# Patient Record
Sex: FEMALE | Race: WHITE | NOT HISPANIC OR LATINO | Employment: OTHER | URBAN - NONMETROPOLITAN AREA
[De-identification: names, ages, dates, MRNs, and addresses within clinical notes are randomized per-mention and may not be internally consistent; named-entity substitution may affect disease eponyms.]

---

## 2017-01-04 ENCOUNTER — OFFICE VISIT (OUTPATIENT)
Dept: FAMILY MEDICINE CLINIC | Facility: CLINIC | Age: 82
End: 2017-01-04

## 2017-01-04 VITALS
DIASTOLIC BLOOD PRESSURE: 77 MMHG | HEART RATE: 56 BPM | BODY MASS INDEX: 30.26 KG/M2 | SYSTOLIC BLOOD PRESSURE: 132 MMHG | WEIGHT: 181.6 LBS | OXYGEN SATURATION: 89 % | HEIGHT: 65 IN

## 2017-01-04 DIAGNOSIS — M79.7 FIBROMYALGIA: ICD-10-CM

## 2017-01-04 DIAGNOSIS — E78.5 HYPERLIPIDEMIA, UNSPECIFIED HYPERLIPIDEMIA TYPE: ICD-10-CM

## 2017-01-04 DIAGNOSIS — R21 RASH: ICD-10-CM

## 2017-01-04 DIAGNOSIS — J30.1 ALLERGIC RHINITIS DUE TO POLLEN, UNSPECIFIED RHINITIS SEASONALITY: ICD-10-CM

## 2017-01-04 DIAGNOSIS — I10 ESSENTIAL HYPERTENSION: Primary | ICD-10-CM

## 2017-01-04 DIAGNOSIS — M25.50 MULTIPLE JOINT PAIN: ICD-10-CM

## 2017-01-04 DIAGNOSIS — F41.9 ANXIETY: ICD-10-CM

## 2017-01-04 DIAGNOSIS — M19.012 OSTEOARTHRITIS OF LEFT SHOULDER, UNSPECIFIED OSTEOARTHRITIS TYPE: ICD-10-CM

## 2017-01-04 DIAGNOSIS — K21.9 GASTROESOPHAGEAL REFLUX DISEASE, ESOPHAGITIS PRESENCE NOT SPECIFIED: ICD-10-CM

## 2017-01-04 PROCEDURE — 99213 OFFICE O/P EST LOW 20 MIN: CPT | Performed by: NURSE PRACTITIONER

## 2017-01-04 RX ORDER — TRAMADOL HYDROCHLORIDE 50 MG/1
50 TABLET ORAL 2 TIMES DAILY
Qty: 60 TABLET | Refills: 0 | Status: SHIPPED | OUTPATIENT
Start: 2017-01-04 | End: 2017-02-22 | Stop reason: SDUPTHER

## 2017-01-04 NOTE — PROGRESS NOTES
Subjective   Tonia Ritter is a 81 y.o. female  here to follow up on fibromyalgia and OA pain.    Fibromyalgia   This is a chronic problem. The current episode started more than 1 year ago. The problem occurs daily. The problem has been unchanged. Associated symptoms include arthralgias and myalgias. Nothing aggravates the symptoms. She has tried acetaminophen, NSAIDs, oral narcotics and relaxation for the symptoms. The treatment provided moderate relief.   Pt has been taking Tramadol for her pain and is tolerating it well.  Tramadol enables pt to continue with her ADLs and live her life without difficulty.  She denies any side effects from the medication.  Pt is requesting refill on her med today.     Itching.  Patient states that she has an area on her back that itches intermittently.  She states that she cannot actually see the area that itches but she had surgery at the site a couple of years ago.  She denies any pain at the site.  No drainage from the site.  She has used lotion on area but does not seem to help.       Braulio # [id number]  Reviewed and is consistent.  Patient comfort assessment guide reviewed and updated today.    As part of the patient's treatment plan they are being prescribed a controlled substance/ substances with potential for abuse.  This patient has been made aware of the appropriate use of such medications, including potential risk of somnolence, limited ability to drive and/or work safely, and potential for overdose.  It has also been made clear these medications are for use by the patient only, without concomitant use of alcohol or other substances unless prescribed/advised by medical provider.    Patient has completed prescribing agreement detailing terms of continued prescribing of controlled substances including monitoring BRAULIO reports, urine drug screens, and pill counts.  The patient is aware BRAULIO reports are reviewed on a regular basis and scanned into the chart.    History  and physical exam exhibit continued safe and appropriate use of controlled substances.    Family History   Problem Relation Age of Onset   • Thyroid disease Mother    • Mental illness Father      nervous breakdown       Social History     Social History   • Marital status:      Spouse name: N/A   • Number of children: N/A   • Years of education: N/A     Occupational History   • Not on file.     Social History Main Topics   • Smoking status: Never Smoker   • Smokeless tobacco: Never Used   • Alcohol use No   • Drug use: No   • Sexual activity: Not on file     Other Topics Concern   • Not on file     Social History Narrative       Past Medical History   Diagnosis Date   • Acute bronchitis    • Allergic rhinitis    • Anxiety    • Atrial fibrillation    • Back pain    • Burping    • Chronic pain    • Depression    • Encounter for screening fecal occult blood testing 03/10/2016     NEGATIVE   • Fibromyalgia    • Flatulence    • GERD (gastroesophageal reflux disease)    • Hematuria    • Hyperlipidemia    • Hypertension    • Osteoarthritis    • PUD (peptic ulcer disease)    • Right shoulder pain        Review of Systems   Constitutional: Negative.    HENT: Negative.    Respiratory: Negative.    Cardiovascular: Negative.    Gastrointestinal: Negative.    Musculoskeletal: Positive for arthralgias and myalgias.   Skin:        itching   Neurological: Negative.        Objective   Physical Exam   Constitutional: She is oriented to person, place, and time. She appears well-developed and well-nourished.   Neck: Normal range of motion. Neck supple.   Cardiovascular: Normal rate, regular rhythm and normal heart sounds.    Pulmonary/Chest: Effort normal and breath sounds normal.   Neurological: She is alert and oriented to person, place, and time.   Skin: Skin is warm and dry.   Erythematous quarter size area noted to upper right back area x2     Psychiatric: She has a normal mood and affect. Her behavior is normal. Judgment  "and thought content normal.   Nursing note and vitals reviewed.      Procedures    Blood pressure 132/77, pulse 56, height 65\" (165.1 cm), weight 181 lb 9.6 oz (82.4 kg), SpO2 (!) 89 %, not currently breastfeeding.      Assessment/Plan   Tonia was seen today for follow-up, hypertension and anxiety.    Diagnoses and all orders for this visit:    Essential hypertension  -     CBC & Differential  -     Comprehensive Metabolic Panel  -     Lipid Panel  -     TSH  -     Magnesium    Allergic rhinitis due to pollen, unspecified rhinitis seasonality  -     CBC & Differential  -     Comprehensive Metabolic Panel  -     Lipid Panel  -     TSH  -     Magnesium    Gastroesophageal reflux disease, esophagitis presence not specified  -     CBC & Differential  -     Comprehensive Metabolic Panel  -     Lipid Panel  -     TSH  -     Magnesium    Hyperlipidemia, unspecified hyperlipidemia type  -     CBC & Differential  -     Comprehensive Metabolic Panel  -     Lipid Panel  -     TSH  -     Magnesium    Anxiety  -     CBC & Differential  -     Comprehensive Metabolic Panel  -     Lipid Panel  -     TSH  -     Magnesium    Fibromyalgia  -     traMADol (ULTRAM) 50 MG tablet; Take 1 tablet by mouth 2 (Two) Times a Day.    Osteoarthritis of left shoulder, unspecified osteoarthritis type    Multiple joint pain  -     traMADol (ULTRAM) 50 MG tablet; Take 1 tablet by mouth 2 (Two) Times a Day.  -     CBC & Differential  -     Comprehensive Metabolic Panel  -     Lipid Panel  -     TSH  -     Magnesium    Rash  -     triamcinolone (KENALOG) 0.1 % ointment; Apply  topically 2 (Two) Times a Day.      Labs prior to next visit   Refill Tramadol   Continue current meds and plan of care   RTC 3 months; sooner if needed            "

## 2017-01-04 NOTE — MR AVS SNAPSHOT
Tonia Ritter   1/4/2017 2:20 PM   Office Visit    Dept Phone:  [phone]   Encounter #:  [id number]    Provider:  TEODORA Red   Department:  [facility]                Your Full Care Plan              Today's Medication Changes          These changes are accurate as of: 1/4/17  3:16 PM.  If you have any questions, ask your nurse or doctor.               New Medication(s)Ordered:     triamcinolone 0.1 % ointment   Commonly known as:  KENALOG   Apply  topically 2 (Two) Times a Day.   Started by:  TEODORA Red            Where to Get Your Medications      These medications were sent to [facility] [address] [place] [address] [phone] [facility] [phone] [address] [address]     Phone:  [phone]     triamcinolone 0.1 % ointment         You can get these medications from any pharmacy     Bring a paper prescription for each of these medications     traMADol 50 MG tablet                  Your Updated Medication List          This list is accurate as of: 1/4/17  3:16 PM.  Always use your most recent med list.                acetaminophen 500 MG tablet   Commonly known as:  TYLENOL       diazePAM 2 MG tablet   Commonly known as:  VALIUM   Take 1 tablet by mouth 2 (Two) Times a Day As Needed for anxiety.       furosemide 20 MG tablet   Commonly known as:  LASIX   Take 1 tablet by mouth 2 (Two) Times a Day.       lisinopril 5 MG tablet   Commonly known as:  PRINIVIL,ZESTRIL       miconazole 2 % cream   Commonly known as:  MICATIN   Apply  topically 2 (Two) Times a Day.       mometasone 50 MCG/ACT nasal spray   Commonly known as:  NASONEX   2 sprays into each nostril daily.       PARoxetine 10 MG tablet   Commonly known as:  PAXIL   Take 1 tablet by mouth Every Night. 1/2 tab qhs       potassium chloride 10 MEQ CR tablet   Commonly known as:  K-DUR,KLOR-CON   Take 1 tablet by mouth daily.       PROBIOTIC ACIDOPHILUS BEADS PO       raNITIdine 150 MG tablet   Commonly known as:  ZANTAC   Take 1 tablet by mouth 2 (Two) Times a Day.       rivaroxaban 20 MG tablet   Commonly known as:  XARELTO   Take 1 tablet by mouth Daily.       rosuvastatin 10 MG tablet   Commonly known as:  CRESTOR   Take 1 tablet by mouth daily.       simethicone 80 MG chewable tablet   Commonly known as:  MYLICON       sotalol 120 MG tablet   Commonly known as:  BETAPACE   Take 1 tablet by mouth 2 (Two) Times a Day.       traMADol 50 MG tablet   Commonly known as:  ULTRAM   Take 1 tablet by mouth 2 (Two) Times a Day.       triamcinolone 0.1 % ointment   Commonly known as:  KENALOG   Apply  topically 2 (Two) Times a Day.               We Performed the Following     CBC & Differential     Comprehensive Metabolic Panel     Lipid Panel     Magnesium     TSH       You Were Diagnosed With        Codes Comments    Essential hypertension    -  Primary ICD-10-CM: I10  ICD-9-CM: 401.9     Allergic rhinitis due to pollen, unspecified rhinitis seasonality     ICD-10-CM: J30.1  ICD-9-CM: 477.0     Gastroesophageal reflux disease, esophagitis presence not specified     ICD-10-CM: K21.9  ICD-9-CM: 530.81     Hyperlipidemia, unspecified hyperlipidemia type     ICD-10-CM: E78.5  ICD-9-CM: 272.4     Anxiety     ICD-10-CM: F41.9  ICD-9-CM: 300.00     Fibromyalgia     ICD-10-CM: M79.7  ICD-9-CM: 729.1     Osteoarthritis of left shoulder, unspecified osteoarthritis type     ICD-10-CM: M19.012  ICD-9-CM: 715.91     Multiple joint pain     ICD-10-CM: M25.50  ICD-9-CM: 719.49     Rash     ICD-10-CM: R21  ICD-9-CM: 782.1       Instructions     None    Patient Instructions History      Upcoming Appointments     Visit Type Date Time Department    FOLLOW UP 1/4/2017  2:20 PM [facility]    FOLLOW UP 4/4/2017  2:00 PM [facility]      MyChart Signup     [facility] [employer] allows you to send messages to your doctor, view your test results, renew your  "prescriptions, schedule appointments, and more. To sign up, go to [employer] and click on the Sign Up Now link in the New User? box. Enter your [employer] Activation Code exactly as it appears below along with the last four digits of your Social Security Number and your Date of Birth () to complete the sign-up process. If you do not sign up before the expiration date, you must request a new code.    [employer] Activation Code: [email or web address]  Expires: 2017  3:16 PM    If you have questions, you can email [email or web address] or call [phone] to talk to our [employer] staff. Remember, [employer] is NOT to be used for urgent needs. For medical emergencies, dial 911.               Other Info from Your Visit           Your Appointments     2017  2:00 PM EDT   Follow Up with TEODORA Red   [facility] (--)    [address]  [address]  [facility] [address] [phone]           Arrive 15 minutes prior to appointment.              Allergies     Reglan [Metoclopramide]      Sulfa Antibiotics        Reason for Visit     Follow-up     Hypertension     Anxiety           Vital Signs     Blood Pressure Pulse Height Weight Oxygen Saturation Breastfeeding?    132/77 (BP Location: Right arm, Patient Position: Sitting, Cuff Size: Adult) 56 65\" (165.1 cm) 181 lb 9.6 oz (82.4 kg) 89% No    Body Mass Index Smoking Status                30.22 kg/m2 Never Smoker          Problems and Diagnoses Noted     Nasal inflammation due to allergen    Anxiety problem    Fibromyalgia    Acid reflux disease    High cholesterol or triglycerides    High blood pressure    Osteoarthritis (arthritis due to wear and tear of joints)    Multiple joint pain        Rash and nonspecific skin eruption            "

## 2017-01-09 LAB
ALBUMIN SERPL-MCNC: 4.5 G/DL (ref 3.4–4.8)
ALBUMIN/GLOB SERPL: 1.6 G/DL (ref 1.5–2.5)
ALP SERPL-CCNC: 86 U/L (ref 46–116)
ALT SERPL W P-5'-P-CCNC: 8 U/L (ref 10–36)
ANION GAP SERPL CALCULATED.3IONS-SCNC: 6.8 MMOL/L (ref 3.6–11.2)
AST SERPL-CCNC: 24 U/L (ref 10–30)
BASOPHILS # BLD AUTO: 0.02 10*3/MM3 (ref 0–0.3)
BASOPHILS NFR BLD AUTO: 0.4 % (ref 0–2)
BILIRUB SERPL-MCNC: 0.4 MG/DL (ref 0.2–1.8)
BUN BLD-MCNC: 11 MG/DL (ref 7–21)
BUN/CREAT SERPL: 10.2 (ref 7–25)
CALCIUM SPEC-SCNC: 9.9 MG/DL (ref 7.7–10)
CHLORIDE SERPL-SCNC: 106 MMOL/L (ref 99–112)
CHOLEST SERPL-MCNC: 199 MG/DL (ref 0–200)
CO2 SERPL-SCNC: 32.2 MMOL/L (ref 24.3–31.9)
CREAT BLD-MCNC: 1.08 MG/DL (ref 0.43–1.29)
DEPRECATED RDW RBC AUTO: 46.7 FL (ref 37–54)
EOSINOPHIL # BLD AUTO: 0.13 10*3/MM3 (ref 0–0.7)
EOSINOPHIL NFR BLD AUTO: 2.5 % (ref 0–7)
ERYTHROCYTE [DISTWIDTH] IN BLOOD BY AUTOMATED COUNT: 13.4 % (ref 11.5–14.5)
GFR SERPL CREATININE-BSD FRML MDRD: 49 ML/MIN/1.73
GLOBULIN UR ELPH-MCNC: 2.9 GM/DL
GLUCOSE BLD-MCNC: 99 MG/DL (ref 70–110)
HCT VFR BLD AUTO: 41.2 % (ref 37–47)
HDLC SERPL-MCNC: 67 MG/DL (ref 60–100)
HGB BLD-MCNC: 13.1 G/DL (ref 12–16)
IMM GRANULOCYTES # BLD: 0 10*3/MM3 (ref 0–0.03)
IMM GRANULOCYTES NFR BLD: 0 % (ref 0–0.5)
LDLC SERPL CALC-MCNC: 105 MG/DL (ref 0–100)
LDLC/HDLC SERPL: 1.57 {RATIO}
LYMPHOCYTES # BLD AUTO: 1.99 10*3/MM3 (ref 1–3)
LYMPHOCYTES NFR BLD AUTO: 39 % (ref 16–46)
MAGNESIUM SERPL-MCNC: 2.5 MG/DL (ref 1.7–2.6)
MCH RBC QN AUTO: 30.2 PG (ref 27–33)
MCHC RBC AUTO-ENTMCNC: 31.8 G/DL (ref 33–37)
MCV RBC AUTO: 94.9 FL (ref 80–94)
MONOCYTES # BLD AUTO: 0.73 10*3/MM3 (ref 0.1–0.9)
MONOCYTES NFR BLD AUTO: 14.3 % (ref 0–12)
NEUTROPHILS # BLD AUTO: 2.23 10*3/MM3 (ref 1.4–6.5)
NEUTROPHILS NFR BLD AUTO: 43.8 % (ref 40–75)
OSMOLALITY SERPL CALC.SUM OF ELEC: 288.1 MOSM/KG (ref 273–305)
PLATELET # BLD AUTO: 185 10*3/MM3 (ref 130–400)
PMV BLD AUTO: 11 FL (ref 6–10)
POTASSIUM BLD-SCNC: 4.3 MMOL/L (ref 3.5–5.3)
PROT SERPL-MCNC: 7.4 G/DL (ref 6–8)
RBC # BLD AUTO: 4.34 10*6/MM3 (ref 4.2–5.4)
SODIUM BLD-SCNC: 145 MMOL/L (ref 135–153)
TRIGL SERPL-MCNC: 135 MG/DL (ref 0–150)
TSH SERPL DL<=0.05 MIU/L-ACNC: 1.35 MIU/ML (ref 0.55–4.78)
VLDLC SERPL-MCNC: 27 MG/DL
WBC NRBC COR # BLD: 5.1 10*3/MM3 (ref 4.5–12.5)

## 2017-01-09 PROCEDURE — 85025 COMPLETE CBC W/AUTO DIFF WBC: CPT | Performed by: NURSE PRACTITIONER

## 2017-01-09 PROCEDURE — 84443 ASSAY THYROID STIM HORMONE: CPT | Performed by: NURSE PRACTITIONER

## 2017-01-09 PROCEDURE — 80053 COMPREHEN METABOLIC PANEL: CPT | Performed by: NURSE PRACTITIONER

## 2017-01-09 PROCEDURE — 36415 COLL VENOUS BLD VENIPUNCTURE: CPT | Performed by: NURSE PRACTITIONER

## 2017-01-09 PROCEDURE — 83735 ASSAY OF MAGNESIUM: CPT | Performed by: NURSE PRACTITIONER

## 2017-01-09 PROCEDURE — 80061 LIPID PANEL: CPT | Performed by: NURSE PRACTITIONER

## 2017-01-10 ENCOUNTER — TELEPHONE (OUTPATIENT)
Dept: FAMILY MEDICINE CLINIC | Facility: CLINIC | Age: 82
End: 2017-01-10

## 2017-01-10 NOTE — TELEPHONE ENCOUNTER
----- Message from TEODORA Red sent at 1/10/2017  2:46 PM EST -----  Labs look essentially normal.  Please let pt know.      No answer & no way to leave a message will attempt later.    Patient returned call & verbalized understanding.

## 2017-01-12 ENCOUNTER — TELEPHONE (OUTPATIENT)
Dept: FAMILY MEDICINE CLINIC | Facility: CLINIC | Age: 82
End: 2017-01-12

## 2017-01-12 ENCOUNTER — HOSPITAL ENCOUNTER (OUTPATIENT)
Dept: CT IMAGING | Facility: HOSPITAL | Age: 82
Discharge: HOME | End: 2017-01-12
Attending: OTOLARYNGOLOGY | Admitting: OTOLARYNGOLOGY

## 2017-01-12 DIAGNOSIS — J30.1 ALLERGIC RHINITIS DUE TO POLLEN, UNSPECIFIED RHINITIS SEASONALITY: ICD-10-CM

## 2017-01-12 DIAGNOSIS — F41.9 ANXIETY: ICD-10-CM

## 2017-01-12 PROCEDURE — 70486 CT MAXILLOFACIAL W/O DYE: CPT

## 2017-01-12 PROCEDURE — 70486 CT MAXILLOFACIAL W/O DYE: CPT | Performed by: RADIOLOGY

## 2017-01-12 RX ORDER — LISINOPRIL 5 MG/1
5 TABLET ORAL DAILY
Qty: 30 TABLET | Refills: 5 | Status: SHIPPED | OUTPATIENT
Start: 2017-01-12 | End: 2017-10-10 | Stop reason: SDUPTHER

## 2017-01-12 RX ORDER — DIAZEPAM 2 MG/1
2 TABLET ORAL 2 TIMES DAILY PRN
Qty: 60 TABLET | Refills: 0 | OUTPATIENT
Start: 2017-01-12 | End: 2017-02-22 | Stop reason: SDUPTHER

## 2017-01-12 NOTE — TELEPHONE ENCOUNTER
Patient called requesting a refill on Diazepam,mo is on your desk.    Rx called in per order & patient notified.

## 2017-02-03 RX ORDER — POLYETHYLENE GLYCOL 3350 17 G/17G
17 POWDER, FOR SOLUTION ORAL DAILY
Qty: 527 G | Refills: 3 | Status: SHIPPED | OUTPATIENT
Start: 2017-02-03 | End: 2017-02-06 | Stop reason: SDUPTHER

## 2017-02-06 RX ORDER — POLYETHYLENE GLYCOL 3350 17 G/17G
17 POWDER, FOR SOLUTION ORAL DAILY
Qty: 527 G | Refills: 3 | Status: SHIPPED | OUTPATIENT
Start: 2017-02-06 | End: 2017-12-19

## 2017-02-06 RX ORDER — ROSUVASTATIN CALCIUM 10 MG/1
10 TABLET, COATED ORAL DAILY
Qty: 30 TABLET | Refills: 5 | Status: SHIPPED | OUTPATIENT
Start: 2017-02-06 | End: 2018-01-04 | Stop reason: SDUPTHER

## 2017-02-06 NOTE — TELEPHONE ENCOUNTER
Patient called for refills on Miralax & crestor,refilled per protocol but she is requesting an ointment for a fever blister like sore that is on the inside & outside of her nose?

## 2017-02-07 NOTE — TELEPHONE ENCOUNTER
Bactroban ointment apply to affected areas BID #[id number] no refills    Rx sent to pharmacy & left a message for her to return call

## 2017-02-22 ENCOUNTER — OFFICE VISIT (OUTPATIENT)
Dept: FAMILY MEDICINE CLINIC | Facility: CLINIC | Age: 82
End: 2017-02-22

## 2017-02-22 VITALS
HEIGHT: 65 IN | HEART RATE: 57 BPM | WEIGHT: 180.6 LBS | OXYGEN SATURATION: 99 % | BODY MASS INDEX: 30.09 KG/M2 | DIASTOLIC BLOOD PRESSURE: 74 MMHG | SYSTOLIC BLOOD PRESSURE: 135 MMHG

## 2017-02-22 DIAGNOSIS — M25.50 MULTIPLE JOINT PAIN: ICD-10-CM

## 2017-02-22 DIAGNOSIS — M79.7 FIBROMYALGIA: ICD-10-CM

## 2017-02-22 DIAGNOSIS — F41.9 ANXIETY: ICD-10-CM

## 2017-02-22 DIAGNOSIS — K59.00 CONSTIPATION, UNSPECIFIED CONSTIPATION TYPE: Primary | ICD-10-CM

## 2017-02-22 PROCEDURE — 99213 OFFICE O/P EST LOW 20 MIN: CPT | Performed by: NURSE PRACTITIONER

## 2017-02-22 RX ORDER — DOCUSATE SODIUM 100 MG/1
100 CAPSULE, LIQUID FILLED ORAL DAILY
Qty: 30 CAPSULE | Refills: 5 | Status: SHIPPED | OUTPATIENT
Start: 2017-02-22 | End: 2017-12-27

## 2017-02-22 RX ORDER — DIAZEPAM 2 MG/1
2 TABLET ORAL 2 TIMES DAILY PRN
Qty: 60 TABLET | Refills: 0 | Status: SHIPPED | OUTPATIENT
Start: 2017-02-22 | End: 2017-04-21 | Stop reason: SDUPTHER

## 2017-02-22 RX ORDER — OMEGA-3/DHA/EPA/FISH OIL 1000 MG
CAPSULE ORAL
Qty: 30 CAPSULE | Refills: 5 | Status: SHIPPED | OUTPATIENT
Start: 2017-02-22 | End: 2017-12-27

## 2017-02-22 RX ORDER — TRAMADOL HYDROCHLORIDE 50 MG/1
50 TABLET ORAL 2 TIMES DAILY
Qty: 60 TABLET | Refills: 0 | Status: SHIPPED | OUTPATIENT
Start: 2017-02-22 | End: 2017-04-21 | Stop reason: SDUPTHER

## 2017-02-22 NOTE — PROGRESS NOTES
Subjective   Tonia Ritter is a 81 y.o. female.     Anxiety   Presents for follow-up visit. Symptoms include decreased concentration, irritability and nervous/anxious behavior. Symptoms occur occasionally. The severity of symptoms is moderate. The quality of sleep is good. Nighttime awakenings: occasional.     Compliance with medications is %.   Pt has taken diazepam for years and tolerates it well.  Her symptoms are well controlled with the medication and without side effects.  Pt is able to continue her ADLs with use of medication.  She denies any suicidal thoughts or ideations.       Fibromyalgia   This is a chronic problem. The current episode started more than 1 year ago. The problem occurs daily. The problem has been unchanged. Associated symptoms include arthralgias and myalgias. Nothing aggravates the symptoms. She has tried acetaminophen, NSAIDs, oral narcotics and relaxation for the symptoms. The treatment provided moderate relief.   Pt has been taking Tramadol for her pain and is tolerating it well. Tramadol enables pt to continue with her ADLs and live her life without difficulty. She denies any side effects from the medication. Pt is requesting refill on her med today.     Pt has a hx of constipation also.  She has been using Miralax and taking it every day.  She states for the past several days she has had excessive gas and abdominal cramping.  She is no longer having any formed stool.  She is also having some rectal bleeding with bowel movements.  She has a hx of hemorrhoids and has discussed this issue with Dr Bearden, general surgeon, who has advised that she have surgery to correct the hemorrhoid and he is currently prescribing her suppositories for her hemorrhoids.  After discussion today I have advised her to stop using Miralax for now.  She may be taking too much of this medication.  I have advised her that she can use a stool softener to help with soft bowel movements.  Pt is agreeable  today.   She is to also follow up with Dr. Bearden regarding her surgery for hemorrhoids.  Pt states understanding.     Family History   Problem Relation Age of Onset   • Thyroid disease Mother    • Mental illness Father      nervous breakdown       Social History     Social History   • Marital status:      Spouse name: N/A   • Number of children: N/A   • Years of education: N/A     Occupational History   • Not on file.     Social History Main Topics   • Smoking status: Never Smoker   • Smokeless tobacco: Never Used   • Alcohol use No   • Drug use: No   • Sexual activity: Not on file     Other Topics Concern   • Not on file     Social History Narrative       Past Medical History   Diagnosis Date   • Acute bronchitis    • Allergic rhinitis    • Anxiety    • Atrial fibrillation    • Back pain    • Burping    • Chronic pain    • Depression    • Encounter for screening fecal occult blood testing 03/10/2016     NEGATIVE   • Fibromyalgia    • Flatulence    • GERD (gastroesophageal reflux disease)    • Hematuria    • Hyperlipidemia    • Hypertension    • Osteoarthritis    • PUD (peptic ulcer disease)    • Right shoulder pain        Review of Systems   Constitutional: Positive for irritability.   HENT: Negative.    Respiratory: Negative.    Cardiovascular: Negative.    Gastrointestinal: Positive for anal bleeding and constipation.   Neurological: Negative.    Psychiatric/Behavioral: Positive for decreased concentration. The patient is nervous/anxious.        Objective   Physical Exam   Constitutional: She is oriented to person, place, and time. She appears well-developed and well-nourished.   Neck: Normal range of motion. Neck supple.   Cardiovascular: Normal rate, regular rhythm and normal heart sounds.    Pulmonary/Chest: Effort normal and breath sounds normal.   Neurological: She is alert and oriented to person, place, and time.   Skin: Skin is warm and dry.   Psychiatric: She has a normal mood and affect. Her  "behavior is normal. Judgment and thought content normal.   Nursing note and vitals reviewed.      Procedures  Blood pressure 135/74, pulse 57, height 65\" (165.1 cm), weight 180 lb 9.6 oz (81.9 kg), SpO2 99 %, not currently breastfeeding.      Assessment/Plan   Tonia was seen today for hemorrhoids and constipation.    Diagnoses and all orders for this visit:    Constipation, unspecified constipation type  -     docusate sodium (COLACE) 100 MG capsule; Take 1 capsule by mouth Daily.  -     Probiotic Product (PROBIOTIC ACIDOPHILUS) capsule; Once daily as directed    Anxiety  -     diazePAM (VALIUM) 2 MG tablet; Take 1 tablet by mouth 2 (Two) Times a Day As Needed for anxiety.    Multiple joint pain  -     traMADol (ULTRAM) 50 MG tablet; Take 1 tablet by mouth 2 (Two) Times a Day.    Fibromyalgia  -     traMADol (ULTRAM) 50 MG tablet; Take 1 tablet by mouth 2 (Two) Times a Day.      Refill on meds today.  Stop Miralax; start colace   Continue other meds as prescribed.   RTC 2-3 months for follow up       "

## 2017-03-06 RX ORDER — SOTALOL HYDROCHLORIDE 120 MG/1
120 TABLET ORAL 2 TIMES DAILY
Qty: 60 TABLET | Refills: 3 | Status: SHIPPED | OUTPATIENT
Start: 2017-03-06 | End: 2017-06-07 | Stop reason: SDUPTHER

## 2017-03-15 RX ORDER — FUROSEMIDE 20 MG/1
20 TABLET ORAL 2 TIMES DAILY
Qty: 60 TABLET | Refills: 3 | Status: SHIPPED | OUTPATIENT
Start: 2017-03-15 | End: 2017-07-03 | Stop reason: SDUPTHER

## 2017-03-15 RX ORDER — RANITIDINE 150 MG/1
150 TABLET ORAL 2 TIMES DAILY
Qty: 60 TABLET | Refills: 3 | Status: SHIPPED | OUTPATIENT
Start: 2017-03-15 | End: 2017-12-27

## 2017-03-29 RX ORDER — POTASSIUM CHLORIDE 750 MG/1
10 TABLET, EXTENDED RELEASE ORAL DAILY
Qty: 30 TABLET | Refills: 5 | Status: SHIPPED | OUTPATIENT
Start: 2017-03-29 | End: 2017-11-14 | Stop reason: SDUPTHER

## 2017-04-21 ENCOUNTER — OFFICE VISIT (OUTPATIENT)
Dept: FAMILY MEDICINE CLINIC | Facility: CLINIC | Age: 82
End: 2017-04-21

## 2017-04-21 VITALS
OXYGEN SATURATION: 94 % | HEART RATE: 61 BPM | BODY MASS INDEX: 30.46 KG/M2 | HEIGHT: 65 IN | WEIGHT: 182.8 LBS | DIASTOLIC BLOOD PRESSURE: 66 MMHG | SYSTOLIC BLOOD PRESSURE: 124 MMHG

## 2017-04-21 DIAGNOSIS — R53.83 OTHER FATIGUE: ICD-10-CM

## 2017-04-21 DIAGNOSIS — M79.7 FIBROMYALGIA: ICD-10-CM

## 2017-04-21 DIAGNOSIS — Z12.31 SCREENING MAMMOGRAM, ENCOUNTER FOR: Primary | ICD-10-CM

## 2017-04-21 DIAGNOSIS — E55.9 VITAMIN D DEFICIENCY: ICD-10-CM

## 2017-04-21 DIAGNOSIS — M25.50 MULTIPLE JOINT PAIN: ICD-10-CM

## 2017-04-21 DIAGNOSIS — F41.9 ANXIETY: ICD-10-CM

## 2017-04-21 LAB
25(OH)D3 SERPL-MCNC: 28 NG/ML
ALBUMIN SERPL-MCNC: 4.4 G/DL (ref 3.4–4.8)
ALBUMIN/GLOB SERPL: 1.6 G/DL (ref 1.5–2.5)
ALP SERPL-CCNC: 81 U/L (ref 35–104)
ALT SERPL W P-5'-P-CCNC: 12 U/L (ref 10–36)
ANION GAP SERPL CALCULATED.3IONS-SCNC: 2.9 MMOL/L (ref 3.6–11.2)
AST SERPL-CCNC: 24 U/L (ref 10–30)
BASOPHILS # BLD AUTO: 0.01 10*3/MM3 (ref 0–0.3)
BASOPHILS NFR BLD AUTO: 0.2 % (ref 0–2)
BILIRUB SERPL-MCNC: 0.3 MG/DL (ref 0.2–1.8)
BUN BLD-MCNC: 14 MG/DL (ref 7–21)
BUN/CREAT SERPL: 14.7 (ref 7–25)
CALCIUM SPEC-SCNC: 9.4 MG/DL (ref 7.7–10)
CHLORIDE SERPL-SCNC: 104 MMOL/L (ref 99–112)
CO2 SERPL-SCNC: 33.1 MMOL/L (ref 24.3–31.9)
CREAT BLD-MCNC: 0.95 MG/DL (ref 0.43–1.29)
DEPRECATED RDW RBC AUTO: 47.6 FL (ref 37–54)
EOSINOPHIL # BLD AUTO: 0.11 10*3/MM3 (ref 0–0.7)
EOSINOPHIL NFR BLD AUTO: 1.7 % (ref 0–7)
ERYTHROCYTE [DISTWIDTH] IN BLOOD BY AUTOMATED COUNT: 13.8 % (ref 11.5–14.5)
GFR SERPL CREATININE-BSD FRML MDRD: 57 ML/MIN/1.73
GLOBULIN UR ELPH-MCNC: 2.8 GM/DL
GLUCOSE BLD-MCNC: 86 MG/DL (ref 70–110)
HCT VFR BLD AUTO: 39 % (ref 37–47)
HGB BLD-MCNC: 12.5 G/DL (ref 12–16)
IMM GRANULOCYTES # BLD: 0.02 10*3/MM3 (ref 0–0.03)
IMM GRANULOCYTES NFR BLD: 0.3 % (ref 0–0.5)
LYMPHOCYTES # BLD AUTO: 2.24 10*3/MM3 (ref 1–3)
LYMPHOCYTES NFR BLD AUTO: 34.8 % (ref 16–46)
MAGNESIUM SERPL-MCNC: 2.1 MG/DL (ref 1.7–2.6)
MCH RBC QN AUTO: 30.1 PG (ref 27–33)
MCHC RBC AUTO-ENTMCNC: 32.1 G/DL (ref 33–37)
MCV RBC AUTO: 94 FL (ref 80–94)
MONOCYTES # BLD AUTO: 1 10*3/MM3 (ref 0.1–0.9)
MONOCYTES NFR BLD AUTO: 15.5 % (ref 0–12)
NEUTROPHILS # BLD AUTO: 3.06 10*3/MM3 (ref 1.4–6.5)
NEUTROPHILS NFR BLD AUTO: 47.5 % (ref 40–75)
OSMOLALITY SERPL CALC.SUM OF ELEC: 279.2 MOSM/KG (ref 273–305)
PLATELET # BLD AUTO: 177 10*3/MM3 (ref 130–400)
PMV BLD AUTO: 11.4 FL (ref 6–10)
POTASSIUM BLD-SCNC: 4.2 MMOL/L (ref 3.5–5.3)
PROT SERPL-MCNC: 7.2 G/DL (ref 6–8)
RBC # BLD AUTO: 4.15 10*6/MM3 (ref 4.2–5.4)
SODIUM BLD-SCNC: 140 MMOL/L (ref 135–153)
VIT B12 BLD-MCNC: 421 PG/ML (ref 211–911)
WBC NRBC COR # BLD: 6.44 10*3/MM3 (ref 4.5–12.5)

## 2017-04-21 PROCEDURE — 83735 ASSAY OF MAGNESIUM: CPT | Performed by: NURSE PRACTITIONER

## 2017-04-21 PROCEDURE — 85025 COMPLETE CBC W/AUTO DIFF WBC: CPT | Performed by: NURSE PRACTITIONER

## 2017-04-21 PROCEDURE — 36415 COLL VENOUS BLD VENIPUNCTURE: CPT | Performed by: NURSE PRACTITIONER

## 2017-04-21 PROCEDURE — 82607 VITAMIN B-12: CPT | Performed by: NURSE PRACTITIONER

## 2017-04-21 PROCEDURE — 80053 COMPREHEN METABOLIC PANEL: CPT | Performed by: NURSE PRACTITIONER

## 2017-04-21 PROCEDURE — 99214 OFFICE O/P EST MOD 30 MIN: CPT | Performed by: NURSE PRACTITIONER

## 2017-04-21 PROCEDURE — 82306 VITAMIN D 25 HYDROXY: CPT | Performed by: NURSE PRACTITIONER

## 2017-04-21 PROCEDURE — 96372 THER/PROPH/DIAG INJ SC/IM: CPT | Performed by: NURSE PRACTITIONER

## 2017-04-21 RX ORDER — TRAMADOL HYDROCHLORIDE 50 MG/1
50 TABLET ORAL 2 TIMES DAILY
Qty: 60 TABLET | Refills: 0 | Status: SHIPPED | OUTPATIENT
Start: 2017-04-21 | End: 2017-06-07 | Stop reason: SDUPTHER

## 2017-04-21 RX ORDER — CYANOCOBALAMIN 1000 UG/ML
1000 INJECTION, SOLUTION INTRAMUSCULAR; SUBCUTANEOUS ONCE
Status: COMPLETED | OUTPATIENT
Start: 2017-04-21 | End: 2017-04-21

## 2017-04-21 RX ORDER — DIAZEPAM 2 MG/1
2 TABLET ORAL 2 TIMES DAILY PRN
Qty: 60 TABLET | Refills: 0 | Status: SHIPPED | OUTPATIENT
Start: 2017-04-21 | End: 2017-06-07 | Stop reason: SDUPTHER

## 2017-04-21 RX ADMIN — CYANOCOBALAMIN 1000 MCG: 1000 INJECTION, SOLUTION INTRAMUSCULAR; SUBCUTANEOUS at 16:13

## 2017-04-21 NOTE — PROGRESS NOTES
Subjective   Tonia Ritter is a 81 y.o. female.     Chief Complaint: Follow-up; Anxiety; and Fibromyalgia    History of Present Illness   Pt here for follow up on anxiety and fibromyalgia.    Anxiety   Presents for follow-up visit. Symptoms include decreased concentration, irritability and nervous/anxious behavior. Symptoms occur occasionally. The severity of symptoms is moderate. The quality of sleep is good. Nighttime awakenings: occasional.      Compliance with medications is %.   Pt has taken diazepam for years and tolerates it well. Her symptoms are well controlled with the medication and without side effects. Pt is able to continue her ADLs with use of medication. She denies any suicidal thoughts or ideations. Pt states that she does occasionally take only 1/2 tablet and does well with that.        Fibromyalgia   This is a chronic problem. The current episode started more than 1 year ago. The problem occurs daily. The problem has been unchanged. Associated symptoms include arthralgias and myalgias. Nothing aggravates the symptoms. She has tried acetaminophen, NSAIDs, oral narcotics and relaxation for the symptoms. The treatment provided moderate relief.   Pt has been taking Tramadol for her pain and is tolerating it well. Tramadol enables pt to continue with her ADLs and live her life without difficulty. She denies any side effects from the medication. Pt is requesting refill on her med today.     Fatigue  Pt states that she has been having a lot of fatigue and getting tired very easily.  These symptoms have been going on for several months and seem to be getting worse.  She continues to follow up with cardiologist as scheduled.  She denies any chest pain or SOA, dizziness or syncope.  Just feels tired.     Micheal #[id number]  Reviewed and is consistent.  Patient comfort assessment guide reviewed and updated today.    As part of the patient's treatment plan they are being prescribed a controlled  substance/ substances with potential for abuse.  This patient has been made aware of the appropriate use of such medications, including potential risk of somnolence, limited ability to drive and/or work safely, and potential for overdose.  It has also been made clear these medications are for use by the patient only, without concomitant use of alcohol or other substances unless prescribed/advised by medical provider.    Patient has completed prescribing agreement detailing terms of continued prescribing of controlled substances including monitoring BRAULIO reports, urine drug screens, and pill counts.  The patient is aware BRAULIO reports are reviewed on a regular basis and scanned into the chart.    History and physical exam exhibit continued safe and appropriate use of controlled substances.    Family History   Problem Relation Age of Onset   • Thyroid disease Mother    • Mental illness Father      nervous breakdown       Social History     Social History   • Marital status:      Spouse name: N/A   • Number of children: N/A   • Years of education: N/A     Occupational History   • Not on file.     Social History Main Topics   • Smoking status: Never Smoker   • Smokeless tobacco: Never Used   • Alcohol use No   • Drug use: No   • Sexual activity: Defer     Other Topics Concern   • Not on file     Social History Narrative       Past Medical History:   Diagnosis Date   • Acute bronchitis    • Allergic rhinitis    • Anxiety    • Atrial fibrillation    • Back pain    • Burping    • Chronic pain    • Depression    • Encounter for screening fecal occult blood testing 03/10/2016    NEGATIVE   • Fibromyalgia    • Flatulence    • GERD (gastroesophageal reflux disease)    • Hematuria    • Hyperlipidemia    • Hypertension    • Osteoarthritis    • PUD (peptic ulcer disease)    • Right shoulder pain        Review of Systems   Constitutional: Positive for fatigue.   HENT: Negative.    Respiratory: Negative.   "  Cardiovascular: Negative.    Gastrointestinal: Negative.    Genitourinary: Negative.    Musculoskeletal: Positive for myalgias.   Neurological: Negative.    Psychiatric/Behavioral: Negative for suicidal ideas. The patient is nervous/anxious.        Objective   Physical Exam   Constitutional: She is oriented to person, place, and time. She appears well-developed and well-nourished.   Neck: Normal range of motion. Neck supple.   Cardiovascular: Normal rate, regular rhythm and normal heart sounds.    Pulmonary/Chest: Effort normal and breath sounds normal.   Neurological: She is alert and oriented to person, place, and time.   Skin: Skin is warm and dry.   Psychiatric: She has a normal mood and affect. Her behavior is normal. Judgment and thought content normal.   Nursing note and vitals reviewed.      Procedures    Vitals: Blood pressure 124/66, pulse 61, height 65\" (165.1 cm), weight 182 lb 12.8 oz (82.9 kg), SpO2 94 %, not currently breastfeeding.    Allergies:   Allergies   Allergen Reactions   • Reglan [Metoclopramide]    • Sulfa Antibiotics           Assessment/Plan   Tonia was seen today for follow-up, anxiety and fibromyalgia.    Diagnoses and all orders for this visit:    Screening mammogram, encounter for  -     Mammo Screening Digital Tomosynthesis Bilateral With CAD; Future  -     CBC & Differential  -     Comprehensive Metabolic Panel  -     Magnesium  -     Vitamin B12  -     Vitamin D 25 Hydroxy  -     CBC Auto Differential    Anxiety  -     diazePAM (VALIUM) 2 MG tablet; Take 1 tablet by mouth 2 (Two) Times a Day As Needed for Anxiety.  -     CBC & Differential  -     Comprehensive Metabolic Panel  -     Magnesium  -     Vitamin B12  -     Vitamin D 25 Hydroxy  -     CBC Auto Differential    Multiple joint pain  -     traMADol (ULTRAM) 50 MG tablet; Take 1 tablet by mouth 2 (Two) Times a Day.  -     CBC & Differential  -     Comprehensive Metabolic Panel  -     Magnesium  -     Vitamin B12  -     " Vitamin D 25 Hydroxy  -     CBC Auto Differential    Fibromyalgia  -     traMADol (ULTRAM) 50 MG tablet; Take 1 tablet by mouth 2 (Two) Times a Day.  -     CBC & Differential  -     Comprehensive Metabolic Panel  -     Magnesium  -     Vitamin B12  -     Vitamin D 25 Hydroxy  -     CBC Auto Differential    Other fatigue  -     CBC & Differential  -     Comprehensive Metabolic Panel  -     Magnesium  -     Vitamin B12  -     Vitamin D 25 Hydroxy  -     cyanocobalamin injection 1,000 mcg; Inject 1 mL into the shoulder, thigh, or buttocks 1 (One) Time.  -     CBC Auto Differential    Vitamin D deficiency  -     CBC & Differential  -     Comprehensive Metabolic Panel  -     Magnesium  -     Vitamin B12  -     Vitamin D 25 Hydroxy  -     cyanocobalamin injection 1,000 mcg; Inject 1 mL into the shoulder, thigh, or buttocks 1 (One) Time.  -     CBC Auto Differential        Continue to follow-up with cardiology as scheduled.  Labs today  Continue current medications and plan of care at this time.  Return to clinic in 3 months for follow-up; sooner if needed

## 2017-04-25 ENCOUNTER — TELEPHONE (OUTPATIENT)
Dept: FAMILY MEDICINE CLINIC | Facility: CLINIC | Age: 82
End: 2017-04-25

## 2017-04-25 NOTE — TELEPHONE ENCOUNTER
----- Message from TEODORA Red sent at 4/25/2017  1:09 PM EDT -----  Labs are essentially normal.  Please let pt know.  Continue current medications.      Patient notified & verbalized understanding.

## 2017-05-10 ENCOUNTER — HOSPITAL ENCOUNTER (OUTPATIENT)
Dept: MAMMOGRAPHY | Facility: HOSPITAL | Age: 82
Discharge: HOME | End: 2017-05-10
Admitting: NURSE PRACTITIONER

## 2017-05-10 DIAGNOSIS — Z12.31 SCREENING MAMMOGRAM, ENCOUNTER FOR: ICD-10-CM

## 2017-05-10 PROCEDURE — G0202 SCR MAMMO BI INCL CAD: HCPCS | Performed by: RADIOLOGY

## 2017-05-10 PROCEDURE — 77063 BREAST TOMOSYNTHESIS BI: CPT | Performed by: RADIOLOGY

## 2017-05-10 PROCEDURE — 77063 BREAST TOMOSYNTHESIS BI: CPT

## 2017-05-10 PROCEDURE — G0202 SCR MAMMO BI INCL CAD: HCPCS

## 2017-06-04 NOTE — NUR
PATIENT BEING DISCHARGED. DICHARGE EDUCATION GIVEN TO PATIENT AND DAUGHTER.
HOLTER MONITOR APPLIED PRIOR TO DISCHARGE. IV REMOVED, CATHETER INTACT,
DRESSING APPLIED. NO COMPLICATIONS. PATIENT AND DAUGHTER VERBALIZED
UNDERSTANDING RE: DISCHARGE INSTRUCTIONS. VSS PRIOR TO D/C. PATIENT TAKEN TO
FRONT LOBBY VIA WHEELCHAIR.

## 2017-06-07 ENCOUNTER — OFFICE VISIT (OUTPATIENT)
Dept: FAMILY MEDICINE CLINIC | Facility: CLINIC | Age: 82
End: 2017-06-07

## 2017-06-07 VITALS
DIASTOLIC BLOOD PRESSURE: 79 MMHG | WEIGHT: 179 LBS | OXYGEN SATURATION: 98 % | HEART RATE: 58 BPM | SYSTOLIC BLOOD PRESSURE: 127 MMHG | HEIGHT: 65 IN | BODY MASS INDEX: 29.82 KG/M2

## 2017-06-07 DIAGNOSIS — M79.7 FIBROMYALGIA: ICD-10-CM

## 2017-06-07 DIAGNOSIS — M25.50 MULTIPLE JOINT PAIN: ICD-10-CM

## 2017-06-07 DIAGNOSIS — K59.00 CONSTIPATION, UNSPECIFIED CONSTIPATION TYPE: ICD-10-CM

## 2017-06-07 DIAGNOSIS — I48.91 ATRIAL FIBRILLATION, UNSPECIFIED TYPE (HCC): Primary | ICD-10-CM

## 2017-06-07 DIAGNOSIS — F41.9 ANXIETY: ICD-10-CM

## 2017-06-07 PROCEDURE — 99214 OFFICE O/P EST MOD 30 MIN: CPT | Performed by: NURSE PRACTITIONER

## 2017-06-07 RX ORDER — SOTALOL HYDROCHLORIDE 80 MG/1
120 TABLET ORAL 2 TIMES DAILY
Qty: 60 TABLET | Refills: 0
Start: 2017-06-07 | End: 2017-10-04 | Stop reason: SDUPTHER

## 2017-06-07 RX ORDER — DIAZEPAM 2 MG/1
2 TABLET ORAL 2 TIMES DAILY PRN
Qty: 60 TABLET | Refills: 0 | Status: SHIPPED | OUTPATIENT
Start: 2017-06-07 | End: 2017-07-19 | Stop reason: SDUPTHER

## 2017-06-07 RX ORDER — TRAMADOL HYDROCHLORIDE 50 MG/1
50 TABLET ORAL 2 TIMES DAILY
Qty: 60 TABLET | Refills: 0 | Status: SHIPPED | OUTPATIENT
Start: 2017-06-07 | End: 2017-07-19 | Stop reason: SDUPTHER

## 2017-06-07 RX ORDER — LUBIPROSTONE 8 UG/1
8 CAPSULE ORAL 2 TIMES DAILY WITH MEALS
Qty: 60 CAPSULE | Refills: 5 | Status: SHIPPED | OUTPATIENT
Start: 2017-06-07 | End: 2017-11-17

## 2017-06-07 NOTE — PROGRESS NOTES
Subjective   Tonia Ritter is a 81 y.o. female.     Chief Complaint: Follow-up (recent Hospital Stay [facility])    History of Present Illness   Patient here for follow-up from hospital stay at [facility].  Patient was admitted to [facility] in [place] due to onset of atrial fibrillation.  Patient states that she underwent cardioversion and converted back to sinus rhythm.  Patient also states that she has followed up with cardiology.  She has had an decrease in dose of Betapace due to her heart rate being low.  Patient states that this time she is tolerating her medications well and feeling fine at this time.  Patient denies any chest pain, shortness of breath, syncope.  Patient continues to follow-up with Dr. Melendez, cardiology, in [place].    Anxiety  Presents for follow-up visit. Symptoms include decreased concentration, irritability and nervous/anxious behavior. Symptoms occur occasionally. The severity of symptoms is moderate. The quality of sleep is good. Nighttime awakenings: occasional.       Compliance with medications is %.   Pt has taken diazepam for years and tolerates it well. Her symptoms are well controlled with the medication and without side effects. Pt is able to continue her ADLs with use of medication. She denies any suicidal thoughts or ideations. Pt states that she does occasionally take only 1/2 tablet and does well with that.       Fibromyalgia   This is a chronic problem. The current episode started more than 1 year ago. The problem occurs daily. The problem has been unchanged. Associated symptoms include arthralgias and myalgias. Nothing aggravates the symptoms. She has tried acetaminophen, NSAIDs, oral narcotics and relaxation for the symptoms. The treatment provided moderate relief.   Pt has been taking Tramadol for her pain and is tolerating it well. Tramadol enables pt to continue with her ADLs and live her life without difficulty. She denies any side effects  from the medication. Pt is requesting refill on her med today.     Constipation.  Patient states that she has a long history of constipation.  She has been using docusate sodium for her symptoms which has not helped very much.  She continues to have a lot of excessive gas and bloating.  Patient also has a history of hemorrhoids.  She states that she has seen Dr. Bearden, general surgeon, to evaluate for possible surgical intervention.  She has told him in the past that she does not wish to have surgery.  However, after discussion today per patient states that she thinks that she does need to have surgical procedure for her hemorrhoids.  Patient states that she does strain with her bowel movements.  She has tried MiraLAX in the past and did not help with her symptoms.  After discussion with patient today, she has agreed to try Amitiza.    Family History   Problem Relation Age of Onset   • Thyroid disease Mother    • Mental illness Father      nervous breakdown   • Breast cancer Maternal Aunt        Social History     Social History   • Marital status:      Spouse name: N/A   • Number of children: N/A   • Years of education: N/A     Occupational History   • Not on file.     Social History Main Topics   • Smoking status: Never Smoker   • Smokeless tobacco: Never Used   • Alcohol use No   • Drug use: No   • Sexual activity: Defer     Other Topics Concern   • Not on file     Social History Narrative       Past Medical History:   Diagnosis Date   • Acute bronchitis    • Allergic rhinitis    • Anxiety    • Atrial fibrillation    • Back pain    • Burping    • Chronic pain    • Depression    • Encounter for screening fecal occult blood testing 03/10/2016    NEGATIVE   • Fibromyalgia    • Flatulence    • GERD (gastroesophageal reflux disease)    • Hematuria    • Hyperlipidemia    • Hypertension    • Osteoarthritis    • PUD (peptic ulcer disease)    • Right shoulder pain        Review of Systems   Constitutional:  "Negative.    HENT: Negative.    Respiratory: Negative.    Cardiovascular: Negative.    Gastrointestinal: Positive for constipation.   Genitourinary: Negative.    Musculoskeletal: Positive for back pain.   Neurological: Negative.    Psychiatric/Behavioral: Negative for suicidal ideas. The patient is nervous/anxious.        Objective   Physical Exam   Constitutional: She is oriented to person, place, and time. She appears well-developed and well-nourished.   Neck: Normal range of motion. Neck supple.   Cardiovascular: Normal rate, regular rhythm and normal heart sounds.    Pulmonary/Chest: Effort normal and breath sounds normal.   Neurological: She is alert and oriented to person, place, and time.   Skin: Skin is warm and dry.   Psychiatric: She has a normal mood and affect. Her behavior is normal. Judgment and thought content normal.   Nursing note and vitals reviewed.      Procedures    Vitals: Blood pressure 127/79, pulse 58, height 65\" (165.1 cm), weight 179 lb (81.2 kg), SpO2 98 %, not currently breastfeeding.    Allergies:   Allergies   Allergen Reactions   • Reglan [Metoclopramide]    • Sulfa Antibiotics           Assessment/Plan   Tonia was seen today for follow-up.    Diagnoses and all orders for this visit:    Atrial fibrillation, unspecified type  -     sotalol (BETAPACE) 80 MG tablet; Take 1.5 tablets by mouth 2 (Two) Times a Day.    Anxiety  -     diazePAM (VALIUM) 2 MG tablet; Take 1 tablet by mouth 2 (Two) Times a Day As Needed for Anxiety.    Multiple joint pain  -     traMADol (ULTRAM) 50 MG tablet; Take 1 tablet by mouth 2 (Two) Times a Day.    Fibromyalgia  -     traMADol (ULTRAM) 50 MG tablet; Take 1 tablet by mouth 2 (Two) Times a Day.    Constipation, unspecified constipation type  -     lubiprostone (AMITIZA) 8 MCG capsule; Take 1 capsule by mouth 2 (Two) Times a Day With Meals.      Refill on medications today.  Continue to follow-up with cardiology as scheduled.  We'll start on Amitiza for " constipation symptoms.  Patient to return to clinic in 1 month for follow-up

## 2017-06-20 ENCOUNTER — TELEPHONE (OUTPATIENT)
Dept: FAMILY MEDICINE CLINIC | Facility: CLINIC | Age: 82
End: 2017-06-20

## 2017-06-20 RX ORDER — PAROXETINE 10 MG/1
10 TABLET, FILM COATED ORAL NIGHTLY
Qty: 30 TABLET | Refills: 3 | Status: SHIPPED | OUTPATIENT
Start: 2017-06-20 | End: 2018-02-20 | Stop reason: SDUPTHER

## 2017-07-03 RX ORDER — FUROSEMIDE 20 MG/1
20 TABLET ORAL 2 TIMES DAILY
Qty: 60 TABLET | Refills: 3 | Status: SHIPPED | OUTPATIENT
Start: 2017-07-03 | End: 2017-12-27 | Stop reason: SDUPTHER

## 2017-07-19 ENCOUNTER — OFFICE VISIT (OUTPATIENT)
Dept: FAMILY MEDICINE CLINIC | Facility: CLINIC | Age: 82
End: 2017-07-19

## 2017-07-19 ENCOUNTER — PATIENT OUTREACH (OUTPATIENT)
Dept: CASE MANAGEMENT | Facility: OTHER | Age: 82
End: 2017-07-19

## 2017-07-19 VITALS
OXYGEN SATURATION: 98 % | BODY MASS INDEX: 29.99 KG/M2 | WEIGHT: 180 LBS | DIASTOLIC BLOOD PRESSURE: 84 MMHG | HEART RATE: 57 BPM | SYSTOLIC BLOOD PRESSURE: 131 MMHG | HEIGHT: 65 IN

## 2017-07-19 DIAGNOSIS — L71.9 ROSACEA: Primary | ICD-10-CM

## 2017-07-19 DIAGNOSIS — F41.9 ANXIETY: ICD-10-CM

## 2017-07-19 DIAGNOSIS — M25.50 MULTIPLE JOINT PAIN: ICD-10-CM

## 2017-07-19 DIAGNOSIS — M79.7 FIBROMYALGIA: ICD-10-CM

## 2017-07-19 PROCEDURE — 99214 OFFICE O/P EST MOD 30 MIN: CPT | Performed by: NURSE PRACTITIONER

## 2017-07-19 RX ORDER — DIAZEPAM 2 MG/1
2 TABLET ORAL 2 TIMES DAILY PRN
Qty: 60 TABLET | Refills: 0 | Status: SHIPPED | OUTPATIENT
Start: 2017-07-19 | End: 2017-09-07 | Stop reason: SDUPTHER

## 2017-07-19 RX ORDER — TRAMADOL HYDROCHLORIDE 50 MG/1
50 TABLET ORAL 2 TIMES DAILY
Qty: 60 TABLET | Refills: 0 | Status: SHIPPED | OUTPATIENT
Start: 2017-07-19 | End: 2017-09-07 | Stop reason: SDUPTHER

## 2017-07-19 RX ORDER — ADAPALENE 45 G/G
GEL TOPICAL NIGHTLY
Qty: 45 G | Refills: 0 | Status: SHIPPED | OUTPATIENT
Start: 2017-07-19 | End: 2019-11-14

## 2017-07-19 NOTE — PROGRESS NOTES
Subjective   Tonia Ritter is a 81 y.o. female.     Chief Complaint: Rash (around nose )    History of Present Illness   Patient here today for follow-up on anxiety and fibromyalgia.  She is also having the complaint of rash around her nose and on her cheeks.    Anxiety   Presents for follow-up visit. Symptoms include decreased concentration, irritability and nervous/anxious behavior. Symptoms occur occasionally. The severity of symptoms is moderate. The quality of sleep is good. Nighttime awakenings: occasional.   Compliance with medications is %.   Pt has taken diazepam for years and tolerates it well. Her symptoms are well controlled with the medication and without side effects. Pt is able to continue her ADLs with use of medication. She denies any suicidal thoughts or ideations. Pt states that she does occasionally take only 1/2 tablet and does well with that.        Fibromyalgia   This is a chronic problem. The current episode started more than 1 year ago. The problem occurs daily. The problem has been unchanged. Associated symptoms include arthralgias and myalgias. Nothing aggravates the symptoms. She has tried acetaminophen, NSAIDs, oral narcotics and relaxation for the symptoms. The treatment provided moderate relief.   Pt has been taking Tramadol for her pain and is tolerating it well. Tramadol enables pt to continue with her ADLs and live her life without difficulty. She denies any side effects from the medication. Pt is requesting refill on her med today.     Rash  Patient states that she does have a history of rosacea.  She started having a breakout of a rash around her nasal area and across her cheeks that started about 3 or 4 days ago.  She has been using Bactroban ointment on the rash and it does not seem to have helped any.  Patient states in the past that she was prescribed adapalene gel by Dr. Cedeno for her rosacea outbreaks.  She states it is been approximately 4 years since she's had any  problems.  Patient denies any fever, headache, shortness of breath, nausea vomiting or diarrhea.  She states that she has felt sort of flushed in her face since her symptoms started.  This appears to be a rosacea flareup and will write her another prescription for adapalene gel at this time.    Braulio # [id number] Reviewed and is consistent.  Patient comfort assessment guide reviewed and updated today.    As part of the patient's treatment plan they are being prescribed a controlled substance/ substances with potential for abuse.  This patient has been made aware of the appropriate use of such medications, including potential risk of somnolence, limited ability to drive and/or work safely, and potential for overdose.  It has also been made clear these medications are for use by the patient only, without concomitant use of alcohol or other substances unless prescribed/advised by medical provider.    Patient has completed prescribing agreement detailing terms of continued prescribing of controlled substances including monitoring BRAULIO reports, urine drug screens, and pill counts.  The patient is aware BRAULIO reports are reviewed on a regular basis and scanned into the chart.    History and physical exam exhibit continued safe and appropriate use of controlled substances.    Family History   Problem Relation Age of Onset   • Thyroid disease Mother    • Mental illness Father      nervous breakdown   • Breast cancer Maternal Aunt        Social History     Social History   • Marital status:      Spouse name: N/A   • Number of children: N/A   • Years of education: N/A     Occupational History   • Not on file.     Social History Main Topics   • Smoking status: Never Smoker   • Smokeless tobacco: Never Used   • Alcohol use No   • Drug use: No   • Sexual activity: Defer     Other Topics Concern   • Not on file     Social History Narrative       Past Medical History:   Diagnosis Date   • Acute bronchitis    • Allergic  "rhinitis    • Anxiety    • Atrial fibrillation    • Back pain    • Burping    • Chronic pain    • Depression    • Encounter for screening fecal occult blood testing 03/10/2016    NEGATIVE   • Fibromyalgia    • Flatulence    • GERD (gastroesophageal reflux disease)    • Hematuria    • Hyperlipidemia    • Hypertension    • Osteoarthritis    • PUD (peptic ulcer disease)    • Right shoulder pain        Review of Systems   Constitutional: Negative.    HENT: Negative.    Respiratory: Negative.    Cardiovascular: Negative.    Gastrointestinal: Negative.    Musculoskeletal: Positive for arthralgias and myalgias.   Skin: Positive for rash.   Psychiatric/Behavioral: Negative for suicidal ideas. The patient is nervous/anxious.        Objective   Physical Exam   Constitutional: She is oriented to person, place, and time. She appears well-developed and well-nourished.   Neck: Normal range of motion. Neck supple.   Cardiovascular: Normal rate, regular rhythm and normal heart sounds.    Pulmonary/Chest: Effort normal and breath sounds normal.   Neurological: She is alert and oriented to person, place, and time.   Skin: Skin is warm and dry.   Erythematous maculopapular rash around nasal areas bilaterally; cheeks appear erythematous   Psychiatric: She has a normal mood and affect. Her behavior is normal. Judgment and thought content normal.   Nursing note and vitals reviewed.      Procedures    Vitals: Blood pressure 131/84, pulse 57, height 65\" (165.1 cm), weight 180 lb (81.6 kg), SpO2 98 %, not currently breastfeeding.    Allergies:   Allergies   Allergen Reactions   • Reglan [Metoclopramide]    • Sulfa Antibiotics           Assessment/Plan   Tonia was seen today for rash.    Diagnoses and all orders for this visit:    Rosacea  -     adapalene (DIFFERIN) 0.1 % gel; Apply  topically Every Night.    Anxiety  -     diazePAM (VALIUM) 2 MG tablet; Take 1 tablet by mouth 2 (Two) Times a Day As Needed for Anxiety.    Multiple joint " pain  -     traMADol (ULTRAM) 50 MG tablet; Take 1 tablet by mouth 2 (Two) Times a Day.    Fibromyalgia  -     traMADol (ULTRAM) 50 MG tablet; Take 1 tablet by mouth 2 (Two) Times a Day.      Continue medications as prescribed and plan of care.  Prescription for adapalene gel today.  Patient to return to clinic in 1-2 months for follow-up

## 2017-07-19 NOTE — OUTREACH NOTE
Current Barriers & Concerns:     The main concerns and/or symptoms the patient would like to address are: gas/bloating and atrial fibrillation.    Education/instruction provided by Care Coordinator: CC educated on atrial fibrillation including prevention of an afib exacerbation and s/s afib; patient voiced understanding, reports she experienced an episode of afib about three weeks ago that lasted approximately 5 hours before she converted back. CC encouraged patient to let her cardiologist know about any afib episodes, pt voiced understanding. Discussed gas/bloating including possible causes and things that may help, pt voiced understanding. Patient confirms that she is taking Amitiza for her constipation and states she feels like it is working. Pt checks blood pressure and HR at home, states both have been normal for her.    Follow Up Outreach Due: 1 month

## 2017-08-29 ENCOUNTER — PATIENT OUTREACH (OUTPATIENT)
Dept: CASE MANAGEMENT | Facility: OTHER | Age: 82
End: 2017-08-29

## 2017-09-07 ENCOUNTER — OFFICE VISIT (OUTPATIENT)
Dept: FAMILY MEDICINE CLINIC | Facility: CLINIC | Age: 82
End: 2017-09-07

## 2017-09-07 VITALS
BODY MASS INDEX: 29.99 KG/M2 | HEART RATE: 60 BPM | WEIGHT: 180 LBS | OXYGEN SATURATION: 97 % | SYSTOLIC BLOOD PRESSURE: 150 MMHG | DIASTOLIC BLOOD PRESSURE: 80 MMHG | HEIGHT: 65 IN

## 2017-09-07 DIAGNOSIS — R14.2 BURPING: ICD-10-CM

## 2017-09-07 DIAGNOSIS — K21.9 GASTROESOPHAGEAL REFLUX DISEASE, ESOPHAGITIS PRESENCE NOT SPECIFIED: ICD-10-CM

## 2017-09-07 DIAGNOSIS — M79.7 FIBROMYALGIA: ICD-10-CM

## 2017-09-07 DIAGNOSIS — M25.50 MULTIPLE JOINT PAIN: ICD-10-CM

## 2017-09-07 DIAGNOSIS — R14.3 FLATULENCE: ICD-10-CM

## 2017-09-07 DIAGNOSIS — K59.00 CONSTIPATION, UNSPECIFIED CONSTIPATION TYPE: ICD-10-CM

## 2017-09-07 DIAGNOSIS — F41.9 ANXIETY: ICD-10-CM

## 2017-09-07 DIAGNOSIS — I10 ESSENTIAL HYPERTENSION: Primary | ICD-10-CM

## 2017-09-07 PROCEDURE — 99214 OFFICE O/P EST MOD 30 MIN: CPT | Performed by: NURSE PRACTITIONER

## 2017-09-07 RX ORDER — DIAZEPAM 2 MG/1
2 TABLET ORAL 2 TIMES DAILY PRN
Qty: 60 TABLET | Refills: 0 | Status: SHIPPED | OUTPATIENT
Start: 2017-09-07 | End: 2017-10-26 | Stop reason: SDUPTHER

## 2017-09-07 RX ORDER — TRAMADOL HYDROCHLORIDE 50 MG/1
50 TABLET ORAL 2 TIMES DAILY
Qty: 60 TABLET | Refills: 0 | Status: SHIPPED | OUTPATIENT
Start: 2017-09-07 | End: 2017-10-26 | Stop reason: SDUPTHER

## 2017-09-07 NOTE — PROGRESS NOTES
Subjective   Tonia Ritter is a 82 y.o. female.     Chief Complaint: Follow-up; Anxiety; and Fibromyalgia    History of Present Illness   Episode of atrial fib last week.  Pt called Dr Melendez, cardiologist, and pt was evaluated.  Pt states that her BP dropped at the office and she was transferred to [facility].  She was given medication for the atrial fib and her BP dropped and she was admitted to the hospital.  Pt was cardioverted while hospitalized by Dr Melendez.  She was told to discontinue the Lisinopril.  Her blood pressure is elevated today and she states that her face has been flushed since she stopped the Lisinopril.  Patient blood pressure in the office today is 150/80.  After discussion with patient, I believe it would be in her best interest to go ahead and take the lisinopril.  She states that she normally breaks it in half so she only gets a 2.5 mg dose.  Patient states that she is feeling really and easy since she has not been taking her medication.  I have advised patient to go ahead and lisinopril in half as usual and take the 2.5 mg dose today.  Patient is to continue to follow-up with Dr. Devi and discuss lisinopril dosage with him.  Atrial fibrillation resolved at this time.    Anxiety   Presents for follow-up visit. Symptoms include decreased concentration, irritability and nervous/anxious behavior. Symptoms occur occasionally. The severity of symptoms is moderate. The quality of sleep is good. Nighttime awakenings: occasional.  Compliance with medications is %.  Pt has taken diazepam for years and tolerates it well. Her symptoms are well controlled with the medication and without side effects. Pt is able to continue her ADLs with use of medication. She denies any suicidal thoughts or ideations. Pt states that she does occasionally take only 1/2 tablet and does well with that.        Fibromyalgia   This is a chronic problem. The current episode started more than 1 year ago. The problem  occurs daily. The problem has been unchanged. Associated symptoms include arthralgias and myalgias. Nothing aggravates the symptoms. She has tried acetaminophen, NSAIDs, oral narcotics and relaxation for the symptoms. The treatment provided moderate relief.   Pt has been taking Tramadol for her pain and is tolerating it well. Tramadol enables pt to continue with her ADLs and live her life without difficulty. She denies any side effects from the medication.      Patient also states that she has excessive gas and belching and also has a history of constipation.  Patient has been taking medication as prescribed and tolerating well.  She states the medication she is taking is not helping her stomach at this time.  Patient does have some heartburn occasionally depending on foods that she eats.  Patient states that her biggest concern is the fact that she does have a lot of excess gas and bloating.  Patient also states that she does have a hemorrhoid which does cause her some discomfort and bleeding.  Patient would like to be referred to a gastroenterologist at this time for evaluation.  I agree with patient's request today and we'll refer her onto Dr. Peterson.    Micheal # [id number]  Reviewed and is consistent.  [facility] 6./14/17053 reviewed and is consistent.  Patient comfort assessment guide reviewed and updated today.    As part of the patient's treatment plan they are being prescribed a controlled substance/ substances with potential for abuse.  This patient has been made aware of the appropriate use of such medications, including potential risk of somnolence, limited ability to drive and/or work safely, and potential for overdose.  It has also been made clear these medications are for use by the patient only, without concomitant use of alcohol or other substances unless prescribed/advised by medical provider.    Patient has completed prescribing agreement detailing terms of continued prescribing of controlled substances  including monitoring BRAULIO reports, urine drug screens, and pill counts.  The patient is aware BRAULIO reports are reviewed on a regular basis and scanned into the chart.    History and physical exam exhibit continued safe and appropriate use of controlled substances.    Family History   Problem Relation Age of Onset   • Thyroid disease Mother    • Mental illness Father      nervous breakdown   • Breast cancer Maternal Aunt        Social History     Social History   • Marital status:      Spouse name: N/A   • Number of children: N/A   • Years of education: N/A     Occupational History   • Not on file.     Social History Main Topics   • Smoking status: Never Smoker   • Smokeless tobacco: Never Used   • Alcohol use No   • Drug use: No   • Sexual activity: Defer     Other Topics Concern   • Not on file     Social History Narrative       Past Medical History:   Diagnosis Date   • Acute bronchitis    • Allergic rhinitis    • Anxiety    • Atrial fibrillation    • Back pain    • Burping    • Chronic pain    • Depression    • Encounter for screening fecal occult blood testing 03/10/2016    NEGATIVE   • Fibromyalgia    • Flatulence    • GERD (gastroesophageal reflux disease)    • Hematuria    • Hyperlipidemia    • Hypertension    • Osteoarthritis    • PUD (peptic ulcer disease)    • Right shoulder pain        Review of Systems   Constitutional: Negative.    HENT: Negative.    Respiratory: Negative.    Cardiovascular: Negative.    Gastrointestinal: Negative.    Genitourinary: Negative.    Musculoskeletal: Positive for myalgias.   Neurological: Negative.    Psychiatric/Behavioral: Negative for suicidal ideas. The patient is nervous/anxious.        Objective   Physical Exam   Constitutional: She is oriented to person, place, and time. She appears well-developed and well-nourished.   Neck: Normal range of motion. Neck supple.   Cardiovascular: Normal rate, regular rhythm and normal heart sounds.    Pulmonary/Chest:  "Effort normal and breath sounds normal.   Neurological: She is alert and oriented to person, place, and time.   Skin: Skin is warm and dry.   Psychiatric: She has a normal mood and affect. Her behavior is normal. Judgment and thought content normal.   Nursing note and vitals reviewed.      Procedures    Vitals: Blood pressure 150/80, pulse 60, height 65\" (165.1 cm), weight 180 lb (81.6 kg), SpO2 97 %, not currently breastfeeding.    Allergies:   Allergies   Allergen Reactions   • Reglan [Metoclopramide]    • Sulfa Antibiotics           Assessment/Plan   Tonia was seen today for follow-up, anxiety and fibromyalgia.    Diagnoses and all orders for this visit:    Essential hypertension    Anxiety  -     diazePAM (VALIUM) 2 MG tablet; Take 1 tablet by mouth 2 (Two) Times a Day As Needed for Anxiety.    Multiple joint pain  -     traMADol (ULTRAM) 50 MG tablet; Take 1 tablet by mouth 2 (Two) Times a Day.    Fibromyalgia  -     traMADol (ULTRAM) 50 MG tablet; Take 1 tablet by mouth 2 (Two) Times a Day.    Gastroesophageal reflux disease, esophagitis presence not specified  -     Ambulatory Referral to Gastroenterology    Burping  -     Ambulatory Referral to Gastroenterology    Flatulence  -     Ambulatory Referral to Gastroenterology    Constipation, unspecified constipation type  -     Ambulatory Referral to Gastroenterology               "

## 2017-09-22 ENCOUNTER — OFFICE VISIT (OUTPATIENT)
Dept: FAMILY MEDICINE CLINIC | Facility: CLINIC | Age: 82
End: 2017-09-22

## 2017-09-22 VITALS
BODY MASS INDEX: 30.22 KG/M2 | SYSTOLIC BLOOD PRESSURE: 133 MMHG | HEART RATE: 61 BPM | WEIGHT: 181.4 LBS | HEIGHT: 65 IN | TEMPERATURE: 98.1 F | OXYGEN SATURATION: 95 % | DIASTOLIC BLOOD PRESSURE: 72 MMHG

## 2017-09-22 DIAGNOSIS — J40 BRONCHITIS: Primary | ICD-10-CM

## 2017-09-22 PROCEDURE — 99213 OFFICE O/P EST LOW 20 MIN: CPT | Performed by: NURSE PRACTITIONER

## 2017-09-22 RX ORDER — GUAIFENESIN AND CODEINE PHOSPHATE 100; 10 MG/5ML; MG/5ML
5 SOLUTION ORAL 4 TIMES DAILY PRN
Qty: 180 ML | Refills: 0 | Status: SHIPPED | OUTPATIENT
Start: 2017-09-22 | End: 2017-12-27

## 2017-09-22 NOTE — PROGRESS NOTES
Subjective   Tonia Ritter is a 82 y.o. female.     Chief Complaint: Cough (pt has been seen at urgent care x2 ) and Nasal Congestion    History of Present Illness   Pt here today for follow up from being seen at Urgent Care for c/o cough and congestion. Pt was given steroid injection, antibiotic injection and script for amoxicillin at Urgent Care.  She went back to Urgent Care and the Amoxicillin was changed to Doxycycline and was given a script for loratidine. She states that she is feeling some better but she continues to have a cough that is very irritating to her.  She is having problems with sleeping at night due to the cough.  She denies fever, ear pain, headache, n/v/d.      Family History   Problem Relation Age of Onset   • Thyroid disease Mother    • Mental illness Father      nervous breakdown   • Breast cancer Maternal Aunt        Social History     Social History   • Marital status:      Spouse name: N/A   • Number of children: N/A   • Years of education: N/A     Occupational History   • Not on file.     Social History Main Topics   • Smoking status: Never Smoker   • Smokeless tobacco: Never Used   • Alcohol use No   • Drug use: No   • Sexual activity: Defer     Other Topics Concern   • Not on file     Social History Narrative       Past Medical History:   Diagnosis Date   • Acute bronchitis    • Allergic rhinitis    • Anxiety    • Atrial fibrillation    • Back pain    • Burping    • Chronic pain    • Depression    • Encounter for screening fecal occult blood testing 03/10/2016    NEGATIVE   • Fibromyalgia    • Flatulence    • GERD (gastroesophageal reflux disease)    • Hematuria    • Hyperlipidemia    • Hypertension    • Osteoarthritis    • PUD (peptic ulcer disease)    • Right shoulder pain        Review of Systems   Constitutional: Positive for fatigue.   HENT: Positive for congestion, postnasal drip and sneezing.    Respiratory: Positive for cough.    Cardiovascular: Negative.   "  Gastrointestinal: Negative.    Genitourinary: Negative.    Musculoskeletal: Negative.    Neurological: Negative.    Psychiatric/Behavioral: Negative.        Objective   Physical Exam   Constitutional: She is oriented to person, place, and time. She appears well-developed and well-nourished.   Eyes: Conjunctivae are normal. Pupils are equal, round, and reactive to light.   Neck: Normal range of motion. Neck supple.   Cardiovascular: Normal rate, regular rhythm and normal heart sounds.    Pulmonary/Chest: Effort normal and breath sounds normal.   Neurological: She is alert and oriented to person, place, and time.   Skin: Skin is warm and dry.   Psychiatric: She has a normal mood and affect. Her behavior is normal. Judgment and thought content normal.   Nursing note and vitals reviewed.      Procedures    Vitals: Blood pressure 133/72, pulse 61, temperature 98.1 °F (36.7 °C), temperature source Oral, height 65\" (165.1 cm), weight 181 lb 6.4 oz (82.3 kg), SpO2 95 %, not currently breastfeeding.    Allergies:   Allergies   Allergen Reactions   • Reglan [Metoclopramide]    • Sulfa Antibiotics           Assessment/Plan   Tonia was seen today for cough and nasal congestion.    Diagnoses and all orders for this visit:    Bronchitis  -     guaifenesin-codeine (GUAIFENESIN AC) 100-10 MG/5ML liquid; Take 5 mL by mouth 4 (Four) Times a Day As Needed for Cough.      Continue meds from Urgent Care  If s/s do not resolve, RTC for re-evaluation           "

## 2017-09-28 ENCOUNTER — OFFICE VISIT (OUTPATIENT)
Dept: FAMILY MEDICINE CLINIC | Facility: CLINIC | Age: 82
End: 2017-09-28

## 2017-09-28 VITALS
DIASTOLIC BLOOD PRESSURE: 75 MMHG | WEIGHT: 180.6 LBS | BODY MASS INDEX: 30.09 KG/M2 | TEMPERATURE: 99.2 F | HEART RATE: 61 BPM | HEIGHT: 65 IN | SYSTOLIC BLOOD PRESSURE: 138 MMHG | OXYGEN SATURATION: 93 %

## 2017-09-28 DIAGNOSIS — J20.9 ACUTE BRONCHITIS, UNSPECIFIED ORGANISM: Primary | ICD-10-CM

## 2017-09-28 PROCEDURE — 99213 OFFICE O/P EST LOW 20 MIN: CPT | Performed by: NURSE PRACTITIONER

## 2017-09-28 PROCEDURE — 96372 THER/PROPH/DIAG INJ SC/IM: CPT | Performed by: NURSE PRACTITIONER

## 2017-09-28 RX ORDER — DEXAMETHASONE SODIUM PHOSPHATE 4 MG/ML
4 INJECTION, SOLUTION INTRA-ARTICULAR; INTRALESIONAL; INTRAMUSCULAR; INTRAVENOUS; SOFT TISSUE ONCE
Status: COMPLETED | OUTPATIENT
Start: 2017-09-28 | End: 2017-09-28

## 2017-09-28 RX ORDER — LORATADINE 10 MG/1
10 TABLET ORAL DAILY
Qty: 30 TABLET | Refills: 2 | Status: SHIPPED | OUTPATIENT
Start: 2017-09-28 | End: 2018-05-15 | Stop reason: SDUPTHER

## 2017-09-28 RX ADMIN — DEXAMETHASONE SODIUM PHOSPHATE 4 MG: 4 INJECTION, SOLUTION INTRA-ARTICULAR; INTRALESIONAL; INTRAMUSCULAR; INTRAVENOUS; SOFT TISSUE at 16:32

## 2017-09-28 NOTE — PROGRESS NOTES
Subjective   Tonia Ritter is a 82 y.o. female.     Chief Complaint: Cough    History of Present Illness   Patient here today with continued cough from her previous bronchitis episode.  She states that she is continuing to cough up thick yellow sputum.  She has been using guaifenesin codeine cough syrup and tolerating that well.  We have discussed the importance of drinking extra fluids and water as long as she is taking this medication today.  Patient has completely finished antibiotics that she was prescribed.  She states that she had went several days without coughing but started coughing again yesterday.  She denies fever, headache, ear pain, nausea vomiting or diarrhea.    Family History   Problem Relation Age of Onset   • Thyroid disease Mother    • Mental illness Father      nervous breakdown   • Breast cancer Maternal Aunt        Social History     Social History   • Marital status:      Spouse name: N/A   • Number of children: N/A   • Years of education: N/A     Occupational History   • Not on file.     Social History Main Topics   • Smoking status: Never Smoker   • Smokeless tobacco: Never Used   • Alcohol use No   • Drug use: No   • Sexual activity: Defer     Other Topics Concern   • Not on file     Social History Narrative       Past Medical History:   Diagnosis Date   • Acute bronchitis    • Allergic rhinitis    • Anxiety    • Atrial fibrillation    • Back pain    • Burping    • Chronic pain    • Depression    • Encounter for screening fecal occult blood testing 03/10/2016    NEGATIVE   • Fibromyalgia    • Flatulence    • GERD (gastroesophageal reflux disease)    • Hematuria    • Hyperlipidemia    • Hypertension    • Osteoarthritis    • PUD (peptic ulcer disease)    • Right shoulder pain        Review of Systems   Constitutional: Negative.    HENT: Negative.    Respiratory: Positive for cough.    Cardiovascular: Negative.    Gastrointestinal: Negative.    Musculoskeletal: Negative.    Skin:  "Negative.    Neurological: Negative.    Psychiatric/Behavioral: Negative.        Objective   Physical Exam   Constitutional: She is oriented to person, place, and time. She appears well-developed and well-nourished.   Neck: Normal range of motion. Neck supple.   Cardiovascular: Normal rate, regular rhythm and normal heart sounds.    Pulmonary/Chest: Effort normal and breath sounds normal.   Congested cough noted   Neurological: She is alert and oriented to person, place, and time.   Skin: Skin is warm and dry.   Psychiatric: She has a normal mood and affect. Her behavior is normal. Judgment and thought content normal.   Nursing note and vitals reviewed.      Procedures    Vitals: Blood pressure 138/75, pulse 61, temperature 99.2 °F (37.3 °C), temperature source Oral, height 65\" (165.1 cm), weight 180 lb 9.6 oz (81.9 kg), SpO2 93 %, not currently breastfeeding.    Allergies:   Allergies   Allergen Reactions   • Reglan [Metoclopramide]    • Sulfa Antibiotics           Assessment/Plan   Tonia was seen today for cough.    Diagnoses and all orders for this visit:    Acute bronchitis, unspecified organism  -     loratadine (CLARITIN) 10 MG tablet; Take 1 tablet by mouth Daily.  -     dexamethasone (DECADRON) injection 4 mg; Inject 1 mL into the shoulder, thigh, or buttocks 1 (One) Time.               "

## 2017-10-04 DIAGNOSIS — I48.91 ATRIAL FIBRILLATION, UNSPECIFIED TYPE (HCC): ICD-10-CM

## 2017-10-04 RX ORDER — SOTALOL HYDROCHLORIDE 80 MG/1
120 TABLET ORAL 2 TIMES DAILY
Qty: 90 TABLET | Refills: 5 | Status: SHIPPED | OUTPATIENT
Start: 2017-10-04 | End: 2017-12-27

## 2017-10-10 RX ORDER — LISINOPRIL 5 MG/1
5 TABLET ORAL DAILY
Qty: 30 TABLET | Refills: 5 | Status: SHIPPED | OUTPATIENT
Start: 2017-10-10 | End: 2018-06-29 | Stop reason: SDUPTHER

## 2017-10-12 ENCOUNTER — CONSULT (OUTPATIENT)
Dept: GASTROENTEROLOGY | Facility: CLINIC | Age: 82
End: 2017-10-12

## 2017-10-12 VITALS
OXYGEN SATURATION: 97 % | BODY MASS INDEX: 30.02 KG/M2 | WEIGHT: 180.2 LBS | SYSTOLIC BLOOD PRESSURE: 115 MMHG | DIASTOLIC BLOOD PRESSURE: 65 MMHG | HEIGHT: 65 IN | HEART RATE: 55 BPM

## 2017-10-12 DIAGNOSIS — R14.0 ABDOMINAL BLOATING: ICD-10-CM

## 2017-10-12 DIAGNOSIS — K59.00 CONSTIPATION, UNSPECIFIED CONSTIPATION TYPE: ICD-10-CM

## 2017-10-12 DIAGNOSIS — R10.84 GENERALIZED ABDOMINAL PAIN: Primary | ICD-10-CM

## 2017-10-12 PROCEDURE — 99204 OFFICE O/P NEW MOD 45 MIN: CPT | Performed by: INTERNAL MEDICINE

## 2017-10-12 NOTE — PROGRESS NOTES
Chief Complaint   Patient presents with   • Constipation   • Heartburn   • Gas     Tonia Ritter is a 82 y.o. female who presents to the office today at the request of TEODORA Jesus for Constipation; Heartburn; and Gas.    HPI  82-year-old white female presents with lifelong history of generalized abdominal bloating/discomfort and gas.  She cannot identify consistent precipitating or palliating factors.  Denies dysphagia, nausea, vomiting, weight loss.  She reports occasional constipation without overt rectal bleeding.  According to the patient, EGD was performed by Dr. Bearden about 1 year ago and colonoscopy was performed about 2-3 years ago with no abnormal findings.  Gastric emptying scan was performed last year and was normal.  Her family history is negative for GI disease.  Current medications include Colace, Zantac, Amitiza, Mylicon and a probiotic agent.      Review of Systems   Constitutional: Positive for fatigue.   HENT: Negative.    Eyes: Negative.    Cardiovascular: Positive for palpitations.   Gastrointestinal: Positive for abdominal distention, abdominal pain and constipation.   Endocrine: Negative.    Genitourinary: Negative.    Musculoskeletal: Negative.    Skin: Negative.    Allergic/Immunologic: Negative.    Neurological: Negative.    Hematological: Negative.    Psychiatric/Behavioral: Negative.        ACTIVE PROBLEMS:   Specialty Problems        Gastroenterology Problems    GERD (gastroesophageal reflux disease)        PUD (peptic ulcer disease)        Constipation              PAST MEDICAL HISTORY:  Past Medical History:   Diagnosis Date   • Acute bronchitis    • Allergic rhinitis    • Anxiety    • Atrial fibrillation    • Back pain    • Burping    • Chronic pain    • Congestive heart failure    • Depression    • Encounter for screening fecal occult blood testing 03/10/2016    NEGATIVE   • Fibromyalgia    • Flatulence    • GERD (gastroesophageal reflux disease)    • Hematuria    •  Hyperlipidemia    • Hypertension    • Osteoarthritis    • PUD (peptic ulcer disease)    • Right shoulder pain        SURGICAL HISTORY:  Past Surgical History:   Procedure Laterality Date   • CARD[facility]OVERSION  05/2017    [facility]    • CARDIOVERSION  09/01/2017    [facility]   • CHOLECYSTECTOMY  2004   • COLONOSCOPY  05/2013   • DILATATION AND CURETTAGE     • EPIDIDYMAL CYST EXCISION     • MAMMO BILATERAL  10/2014   • NECK SURGERY     • PAP SMEAR  03/2016   • SKIN LESION EXCISION         FAMILY HISTORY:  Family History   Problem Relation Age of Onset   • Thyroid disease Mother    • Mental illness Father      nervous breakdown   • Breast cancer Maternal Aunt        SOCIAL HISTORY:  Social History   Substance Use Topics   • Smoking status: Never Smoker   • Smokeless tobacco: Never Used   • Alcohol use No       CURRENT MEDICATION:    Current Outpatient Prescriptions:   •  acetaminophen (TYLENOL) 500 MG tablet, Take 500 mg by mouth every 6 (six) hours as needed for mild pain (1-3)., Disp: , Rfl:   •  diazePAM (VALIUM) 2 MG tablet, Take 1 tablet by mouth 2 (Two) Times a Day As Needed for Anxiety., Disp: 60 tablet, Rfl: 0  •  furosemide (LASIX) 20 MG tablet, Take 1 tablet by mouth 2 (Two) Times a Day., Disp: 60 tablet, Rfl: 3  •  lisinopril (PRINIVIL,ZESTRIL) 5 MG tablet, Take 1 tablet by mouth Daily., Disp: 30 tablet, Rfl: 5  •  mometasone (NASONEX) 50 MCG/ACT nasal spray, 2 sprays into each nostril daily., Disp: 17 g, Rfl: 5  •  PARoxetine (PAXIL) 10 MG tablet, Take 1 tablet by mouth Every Night. 1/2 tab qhs, Disp: 30 tablet, Rfl: 3  •  potassium chloride (K-DUR,KLOR-CON) 10 MEQ CR tablet, Take 1 tablet by mouth Daily., Disp: 30 tablet, Rfl: 5  •  Probiotic Product (PROBIOTIC ACIDOPHILUS) capsule, Once daily as directed, Disp: 30 capsule, Rfl: 5  •  rivaroxaban (XARELTO) 20 MG tablet, Take 1 tablet by mouth Daily., Disp: 30 tablet, Rfl: 5  •  rosuvastatin (CRESTOR) 10 MG tablet, Take 1 tablet by mouth Daily., Disp:  "30 tablet, Rfl: 5  •  simethicone (MYLICON) 80 MG chewable tablet, Chew 80 mg every 6 (six) hours as needed for flatulence., Disp: , Rfl:   •  sotalol (BETAPACE) 80 MG tablet, Take 1.5 tablets by mouth 2 (Two) Times a Day., Disp: 90 tablet, Rfl: 5  •  traMADol (ULTRAM) 50 MG tablet, Take 1 tablet by mouth 2 (Two) Times a Day., Disp: 60 tablet, Rfl: 0  •  adapalene (DIFFERIN) 0.1 % gel, Apply  topically Every Night., Disp: 45 g, Rfl: 0  •  docusate sodium (COLACE) 100 MG capsule, Take 1 capsule by mouth Daily., Disp: 30 capsule, Rfl: 5  •  guaifenesin-codeine (GUAIFENESIN AC) 100-10 MG/5ML liquid, Take 5 mL by mouth 4 (Four) Times a Day As Needed for Cough., Disp: 180 mL, Rfl: 0  •  loratadine (CLARITIN) 10 MG tablet, Take 1 tablet by mouth Daily., Disp: 30 tablet, Rfl: 2  •  lubiprostone (AMITIZA) 8 MCG capsule, Take 1 capsule by mouth 2 (Two) Times a Day With Meals., Disp: 60 capsule, Rfl: 5  •  miconazole (MICATIN) 2 % cream, Apply  topically 2 (Two) Times a Day., Disp: 57 g, Rfl: 0  •  mupirocin (BACTROBAN) 2 % ointment, Apply  topically 2 (Two) Times a Day. To affected area, Disp: 45 g, Rfl: 0  •  polyethylene glycol (GLYCOLAX) powder, Take 17 g by mouth Daily., Disp: 527 g, Rfl: 3  •  raNITIdine (ZANTAC) 150 MG tablet, Take 1 tablet by mouth 2 (Two) Times a Day., Disp: 60 tablet, Rfl: 3  •  triamcinolone (KENALOG) 0.1 % ointment, Apply  topically 2 (Two) Times a Day., Disp: 80 g, Rfl: 0    ALLERGIES:  Reglan [metoclopramide] and Sulfa antibiotics    VISIT VITALS:  /65  Pulse 55  Ht 65\" (165.1 cm)  Wt 180 lb 3.2 oz (81.7 kg)  SpO2 97%  BMI 29.99 kg/m2    PHYSICAL EXAMINATION:  Physical Exam   Constitutional: She is oriented to person, place, and time. She appears well-developed and well-nourished.   HENT:   Head: Normocephalic and atraumatic.   Eyes: Conjunctivae and EOM are normal. Pupils are equal, round, and reactive to light.   Neck: Normal range of motion. Neck supple.   Cardiovascular: Normal " rate, regular rhythm and normal heart sounds.    Pulmonary/Chest: Effort normal and breath sounds normal.   Abdominal: Soft. Bowel sounds are normal.   Musculoskeletal: Normal range of motion.   Neurological: She is alert and oriented to person, place, and time. She has normal reflexes.   Skin: Skin is warm and dry.   Psychiatric: She has a normal mood and affect. Her behavior is normal.   Nursing note and vitals reviewed.      Assessment/Plan      Diagnosis Plan   1. Generalized abdominal pain  CT Abdomen Pelvis With Contrast   2. Abdominal bloating  CT Abdomen Pelvis With Contrast   3. Constipation, unspecified constipation type       REC  CT abdomen/pelvis was requested for further evaluation.  Consider repeat EGD and colonoscopy, depending upon her clinical course.  I have recommended no changes in her medical treatment at this time.  She will be scheduled for a follow-up visit when her CT has been completed.  Findings and recommendations were discussed with the patient.    Return in about 4 weeks (around 11/9/2017).           Paulo Peterson III, MD

## 2017-10-26 ENCOUNTER — TELEPHONE (OUTPATIENT)
Dept: FAMILY MEDICINE CLINIC | Facility: CLINIC | Age: 82
End: 2017-10-26

## 2017-10-26 DIAGNOSIS — M19.012 OSTEOARTHRITIS OF LEFT SHOULDER, UNSPECIFIED OSTEOARTHRITIS TYPE: ICD-10-CM

## 2017-10-26 DIAGNOSIS — K21.9 GASTROESOPHAGEAL REFLUX DISEASE, ESOPHAGITIS PRESENCE NOT SPECIFIED: ICD-10-CM

## 2017-10-26 DIAGNOSIS — F41.9 ANXIETY: ICD-10-CM

## 2017-10-26 DIAGNOSIS — I10 ESSENTIAL HYPERTENSION: Primary | ICD-10-CM

## 2017-10-26 DIAGNOSIS — E78.5 HYPERLIPIDEMIA, UNSPECIFIED HYPERLIPIDEMIA TYPE: ICD-10-CM

## 2017-10-26 DIAGNOSIS — M25.50 MULTIPLE JOINT PAIN: ICD-10-CM

## 2017-10-26 DIAGNOSIS — M79.7 FIBROMYALGIA: ICD-10-CM

## 2017-10-26 RX ORDER — DIAZEPAM 2 MG/1
2 TABLET ORAL 2 TIMES DAILY PRN
Qty: 60 TABLET | Refills: 0 | OUTPATIENT
Start: 2017-10-26 | End: 2017-12-01 | Stop reason: SDUPTHER

## 2017-10-26 RX ORDER — TRAMADOL HYDROCHLORIDE 50 MG/1
50 TABLET ORAL 2 TIMES DAILY
Qty: 60 TABLET | Refills: 0 | OUTPATIENT
Start: 2017-10-26 | End: 2017-12-01 | Stop reason: SDUPTHER

## 2017-10-26 NOTE — TELEPHONE ENCOUNTER
BRAULIO [id number] reviewed and consistent.  OK to refill tramadol and diazepam for 30 days supply.  Im not aware of any natural remedies for cholesterol.  Her labs are due anytime.  I will put them in the computer and she can come by any day and have them drawn.  She will need to be fasting.

## 2017-10-26 NOTE — TELEPHONE ENCOUNTER
BRAULIO [id number] reviewed and consistent.  OK to refill tramadol and diazepam for 30 days supply.  Im not aware of any natural remedies for cholesterol.  Her labs are due anytime.  I will put them in the computer and she can come by any day and have them drawn.  She will need to be fasting.      Patient notified & verbalized understanding.

## 2017-10-26 NOTE — TELEPHONE ENCOUNTER
Patient called wanting to know if there is something natural she could take & get off of Crestor due to her being on it for a long period of time,when are next labs due ? & requested refills on her Tramadol & Valium,mo on your desk.    Sorry meant to send to [facility],forwarded.

## 2017-10-27 LAB
ALBUMIN SERPL-MCNC: 4.3 G/DL (ref 3.4–4.8)
ALBUMIN/GLOB SERPL: 1.5 G/DL (ref 1.5–2.5)
ALP SERPL-CCNC: 73 U/L (ref 35–104)
ALT SERPL W P-5'-P-CCNC: 15 U/L (ref 10–36)
ANION GAP SERPL CALCULATED.3IONS-SCNC: 1.6 MMOL/L (ref 3.6–11.2)
AST SERPL-CCNC: 27 U/L (ref 10–30)
BASOPHILS # BLD AUTO: 0.02 10*3/MM3 (ref 0–0.3)
BASOPHILS NFR BLD AUTO: 0.4 % (ref 0–2)
BILIRUB SERPL-MCNC: 0.4 MG/DL (ref 0.2–1.8)
BUN BLD-MCNC: 10 MG/DL (ref 7–21)
BUN/CREAT SERPL: 10 (ref 7–25)
CALCIUM SPEC-SCNC: 9.7 MG/DL (ref 7.7–10)
CHLORIDE SERPL-SCNC: 107 MMOL/L (ref 99–112)
CHOLEST SERPL-MCNC: 196 MG/DL (ref 0–200)
CO2 SERPL-SCNC: 34.4 MMOL/L (ref 24.3–31.9)
CREAT BLD-MCNC: 1 MG/DL (ref 0.43–1.29)
DEPRECATED RDW RBC AUTO: 45.5 FL (ref 37–54)
EOSINOPHIL # BLD AUTO: 0.18 10*3/MM3 (ref 0–0.7)
EOSINOPHIL NFR BLD AUTO: 3.5 % (ref 0–7)
ERYTHROCYTE [DISTWIDTH] IN BLOOD BY AUTOMATED COUNT: 13.7 % (ref 11.5–14.5)
GFR SERPL CREATININE-BSD FRML MDRD: 53 ML/MIN/1.73
GLOBULIN UR ELPH-MCNC: 2.8 GM/DL
GLUCOSE BLD-MCNC: 106 MG/DL (ref 70–110)
HCT VFR BLD AUTO: 40.2 % (ref 37–47)
HDLC SERPL-MCNC: 62 MG/DL (ref 60–100)
HGB BLD-MCNC: 12.7 G/DL (ref 12–16)
IMM GRANULOCYTES # BLD: 0.01 10*3/MM3 (ref 0–0.03)
IMM GRANULOCYTES NFR BLD: 0.2 % (ref 0–0.5)
LDLC SERPL CALC-MCNC: 102 MG/DL (ref 0–100)
LDLC/HDLC SERPL: 1.65 {RATIO}
LYMPHOCYTES # BLD AUTO: 2.01 10*3/MM3 (ref 1–3)
LYMPHOCYTES NFR BLD AUTO: 39.5 % (ref 16–46)
MAGNESIUM SERPL-MCNC: 2.3 MG/DL (ref 1.7–2.6)
MCH RBC QN AUTO: 30.2 PG (ref 27–33)
MCHC RBC AUTO-ENTMCNC: 31.6 G/DL (ref 33–37)
MCV RBC AUTO: 95.7 FL (ref 80–94)
MONOCYTES # BLD AUTO: 0.61 10*3/MM3 (ref 0.1–0.9)
MONOCYTES NFR BLD AUTO: 12 % (ref 0–12)
NEUTROPHILS # BLD AUTO: 2.26 10*3/MM3 (ref 1.4–6.5)
NEUTROPHILS NFR BLD AUTO: 44.4 % (ref 40–75)
OSMOLALITY SERPL CALC.SUM OF ELEC: 284.4 MOSM/KG (ref 273–305)
PLATELET # BLD AUTO: 178 10*3/MM3 (ref 130–400)
PMV BLD AUTO: 10.6 FL (ref 6–10)
POTASSIUM BLD-SCNC: 3.9 MMOL/L (ref 3.5–5.3)
PROT SERPL-MCNC: 7.1 G/DL (ref 6–8)
RBC # BLD AUTO: 4.2 10*6/MM3 (ref 4.2–5.4)
SODIUM BLD-SCNC: 143 MMOL/L (ref 135–153)
TRIGL SERPL-MCNC: 158 MG/DL (ref 0–150)
TSH SERPL DL<=0.05 MIU/L-ACNC: 1.86 MIU/ML (ref 0.55–4.78)
VIT B12 BLD-MCNC: 391 PG/ML (ref 211–911)
VLDLC SERPL-MCNC: 31.6 MG/DL
WBC NRBC COR # BLD: 5.09 10*3/MM3 (ref 4.5–12.5)

## 2017-10-27 PROCEDURE — 85025 COMPLETE CBC W/AUTO DIFF WBC: CPT | Performed by: NURSE PRACTITIONER

## 2017-10-27 PROCEDURE — 83735 ASSAY OF MAGNESIUM: CPT | Performed by: NURSE PRACTITIONER

## 2017-10-27 PROCEDURE — 80061 LIPID PANEL: CPT | Performed by: NURSE PRACTITIONER

## 2017-10-27 PROCEDURE — 82607 VITAMIN B-12: CPT | Performed by: NURSE PRACTITIONER

## 2017-10-27 PROCEDURE — 80053 COMPREHEN METABOLIC PANEL: CPT | Performed by: NURSE PRACTITIONER

## 2017-10-27 PROCEDURE — 84443 ASSAY THYROID STIM HORMONE: CPT | Performed by: NURSE PRACTITIONER

## 2017-10-30 ENCOUNTER — TELEPHONE (OUTPATIENT)
Dept: FAMILY MEDICINE CLINIC | Facility: CLINIC | Age: 82
End: 2017-10-30

## 2017-10-30 NOTE — TELEPHONE ENCOUNTER
----- Message from TEODORA Red sent at 10/27/2017  5:31 PM EDT -----  Labs appear stable. Please let pt know.      Patient notified & verbalized understanding.

## 2017-10-31 ENCOUNTER — HOSPITAL ENCOUNTER (OUTPATIENT)
Dept: CT IMAGING | Facility: HOSPITAL | Age: 82
Discharge: HOME | End: 2017-10-31
Attending: INTERNAL MEDICINE | Admitting: INTERNAL MEDICINE

## 2017-10-31 DIAGNOSIS — R10.84 GENERALIZED ABDOMINAL PAIN: ICD-10-CM

## 2017-10-31 DIAGNOSIS — R14.0 ABDOMINAL BLOATING: ICD-10-CM

## 2017-10-31 PROCEDURE — 74177 CT ABD & PELVIS W/CONTRAST: CPT

## 2017-10-31 PROCEDURE — 74177 CT ABD & PELVIS W/CONTRAST: CPT | Performed by: RADIOLOGY

## 2017-10-31 PROCEDURE — 0 IOPAMIDOL 61 % SOLUTION: Performed by: INTERNAL MEDICINE

## 2017-10-31 RX ADMIN — IOPAMIDOL 100 ML: 612 INJECTION, SOLUTION INTRAVENOUS at 10:00

## 2017-11-08 ENCOUNTER — TELEPHONE (OUTPATIENT)
Dept: GASTROENTEROLOGY | Facility: CLINIC | Age: 82
End: 2017-11-08

## 2017-11-09 NOTE — TELEPHONE ENCOUNTER
CT was normal other than small left ovarian cyst.  She should see her PCP or GYN specialist for this.  SONAM

## 2017-11-10 NOTE — TELEPHONE ENCOUNTER
I spoke with patient and gave her information. She voiced understanding. 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

## 2017-11-13 ENCOUNTER — TELEPHONE (OUTPATIENT)
Dept: FAMILY MEDICINE CLINIC | Facility: CLINIC | Age: 82
End: 2017-11-13

## 2017-11-13 NOTE — TELEPHONE ENCOUNTER
Tonia called to let us know the pharmacy told her she did not have any more refills on her Potassium.  Can we send in a refill to [facility] for her?

## 2017-11-14 RX ORDER — POTASSIUM CHLORIDE 750 MG/1
10 TABLET, EXTENDED RELEASE ORAL DAILY
Qty: 30 TABLET | Refills: 5 | Status: SHIPPED | OUTPATIENT
Start: 2017-11-14 | End: 2017-12-19

## 2017-11-16 ENCOUNTER — OFFICE VISIT (OUTPATIENT)
Dept: GASTROENTEROLOGY | Facility: CLINIC | Age: 82
End: 2017-11-16

## 2017-11-16 VITALS
DIASTOLIC BLOOD PRESSURE: 66 MMHG | HEART RATE: 56 BPM | WEIGHT: 180 LBS | SYSTOLIC BLOOD PRESSURE: 127 MMHG | OXYGEN SATURATION: 97 % | BODY MASS INDEX: 29.99 KG/M2 | HEIGHT: 65 IN

## 2017-11-16 DIAGNOSIS — R14.3 FLATULENCE: ICD-10-CM

## 2017-11-16 DIAGNOSIS — K59.00 CONSTIPATION, UNSPECIFIED CONSTIPATION TYPE: Primary | ICD-10-CM

## 2017-11-16 DIAGNOSIS — R10.10 PAIN OF UPPER ABDOMEN: ICD-10-CM

## 2017-11-16 PROCEDURE — 99214 OFFICE O/P EST MOD 30 MIN: CPT | Performed by: PHYSICIAN ASSISTANT

## 2017-11-16 NOTE — PROGRESS NOTES
Chief Complaint   Patient presents with   • Constipation       Tonia Ritter is a 82 y.o. female who presents to the office today for follow up appointment for Constipation  .    HPI    The patient was seen for a follow up visit.  She had a CT scan that was ordered last visit but was negative for GI issues.  The patient reports that she takes Amitiza 8 mcg and over the counter stool softeners but is still experiencing gas with a mucousy stubstance.  Patient is having upper abdominal pain as well.  She denies nausea, vomiting,  hematochezia, and melena.  Patient takes Tramadol approximately 3-4 times per week.        Review of Systems   Constitutional: Positive for fatigue.   HENT: Negative.    Eyes: Negative.    Cardiovascular: Positive for palpitations.   Gastrointestinal: Positive for abdominal distention, abdominal pain and constipation. Negative for anal bleeding, blood in stool, diarrhea, nausea, rectal pain and vomiting.   Endocrine: Negative.    Genitourinary: Negative.    Musculoskeletal: Negative.    Skin: Negative.    Allergic/Immunologic: Negative.    Neurological: Negative.    Hematological: Negative.    Psychiatric/Behavioral: Negative.        ACTIVE PROBLEMS:   Specialty Problems        Gastroenterology Problems    GERD (gastroesophageal reflux disease)        PUD (peptic ulcer disease)        Constipation              PAST MEDICAL HISTORY:  Past Medical History:   Diagnosis Date   • Acute bronchitis    • Allergic rhinitis    • Anxiety    • Atrial fibrillation    • Back pain    • Burping    • Chronic pain    • Congestive heart failure    • Depression    • Encounter for screening fecal occult blood testing 03/10/2016    NEGATIVE   • Fibromyalgia    • Flatulence    • GERD (gastroesophageal reflux disease)    • Hematuria    • Hyperlipidemia    • Hypertension    • Osteoarthritis    • PUD (peptic ulcer disease)    • Right shoulder pain        SURGICAL HISTORY:  Past Surgical History:   Procedure  Laterality Date   • CARDIOVERSION  05/2017    [facility]    • CARDIOVERSION  09/01/2017    [facility]   • CHOLECYSTECTOMY  2004   • COLONOSCOPY  05/2013   • DILATATION AND CURETTAGE     • EPIDIDYMAL CYST EXCISION     • MAMMO BILATERAL  10/2014   • NECK SURGERY     • PAP SMEAR  03/2016   • SKIN LESION EXCISION         FAMILY HISTORY:  Family History   Problem Relation Age of Onset   • Thyroid disease Mother    • Mental illness Father      nervous breakdown   • Breast cancer Maternal Aunt        SOCIAL HISTORY:  Social History   Substance Use Topics   • Smoking status: Never Smoker   • Smokeless tobacco: Never Used   • Alcohol use No       CURRENT MEDICATION:    Current Outpatient Prescriptions:   •  acetaminophen (TYLENOL) 500 MG tablet, Take 500 mg by mouth every 6 (six) hours as needed for mild pain (1-3)., Disp: , Rfl:   •  adapalene (DIFFERIN) 0.1 % gel, Apply  topically Every Night., Disp: 45 g, Rfl: 0  •  diazePAM (VALIUM) 2 MG tablet, Take 1 tablet by mouth 2 (Two) Times a Day As Needed for Anxiety., Disp: 60 tablet, Rfl: 0  •  docusate sodium (COLACE) 100 MG capsule, Take 1 capsule by mouth Daily., Disp: 30 capsule, Rfl: 5  •  furosemide (LASIX) 20 MG tablet, Take 1 tablet by mouth 2 (Two) Times a Day., Disp: 60 tablet, Rfl: 3  •  guaifenesin-codeine (GUAIFENESIN AC) 100-10 MG/5ML liquid, Take 5 mL by mouth 4 (Four) Times a Day As Needed for Cough., Disp: 180 mL, Rfl: 0  •  lisinopril (PRINIVIL,ZESTRIL) 5 MG tablet, Take 1 tablet by mouth Daily., Disp: 30 tablet, Rfl: 5  •  loratadine (CLARITIN) 10 MG tablet, Take 1 tablet by mouth Daily., Disp: 30 tablet, Rfl: 2  •  lubiprostone (AMITIZA) 8 MCG capsule, Take 1 capsule by mouth 2 (Two) Times a Day With Meals., Disp: 60 capsule, Rfl: 5  •  miconazole (MICATIN) 2 % cream, Apply  topically 2 (Two) Times a Day., Disp: 57 g, Rfl: 0  •  mometasone (NASONEX) 50 MCG/ACT nasal spray, 2 sprays into each nostril daily., Disp: 17 g, Rfl: 5  •  mupirocin  "(BACTROBAN) 2 % ointment, Apply  topically 2 (Two) Times a Day. To affected area, Disp: 45 g, Rfl: 0  •  PARoxetine (PAXIL) 10 MG tablet, Take 1 tablet by mouth Every Night. 1/2 tab qhs, Disp: 30 tablet, Rfl: 3  •  polyethylene glycol (GLYCOLAX) powder, Take 17 g by mouth Daily., Disp: 527 g, Rfl: 3  •  potassium chloride (K-DUR,KLOR-CON) 10 MEQ CR tablet, Take 1 tablet by mouth Daily., Disp: 30 tablet, Rfl: 5  •  Probiotic Product (PROBIOTIC ACIDOPHILUS) capsule, Once daily as directed, Disp: 30 capsule, Rfl: 5  •  raNITIdine (ZANTAC) 150 MG tablet, Take 1 tablet by mouth 2 (Two) Times a Day., Disp: 60 tablet, Rfl: 3  •  rivaroxaban (XARELTO) 20 MG tablet, Take 1 tablet by mouth Daily., Disp: 30 tablet, Rfl: 5  •  rosuvastatin (CRESTOR) 10 MG tablet, Take 1 tablet by mouth Daily., Disp: 30 tablet, Rfl: 5  •  simethicone (MYLICON) 80 MG chewable tablet, Chew 80 mg every 6 (six) hours as needed for flatulence., Disp: , Rfl:   •  sotalol (BETAPACE) 80 MG tablet, Take 1.5 tablets by mouth 2 (Two) Times a Day., Disp: 90 tablet, Rfl: 5  •  traMADol (ULTRAM) 50 MG tablet, Take 1 tablet by mouth 2 (Two) Times a Day., Disp: 60 tablet, Rfl: 0  •  triamcinolone (KENALOG) 0.1 % ointment, Apply  topically 2 (Two) Times a Day., Disp: 80 g, Rfl: 0    ALLERGIES:  Reglan [metoclopramide] and Sulfa antibiotics    VISIT VITALS:  /66  Pulse 56  Ht 65\" (165.1 cm)  Wt 180 lb (81.6 kg)  SpO2 97%  BMI 29.95 kg/m2    Physical Exam   Constitutional: She is oriented to person, place, and time. She appears well-developed and well-nourished. No distress.   HENT:   Head: Normocephalic and atraumatic.   Right Ear: External ear normal.   Left Ear: External ear normal.   Nose: Nose normal.   Mouth/Throat: Oropharynx is clear and moist. No oropharyngeal exudate.   Eyes: Conjunctivae and EOM are normal. Pupils are equal, round, and reactive to light. Right eye exhibits no discharge. Left eye exhibits no discharge. No scleral icterus. "   Neck: Normal range of motion. Neck supple. No JVD present. No tracheal deviation present. No thyromegaly present.   Cardiovascular: Normal rate, regular rhythm, normal heart sounds and intact distal pulses.  Exam reveals no gallop and no friction rub.    No murmur heard.  Pulmonary/Chest: Effort normal and breath sounds normal. No stridor. No respiratory distress. She has no wheezes. She has no rales. She exhibits no tenderness.   Abdominal: Soft. She exhibits no distension and no mass. There is no tenderness. There is no rebound and no guarding. No hernia.   Decreased bowel sounds   Musculoskeletal: She exhibits no edema, tenderness or deformity.   Lymphadenopathy:     She has no cervical adenopathy.   Neurological: She is alert and oriented to person, place, and time. She has normal reflexes. She displays normal reflexes. No cranial nerve deficit. She exhibits normal muscle tone. Coordination normal.   Skin: Skin is warm and dry. No rash noted. She is not diaphoretic. No erythema. No pallor.   Psychiatric: She has a normal mood and affect. Her behavior is normal. Judgment and thought content normal.       Assessment/Plan      Diagnosis Plan   1. Constipation, unspecified constipation type     2. Flatulence     3. Pain of upper abdomen       The patient's Amitiza 8 mcg will be increased to 24 mcg BID due to her uncontrolled constipation and flatulence.  She voiced understanding and agreement of recommendations.   Return in about 4 weeks (around 12/14/2017) for Recheck.         CASSIE Singh

## 2017-11-17 ENCOUNTER — TELEPHONE (OUTPATIENT)
Dept: GASTROENTEROLOGY | Facility: CLINIC | Age: 82
End: 2017-11-17

## 2017-11-17 DIAGNOSIS — K59.04 CHRONIC IDIOPATHIC CONSTIPATION: Primary | ICD-10-CM

## 2017-11-17 RX ORDER — LUBIPROSTONE 24 UG/1
24 CAPSULE ORAL 2 TIMES DAILY WITH MEALS
Qty: 60 CAPSULE | Refills: 5 | Status: SHIPPED | OUTPATIENT
Start: 2017-11-17 | End: 2019-03-21

## 2017-11-29 ENCOUNTER — TRANSITIONAL CARE MANAGEMENT TELEPHONE ENCOUNTER (OUTPATIENT)
Dept: FAMILY MEDICINE CLINIC | Facility: CLINIC | Age: 82
End: 2017-11-29

## 2017-11-29 NOTE — OUTREACH NOTE
Called pt and completed TCM. Pt answered questions and verbalized understanding. Pt confirmed appt for 12/1/2017 @ 1040 with [facility]. Called [facility] and records are being faxed.

## 2017-12-01 ENCOUNTER — OFFICE VISIT (OUTPATIENT)
Dept: FAMILY MEDICINE CLINIC | Facility: CLINIC | Age: 82
End: 2017-12-01

## 2017-12-01 VITALS
HEIGHT: 65 IN | BODY MASS INDEX: 29.66 KG/M2 | OXYGEN SATURATION: 98 % | HEART RATE: 83 BPM | WEIGHT: 178 LBS | DIASTOLIC BLOOD PRESSURE: 80 MMHG | SYSTOLIC BLOOD PRESSURE: 134 MMHG

## 2017-12-01 DIAGNOSIS — I48.0 PAROXYSMAL ATRIAL FIBRILLATION (HCC): Primary | ICD-10-CM

## 2017-12-01 DIAGNOSIS — R14.3 EXCESSIVE GAS: ICD-10-CM

## 2017-12-01 DIAGNOSIS — M25.50 MULTIPLE JOINT PAIN: ICD-10-CM

## 2017-12-01 DIAGNOSIS — M79.7 FIBROMYALGIA: ICD-10-CM

## 2017-12-01 DIAGNOSIS — F41.9 ANXIETY: ICD-10-CM

## 2017-12-01 PROBLEM — Z98.890 S/P ABLATION OF ATRIAL FIBRILLATION: Status: ACTIVE | Noted: 2017-12-01

## 2017-12-01 PROBLEM — Z86.79 S/P ABLATION OF ATRIAL FIBRILLATION: Status: ACTIVE | Noted: 2017-12-01

## 2017-12-01 PROBLEM — Z95.0 PACEMAKER: Status: ACTIVE | Noted: 2017-12-01

## 2017-12-01 PROCEDURE — 99496 TRANSJ CARE MGMT HIGH F2F 7D: CPT | Performed by: NURSE PRACTITIONER

## 2017-12-01 RX ORDER — TRAMADOL HYDROCHLORIDE 50 MG/1
50 TABLET ORAL 2 TIMES DAILY
Qty: 60 TABLET | Refills: 0 | Status: SHIPPED | OUTPATIENT
Start: 2017-12-01 | End: 2018-01-09 | Stop reason: SDUPTHER

## 2017-12-01 RX ORDER — DIAZEPAM 2 MG/1
2 TABLET ORAL 2 TIMES DAILY PRN
Qty: 60 TABLET | Refills: 0 | Status: SHIPPED | OUTPATIENT
Start: 2017-12-01 | End: 2018-01-09 | Stop reason: SDUPTHER

## 2017-12-01 RX ORDER — SIMETHICONE 80 MG
80 TABLET,CHEWABLE ORAL
Qty: 120 TABLET | Refills: 5 | Status: SHIPPED | OUTPATIENT
Start: 2017-12-01 | End: 2019-11-14

## 2017-12-01 NOTE — PROGRESS NOTES
Subjective   Tonia Ritter is a 82 y.o. female.     Chief Complaint: Transitional Care Management (Recent Stay at [facility])    History of Present Illness   Patient here today for transitional care visit.  Patient was admitted to [facility] in [place] on November 24 and was discharged on November 28, 2017.  Patient was admitted with atrial fib.  Patient does have a history of atrial feel and was currently taking sotalol and xarelto.  Patient follows with cardiologist, Dr. Melendez.  Patient states that she began having atrial fib 5 days prior to going to the emergency room.  Patient was admitted after being evaluated in the emergency room on Friday afternoon.  The following Monday morning she was evaluated by her cardiologist and underwent a cardiac ablation and also permanent pacemaker.  Patient states that she has done well since she was discharged 3 days ago.  She has had no further atrial fib to her knowledge.  She denies any issues with her permanent pacemaker placement.  She is currently wearing her arm sling as was advised from the cardiologist.  She does have a pressure dressing to her left upper chest area that is clean and dry.  She denies any pain or discomfort at her pacemaker site.  Patient appears to be doing well since her procedures were done.  Patient states that she feels well and is very happy at this time.  She does have a follow-up appointment scheduled with cardiologist in 3 days.  I have advised patient to keep this appointment as scheduled.    Anxiety   Presents for follow-up visit. Symptoms include decreased concentration, irritability and nervous/anxious behavior. Symptoms occur occasionally. The severity of symptoms is moderate. The quality of sleep is good. Nighttime awakenings: occasional.  Compliance with medications is %.  Pt has taken diazepam for years and tolerates it well. Her symptoms are well controlled with the medication and without side effects. Pt is able to  continue her ADLs with use of medication. She denies any suicidal thoughts or ideations. Pt states that she does occasionally take only 1/2 tablet and does well with that.        Fibromyalgia   This is a chronic problem. The current episode started more than 1 year ago. The problem occurs daily. The problem has been unchanged. Associated symptoms include arthralgias and myalgias. Nothing aggravates the symptoms. She has tried acetaminophen, NSAIDs, oral narcotics and relaxation for the symptoms. The treatment provided moderate relief.   Pt has been taking Tramadol for her pain and is tolerating it well. Tramadol enables pt to continue with her ADLs and live her life without difficulty. She denies any side effects from the medication.     Excessive gas  Patient states that she does continue to have excessive gas especially after meals.  She states when she was in the hospital she was given simethicone to help with her gas.  She states that the medication worked well.  She has requested a prescription for that medication today.    Braulio # [id number] Reviewed and is consistent.  S 9/17/2017 reviewed and is consistent.  Patient comfort assessment guide reviewed and updated today.    As part of the patient's treatment plan they are being prescribed a controlled substance/ substances with potential for abuse.  This patient has been made aware of the appropriate use of such medications, including potential risk of somnolence, limited ability to drive and/or work safely, and potential for overdose.  It has also been made clear these medications are for use by the patient only, without concomitant use of alcohol or other substances unless prescribed/advised by medical provider.    Patient has completed prescribing agreement detailing terms of continued prescribing of controlled substances including monitoring BRAULIO reports, urine drug screens, and pill counts.  The patient is aware BRAULIO reports are reviewed on a regular  basis and scanned into the chart.    History and physical exam exhibit continued safe and appropriate use of controlled substances.    Family History   Problem Relation Age of Onset   • Thyroid disease Mother    • Mental illness Father      nervous breakdown   • Breast cancer Maternal Aunt        Social History     Social History   • Marital status:      Spouse name: N/A   • Number of children: N/A   • Years of education: N/A     Occupational History   • Not on file.     Social History Main Topics   • Smoking status: Never Smoker   • Smokeless tobacco: Never Used   • Alcohol use No   • Drug use: No   • Sexual activity: Defer     Other Topics Concern   • Not on file     Social History Narrative       Past Medical History:   Diagnosis Date   • Acute bronchitis    • Allergic rhinitis    • Anxiety    • Atrial fibrillation    • Back pain    • Burping    • Chronic pain    • Congestive heart failure    • Depression    • Encounter for screening fecal occult blood testing 03/10/2016    NEGATIVE   • Fibromyalgia    • Flatulence    • GERD (gastroesophageal reflux disease)    • Hematuria    • Hyperlipidemia    • Hypertension    • Osteoarthritis    • Pacemaker    • PUD (peptic ulcer disease)    • Right shoulder pain        Review of Systems   Constitutional: Negative.    HENT: Negative.    Respiratory: Negative.    Cardiovascular: Negative.    Gastrointestinal: Negative.    Musculoskeletal: Negative.    Skin: Negative.    Neurological: Negative.    Psychiatric/Behavioral: Negative.        Objective   Physical Exam   Constitutional: She is oriented to person, place, and time. She appears well-developed and well-nourished.   Neck: Normal range of motion. Neck supple.   Cardiovascular: Normal rate, regular rhythm and normal heart sounds.    Pulmonary/Chest: Effort normal and breath sounds normal.   Neurological: She is alert and oriented to person, place, and time.   Skin: Skin is warm and dry.   Pressure dressing intact to  "left upper chest wall; dressing is clean and dry; no drainage noted; left arm in sling as advised from cardiologist   Psychiatric: She has a normal mood and affect. Her behavior is normal. Judgment and thought content normal.   Nursing note and vitals reviewed.      Procedures    Vitals: Blood pressure 134/80, pulse 83, height 65\" (165.1 cm), weight 178 lb (80.7 kg), SpO2 98 %, not currently breastfeeding.    Allergies:   Allergies   Allergen Reactions   • Reglan [Metoclopramide]    • Sulfa Antibiotics           Assessment/Plan   Tonia was seen today for transitional care management.    Diagnoses and all orders for this visit:    Paroxysmal atrial fibrillation    Anxiety  -     diazePAM (VALIUM) 2 MG tablet; Take 1 tablet by mouth 2 (Two) Times a Day As Needed for Anxiety.    Multiple joint pain  -     traMADol (ULTRAM) 50 MG tablet; Take 1 tablet by mouth 2 (Two) Times a Day.    Fibromyalgia  -     traMADol (ULTRAM) 50 MG tablet; Take 1 tablet by mouth 2 (Two) Times a Day.    Excessive gas  -     simethicone (GAS-X) 80 MG chewable tablet; Chew 1 tablet 4 (Four) Times a Day After Meals & at Bedtime.               "

## 2017-12-13 RX ORDER — MOMETASONE FUROATE 50 UG/1
2 SPRAY, METERED NASAL DAILY
Qty: 17 G | Refills: 5 | Status: SHIPPED | OUTPATIENT
Start: 2017-12-13 | End: 2018-05-15 | Stop reason: SDUPTHER

## 2017-12-19 ENCOUNTER — OFFICE VISIT (OUTPATIENT)
Dept: GASTROENTEROLOGY | Facility: CLINIC | Age: 82
End: 2017-12-19

## 2017-12-19 VITALS
SYSTOLIC BLOOD PRESSURE: 110 MMHG | WEIGHT: 176.6 LBS | OXYGEN SATURATION: 96 % | DIASTOLIC BLOOD PRESSURE: 70 MMHG | HEIGHT: 65 IN | BODY MASS INDEX: 29.42 KG/M2 | HEART RATE: 86 BPM

## 2017-12-19 DIAGNOSIS — R14.0 FLATULENCE/GAS PAIN/BELCHING: ICD-10-CM

## 2017-12-19 DIAGNOSIS — K59.04 CHRONIC IDIOPATHIC CONSTIPATION: Primary | ICD-10-CM

## 2017-12-19 PROCEDURE — 99214 OFFICE O/P EST MOD 30 MIN: CPT | Performed by: PHYSICIAN ASSISTANT

## 2017-12-19 RX ORDER — SOTALOL HYDROCHLORIDE 120 MG/1
120 TABLET ORAL 2 TIMES DAILY
COMMUNITY
End: 2017-12-27 | Stop reason: SDUPTHER

## 2017-12-19 NOTE — PROGRESS NOTES
Chief Complaint   Patient presents with   • Constipation   • Abdominal Pain       Tonia Ritter is a 82 y.o. female who presents to the office today for follow up appointment regarding Constipation and Abdominal Pain.    HPI  She is taking Amitiza 24 mcg BID. Bowel movements are occurring daily most of the time but seems to be in small amounts. She has occasional skip days between stools. She complains of continued abdominal gas pains, flatulence and belching. Her PCP gave her simethicone chews but they have not seemed to help any. When she lies down at night, the abdominal gas pressure builds up and she has more severe gas pains and even large amount of belching. She does have a hemorrhoid which causes her discomfort at times. It seems to have improved recently.     Gastric emptying test in the past was normal.     According to the patient, EGD was performed by Dr. Bearden approx 1 year ago and colonoscopy was performed about 2-3 years ago with no abnormal findings.    Review of Systems   Constitutional: Positive for fatigue.   HENT: Negative.    Eyes: Negative.    Cardiovascular: Positive for palpitations.   Gastrointestinal: Positive for abdominal distention, abdominal pain and constipation. Negative for anal bleeding, blood in stool, diarrhea, nausea, rectal pain and vomiting.   Endocrine: Negative.    Genitourinary: Negative.    Musculoskeletal: Negative.    Skin: Negative.    Allergic/Immunologic: Negative.    Neurological: Negative.    Hematological: Negative.    Psychiatric/Behavioral: Negative.      Specialty Problems        Gastroenterology Problems    GERD (gastroesophageal reflux disease)        PUD (peptic ulcer disease)        Constipation            Past Medical History:   Diagnosis Date   • Acute bronchitis    • Allergic rhinitis    • Anxiety    • Atrial fibrillation    • Back pain    • Burping    • Chronic pain    • Congestive heart failure    • Depression    • Encounter for screening fecal  occult blood testing 03/10/2016    NEGATIVE   • Fibromyalgia    • Flatulence    • GERD (gastroesophageal reflux disease)    • Hematuria    • Hyperlipidemia    • Hypertension    • Osteoarthritis    • Pacemaker    • PUD (peptic ulcer disease)    • Right shoulder pain        Past Surgical History:   Procedure Laterality Date   • C[facility]ERSION  05/2017    [facility]    • CARDIOVERSION  09/01/2017    [facility]   • CHOLECYSTECTOMY  2004   • COLONOSCOPY  05/2013   • DILATATION AND CURETTAGE     • EPIDIDYMAL CYST EXCISION     • MAMMO BILATERAL  10/2014   • NECK SURGERY     • PACEMAKER IMPLANTATION     • PAP SMEAR  03/2016   • SKIN LESION EXCISION         Family History   Problem Relation Age of Onset   • Thyroid disease Mother    • Mental illness Father      nervous breakdown   • Breast cancer Maternal Aunt        Social History   Substance Use Topics   • Smoking status: Never Smoker   • Smokeless tobacco: Never Used   • Alcohol use No       CURRENT MEDICATION:  •  acetaminophen (TYLENOL) 500 MG tablet, Take 500 mg by mouth every 6 (six) hours as needed for mild pain (1-3)., Disp: , Rfl:   •  adapalene (DIFFERIN) 0.1 % gel, Apply  topically Every Night., Disp: 45 g, Rfl: 0  •  diazePAM (VALIUM) 2 MG tablet, Take 1 tablet by mouth 2 (Two) Times a Day As Needed for Anxiety., Disp: 60 tablet, Rfl: 0  •  docusate sodium (COLACE) 100 MG capsule, Take 1 capsule by mouth Daily., Disp: 30 capsule, Rfl: 5  •  furosemide (LASIX) 20 MG tablet, Take 1 tablet by mouth 2 (Two) Times a Day., Disp: 60 tablet, Rfl: 3  •  guaifenesin-codeine (GUAIFENESIN AC) 100-10 MG/5ML liquid, Take 5 mL by mouth 4 (Four) Times a Day As Needed for Cough., Disp: 180 mL, Rfl: 0  •  lisinopril (PRINIVIL,ZESTRIL) 5 MG tablet, Take 1 tablet by mouth Daily., Disp: 30 tablet, Rfl: 5  •  loratadine (CLARITIN) 10 MG tablet, Take 1 tablet by mouth Daily., Disp: 30 tablet, Rfl: 2  •  lubiprostone (AMITIZA) 24 MCG capsule, Take 1 capsule by mouth 2 (Two) Times  "a Day With Meals., Disp: 60 capsule, Rfl: 5  •  miconazole (MICATIN) 2 % cream, Apply  topically 2 (Two) Times a Day., Disp: 57 g, Rfl: 0  •  mometasone (NASONEX) 50 MCG/ACT nasal spray, 2 sprays into each nostril Daily., Disp: 17 g, Rfl: 5  •  mupirocin (BACTROBAN) 2 % ointment, Apply  topically 2 (Two) Times a Day. To affected area, Disp: 45 g, Rfl: 0  •  PARoxetine (PAXIL) 10 MG tablet, Take 1 tablet by mouth Every Night. 1/2 tab qhs, Disp: 30 tablet, Rfl: 3  •  polyethylene glycol (GLYCOLAX) powder, Take 17 g by mouth Daily., Disp: 527 g, Rfl: 3  •  potassium chloride (K-DUR,KLOR-CON) 10 MEQ CR tablet, Take 1 tablet by mouth Daily., Disp: 30 tablet, Rfl: 5  •  Probiotic Product (PROBIOTIC ACIDOPHILUS) capsule, Once daily as directed, Disp: 30 capsule, Rfl: 5  •  raNITIdine (ZANTAC) 150 MG tablet, Take 1 tablet by mouth 2 (Two) Times a Day., Disp: 60 tablet, Rfl: 3  •  rivaroxaban (XARELTO) 20 MG tablet, Take 1 tablet by mouth Daily., Disp: 30 tablet, Rfl: 5  •  rosuvastatin (CRESTOR) 10 MG tablet, Take 1 tablet by mouth Daily., Disp: 30 tablet, Rfl: 5  •  simethicone (GAS-X) 80 MG chewable tablet, Chew 1 tablet 4 (Four) Times a Day After Meals & at Bedtime., Disp: 120 tablet, Rfl: 5  •  simethicone (MYLICON) 80 MG chewable tablet, Chew 80 mg every 6 (six) hours as needed for flatulence., Disp: , Rfl:   •  sotalol (BETAPACE) 80 MG tablet, Take 1.5 tablets by mouth 2 (Two) Times a Day., Disp: 90 tablet, Rfl: 5  •  traMADol (ULTRAM) 50 MG tablet, Take 1 tablet by mouth 2 (Two) Times a Day., Disp: 60 tablet, Rfl: 0  •  triamcinolone (KENALOG) 0.1 % ointment, Apply  topically 2 (Two) Times a Day., Disp: 80 g, Rfl: 0    ALLERGIES:  Reglan [metoclopramide] and Sulfa antibiotics    VISIT VITALS:  /70  Pulse 86  Ht 165.1 cm (65\")  Wt 80.1 kg (176 lb 9.6 oz)  SpO2 96%  BMI 29.39 kg/m2    Physical Exam   Constitutional: She is oriented to person, place, and time. She appears well-developed and well-nourished. No " distress.   HENT:   Head: Normocephalic and atraumatic.   Right Ear: External ear normal.   Left Ear: External ear normal.   Nose: Nose normal.   Mouth/Throat: Oropharynx is clear and moist.   Eyes: Conjunctivae and EOM are normal. Right eye exhibits no discharge. Left eye exhibits no discharge. No scleral icterus.   Neck: Normal range of motion. Neck supple.   Cardiovascular: Normal rate, regular rhythm and normal heart sounds.  Exam reveals no gallop and no friction rub.    No murmur heard.  Pulmonary/Chest: Effort normal and breath sounds normal. No respiratory distress. She has no wheezes. She has no rales. She exhibits no tenderness.   Abdominal: Soft. Normal appearance and bowel sounds are normal. She exhibits no distension, no ascites and no mass. There is tenderness (generalized). There is no rigidity and no guarding. No hernia.   Musculoskeletal: Normal range of motion. She exhibits no edema or deformity.   Neurological: She is alert and oriented to person, place, and time. She exhibits normal muscle tone. Coordination normal.   Skin: Skin is warm and dry. No rash noted. No erythema. No pallor.   Psychiatric: She has a normal mood and affect. Her behavior is normal. Judgment and thought content normal.   Nursing note and vitals reviewed.      Assessment/Plan     1. Chronic idiopathic constipation    2. Flatulence/gas pain/belching      She will complete magnesium citrate clean out then resume Amitiza 24 mcg BID due to constipation. It is likely that her other complaints of increased flatulence, abdominal gas, etc are all consistent with constipation. Increase dietary fiber and daily water intake.          Return in about 6 weeks (around 1/30/2018) for recheck constipation.      Electronically signed 12/22/2017 2:17 PM  Vivian Saul PA-C, [facility]

## 2017-12-27 ENCOUNTER — OFFICE VISIT (OUTPATIENT)
Dept: FAMILY MEDICINE CLINIC | Facility: CLINIC | Age: 82
End: 2017-12-27

## 2017-12-27 VITALS
HEIGHT: 65 IN | WEIGHT: 176 LBS | OXYGEN SATURATION: 97 % | BODY MASS INDEX: 29.32 KG/M2 | SYSTOLIC BLOOD PRESSURE: 112 MMHG | DIASTOLIC BLOOD PRESSURE: 71 MMHG | HEART RATE: 91 BPM

## 2017-12-27 DIAGNOSIS — M81.0 AGE-RELATED OSTEOPOROSIS WITHOUT CURRENT PATHOLOGICAL FRACTURE: Primary | ICD-10-CM

## 2017-12-27 PROCEDURE — G0439 PPPS, SUBSEQ VISIT: HCPCS | Performed by: NURSE PRACTITIONER

## 2017-12-27 RX ORDER — SOTALOL HYDROCHLORIDE 120 MG/1
120 TABLET ORAL 2 TIMES DAILY
Qty: 60 TABLET | Refills: 5 | Status: SHIPPED | OUTPATIENT
Start: 2017-12-27 | End: 2018-06-06 | Stop reason: SDUPTHER

## 2017-12-27 RX ORDER — FUROSEMIDE 20 MG/1
20 TABLET ORAL DAILY
Qty: 60 TABLET | Refills: 3
Start: 2017-12-27 | End: 2018-02-23 | Stop reason: SDUPTHER

## 2017-12-27 NOTE — PROGRESS NOTES
QUICK REFERENCE INFORMATION:  The ABCs of the Annual Wellness Visit    Subsequent Medicare Wellness Visit    HEALTH RISK ASSESSMENT    1935    Recent Hospitalizations:  Recently treated at the following:  Other: [facility], [facility].        Current Medical Providers:  Patient Care Team:  TEODORA Guy as PCP - General  TEODORA Red as PCP - Family Medicine  TEODORA Red as PCP - Claims Attributed  Dr. Melendez, Cardiologist      Smoking Status:  History   Smoking Status   • Never Smoker   Smokeless Tobacco   • Never Used       Alcohol Consumption:  History   Alcohol Use No       Depression Screen:   PHQ-2/PHQ-9 Depression Screening 12/27/2017   Little interest or pleasure in doing things 0   Feeling down, depressed, or hopeless 0   Total Score 0       Health Habits and Functional and Cognitive Screening:  Functional & Cognitive Status 12/27/2017   Do you have difficulty preparing food and eating? No   Do you have difficulty bathing yourself, getting dressed or grooming yourself? No   Do you have difficulty using the toilet? No   Do you have difficulty moving around from place to place? No   Do you have trouble with steps or getting out of a bed or a chair? No   In the past year have you fallen or experienced a near fall? No   Current Diet Well Balanced Diet   Dental Exam Not up to date   Eye Exam Not up to date   Exercise (times per week) 0 times per week   Current Exercise Activities Include Housecleaning   Do you need help using the phone?  No   Are you deaf or do you have serious difficulty hearing?  No   Do you need help with transportation? No   Do you need help shopping? No   Do you need help preparing meals?  No   Do you need help with housework?  No   Do you need help with laundry? No   Do you need help taking your medications? No   Do you need help managing money? No   Have you felt unusual stress, anger or loneliness in the last month? No   Who do you live with? Alone   If you  need help, do you have trouble finding someone available to you? No   Have you been bothered in the last four weeks by sexual problems? (No Data)   Do you have difficulty concentrating, remembering or making decisions? No           Does the patient have evidence of cognitive impairment? No    Aspirin use counseling: Does not need ASA (and currently is not on it)      Recent Lab Results:  CMP:  Lab Results   Component Value Date    GLU 92 08/11/2016    BUN 10 10/27/2017    CREATININE 1.00 10/27/2017    EGFRIFNONA 53 (L) 10/27/2017    EGFRIFAFRI 63 08/11/2016    BCR 10.0 10/27/2017     10/27/2017    K 3.9 10/27/2017    CO2 34.4 (H) 10/27/2017    CALCIUM 9.7 10/27/2017    PROTENTOTREF 7.1 08/11/2016    ALBUMIN 4.30 10/27/2017    LABGLOBREF 2.8 08/11/2016    LABIL2 1.5 10/27/2017    BILITOT 0.4 10/27/2017    ALKPHOS 73 10/27/2017    AST 27 10/27/2017    ALT 15 10/27/2017     Lipid Panel:  Lab Results   Component Value Date    CHOL 196 10/27/2017    TRIG 158 (H) 10/27/2017    HDL 62 10/27/2017    VLDL 31.6 10/27/2017    LDLCALC 102 (H) 10/27/2017    LDLHDL 1.65 10/27/2017     HbA1c:       Visual Acuity:  No exam data present    Age-appropriate Screening Schedule:  Refer to the list below for future screening recommendations based on patient's age, sex and/or medical conditions. Orders for these recommended tests are listed in the plan section. The patient has been provided with a written plan.    Health Maintenance   Topic Date Due   • ZOSTER VACCINE  05/19/2016   • PNEUMOCOCCAL VACCINES (65+ LOW/MEDIUM RISK) (2 of 2 - PPSV23) 08/04/2016   • INFLUENZA VACCINE  08/01/2017   • DXA SCAN  12/27/2017   • LIPID PANEL  10/27/2018   • MAMMOGRAM  05/10/2019   • TDAP/TD VACCINES (2 - Td) 10/04/2026        Subjective   History of Present Illness    Tonia Ritter is a 82 y.o. female who presents for an Subsequent Wellness Visit.    The following portions of the patient's history were reviewed and updated as appropriate:  allergies, current medications, past family history, past medical history, past social history, past surgical history and problem list.    Outpatient Medications Prior to Visit   Medication Sig Dispense Refill   • acetaminophen (TYLENOL) 500 MG tablet Take 500 mg by mouth every 6 (six) hours as needed for mild pain (1-3).     • adapalene (DIFFERIN) 0.1 % gel Apply  topically Every Night. 45 g 0   • diazePAM (VALIUM) 2 MG tablet Take 1 tablet by mouth 2 (Two) Times a Day As Needed for Anxiety. 60 tablet 0   • lisinopril (PRINIVIL,ZESTRIL) 5 MG tablet Take 1 tablet by mouth Daily. 30 tablet 5   • loratadine (CLARITIN) 10 MG tablet Take 1 tablet by mouth Daily. 30 tablet 2   • lubiprostone (AMITIZA) 24 MCG capsule Take 1 capsule by mouth 2 (Two) Times a Day With Meals. 60 capsule 5   • mometasone (NASONEX) 50 MCG/ACT nasal spray 2 sprays into each nostril Daily. 17 g 5   • PARoxetine (PAXIL) 10 MG tablet Take 1 tablet by mouth Every Night. 1/2 tab qhs 30 tablet 3   • rivaroxaban (XARELTO) 20 MG tablet Take 1 tablet by mouth Daily. 30 tablet 5   • rosuvastatin (CRESTOR) 10 MG tablet Take 1 tablet by mouth Daily. 30 tablet 5   • simethicone (GAS-X) 80 MG chewable tablet Chew 1 tablet 4 (Four) Times a Day After Meals & at Bedtime. 120 tablet 5   • traMADol (ULTRAM) 50 MG tablet Take 1 tablet by mouth 2 (Two) Times a Day. 60 tablet 0   • triamcinolone (KENALOG) 0.1 % ointment Apply  topically 2 (Two) Times a Day. 80 g 0   • docusate sodium (COLACE) 100 MG capsule Take 1 capsule by mouth Daily. 30 capsule 5   • furosemide (LASIX) 20 MG tablet Take 1 tablet by mouth 2 (Two) Times a Day. 60 tablet 3   • guaifenesin-codeine (GUAIFENESIN AC) 100-10 MG/5ML liquid Take 5 mL by mouth 4 (Four) Times a Day As Needed for Cough. 180 mL 0   • magnesium citrate solution Take 296 mL by mouth Take As Directed. Take 1 bottle now than 2nd bottle approx 6 hours after for clean out. 592 mL 0   • miconazole (MICATIN) 2 % cream Apply  topically  "2 (Two) Times a Day. 57 g 0   • mupirocin (BACTROBAN) 2 % ointment Apply  topically 2 (Two) Times a Day. To affected area 45 g 0   • Probiotic Product (PROBIOTIC ACIDOPHILUS) capsule Once daily as directed 30 capsule 5   • raNITIdine (ZANTAC) 150 MG tablet Take 1 tablet by mouth 2 (Two) Times a Day. 60 tablet 3   • simethicone (MYLICON) 80 MG chewable tablet Chew 80 mg every 6 (six) hours as needed for flatulence.     • sotalol (BETAPACE) 120 MG tablet Take 120 mg by mouth 2 (Two) Times a Day.     • sotalol (BETAPACE) 80 MG tablet Take 1.5 tablets by mouth 2 (Two) Times a Day. 90 tablet 5     No facility-administered medications prior to visit.        Patient Active Problem List   Diagnosis   • Right shoulder pain   • PUD (peptic ulcer disease)   • Osteoarthritis   • Hypertension   • Hyperlipidemia   • Acute bronchitis   • Allergic rhinitis   • Anxiety   • Atrial fibrillation   • Back pain   • Burping   • Chronic pain   • Depression   • Encounter for screening fecal occult blood testing   • Flatulence   • GERD (gastroesophageal reflux disease)   • Hematuria   • Fibromyalgia   • Constipation   • Pain of upper abdomen   • Pacemaker   • S/P ablation of atrial fibrillation       Advance Care Planning:  has NO advance directive - not interested in additional information    Identification of Risk Factors:  Risk factors include: cardiovascular risk, inactivity, depression and polypharmacy.    Review of Systems    Compared to one year ago, the patient feels her physical health is the same.  Compared to one year ago, the patient feels her mental health is the same.    Objective     Physical Exam    Vitals:    12/27/17 1031   BP: 112/71   BP Location: Right arm   Patient Position: Sitting   Cuff Size: Adult   Pulse: 91   SpO2: 97%   Weight: 79.8 kg (176 lb)   Height: 165.1 cm (65\")       Body mass index is 29.29 kg/(m^2).  Discussed the patient's BMI with her. BMI is above normal parameters. Follow-up plan includes:  exercise " counseling and nutrition counseling.    Assessment/Plan   Patient Self-Management and Personalized Health Advice  The patient has been provided with information about: diet and prevention of cardiac or vascular disease and preventive services including:   · Bone densitometry screening, Counseling for cardiovascular disease risk reduction, Fall Risk assessment done, Glaucoma screening recommended, Influenza vaccine, Nutrition counseling provided, Zostavax vaccine (Herpes Zoster).    Visit Diagnoses:    ICD-10-CM ICD-9-CM   1. Age-related osteoporosis without current pathological fracture M81.0 733.01       Orders Placed This Encounter   Procedures   • DEXA Bone Density Axial     Standing Status:   Future     Standing Expiration Date:   12/27/2018     Order Specific Question:   Reason for Exam:     Answer:   osteoporosis       Outpatient Encounter Prescriptions as of 12/27/2017   Medication Sig Dispense Refill   • acetaminophen (TYLENOL) 500 MG tablet Take 500 mg by mouth every 6 (six) hours as needed for mild pain (1-3).     • adapalene (DIFFERIN) 0.1 % gel Apply  topically Every Night. 45 g 0   • diazePAM (VALIUM) 2 MG tablet Take 1 tablet by mouth 2 (Two) Times a Day As Needed for Anxiety. 60 tablet 0   • furosemide (LASIX) 20 MG tablet Take 1 tablet by mouth Daily. 60 tablet 3   • lisinopril (PRINIVIL,ZESTRIL) 5 MG tablet Take 1 tablet by mouth Daily. 30 tablet 5   • loratadine (CLARITIN) 10 MG tablet Take 1 tablet by mouth Daily. 30 tablet 2   • lubiprostone (AMITIZA) 24 MCG capsule Take 1 capsule by mouth 2 (Two) Times a Day With Meals. 60 capsule 5   • mometasone (NASONEX) 50 MCG/ACT nasal spray 2 sprays into each nostril Daily. 17 g 5   • PARoxetine (PAXIL) 10 MG tablet Take 1 tablet by mouth Every Night. 1/2 tab qhs 30 tablet 3   • rivaroxaban (XARELTO) 20 MG tablet Take 1 tablet by mouth Daily. 30 tablet 5   • rosuvastatin (CRESTOR) 10 MG tablet Take 1 tablet by mouth Daily. 30 tablet 5   • simethicone  (GAS-X) 80 MG chewable tablet Chew 1 tablet 4 (Four) Times a Day After Meals & at Bedtime. 120 tablet 5   • sotalol (BETAPACE) 120 MG tablet Take 1 tablet by mouth 2 (Two) Times a Day. 60 tablet 5   • traMADol (ULTRAM) 50 MG tablet Take 1 tablet by mouth 2 (Two) Times a Day. 60 tablet 0   • triamcinolone (KENALOG) 0.1 % ointment Apply  topically 2 (Two) Times a Day. 80 g 0   • [DISCONTINUED] docusate sodium (COLACE) 100 MG capsule Take 1 capsule by mouth Daily. 30 capsule 5   • [DISCONTINUED] furosemide (LASIX) 20 MG tablet Take 1 tablet by mouth 2 (Two) Times a Day. 60 tablet 3   • [DISCONTINUED] guaifenesin-codeine (GUAIFENESIN AC) 100-10 MG/5ML liquid Take 5 mL by mouth 4 (Four) Times a Day As Needed for Cough. 180 mL 0   • [DISCONTINUED] magnesium citrate solution Take 296 mL by mouth Take As Directed. Take 1 bottle now than 2nd bottle approx 6 hours after for clean out. 592 mL 0   • [DISCONTINUED] miconazole (MICATIN) 2 % cream Apply  topically 2 (Two) Times a Day. 57 g 0   • [DISCONTINUED] mupirocin (BACTROBAN) 2 % ointment Apply  topically 2 (Two) Times a Day. To affected area 45 g 0   • [DISCONTINUED] Probiotic Product (PROBIOTIC ACIDOPHILUS) capsule Once daily as directed 30 capsule 5   • [DISCONTINUED] raNITIdine (ZANTAC) 150 MG tablet Take 1 tablet by mouth 2 (Two) Times a Day. 60 tablet 3   • [DISCONTINUED] simethicone (MYLICON) 80 MG chewable tablet Chew 80 mg every 6 (six) hours as needed for flatulence.     • [DISCONTINUED] sotalol (BETAPACE) 120 MG tablet Take 120 mg by mouth 2 (Two) Times a Day.     • [DISCONTINUED] sotalol (BETAPACE) 80 MG tablet Take 1.5 tablets by mouth 2 (Two) Times a Day. 90 tablet 5     No facility-administered encounter medications on file as of 12/27/2017.        Reviewed use of high risk medication in the elderly: yes  Reviewed for potential of harmful drug interactions in the elderly: yes    Follow Up:  Return in about 3 months (around 3/27/2018).     An After Visit  Summary and PPPS with all of these plans were given to the patient.

## 2018-01-04 ENCOUNTER — TELEPHONE (OUTPATIENT)
Dept: FAMILY MEDICINE CLINIC | Facility: CLINIC | Age: 83
End: 2018-01-04

## 2018-01-04 ENCOUNTER — HOSPITAL ENCOUNTER (OUTPATIENT)
Dept: BONE DENSITY | Facility: HOSPITAL | Age: 83
Discharge: HOME | End: 2018-01-04
Admitting: NURSE PRACTITIONER

## 2018-01-04 DIAGNOSIS — M81.0 AGE-RELATED OSTEOPOROSIS WITHOUT CURRENT PATHOLOGICAL FRACTURE: ICD-10-CM

## 2018-01-04 PROCEDURE — 77080 DXA BONE DENSITY AXIAL: CPT | Performed by: RADIOLOGY

## 2018-01-04 PROCEDURE — 77080 DXA BONE DENSITY AXIAL: CPT

## 2018-01-04 RX ORDER — ROSUVASTATIN CALCIUM 10 MG/1
10 TABLET, COATED ORAL DAILY
Qty: 30 TABLET | Refills: 5 | Status: SHIPPED | OUTPATIENT
Start: 2018-01-04 | End: 2018-01-05 | Stop reason: SDUPTHER

## 2018-01-05 ENCOUNTER — TELEPHONE (OUTPATIENT)
Dept: FAMILY MEDICINE CLINIC | Facility: CLINIC | Age: 83
End: 2018-01-05

## 2018-01-05 RX ORDER — ROSUVASTATIN CALCIUM 10 MG/1
10 TABLET, COATED ORAL DAILY
Qty: 30 TABLET | Refills: 5 | Status: SHIPPED | OUTPATIENT
Start: 2018-01-05 | End: 2018-08-01 | Stop reason: SDUPTHER

## 2018-01-05 NOTE — TELEPHONE ENCOUNTER
----- Message from TEODORA Red sent at 1/5/2018  8:40 AM EST -----  Her DEXA scan shows that she is osteopenic.  She needs to be taking Caltrate D.  Please let her know.  She can get otc.      Patient notified & verbalized understanding.

## 2018-01-09 DIAGNOSIS — F41.9 ANXIETY: ICD-10-CM

## 2018-01-09 DIAGNOSIS — R53.83 FATIGUE, UNSPECIFIED TYPE: ICD-10-CM

## 2018-01-09 DIAGNOSIS — I15.9 SECONDARY HYPERTENSION: Primary | ICD-10-CM

## 2018-01-09 DIAGNOSIS — M25.50 MULTIPLE JOINT PAIN: ICD-10-CM

## 2018-01-09 DIAGNOSIS — E55.9 VITAMIN D DEFICIENCY: ICD-10-CM

## 2018-01-09 DIAGNOSIS — M79.7 FIBROMYALGIA: ICD-10-CM

## 2018-01-09 DIAGNOSIS — E78.00 HIGH CHOLESTEROL: ICD-10-CM

## 2018-01-09 DIAGNOSIS — E53.8 B12 DEFICIENCY: ICD-10-CM

## 2018-01-09 LAB
25(OH)D3 SERPL-MCNC: 28 NG/ML
ALBUMIN SERPL-MCNC: 4.2 G/DL (ref 3.4–4.8)
ALBUMIN/GLOB SERPL: 1.4 G/DL (ref 1.5–2.5)
ALP SERPL-CCNC: 82 U/L (ref 35–104)
ALT SERPL W P-5'-P-CCNC: 12 U/L (ref 10–36)
ANION GAP SERPL CALCULATED.3IONS-SCNC: 3.9 MMOL/L (ref 3.6–11.2)
AST SERPL-CCNC: 23 U/L (ref 10–30)
BASOPHILS # BLD AUTO: 0.01 10*3/MM3 (ref 0–0.3)
BASOPHILS NFR BLD AUTO: 0.2 % (ref 0–2)
BILIRUB SERPL-MCNC: 0.5 MG/DL (ref 0.2–1.8)
BUN BLD-MCNC: 9 MG/DL (ref 7–21)
BUN/CREAT SERPL: 8.9 (ref 7–25)
CALCIUM SPEC-SCNC: 9.5 MG/DL (ref 7.7–10)
CHLORIDE SERPL-SCNC: 106 MMOL/L (ref 99–112)
CHOLEST SERPL-MCNC: 180 MG/DL (ref 0–200)
CO2 SERPL-SCNC: 33.1 MMOL/L (ref 24.3–31.9)
CREAT BLD-MCNC: 1.01 MG/DL (ref 0.43–1.29)
DEPRECATED RDW RBC AUTO: 44.3 FL (ref 37–54)
EOSINOPHIL # BLD AUTO: 0.11 10*3/MM3 (ref 0–0.7)
EOSINOPHIL NFR BLD AUTO: 2.4 % (ref 0–7)
ERYTHROCYTE [DISTWIDTH] IN BLOOD BY AUTOMATED COUNT: 13.3 % (ref 11.5–14.5)
GFR SERPL CREATININE-BSD FRML MDRD: 53 ML/MIN/1.73
GLOBULIN UR ELPH-MCNC: 2.9 GM/DL
GLUCOSE BLD-MCNC: 88 MG/DL (ref 70–110)
HCT VFR BLD AUTO: 39.7 % (ref 37–47)
HDLC SERPL-MCNC: 68 MG/DL (ref 60–100)
HGB BLD-MCNC: 12.8 G/DL (ref 12–16)
IMM GRANULOCYTES # BLD: 0.01 10*3/MM3 (ref 0–0.03)
IMM GRANULOCYTES NFR BLD: 0.2 % (ref 0–0.5)
LDLC SERPL CALC-MCNC: 86 MG/DL (ref 0–100)
LDLC/HDLC SERPL: 1.27 {RATIO}
LYMPHOCYTES # BLD AUTO: 1.7 10*3/MM3 (ref 1–3)
LYMPHOCYTES NFR BLD AUTO: 37.1 % (ref 16–46)
MAGNESIUM SERPL-MCNC: 2.2 MG/DL (ref 1.7–2.6)
MCH RBC QN AUTO: 30.8 PG (ref 27–33)
MCHC RBC AUTO-ENTMCNC: 32.2 G/DL (ref 33–37)
MCV RBC AUTO: 95.7 FL (ref 80–94)
MONOCYTES # BLD AUTO: 0.47 10*3/MM3 (ref 0.1–0.9)
MONOCYTES NFR BLD AUTO: 10.3 % (ref 0–12)
NEUTROPHILS # BLD AUTO: 2.28 10*3/MM3 (ref 1.4–6.5)
NEUTROPHILS NFR BLD AUTO: 49.8 % (ref 40–75)
OSMOLALITY SERPL CALC.SUM OF ELEC: 283.1 MOSM/KG (ref 273–305)
PLATELET # BLD AUTO: 176 10*3/MM3 (ref 130–400)
PMV BLD AUTO: 10.5 FL (ref 6–10)
POTASSIUM BLD-SCNC: 4.3 MMOL/L (ref 3.5–5.3)
PROT SERPL-MCNC: 7.1 G/DL (ref 6–8)
RBC # BLD AUTO: 4.15 10*6/MM3 (ref 4.2–5.4)
SODIUM BLD-SCNC: 143 MMOL/L (ref 135–153)
TRIGL SERPL-MCNC: 128 MG/DL (ref 0–150)
TSH SERPL DL<=0.05 MIU/L-ACNC: 1.06 MIU/ML (ref 0.55–4.78)
VIT B12 BLD-MCNC: 437 PG/ML (ref 211–911)
VLDLC SERPL-MCNC: 25.6 MG/DL
WBC NRBC COR # BLD: 4.58 10*3/MM3 (ref 4.5–12.5)

## 2018-01-09 PROCEDURE — 83735 ASSAY OF MAGNESIUM: CPT | Performed by: NURSE PRACTITIONER

## 2018-01-09 PROCEDURE — 84443 ASSAY THYROID STIM HORMONE: CPT | Performed by: NURSE PRACTITIONER

## 2018-01-09 PROCEDURE — 85025 COMPLETE CBC W/AUTO DIFF WBC: CPT | Performed by: NURSE PRACTITIONER

## 2018-01-09 PROCEDURE — 82306 VITAMIN D 25 HYDROXY: CPT | Performed by: NURSE PRACTITIONER

## 2018-01-09 PROCEDURE — 82607 VITAMIN B-12: CPT | Performed by: NURSE PRACTITIONER

## 2018-01-09 PROCEDURE — 80053 COMPREHEN METABOLIC PANEL: CPT | Performed by: NURSE PRACTITIONER

## 2018-01-09 PROCEDURE — 80061 LIPID PANEL: CPT | Performed by: NURSE PRACTITIONER

## 2018-01-09 RX ORDER — DIAZEPAM 2 MG/1
2 TABLET ORAL 2 TIMES DAILY PRN
Qty: 60 TABLET | Refills: 0 | Status: SHIPPED | OUTPATIENT
Start: 2018-01-09 | End: 2018-02-20 | Stop reason: SDUPTHER

## 2018-01-09 RX ORDER — TRAMADOL HYDROCHLORIDE 50 MG/1
50 TABLET ORAL 2 TIMES DAILY PRN
Qty: 60 TABLET | Refills: 0 | Status: SHIPPED | OUTPATIENT
Start: 2018-01-09 | End: 2018-02-20 | Stop reason: SDUPTHER

## 2018-01-10 ENCOUNTER — TELEPHONE (OUTPATIENT)
Dept: FAMILY MEDICINE CLINIC | Facility: CLINIC | Age: 83
End: 2018-01-10

## 2018-01-10 NOTE — TELEPHONE ENCOUNTER
----- Message from TEODORA Red sent at 1/10/2018  1:10 PM EST -----  Labs appear to be stable at this time.  Please let her know      Attempted to contact patient,no answer ,will attempt later.    No answer.      Patient returned call & verbalized understanding.

## 2018-01-22 ENCOUNTER — TELEPHONE (OUTPATIENT)
Dept: FAMILY MEDICINE CLINIC | Facility: CLINIC | Age: 83
End: 2018-01-22

## 2018-01-22 NOTE — TELEPHONE ENCOUNTER
Bone density is stable at this time, same as dexa in 2014 PER KANWAL LEE.    STABLE LETTER SENT VIA MAIL.

## 2018-01-25 ENCOUNTER — OFFICE VISIT (OUTPATIENT)
Dept: FAMILY MEDICINE CLINIC | Facility: CLINIC | Age: 83
End: 2018-01-25

## 2018-01-25 VITALS
BODY MASS INDEX: 29.32 KG/M2 | SYSTOLIC BLOOD PRESSURE: 119 MMHG | HEART RATE: 78 BPM | HEIGHT: 65 IN | DIASTOLIC BLOOD PRESSURE: 72 MMHG | TEMPERATURE: 97.9 F | WEIGHT: 176 LBS | OXYGEN SATURATION: 97 %

## 2018-01-25 DIAGNOSIS — K64.4 EXTERNAL HEMORRHOID: ICD-10-CM

## 2018-01-25 DIAGNOSIS — J01.00 ACUTE NON-RECURRENT MAXILLARY SINUSITIS: Primary | ICD-10-CM

## 2018-01-25 PROCEDURE — 99213 OFFICE O/P EST LOW 20 MIN: CPT | Performed by: NURSE PRACTITIONER

## 2018-01-25 RX ORDER — AMOXICILLIN 875 MG/1
875 TABLET, COATED ORAL 2 TIMES DAILY
Qty: 20 TABLET | Refills: 0 | Status: SHIPPED | OUTPATIENT
Start: 2018-01-25 | End: 2018-03-27

## 2018-01-25 NOTE — PROGRESS NOTES
Subjective   Tonia Ritter is a 82 y.o. female.     Chief Complaint: Sinus Problem; Nose Bleed; and Earache    History of Present Illness   Patient here today with complaints of right sided sinus pressure and earache along with right sided nosebleeds.  Patient states that she had cataract surgery almost 2 years ago and following that she had to have another procedure due to a blocked tear duct.  Since that time she has had issues with increased congestion on the right side of her face.  She states that over the past 3-4 weeks she has had a lot of pressure underneath her eye and in her ear.  She states that yesterday she had an active nosebleed from the right nostril.  The bleeding lasted for several minutes and was bright red.  Patient has had a mild headache and earache for the past 3-4 weeks intermittently.  Patient states that she has had a lot of nasal drainage from her right nasal area that has included copious amounts of yellow to white discharge.  Patient has denied fever, visual disturbances, sore throat, nausea vomiting or diarrhea.    Patient also has a history of external hemorrhoid.  She has been evaluated by general surgery and suggested that she have a hemorrhoidectomy.  Patient does not wish to do that at this time.  She has been prescribed medication for her hemorrhoids in the past and is requesting a refill on the medication today.  I have agreed to go ahead and write her another prescription for the hemorrhoid medication but I have also advised her that she needs to follow-up with general surgery for possibility of hemorrhoidectomy.  Patient stated understanding.    Family History   Problem Relation Age of Onset   • Thyroid disease Mother    • Mental illness Father      nervous breakdown   • Breast cancer Maternal Aunt        Social History     Social History   • Marital status:      Spouse name: N/A   • Number of children: N/A   • Years of education: N/A     Occupational History   •  Not on file.     Social History Main Topics   • Smoking status: Never Smoker   • Smokeless tobacco: Never Used   • Alcohol use No   • Drug use: No   • Sexual activity: Defer     Other Topics Concern   • Not on file     Social History Narrative       Past Medical History:   Diagnosis Date   • Acute bronchitis    • Allergic rhinitis    • Anxiety    • Atrial fibrillation    • Back pain    • Burping    • Chronic pain    • Congestive heart failure    • Depression    • Encounter for screening fecal occult blood testing 03/10/2016    NEGATIVE   • Fibromyalgia    • Flatulence    • GERD (gastroesophageal reflux disease)    • Hematuria    • Hyperlipidemia    • Hypertension    • Osteoarthritis    • Pacemaker    • PUD (peptic ulcer disease)    • Right shoulder pain        Review of Systems   Constitutional: Negative.    HENT: Positive for congestion, ear pain, facial swelling, postnasal drip, rhinorrhea and sinus pressure.    Eyes: Negative for discharge and visual disturbance.   Respiratory: Negative.    Cardiovascular: Negative.    Gastrointestinal:        External hemorrhoid   Musculoskeletal: Negative.    Skin: Negative.    Neurological: Negative.    Psychiatric/Behavioral: Negative.        Objective   Physical Exam   Constitutional: She is oriented to person, place, and time. She appears well-developed and well-nourished.   HENT:   Right Ear: External ear normal.   Left Ear: External ear normal.   Mouth/Throat: Oropharynx is clear and moist.   Right sided maxillary sinus tenderness;  Nasal erythematous right side; blood noted in right nasal   Eyes: Conjunctivae are normal. Pupils are equal, round, and reactive to light.   Neck: Normal range of motion. Neck supple.   Cardiovascular: Normal rate, regular rhythm and normal heart sounds.    Pulmonary/Chest: Effort normal and breath sounds normal.   Neurological: She is alert and oriented to person, place, and time.   Skin: Skin is warm and dry.   Psychiatric: She has a normal  "mood and affect. Her behavior is normal. Judgment and thought content normal.   Nursing note and vitals reviewed.      Procedures    Vitals: Blood pressure 119/72, pulse 78, temperature 97.9 °F (36.6 °C), temperature source Oral, height 165.1 cm (65\"), weight 79.8 kg (176 lb), SpO2 97 %, not currently breastfeeding.    Allergies:   Allergies   Allergen Reactions   • Reglan [Metoclopramide]    • Sulfa Antibiotics           Assessment/Plan   Tonia was seen today for sinus problem, nose bleed and earache.    Diagnoses and all orders for this visit:    Acute non-recurrent maxillary sinusitis  -     amoxicillin (AMOXIL) 875 MG tablet; Take 1 tablet by mouth 2 (Two) Times a Day.    External hemorrhoid  -     hydrocortisone 2.5 % cream; Apply  topically 3 (Three) Times a Day.               "

## 2018-02-01 ENCOUNTER — OFFICE VISIT (OUTPATIENT)
Dept: GASTROENTEROLOGY | Facility: CLINIC | Age: 83
End: 2018-02-01

## 2018-02-01 VITALS
WEIGHT: 174.2 LBS | HEART RATE: 72 BPM | SYSTOLIC BLOOD PRESSURE: 116 MMHG | HEIGHT: 65 IN | OXYGEN SATURATION: 99 % | DIASTOLIC BLOOD PRESSURE: 70 MMHG | BODY MASS INDEX: 29.02 KG/M2

## 2018-02-01 DIAGNOSIS — R14.0 FLATULENCE/GAS PAIN/BELCHING: ICD-10-CM

## 2018-02-01 DIAGNOSIS — K59.04 CHRONIC IDIOPATHIC CONSTIPATION: Primary | ICD-10-CM

## 2018-02-01 DIAGNOSIS — R19.8 BORBORYGMI: ICD-10-CM

## 2018-02-01 PROCEDURE — 99213 OFFICE O/P EST LOW 20 MIN: CPT | Performed by: PHYSICIAN ASSISTANT

## 2018-02-01 NOTE — PROGRESS NOTES
Chief Complaint   Patient presents with   • Constipation       Tonia Ritter is a 82 y.o. female who presents to the office today for follow up appointment regarding Constipation.    HPI  Today, she reports that constipation is improved. She takes Amitiza 8 mcg in the morning and 24 mcg at night. She decided to do this because she had 8 mcg Amitiza tablets already at home and wanted to use them. She wonders if she should have hemorrhoid surgery due to uncomfortable external hemorrhoid. She is afraid of the recovery time. She has been trying to increase dietary fiber and drink more water. BMs are occurring every day but still in small amounts. There has been some improvement in her bowel habits per her report. Magnesium citrate bowel cleanse was not completed and she states that she forgot to do this. Still having flatulence and abdominal gas pains. Abdominal noises are constant and loud.     According to the patient, EGD was performed by Dr. Bearden approx 1 year ago and colonoscopy was performed about 2-3 years ago with no abnormal findings.     Review of Systems   Constitutional: Positive for fatigue.   HENT: Negative.    Eyes: Negative.    Cardiovascular: Positive for palpitations.   Gastrointestinal: Positive for abdominal distention, abdominal pain and constipation. Negative for anal bleeding, blood in stool, diarrhea, nausea, rectal pain and vomiting.   Endocrine: Negative.    Genitourinary: Negative.    Musculoskeletal: Negative.    Skin: Negative.    Allergic/Immunologic: Negative.    Neurological: Negative.    Hematological: Negative.    Psychiatric/Behavioral: Negative.        Specialty Problems        Gastroenterology Problems    GERD (gastroesophageal reflux disease)        PUD (peptic ulcer disease)        Constipation              Past Medical History:   Diagnosis Date   • Acute bronchitis    • Allergic rhinitis    • Anxiety    • Atrial fibrillation    • Back pain    • Burping    • Chronic pain    •  Congestive heart failure    • Depression    • Encounter for screening fecal occult blood testing 03/10/2016    NEGATIVE   • Fibromyalgia    • Flatulence    • GERD (gastroesophageal reflux disease)    • Hematuria    • Hyperlipidemia    • Hypertension    • Osteoarthritis    • Pacemaker    • PUD (peptic ulcer disease)    • Right shoulder pain        Past Surgical History:   Procedure Laterality Date   • CARDIOVERSION  05/2017    [facility]    • CARDIOVERSION  09/01/2017    [facility]   • CHOLECYSTECTOMY  2004   • COLONOSCOPY  05/2013   • DILATATION AND CURETTAGE     • EPIDIDYMAL CYST EXCISION     • MAMMO BILATERAL  10/2014   • NECK SURGERY     • PACEMAKER IMPLANTATION     • PAP SMEAR  03/2016   • SKIN LESION EXCISION         Family History   Problem Relation Age of Onset   • Thyroid disease Mother    • Mental illness Father      nervous breakdown   • Breast cancer Maternal Aunt        Social History   Substance Use Topics   • Smoking status: Never Smoker   • Smokeless tobacco: Never Used   • Alcohol use No       CURRENT MEDICATION:  •  acetaminophen (TYLENOL) 500 MG tablet, Take 500 mg by mouth every 6 (six) hours as needed for mild pain (1-3)., Disp: , Rfl:   •  adapalene (DIFFERIN) 0.1 % gel, Apply  topically Every Night., Disp: 45 g, Rfl: 0  •  amoxicillin (AMOXIL) 875 MG tablet, Take 1 tablet by mouth 2 (Two) Times a Day., Disp: 20 tablet, Rfl: 0  •  diazePAM (VALIUM) 2 MG tablet, Take 1 tablet by mouth 2 (Two) Times a Day As Needed for Anxiety., Disp: 60 tablet, Rfl: 0  •  furosemide (LASIX) 20 MG tablet, Take 1 tablet by mouth Daily., Disp: 60 tablet, Rfl: 3  •  hydrocortisone 2.5 % cream, Apply  topically 3 (Three) Times a Day., Disp: 28 g, Rfl: 2  •  lisinopril (PRINIVIL,ZESTRIL) 5 MG tablet, Take 1 tablet by mouth Daily., Disp: 30 tablet, Rfl: 5  •  loratadine (CLARITIN) 10 MG tablet, Take 1 tablet by mouth Daily., Disp: 30 tablet, Rfl: 2  •  lubiprostone (AMITIZA) 24 MCG capsule, Take 1 capsule by  "mouth 2 (Two) Times a Day With Meals., Disp: 60 capsule, Rfl: 5  •  mometasone (NASONEX) 50 MCG/ACT nasal spray, 2 sprays into each nostril Daily., Disp: 17 g, Rfl: 5  •  PARoxetine (PAXIL) 10 MG tablet, Take 1 tablet by mouth Every Night. 1/2 tab qhs, Disp: 30 tablet, Rfl: 3  •  rivaroxaban (XARELTO) 20 MG tablet, Take 1 tablet by mouth Daily., Disp: 30 tablet, Rfl: 5  •  rosuvastatin (CRESTOR) 10 MG tablet, Take 1 tablet by mouth Daily., Disp: 30 tablet, Rfl: 5  •  simethicone (GAS-X) 80 MG chewable tablet, Chew 1 tablet 4 (Four) Times a Day After Meals & at Bedtime., Disp: 120 tablet, Rfl: 5  •  sotalol (BETAPACE) 120 MG tablet, Take 1 tablet by mouth 2 (Two) Times a Day., Disp: 60 tablet, Rfl: 5  •  traMADol (ULTRAM) 50 MG tablet, Take 1 tablet by mouth 2 (Two) Times a Day As Needed for Moderate Pain ., Disp: 60 tablet, Rfl: 0  •  triamcinolone (KENALOG) 0.1 % ointment, Apply  topically 2 (Two) Times a Day., Disp: 80 g, Rfl: 0    ALLERGIES:  Reglan [metoclopramide] and Sulfa antibiotics    VISIT VITALS:  /70  Pulse 72  Ht 165.1 cm (65\")  Wt 79 kg (174 lb 3.2 oz)  SpO2 99%  BMI 28.99 kg/m2    Physical Exam   Constitutional: She is oriented to person, place, and time. She appears well-developed and well-nourished. No distress.   HENT:   Head: Normocephalic and atraumatic.   Right Ear: External ear normal.   Left Ear: External ear normal.   Nose: Nose normal.   Mouth/Throat: Oropharynx is clear and moist.   Eyes: Conjunctivae and EOM are normal. Right eye exhibits no discharge. Left eye exhibits no discharge. No scleral icterus.   Neck: Normal range of motion. Neck supple.   Cardiovascular: Normal rate, regular rhythm and normal heart sounds.  Exam reveals no gallop and no friction rub.    No murmur heard.  Pacemaker scar   Pulmonary/Chest: Effort normal and breath sounds normal. No respiratory distress. She has no wheezes. She has no rales. She exhibits no tenderness.   Abdominal: Soft. Normal " appearance and bowel sounds are normal. She exhibits no distension, no ascites and no mass. There is no tenderness. There is no rigidity and no guarding. No hernia.   Musculoskeletal: Normal range of motion. She exhibits no edema or deformity.   Neurological: She is alert and oriented to person, place, and time. She exhibits normal muscle tone. Coordination normal.   Skin: Skin is warm and dry. No rash noted. No erythema. No pallor.   Psychiatric: She has a normal mood and affect. Her behavior is normal. Judgment and thought content normal.   Nursing note and vitals reviewed.      Assessment/Plan     1. Chronic idiopathic constipation    2. Flatulence/gas pain/belching    3. Borborygmi      I have instructed her to complete magnesium citrate bowel cleanse. 1 bottle first then see how she is feeling to determine if second one is needed. She has had severe constipation on previous imaging studies. I recommend that she start consistently taking Amitiza 24 mcg BID with meals for management of constipation.       Return in about 6 weeks (around 3/15/2018) for recheck constipation.      Electronically signed 2/7/2018 10:37 AM  Vivian Saul PA-C, [facility]

## 2018-02-20 ENCOUNTER — TELEPHONE (OUTPATIENT)
Dept: FAMILY MEDICINE CLINIC | Facility: CLINIC | Age: 83
End: 2018-02-20

## 2018-02-20 DIAGNOSIS — F41.9 ANXIETY: ICD-10-CM

## 2018-02-20 DIAGNOSIS — M25.50 MULTIPLE JOINT PAIN: ICD-10-CM

## 2018-02-20 DIAGNOSIS — M79.7 FIBROMYALGIA: ICD-10-CM

## 2018-02-20 RX ORDER — TRAMADOL HYDROCHLORIDE 50 MG/1
50 TABLET ORAL 2 TIMES DAILY PRN
Qty: 60 TABLET | Refills: 0 | OUTPATIENT
Start: 2018-02-20 | End: 2018-03-27 | Stop reason: SDUPTHER

## 2018-02-20 RX ORDER — PAROXETINE 10 MG/1
10 TABLET, FILM COATED ORAL NIGHTLY
Qty: 30 TABLET | Refills: 0 | OUTPATIENT
Start: 2018-02-20 | End: 2018-04-23 | Stop reason: SDUPTHER

## 2018-02-20 RX ORDER — DIAZEPAM 2 MG/1
2 TABLET ORAL 2 TIMES DAILY PRN
Qty: 60 TABLET | Refills: 0 | OUTPATIENT
Start: 2018-02-20 | End: 2018-03-27 | Stop reason: SDUPTHER

## 2018-02-20 NOTE — TELEPHONE ENCOUNTER
BRAULIO [id number] reviewed and consistent.  OK to refill 30 days supply      Called to pharmacy as requested & patient notified.

## 2018-02-23 RX ORDER — FUROSEMIDE 20 MG/1
20 TABLET ORAL DAILY
Qty: 60 TABLET | Refills: 3 | Status: SHIPPED | OUTPATIENT
Start: 2018-02-23 | End: 2018-10-18 | Stop reason: SDUPTHER

## 2018-03-27 ENCOUNTER — OFFICE VISIT (OUTPATIENT)
Dept: FAMILY MEDICINE CLINIC | Facility: CLINIC | Age: 83
End: 2018-03-27

## 2018-03-27 VITALS
SYSTOLIC BLOOD PRESSURE: 105 MMHG | DIASTOLIC BLOOD PRESSURE: 63 MMHG | BODY MASS INDEX: 28.99 KG/M2 | WEIGHT: 174 LBS | HEIGHT: 65 IN | OXYGEN SATURATION: 98 % | HEART RATE: 80 BPM

## 2018-03-27 DIAGNOSIS — M25.50 MULTIPLE JOINT PAIN: ICD-10-CM

## 2018-03-27 DIAGNOSIS — F41.9 ANXIETY: ICD-10-CM

## 2018-03-27 DIAGNOSIS — M79.7 FIBROMYALGIA: ICD-10-CM

## 2018-03-27 DIAGNOSIS — K59.04 CHRONIC IDIOPATHIC CONSTIPATION: ICD-10-CM

## 2018-03-27 PROCEDURE — 99214 OFFICE O/P EST MOD 30 MIN: CPT | Performed by: NURSE PRACTITIONER

## 2018-03-27 RX ORDER — DIAZEPAM 2 MG/1
2 TABLET ORAL 2 TIMES DAILY PRN
Qty: 60 TABLET | Refills: 0 | Status: SHIPPED | OUTPATIENT
Start: 2018-03-27 | End: 2018-05-30 | Stop reason: SDUPTHER

## 2018-03-27 RX ORDER — TRAMADOL HYDROCHLORIDE 50 MG/1
50 TABLET ORAL 2 TIMES DAILY PRN
Qty: 60 TABLET | Refills: 0 | Status: SHIPPED | OUTPATIENT
Start: 2018-03-27 | End: 2018-05-30 | Stop reason: SDUPTHER

## 2018-03-27 NOTE — PROGRESS NOTES
Subjective   Tonia Ritter is a 82 y.o. female.     Chief Complaint: Follow-up; Anxiety; and Back Pain    Anxiety   Presents for follow-up visit. Symptoms include depressed mood, excessive worry, nervous/anxious behavior and restlessness. Patient reports no chest pain, insomnia, shortness of breath or suicidal ideas. Symptoms occur most days. The severity of symptoms is moderate. The quality of sleep is fair. Nighttime awakenings: occasional.     Compliance with medications is %.   Back Pain   This is a chronic problem. The current episode started more than 1 year ago. The problem occurs daily. The problem has been waxing and waning since onset. The pain is present in the lumbar spine. The quality of the pain is described as aching. The pain does not radiate. The pain is moderate. The pain is worse during the day. The symptoms are aggravated by bending and standing. Stiffness is present all day. Pertinent negatives include no bladder incontinence, bowel incontinence, chest pain or dysuria. Risk factors include menopause and sedentary lifestyle. Treatments tried: tramadol. The treatment provided significant relief.   Fibromyalgia   This is a chronic problem. The current episode started more than 1 year ago. The problem occurs daily. The problem has been unchanged. Associated symptoms include arthralgias and myalgias. Nothing aggravates the symptoms. She has tried acetaminophen, NSAIDs, oral narcotics and relaxation for the symptoms. The treatment provided moderate relief.   Pt has been taking Tramadol for her pain and is tolerating it well. Tramadol enables pt to continue with her ADLs and live her life without difficulty. She denies any side effects from the medication.       [facility] # [id number]  Reviewed and is consistent.  UDS reviewed and is consistent.  Patient comfort assessment guide reviewed and updated today.    As part of the patient's treatment plan they are being prescribed a controlled substance/  substances with potential for abuse.  This patient has been made aware of the appropriate use of such medications, including potential risk of somnolence, limited ability to drive and/or work safely, and potential for overdose.  It has also been made clear these medications are for use by the patient only, without concomitant use of alcohol or other substances unless prescribed/advised by medical provider.    Patient has completed prescribing agreement detailing terms of continued prescribing of controlled substances including monitoring BRAULIO reports, urine drug screens, and pill counts.  The patient is aware BRAULIO reports are reviewed on a regular basis and scanned into the chart.    History and physical exam exhibit continued safe and appropriate use of controlled substances.    Family History   Problem Relation Age of Onset   • Thyroid disease Mother    • Mental illness Father      nervous breakdown   • Breast cancer Maternal Aunt        Social History     Social History   • Marital status:      Spouse name: N/A   • Number of children: N/A   • Years of education: N/A     Occupational History   • Not on file.     Social History Main Topics   • Smoking status: Never Smoker   • Smokeless tobacco: Never Used   • Alcohol use No   • Drug use: No   • Sexual activity: Defer     Other Topics Concern   • Not on file     Social History Narrative   • No narrative on file       Past Medical History:   Diagnosis Date   • Acute bronchitis    • Allergic rhinitis    • Anxiety    • Atrial fibrillation    • Back pain    • Burping    • Chronic pain    • Congestive heart failure    • Depression    • Encounter for screening fecal occult blood testing 03/10/2016    NEGATIVE   • Fibromyalgia    • Flatulence    • GERD (gastroesophageal reflux disease)    • Hematuria    • Hyperlipidemia    • Hypertension    • Osteoarthritis    • Pacemaker    • PUD (peptic ulcer disease)    • Right shoulder pain        Review of Systems  "  Constitutional: Negative.    HENT: Negative.    Respiratory: Negative.  Negative for shortness of breath.    Cardiovascular: Negative.  Negative for chest pain.   Gastrointestinal: Negative.  Negative for bowel incontinence.   Genitourinary: Negative for bladder incontinence and dysuria.   Musculoskeletal: Positive for back pain.   Skin: Negative.    Neurological: Negative.    Psychiatric/Behavioral: Negative for suicidal ideas. The patient is nervous/anxious. The patient does not have insomnia.        Objective   Physical Exam   Constitutional: She is oriented to person, place, and time. She appears well-developed and well-nourished.   Neck: Normal range of motion. Neck supple.   Cardiovascular: Normal rate, regular rhythm and normal heart sounds.    Pulmonary/Chest: Effort normal and breath sounds normal.   Neurological: She is alert and oriented to person, place, and time.   Skin: Skin is warm and dry.   Psychiatric: She has a normal mood and affect. Her behavior is normal. Judgment and thought content normal.   Nursing note and vitals reviewed.      Procedures    Vitals: Blood pressure 105/63, pulse 80, height 165.1 cm (65\"), weight 78.9 kg (174 lb), SpO2 98 %, not currently breastfeeding.    Allergies:   Allergies   Allergen Reactions   • Reglan [Metoclopramide]    • Sulfa Antibiotics           Assessment/Plan   Tonia was seen today for follow-up, anxiety and back pain.    Diagnoses and all orders for this visit:    Anxiety  -     diazePAM (VALIUM) 2 MG tablet; Take 1 tablet by mouth 2 (Two) Times a Day As Needed for Anxiety.    Multiple joint pain  -     traMADol (ULTRAM) 50 MG tablet; Take 1 tablet by mouth 2 (Two) Times a Day As Needed for Moderate Pain .    Fibromyalgia  -     traMADol (ULTRAM) 50 MG tablet; Take 1 tablet by mouth 2 (Two) Times a Day As Needed for Moderate Pain .               "

## 2018-04-23 RX ORDER — PAROXETINE 10 MG/1
10 TABLET, FILM COATED ORAL NIGHTLY
Qty: 30 TABLET | Refills: 5 | Status: SHIPPED | OUTPATIENT
Start: 2018-04-23 | End: 2018-12-04 | Stop reason: SDUPTHER

## 2018-05-15 ENCOUNTER — OFFICE VISIT (OUTPATIENT)
Dept: FAMILY MEDICINE CLINIC | Facility: CLINIC | Age: 83
End: 2018-05-15

## 2018-05-15 VITALS
HEIGHT: 65 IN | SYSTOLIC BLOOD PRESSURE: 105 MMHG | TEMPERATURE: 99 F | OXYGEN SATURATION: 97 % | BODY MASS INDEX: 28.66 KG/M2 | HEART RATE: 77 BPM | WEIGHT: 172 LBS | DIASTOLIC BLOOD PRESSURE: 63 MMHG

## 2018-05-15 DIAGNOSIS — Z91.09 ENVIRONMENTAL ALLERGIES: ICD-10-CM

## 2018-05-15 DIAGNOSIS — M54.2 MUSCLE PAIN, CERVICAL: Primary | ICD-10-CM

## 2018-05-15 DIAGNOSIS — J06.9 ACUTE URI: ICD-10-CM

## 2018-05-15 PROCEDURE — 96372 THER/PROPH/DIAG INJ SC/IM: CPT | Performed by: NURSE PRACTITIONER

## 2018-05-15 PROCEDURE — 99213 OFFICE O/P EST LOW 20 MIN: CPT | Performed by: NURSE PRACTITIONER

## 2018-05-15 RX ORDER — LORATADINE 10 MG/1
10 TABLET ORAL DAILY
Qty: 30 TABLET | Refills: 2 | Status: SHIPPED | OUTPATIENT
Start: 2018-05-15 | End: 2018-12-04 | Stop reason: SDUPTHER

## 2018-05-15 RX ORDER — KETOROLAC TROMETHAMINE 30 MG/ML
30 INJECTION, SOLUTION INTRAMUSCULAR; INTRAVENOUS ONCE
Status: COMPLETED | OUTPATIENT
Start: 2018-05-15 | End: 2018-05-15

## 2018-05-15 RX ORDER — MOMETASONE FUROATE 50 UG/1
2 SPRAY, METERED NASAL DAILY
Qty: 17 G | Refills: 5 | Status: SHIPPED | OUTPATIENT
Start: 2018-05-15 | End: 2019-03-21

## 2018-05-15 RX ADMIN — KETOROLAC TROMETHAMINE 30 MG: 30 INJECTION, SOLUTION INTRAMUSCULAR; INTRAVENOUS at 16:33

## 2018-05-15 NOTE — PROGRESS NOTES
Subjective   Tonia Ritter is a 82 y.o. female.     Chief Complaint: Generalized Body Aches ([facility] )    Muscle Pain   This is a new problem. The current episode started in the past 7 days. The problem occurs constantly. The problem is unchanged. Associated with: pt states that she went to the beauty parlor and had her hair styled for a wedding; she has had pain in her neck and shoulders since that time; gotten worse yesterday. The pain is present in the neck, right shoulder and left shoulder. The pain is mild. The symptoms are aggravated by any movement. Pertinent negatives include no eye pain, fever or nausea. Past treatments include nothing.   URI    This is a new problem. The current episode started yesterday. The problem has been unchanged. There has been no fever. Associated symptoms include neck pain and rhinorrhea. Pertinent negatives include no nausea. Treatments tried[facility]  The treatment provided no relief.        Family History   Problem Relation Age of Onset   • Thyroid disease Mother    • Mental illness Father      nervous breakdown   • Breast cancer Maternal Aunt        Social History     Social History   • Marital status:      Spouse name: N/A   • Number of children: N/A   • Years of education: N/A     Occupational History   • Not on file.     Social History Main Topics   • Smoking status: Never Smoker   • Smokeless tobacco: Never Used   • Alcohol use No   • Drug use: No   • Sexual activity: Defer     Other Topics Concern   • Not on file     Social History Narrative   • No narrative on file       Past Medical History:   Diagnosis Date   • Acute bronchitis    • Allergic rhinitis    • Anxiety    • Atrial fibrillation    • Back pain    • Burping    • Chronic pain    • Congestive heart failure    • Depression    • Encounter for screening fecal occult blood testing 03/10/2016  "   NEGATIVE   • Fibromyalgia    • Flatulence    • GERD (gastroesophageal reflux disease)    • Hematuria    • Hyperlipidemia    • Hypertension    • Osteoarthritis    • Pacemaker    • PUD (peptic ulcer disease)    • Right shoulder pain        Review of Systems   Constitutional: Negative.  Negative for fever.   HENT: Positive for rhinorrhea.    Eyes: Negative for pain.   Respiratory: Negative.    Cardiovascular: Negative.    Gastrointestinal: Negative.  Negative for nausea.   Musculoskeletal: Positive for neck pain.   Skin: Negative.    Neurological: Negative.    Psychiatric/Behavioral: Negative.        Objective   Physical Exam   Constitutional: She is oriented to person, place, and time. She appears well-developed and well-nourished.   Neck: Normal range of motion. Neck supple.   Cardiovascular: Normal rate, regular rhythm and normal heart sounds.    Pulmonary/Chest: Effort normal and breath sounds normal.   Neurological: She is alert and oriented to person, place, and time.   Skin: Skin is warm and dry.   Psychiatric: She has a normal mood and affect. Her behavior is normal. Judgment and thought content normal.   Nursing note and vitals reviewed.      Procedures    Vitals: Blood pressure 105/63, pulse 77, temperature 99 °F (37.2 °C), temperature source Oral, height 165.1 cm (65\"), weight 78 kg (172 lb), SpO2 97 %, not currently breastfeeding.    Allergies:   Allergies   Allergen Reactions   • Reglan [Metoclopramide]    • Sulfa Antibiotics         During this visit the following were done:  Labs Reviewed []    Labs Ordered []    Radiology Reports Reviewed []    Radiology Ordered []    PCP Records Reviewed []    Referring Provider Records Reviewed []    ER Records Reviewed []    Hospital Records Reviewed []    History Obtained From Family []    Radiology Images Reviewed []    Other Reviewed []    Records Requested []      Assessment/Plan   Tonia was seen today for generalized body aches.    Diagnoses and all orders " for this visit:    Muscle pain, cervical  -     ketorolac (TORADOL) injection 30 mg; Inject 30 mg into the shoulder, thigh, or buttocks 1 (One) Time.    Acute URI    Environmental allergies  -     loratadine (CLARITIN) 10 MG tablet; Take 1 tablet by mouth Daily.    Other orders  -     mometasone (NASONEX) 50 MCG/ACT nasal spray; 2 sprays into each nostril Daily.

## 2018-05-30 ENCOUNTER — TELEPHONE (OUTPATIENT)
Dept: FAMILY MEDICINE CLINIC | Facility: CLINIC | Age: 83
End: 2018-05-30

## 2018-05-30 DIAGNOSIS — M79.7 FIBROMYALGIA: ICD-10-CM

## 2018-05-30 DIAGNOSIS — F41.9 ANXIETY: ICD-10-CM

## 2018-05-30 DIAGNOSIS — M25.50 MULTIPLE JOINT PAIN: ICD-10-CM

## 2018-05-30 RX ORDER — DIAZEPAM 2 MG/1
2 TABLET ORAL 2 TIMES DAILY PRN
Qty: 60 TABLET | Refills: 0 | Status: SHIPPED | OUTPATIENT
Start: 2018-05-30 | End: 2018-06-29 | Stop reason: SDUPTHER

## 2018-05-30 RX ORDER — TRAMADOL HYDROCHLORIDE 50 MG/1
50 TABLET ORAL 2 TIMES DAILY PRN
Qty: 60 TABLET | Refills: 0 | Status: SHIPPED | OUTPATIENT
Start: 2018-05-30 | End: 2018-06-29 | Stop reason: SDUPTHER

## 2018-05-30 NOTE — TELEPHONE ENCOUNTER
BRAULIO [id number] reviewed and consistent.  Sent to pharmacy      Attempted to notify patient,no answer,will attempt later.    Patient here & notified.

## 2018-06-06 RX ORDER — SOTALOL HYDROCHLORIDE 120 MG/1
120 TABLET ORAL 2 TIMES DAILY
Qty: 60 TABLET | Refills: 3 | Status: SHIPPED | OUTPATIENT
Start: 2018-06-06 | End: 2018-10-02 | Stop reason: SDUPTHER

## 2018-06-25 RX ORDER — RIVAROXABAN 20 MG/1
TABLET, FILM COATED ORAL
Qty: 30 TABLET | Refills: 5 | Status: SHIPPED | OUTPATIENT
Start: 2018-06-25 | End: 2019-01-14 | Stop reason: SDUPTHER

## 2018-06-29 ENCOUNTER — OFFICE VISIT (OUTPATIENT)
Dept: FAMILY MEDICINE CLINIC | Facility: CLINIC | Age: 83
End: 2018-06-29

## 2018-06-29 VITALS
HEART RATE: 77 BPM | BODY MASS INDEX: 28.82 KG/M2 | SYSTOLIC BLOOD PRESSURE: 108 MMHG | WEIGHT: 173 LBS | DIASTOLIC BLOOD PRESSURE: 67 MMHG | HEIGHT: 65 IN | OXYGEN SATURATION: 98 %

## 2018-06-29 DIAGNOSIS — M79.7 FIBROMYALGIA: ICD-10-CM

## 2018-06-29 DIAGNOSIS — Z12.31 SCREENING MAMMOGRAM, ENCOUNTER FOR: ICD-10-CM

## 2018-06-29 DIAGNOSIS — M19.012 PRIMARY OSTEOARTHRITIS OF BOTH SHOULDERS: ICD-10-CM

## 2018-06-29 DIAGNOSIS — M25.50 MULTIPLE JOINT PAIN: ICD-10-CM

## 2018-06-29 DIAGNOSIS — M19.011 PRIMARY OSTEOARTHRITIS OF BOTH SHOULDERS: ICD-10-CM

## 2018-06-29 DIAGNOSIS — G89.29 CHRONIC MIDLINE LOW BACK PAIN WITHOUT SCIATICA: ICD-10-CM

## 2018-06-29 DIAGNOSIS — F41.9 ANXIETY: Primary | ICD-10-CM

## 2018-06-29 DIAGNOSIS — M54.50 CHRONIC MIDLINE LOW BACK PAIN WITHOUT SCIATICA: ICD-10-CM

## 2018-06-29 PROCEDURE — 99214 OFFICE O/P EST MOD 30 MIN: CPT | Performed by: NURSE PRACTITIONER

## 2018-06-29 RX ORDER — LISINOPRIL 5 MG/1
5 TABLET ORAL DAILY
Qty: 30 TABLET | Refills: 5 | Status: SHIPPED | OUTPATIENT
Start: 2018-06-29 | End: 2018-12-04 | Stop reason: SDUPTHER

## 2018-06-29 RX ORDER — DIAZEPAM 2 MG/1
2 TABLET ORAL 2 TIMES DAILY PRN
Qty: 60 TABLET | Refills: 0 | Status: SHIPPED | OUTPATIENT
Start: 2018-06-29 | End: 2018-08-07 | Stop reason: SDUPTHER

## 2018-06-29 RX ORDER — TRAMADOL HYDROCHLORIDE 50 MG/1
50 TABLET ORAL 2 TIMES DAILY PRN
Qty: 60 TABLET | Refills: 0 | Status: SHIPPED | OUTPATIENT
Start: 2018-06-29 | End: 2018-08-07 | Stop reason: SDUPTHER

## 2018-06-29 NOTE — PROGRESS NOTES
Subjective   Tonia Ritter is a 82 y.o. female.     Chief Complaint: Follow-up; Hypertension; Back Pain; and Anxiety    History of Present Illness   Anxiety   Presents for follow-up visit. Symptoms include depressed mood, excessive worry, nervous/anxious behavior and restlessness. Patient reports no chest pain, insomnia, shortness of breath or suicidal ideas. Symptoms occur most days. The severity of symptoms is moderate. The quality of sleep is fair. Nighttime awakenings: occasional.   Compliance with medications is %. Symptoms controlled well.  No side effects from medication.  Pt able to continue ADLs with use of medication.   Back Pain   This is a chronic problem. The current episode started more than 1 year ago. The problem occurs daily. The problem has been waxing and waning since onset. The pain is present in the lumbar spine. The quality of the pain is described as aching. The pain does not radiate. The pain is moderate. The pain is worse during the day. The symptoms are aggravated by bending and standing. Stiffness is present all day. Pertinent negatives include no bladder incontinence, bowel incontinence, chest pain or dysuria. Risk factors include menopause and sedentary lifestyle. Treatments tried: tramadol. The treatment provided significant relief. Pt able to continue ADLs with medication use.  Fibromyalgia   This is a chronic problem. The current episode started more than 1 year ago. The problem occurs daily. The problem has been unchanged. Associated symptoms include arthralgias and myalgias. Nothing aggravates the symptoms. She has tried acetaminophen, NSAIDs, oral narcotics and relaxation for the symptoms. The treatment provided moderate relief.   Pt has been taking Tramadol for her pain and is tolerating it well. Tramadol enables pt to continue with her ADLs and live her life without difficulty. She denies any side effects from the medication.     [facility] # [id number]  Reviewed and is  consistent.  UDS 3/28/2018 reviewed and is consistent.  Patient comfort assessment guide reviewed and updated today.    As part of the patient's treatment plan they are being prescribed a controlled substance/ substances with potential for abuse.  This patient has been made aware of the appropriate use of such medications, including potential risk of somnolence, limited ability to drive and/or work safely, and potential for overdose.  It has also been made clear these medications are for use by the patient only, without concomitant use of alcohol or other substances unless prescribed/advised by medical provider.    Patient has completed prescribing agreement detailing terms of continued prescribing of controlled substances including monitoring BRAULIO reports, urine drug screens, and pill counts.  The patient is aware BRAULIO reports are reviewed on a regular basis and scanned into the chart.    History and physical exam exhibit continued safe and appropriate use of controlled substances.    Family History   Problem Relation Age of Onset   • Thyroid disease Mother    • Mental illness Father         nervous breakdown   • Breast cancer Maternal Aunt        Social History     Social History   • Marital status:      Spouse name: N/A   • Number of children: N/A   • Years of education: N/A     Occupational History   • Not on file.     Social History Main Topics   • Smoking status: Never Smoker   • Smokeless tobacco: Never Used   • Alcohol use No   • Drug use: No   • Sexual activity: Defer     Other Topics Concern   • Not on file     Social History Narrative   • No narrative on file       Past Medical History:   Diagnosis Date   • Acute bronchitis    • Allergic rhinitis    • Anxiety    • Atrial fibrillation    • Back pain    • Burping    • Chronic pain    • Congestive heart failure    • Depression    • Encounter for screening fecal occult blood testing 03/10/2016    NEGATIVE   • Fibromyalgia    • Flatulence    • GERD  "(gastroesophageal reflux disease)    • Hematuria    • Hyperlipidemia    • Hypertension    • Osteoarthritis    • Pacemaker    • PUD (peptic ulcer disease)    • Right shoulder pain        Review of Systems   Constitutional: Negative.    HENT: Negative.    Respiratory: Negative.    Cardiovascular: Negative.    Gastrointestinal: Negative.    Musculoskeletal: Positive for back pain.   Skin: Negative.    Neurological: Negative.    Psychiatric/Behavioral: Negative for suicidal ideas. The patient is nervous/anxious.        Objective   Physical Exam   Constitutional: She is oriented to person, place, and time. She appears well-developed and well-nourished.   Neck: Normal range of motion. Neck supple.   Cardiovascular: Normal rate, regular rhythm and normal heart sounds.    Pulmonary/Chest: Effort normal and breath sounds normal.   Musculoskeletal: Normal range of motion.   Neurological: She is alert and oriented to person, place, and time.   Skin: Skin is warm and dry.   Psychiatric: She has a normal mood and affect. Her behavior is normal. Judgment and thought content normal.   Nursing note and vitals reviewed.      Procedures    Vitals: Blood pressure 108/67, pulse 77, height 165.1 cm (65\"), weight 78.5 kg (173 lb), SpO2 98 %, not currently breastfeeding.    Allergies:   Allergies   Allergen Reactions   • Reglan [Metoclopramide]    • Sulfa Antibiotics         During this visit the following were done:  Labs Reviewed []    Labs Ordered []    Radiology Reports Reviewed []    Radiology Ordered []    PCP Records Reviewed []    Referring Provider Records Reviewed []    ER Records Reviewed []    Hospital Records Reviewed []    History Obtained From Family []    Radiology Images Reviewed []    Other Reviewed []    Records Requested []      Assessment/Plan   Tonia was seen today for follow-up, hypertension, back pain and anxiety.    Diagnoses and all orders for this visit:    Anxiety  -     diazePAM (VALIUM) 2 MG tablet; Take 1 " tablet by mouth 2 (Two) Times a Day As Needed for Anxiety.    Fibromyalgia  -     traMADol (ULTRAM) 50 MG tablet; Take 1 tablet by mouth 2 (Two) Times a Day As Needed for Moderate Pain .    Primary osteoarthritis of both shoulders    Chronic midline low back pain without sciatica    Screening mammogram, encounter for  -     Mammo Screening Digital Tomosynthesis Bilateral With CAD    Multiple joint pain  -     traMADol (ULTRAM) 50 MG tablet; Take 1 tablet by mouth 2 (Two) Times a Day As Needed for Moderate Pain .

## 2018-07-26 RX ORDER — POTASSIUM CHLORIDE 750 MG/1
10 TABLET, FILM COATED, EXTENDED RELEASE ORAL DAILY
Qty: 30 TABLET | Refills: 5 | Status: SHIPPED | OUTPATIENT
Start: 2018-07-26 | End: 2018-12-04 | Stop reason: SDUPTHER

## 2018-07-26 NOTE — TELEPHONE ENCOUNTER
After Visit Summary   4/19/2017    Gallito Farley    MRN: [id number]           Patient Information     Date Of Birth          1/1/1932        Visit Information        Provider Department      4/19/2017 1:00 PM Mal Davis DPM; [employer] [facility]        Today's Diagnoses     Ulcer of right lower leg, with fat layer exposed (H)    -  1    Venous stasis          Care Instructions    Thanks for coming today.  Or[facility] Medicine Clinic  [address] [place] [id number]    To schedule future appointments in Ortho Clinic, you may call [phone].    To schedule ordered imaging by your Provider: Call [facility] at [phone]    [employer] available online at:   [email or web address]    Please call if any further questions or concerns [phone] and ask for the Orthopedic Department. Clinic hours 8 am to 5 pm.    Return to clinic if symptoms worsen.          Follow-ups after your visit        Your next 10 appointments already scheduled     May 03, 2017  2:00 PM CDT   Return Visit with Mal Davis DPM   [facility] ([facility])    [address] [place] [id number] [phone]            May 11, 2017 11:00 AM CDT   Return Visit with Maria G Friedman PA-C   [facility] ([facility])    [address] [place] [id number] [phone]            Oct 17, 2017  2:00 PM CDT   (Arrive by 1:45 PM)   Pacemaker Check with Uc Cv Device 1   [facility] ([facility])    [address] [place] [id number] [phone]              Who to contact     If you have questions or need follow up information about today's clinic visit or your schedule please contact [facility] directly at [phone].  Normal or non-critical lab and imaging results will be communicated to you by  Patient called for refills on her K Dur,refilled per orders.   MyChart, letter or phone within 4 business days after the clinic has received the results. If you do not hear from us within 7 days, please contact the clinic through [employer] or phone. If you have a critical or abnormal lab result, we will notify you by phone as soon as possible.  Submit refill requests through [employer] or call your pharmacy and they will forward the refill request to us. Please allow 3 business days for your refill to be completed.          Additional Information About Your Visit        [employer] Information     [employer] is an electronic gateway that provides easy, online access to your medical records. With [employer], you can request a clinic appointment, read your test results, renew a prescription or communicate with your care team.     To sign up for [employer] visit the website at [email or web address]   You will be asked to enter the access code listed below, as well as some personal information. Please follow the directions to create your username and password.     Your access code is: [email or web address]  Expires: 2017  6:31 AM     Your access code will  in 90 days. If you need help or a new code, please contact your [facility] or call [phone] for assistance.        Care EveryWhere ID     This is your Care EveryWhere ID. This could be used by other organizations to access your [facility] medical records  [id number]        Your Vitals Were     Pulse Pulse Oximetry                96 97%           Blood Pressure from Last 3 Encounters:   17 113/74   17 104/65   17 101/63    Weight from Last 3 Encounters:   17 81.6 kg (180 lb)   17 78.9 kg (174 lb)   17 79.6 kg (175 lb 7.8 oz)              Today, you had the following     No orders found for display       Primary Care Provider Office Phone # Fax #    Hakeem Awad [phone]       [facility] [address]  [facility]  "[id number]        Thank you!     Thank you for choosing [facility]  for your care. Our goal is always to provide you with excellent care. Hearing back from our patients is one way we can continue to improve our services. Please take a few minutes to complete the written survey that you may receive in the mail after your visit with us. Thank you!             Your Updated Medication List - Protect others around you: Learn how to safely use, store and throw away your medicines at www.disposemymeds.org.          This list is accurate as of: 4/19/17 11:59 PM.  Always use your most recent med list.                   Brand Name Dispense Instructions for use    acetaminophen 325 MG tablet    TYLENOL    100 tablet    Take 2 tablets (650 mg) by mouth every 4 hours as needed for mild pain       albuterol (2.5 MG/3ML) 0.083% neb solution     360 mL    Take 1 vial (2.5 mg) by nebulization every 6 hours as needed       amLODIPine 10 MG tablet    NORVASC    30 tablet    Take 1 tablet (10 mg) by mouth daily       diclofenac sodium 3 % Gel     100 g    Apply 4 gm four times a day as needed to right trapezius muscle.       enalapril 10 MG tablet    VASOTEC    90 tablet    Take 1 tablet (10 mg) by mouth daily       finasteride 5 MG tablet    PROSCAR    30 tablet    Take 1 tablet (5 mg) by mouth At Bedtime       fluticasone 250 MCG/BLIST Aepb Inhaler    FLOVENT DISKUS    1 Inhaler    Inhale 1 puff into the lungs every 12 hours       hydrochlorothiazide 12.5 MG capsule    MICROZIDE    60 capsule    Take 2 capsules (25 mg) by mouth daily       mupirocin 2 % ointment    BACTROBAN    30 g    Apply twice a day to right leg wound and cover with occlusive dressing.       * order for DME     5 each    Equipment being ordered: Dispense 4\" x 4\" sterile gauze. Dispense any brand name.       * order for DME     5 each    Equipment being ordered: Dispense Coban wrap.       ranitidine 150 MG tablet    ZANTAC    60 tablet    Take 1 " tablet (150 mg) by mouth 2 times daily       tamsulosin 0.4 MG capsule    FLOMAX    60 capsule    Take 2 capsules (0.8 mg) by mouth At Bedtime       tiotropium 18 MCG capsule    SPIRIVA    30 capsule    Inhale 1 capsule (18 mcg) into the lungs daily       trolamine salicylate 10 % cream    ASPERCREME    85 g    Apply topically as needed for moderate pain       * Notice:  This list has 2 medication(s) that are the same as other medications prescribed for you. Read the directions carefully, and ask your doctor or other care provider to review them with you.

## 2018-08-01 RX ORDER — ROSUVASTATIN CALCIUM 10 MG/1
10 TABLET, COATED ORAL DAILY
Qty: 90 TABLET | Refills: 1 | Status: SHIPPED | OUTPATIENT
Start: 2018-08-01 | End: 2018-11-06 | Stop reason: SDUPTHER

## 2018-08-02 ENCOUNTER — TELEPHONE (OUTPATIENT)
Dept: FAMILY MEDICINE CLINIC | Facility: CLINIC | Age: 83
End: 2018-08-02

## 2018-08-02 ENCOUNTER — HOSPITAL ENCOUNTER (OUTPATIENT)
Dept: MAMMOGRAPHY | Facility: HOSPITAL | Age: 83
Discharge: HOME | End: 2018-08-02
Admitting: NURSE PRACTITIONER

## 2018-08-02 PROCEDURE — 77063 BREAST TOMOSYNTHESIS BI: CPT

## 2018-08-02 PROCEDURE — 77067 SCR MAMMO BI INCL CAD: CPT

## 2018-08-02 PROCEDURE — 77067 SCR MAMMO BI INCL CAD: CPT | Performed by: RADIOLOGY

## 2018-08-02 PROCEDURE — 77063 BREAST TOMOSYNTHESIS BI: CPT | Performed by: RADIOLOGY

## 2018-08-03 ENCOUNTER — TELEPHONE (OUTPATIENT)
Dept: FAMILY MEDICINE CLINIC | Facility: CLINIC | Age: 83
End: 2018-08-03

## 2018-08-06 ENCOUNTER — TELEPHONE (OUTPATIENT)
Dept: FAMILY MEDICINE CLINIC | Facility: CLINIC | Age: 83
End: 2018-08-06

## 2018-08-07 DIAGNOSIS — M79.7 FIBROMYALGIA: ICD-10-CM

## 2018-08-07 DIAGNOSIS — F41.9 ANXIETY: ICD-10-CM

## 2018-08-07 DIAGNOSIS — M25.50 MULTIPLE JOINT PAIN: ICD-10-CM

## 2018-08-07 RX ORDER — TRAMADOL HYDROCHLORIDE 50 MG/1
50 TABLET ORAL 2 TIMES DAILY PRN
Qty: 60 TABLET | Refills: 0 | Status: SHIPPED | OUTPATIENT
Start: 2018-08-07 | End: 2018-09-18 | Stop reason: SDUPTHER

## 2018-08-07 RX ORDER — DIAZEPAM 2 MG/1
2 TABLET ORAL 2 TIMES DAILY PRN
Qty: 60 TABLET | Refills: 0 | Status: SHIPPED | OUTPATIENT
Start: 2018-08-07 | End: 2018-09-18 | Stop reason: SDUPTHER

## 2018-08-20 RX ORDER — ROSUVASTATIN CALCIUM 10 MG/1
TABLET, COATED ORAL
Qty: 30 TABLET | Refills: 5 | Status: SHIPPED | OUTPATIENT
Start: 2018-08-20 | End: 2018-11-06 | Stop reason: SDUPTHER

## 2018-09-18 ENCOUNTER — TELEPHONE (OUTPATIENT)
Dept: FAMILY MEDICINE CLINIC | Facility: CLINIC | Age: 83
End: 2018-09-18

## 2018-09-18 DIAGNOSIS — F41.9 ANXIETY: ICD-10-CM

## 2018-09-18 DIAGNOSIS — M25.50 MULTIPLE JOINT PAIN: ICD-10-CM

## 2018-09-18 DIAGNOSIS — M79.7 FIBROMYALGIA: ICD-10-CM

## 2018-09-18 RX ORDER — TRAMADOL HYDROCHLORIDE 50 MG/1
50 TABLET ORAL 2 TIMES DAILY PRN
Qty: 60 TABLET | Refills: 0 | Status: SHIPPED | OUTPATIENT
Start: 2018-09-18 | End: 2018-10-24 | Stop reason: SDUPTHER

## 2018-09-18 RX ORDER — DIAZEPAM 2 MG/1
2 TABLET ORAL 2 TIMES DAILY PRN
Qty: 60 TABLET | Refills: 0 | Status: SHIPPED | OUTPATIENT
Start: 2018-09-18 | End: 2018-10-24 | Stop reason: SDUPTHER

## 2018-09-18 NOTE — TELEPHONE ENCOUNTER
RX sent to pharmacy  [facility] # [id number] reviewed    Attempted to notify patient,no answer,will try again later.    Patient notified.

## 2018-10-02 ENCOUNTER — OFFICE VISIT (OUTPATIENT)
Dept: FAMILY MEDICINE CLINIC | Facility: CLINIC | Age: 83
End: 2018-10-02

## 2018-10-02 VITALS
WEIGHT: 172 LBS | BODY MASS INDEX: 28.66 KG/M2 | HEART RATE: 80 BPM | HEIGHT: 65 IN | DIASTOLIC BLOOD PRESSURE: 70 MMHG | OXYGEN SATURATION: 97 % | SYSTOLIC BLOOD PRESSURE: 108 MMHG

## 2018-10-02 DIAGNOSIS — E55.9 VITAMIN D DEFICIENCY: ICD-10-CM

## 2018-10-02 DIAGNOSIS — M19.012 PRIMARY OSTEOARTHRITIS OF BOTH SHOULDERS: ICD-10-CM

## 2018-10-02 DIAGNOSIS — R10.12 LEFT UPPER QUADRANT PAIN: ICD-10-CM

## 2018-10-02 DIAGNOSIS — M19.011 PRIMARY OSTEOARTHRITIS OF BOTH SHOULDERS: ICD-10-CM

## 2018-10-02 DIAGNOSIS — E78.5 HYPERLIPIDEMIA, UNSPECIFIED HYPERLIPIDEMIA TYPE: ICD-10-CM

## 2018-10-02 DIAGNOSIS — I10 ESSENTIAL HYPERTENSION: Primary | ICD-10-CM

## 2018-10-02 DIAGNOSIS — F41.9 ANXIETY: ICD-10-CM

## 2018-10-02 LAB
25(OH)D3 SERPL-MCNC: 21 NG/ML
ALBUMIN SERPL-MCNC: 4.4 G/DL (ref 3.4–4.8)
ALBUMIN/GLOB SERPL: 1.6 G/DL (ref 1.5–2.5)
ALP SERPL-CCNC: 74 U/L (ref 35–104)
ALT SERPL W P-5'-P-CCNC: 16 U/L (ref 10–36)
ANION GAP SERPL CALCULATED.3IONS-SCNC: 6 MMOL/L (ref 3.6–11.2)
AST SERPL-CCNC: 21 U/L (ref 10–30)
BASOPHILS # BLD AUTO: 0.02 10*3/MM3 (ref 0–0.3)
BASOPHILS NFR BLD AUTO: 0.3 % (ref 0–2)
BILIRUB SERPL-MCNC: 0.4 MG/DL (ref 0.2–1.8)
BUN BLD-MCNC: 17 MG/DL (ref 7–21)
BUN/CREAT SERPL: 14.4 (ref 7–25)
CALCIUM SPEC-SCNC: 9.3 MG/DL (ref 7.7–10)
CHLORIDE SERPL-SCNC: 104 MMOL/L (ref 99–112)
CO2 SERPL-SCNC: 30 MMOL/L (ref 24.3–31.9)
CREAT BLD-MCNC: 1.18 MG/DL (ref 0.43–1.29)
DEPRECATED RDW RBC AUTO: 46.2 FL (ref 37–54)
EOSINOPHIL # BLD AUTO: 0.1 10*3/MM3 (ref 0–0.7)
EOSINOPHIL NFR BLD AUTO: 1.7 % (ref 0–7)
ERYTHROCYTE [DISTWIDTH] IN BLOOD BY AUTOMATED COUNT: 13.4 % (ref 11.5–14.5)
GFR SERPL CREATININE-BSD FRML MDRD: 44 ML/MIN/1.73
GLOBULIN UR ELPH-MCNC: 2.7 GM/DL
GLUCOSE BLD-MCNC: 85 MG/DL (ref 70–110)
HCT VFR BLD AUTO: 38.8 % (ref 37–47)
HGB BLD-MCNC: 12.3 G/DL (ref 12–16)
IMM GRANULOCYTES # BLD: 0.01 10*3/MM3 (ref 0–0.03)
IMM GRANULOCYTES NFR BLD: 0.2 % (ref 0–0.5)
LYMPHOCYTES # BLD AUTO: 1.95 10*3/MM3 (ref 1–3)
LYMPHOCYTES NFR BLD AUTO: 32.2 % (ref 16–46)
MAGNESIUM SERPL-MCNC: 2.2 MG/DL (ref 1.7–2.6)
MCH RBC QN AUTO: 29.9 PG (ref 27–33)
MCHC RBC AUTO-ENTMCNC: 31.7 G/DL (ref 33–37)
MCV RBC AUTO: 94.4 FL (ref 80–94)
MONOCYTES # BLD AUTO: 0.78 10*3/MM3 (ref 0.1–0.9)
MONOCYTES NFR BLD AUTO: 12.9 % (ref 0–12)
NEUTROPHILS # BLD AUTO: 3.2 10*3/MM3 (ref 1.4–6.5)
NEUTROPHILS NFR BLD AUTO: 52.7 % (ref 40–75)
OSMOLALITY SERPL CALC.SUM OF ELEC: 280.2 MOSM/KG (ref 273–305)
PLATELET # BLD AUTO: 170 10*3/MM3 (ref 130–400)
PMV BLD AUTO: 10.9 FL (ref 6–10)
POTASSIUM BLD-SCNC: 4.3 MMOL/L (ref 3.5–5.3)
PROT SERPL-MCNC: 7.1 G/DL (ref 6–8)
RBC # BLD AUTO: 4.11 10*6/MM3 (ref 4.2–5.4)
SODIUM BLD-SCNC: 140 MMOL/L (ref 135–153)
TSH SERPL DL<=0.05 MIU/L-ACNC: 1.04 MIU/ML (ref 0.55–4.78)
VIT B12 BLD-MCNC: 295 PG/ML (ref 211–911)
WBC NRBC COR # BLD: 6.06 10*3/MM3 (ref 4.5–12.5)

## 2018-10-02 PROCEDURE — 83735 ASSAY OF MAGNESIUM: CPT | Performed by: NURSE PRACTITIONER

## 2018-10-02 PROCEDURE — 99214 OFFICE O/P EST MOD 30 MIN: CPT | Performed by: NURSE PRACTITIONER

## 2018-10-02 PROCEDURE — 36415 COLL VENOUS BLD VENIPUNCTURE: CPT | Performed by: NURSE PRACTITIONER

## 2018-10-02 PROCEDURE — 84443 ASSAY THYROID STIM HORMONE: CPT | Performed by: NURSE PRACTITIONER

## 2018-10-02 PROCEDURE — 80053 COMPREHEN METABOLIC PANEL: CPT | Performed by: NURSE PRACTITIONER

## 2018-10-02 PROCEDURE — 82306 VITAMIN D 25 HYDROXY: CPT | Performed by: NURSE PRACTITIONER

## 2018-10-02 PROCEDURE — 82607 VITAMIN B-12: CPT | Performed by: NURSE PRACTITIONER

## 2018-10-02 PROCEDURE — 85025 COMPLETE CBC W/AUTO DIFF WBC: CPT | Performed by: NURSE PRACTITIONER

## 2018-10-02 RX ORDER — SOTALOL HYDROCHLORIDE 120 MG/1
TABLET ORAL
Qty: 60 TABLET | Refills: 3 | Status: SHIPPED | OUTPATIENT
Start: 2018-10-02 | End: 2019-01-23 | Stop reason: SDUPTHER

## 2018-10-02 NOTE — PROGRESS NOTES
Subjective   Tonia Ritter is a 83 y.o. female.     Chief Complaint: Follow-up and Anxiety    Hypertension   This is a chronic problem. The current episode started more than 1 year ago. The problem is controlled. Pertinent negatives include no chest pain, peripheral edema or shortness of breath. Risk factors for coronary artery disease include dyslipidemia and sedentary lifestyle. Current antihypertension treatment includes diuretics and ACE inhibitors. The current treatment provides significant improvement. There are no compliance problems.    Hyperlipidemia   This is a chronic problem. The current episode started more than 1 year ago. Recent lipid tests were reviewed and are variable. Factors aggravating her hyperlipidemia include fatty foods. Pertinent negatives include no chest pain or shortness of breath. Current antihyperlipidemic treatment includes statins. The current treatment provides significant improvement of lipids. There are no compliance problems.  Risk factors for coronary artery disease include dyslipidemia, hypertension and a sedentary lifestyle.   Abdominal Pain   This is a chronic problem. The current episode started more than 1 year ago. The problem occurs daily. The problem has been waxing and waning. The pain is located in the LUQ. The pain is moderate. The quality of the pain is aching and cramping. The abdominal pain does not radiate. Associated symptoms include belching, constipation and nausea. The pain is aggravated by eating. The pain is relieved by nothing. She has tried proton pump inhibitors for the symptoms. Prior workup: discussed with patient need for EGD/colonoscopy for evaluation and pt is agreeable.      Anxiety   Presents for follow-up visit. Symptoms include depressed mood, excessive worry, nervous/anxious behavior and restlessness. Patient reports no chest pain, insomnia, shortness of breath or suicidal ideas. Symptoms occur most days. The severity of symptoms is  moderate. The quality of sleep is fair. Nighttime awakenings: occasional.   Compliance with medications is %. Symptoms controlled well.  No side effects from medication.  Pt able to continue ADLs with use of medication.   Back Pain   This is a chronic problem. The current episode started more than 1 year ago. The problem occurs daily. The problem has been waxing and waning since onset. The pain is present in the lumbar spine. The quality of the pain is described as aching. The pain does not radiate. The pain is moderate. The pain is worse during the day. The symptoms are aggravated by bending and standing. Stiffness is present all day. Pertinent negatives include no bladder incontinence, bowel incontinence, chest pain or dysuria. Risk factors include menopause and sedentary lifestyle. Treatments tried: tramadol. The treatment provided significant relief. Pt able to continue ADLs with medication use.  Fibromyalgia   This is a chronic problem. The current episode started more than 1 year ago. The problem occurs daily. The problem has been unchanged. Associated symptoms include arthralgias and myalgias. Nothing aggravates the symptoms. She has tried acetaminophen, NSAIDs, oral narcotics and relaxation for the symptoms. The treatment provided moderate relief.   Pt has been taking Tramadol for her pain and is tolerating it well. Tramadol enables pt to continue with her ADLs and live her life without difficulty. She denies any side effects from the medication.     [facility] # [id number]  Reviewed and is consistent.  [facility] 3/28/2018 reviewed and is consistent.  Patient comfort assessment guide reviewed and updated today.    As part of the patient's treatment plan they are being prescribed a controlled substance/ substances with potential for abuse.  This patient has been made aware of the appropriate use of such medications, including potential risk of somnolence, limited ability to drive and/or work safely, and potential  for overdose.  It has also been made clear these medications are for use by the patient only, without concomitant use of alcohol or other substances unless prescribed/advised by medical provider.    Patient has completed prescribing agreement detailing terms of continued prescribing of controlled substances including monitoring BRAULIO reports, urine drug screens, and pill counts.  The patient is aware BRAULIO reports are reviewed on a regular basis and scanned into the chart.    History and physical exam exhibit continued safe and appropriate use of controlled substances.    Family History   Problem Relation Age of Onset   • Thyroid disease Mother    • Mental illness Father         nervous breakdown   • Breast cancer Maternal Aunt        Social History     Social History   • Marital status:      Spouse name: N/A   • Number of children: N/A   • Years of education: N/A     Occupational History   • Not on file.     Social History Main Topics   • Smoking status: Never Smoker   • Smokeless tobacco: Never Used   • Alcohol use No   • Drug use: No   • Sexual activity: Defer     Other Topics Concern   • Not on file     Social History Narrative   • No narrative on file       Past Medical History:   Diagnosis Date   • Acute bronchitis    • Allergic rhinitis    • Anxiety    • Atrial fibrillation (CMS/HCC)    • Back pain    • Burping    • Chronic pain    • Congestive heart failure (CMS/HCC)    • Depression    • Encounter for screening fecal occult blood testing 03/10/2016    NEGATIVE   • Fibromyalgia    • Flatulence    • GERD (gastroesophageal reflux disease)    • Hematuria    • Hyperlipidemia    • Hypertension    • Osteoarthritis    • Pacemaker    • PUD (peptic ulcer disease)    • Right shoulder pain        Review of Systems   Constitutional: Negative.    HENT: Negative.    Respiratory: Negative.  Negative for shortness of breath.    Cardiovascular: Negative.  Negative for chest pain.   Gastrointestinal: Positive for  "abdominal pain, constipation and nausea.   Musculoskeletal: Negative.    Skin: Negative.    Neurological: Negative.    Psychiatric/Behavioral: Negative.        Objective   Physical Exam   Constitutional: She is oriented to person, place, and time. She appears well-developed and well-nourished.   Neck: Normal range of motion. Neck supple.   Cardiovascular: Normal rate, regular rhythm and normal heart sounds.    Pulmonary/Chest: Effort normal and breath sounds normal.   Abdominal: Soft. Bowel sounds are normal. She exhibits no distension. There is no tenderness. There is no guarding.   Neurological: She is alert and oriented to person, place, and time.   Skin: Skin is warm and dry.   Psychiatric: She has a normal mood and affect. Her behavior is normal. Judgment and thought content normal.   Nursing note and vitals reviewed.      Procedures    Vitals: Blood pressure 108/70, pulse 80, height 165.1 cm (65\"), weight 78 kg (172 lb), SpO2 97 %, not currently breastfeeding.    Allergies:   Allergies   Allergen Reactions   • Reglan [Metoclopramide]    • Sulfa Antibiotics         During this visit the following were done:  Labs Reviewed []    Labs Ordered []    Radiology Reports Reviewed []    Radiology Ordered []    PCP Records Reviewed []    Referring Provider Records Reviewed []    ER Records Reviewed []    Hospital Records Reviewed []    History Obtained From Family []    Radiology Images Reviewed []    Other Reviewed []    Records Requested []      Assessment/Plan   Tonia was seen today for follow-up and anxiety.    Diagnoses and all orders for this visit:    Essential hypertension  -     CBC & Differential  -     Comprehensive Metabolic Panel  -     Magnesium  -     TSH  -     Vitamin B12  -     Vitamin D 25 Hydroxy  -     CBC Auto Differential    Hyperlipidemia, unspecified hyperlipidemia type  -     CBC & Differential  -     Comprehensive Metabolic Panel  -     Magnesium  -     TSH  -     Vitamin B12  -     Vitamin " D 25 Hydroxy  -     CBC Auto Differential    Primary osteoarthritis of both shoulders  -     CBC & Differential  -     Comprehensive Metabolic Panel  -     Magnesium  -     TSH  -     Vitamin B12  -     Vitamin D 25 Hydroxy  -     CBC Auto Differential    Anxiety  -     CBC & Differential  -     Comprehensive Metabolic Panel  -     Magnesium  -     TSH  -     Vitamin B12  -     Vitamin D 25 Hydroxy  -     CBC Auto Differential    Left upper quadrant pain  -     Ambulatory Referral to General Surgery  -     CBC & Differential  -     Comprehensive Metabolic Panel  -     Magnesium  -     TSH  -     Vitamin B12  -     Vitamin D 25 Hydroxy  -     CBC Auto Differential    Vitamin D deficiency  -     CBC & Differential  -     Comprehensive Metabolic Panel  -     Magnesium  -     TSH  -     Vitamin B12  -     Vitamin D 25 Hydroxy  -     CBC Auto Differential

## 2018-10-03 ENCOUNTER — CLINICAL SUPPORT (OUTPATIENT)
Dept: FAMILY MEDICINE CLINIC | Facility: CLINIC | Age: 83
End: 2018-10-03

## 2018-10-03 ENCOUNTER — TELEPHONE (OUTPATIENT)
Dept: FAMILY MEDICINE CLINIC | Facility: CLINIC | Age: 83
End: 2018-10-03

## 2018-10-03 DIAGNOSIS — E53.8 B12 DEFICIENCY: Primary | ICD-10-CM

## 2018-10-03 PROCEDURE — 96372 THER/PROPH/DIAG INJ SC/IM: CPT | Performed by: NURSE PRACTITIONER

## 2018-10-03 RX ORDER — CYANOCOBALAMIN 1000 UG/ML
1000 INJECTION, SOLUTION INTRAMUSCULAR; SUBCUTANEOUS
Status: DISCONTINUED | OUTPATIENT
Start: 2018-10-03 | End: 2018-11-06

## 2018-10-03 RX ORDER — ERGOCALCIFEROL 1.25 MG/1
50000 CAPSULE ORAL WEEKLY
Qty: 4 CAPSULE | Refills: 2 | Status: SHIPPED | OUTPATIENT
Start: 2018-10-03 | End: 2019-11-14

## 2018-10-03 RX ADMIN — CYANOCOBALAMIN 1000 MCG: 1000 INJECTION, SOLUTION INTRAMUSCULAR; SUBCUTANEOUS at 16:13

## 2018-10-03 NOTE — TELEPHONE ENCOUNTER
----- Message from TEODORA Red sent at 10/3/2018  8:39 AM EDT -----  Her Vit D is low at 21.  I will send her in weekly dosing of Vit D for 12 weeks.  Her B12 is also a little low.  She has c/o fatigue.  If she would like to have weekly B12 injections x8 weeks, it may help with her energy.  Please let her know.      Patient notified & verbalized understanding.

## 2018-10-03 NOTE — PROGRESS NOTES
Her Vit D is low at 21.  I will send her in weekly dosing of Vit D for 12 weeks.  Her B12 is also a little low.  She has c/o fatigue.  If she would like to have weekly B12 injections x8 weeks, it may help with her energy.  Please let her know.

## 2018-10-10 ENCOUNTER — CLINICAL SUPPORT (OUTPATIENT)
Dept: FAMILY MEDICINE CLINIC | Facility: CLINIC | Age: 83
End: 2018-10-10

## 2018-10-10 DIAGNOSIS — E53.8 B12 DEFICIENCY: Primary | ICD-10-CM

## 2018-10-10 PROCEDURE — 96372 THER/PROPH/DIAG INJ SC/IM: CPT | Performed by: NURSE PRACTITIONER

## 2018-10-10 RX ORDER — CYANOCOBALAMIN 1000 UG/ML
1000 INJECTION, SOLUTION INTRAMUSCULAR; SUBCUTANEOUS ONCE
Status: COMPLETED | OUTPATIENT
Start: 2018-10-10 | End: 2018-10-10

## 2018-10-10 RX ADMIN — CYANOCOBALAMIN 1000 MCG: 1000 INJECTION, SOLUTION INTRAMUSCULAR; SUBCUTANEOUS at 16:10

## 2018-10-17 ENCOUNTER — CLINICAL SUPPORT (OUTPATIENT)
Dept: FAMILY MEDICINE CLINIC | Facility: CLINIC | Age: 83
End: 2018-10-17

## 2018-10-17 DIAGNOSIS — E53.8 B12 DEFICIENCY: Primary | ICD-10-CM

## 2018-10-17 PROCEDURE — 96372 THER/PROPH/DIAG INJ SC/IM: CPT | Performed by: NURSE PRACTITIONER

## 2018-10-17 RX ORDER — CYANOCOBALAMIN 1000 UG/ML
1000 INJECTION, SOLUTION INTRAMUSCULAR; SUBCUTANEOUS ONCE
Status: COMPLETED | OUTPATIENT
Start: 2018-10-17 | End: 2018-10-17

## 2018-10-17 RX ADMIN — CYANOCOBALAMIN 1000 MCG: 1000 INJECTION, SOLUTION INTRAMUSCULAR; SUBCUTANEOUS at 15:24

## 2018-10-18 RX ORDER — FUROSEMIDE 20 MG/1
20 TABLET ORAL DAILY
Qty: 30 TABLET | Refills: 3 | Status: SHIPPED | OUTPATIENT
Start: 2018-10-18 | End: 2018-12-04 | Stop reason: SDUPTHER

## 2018-10-24 ENCOUNTER — TELEPHONE (OUTPATIENT)
Dept: FAMILY MEDICINE CLINIC | Facility: CLINIC | Age: 83
End: 2018-10-24

## 2018-10-24 DIAGNOSIS — M25.50 MULTIPLE JOINT PAIN: ICD-10-CM

## 2018-10-24 DIAGNOSIS — F41.9 ANXIETY: ICD-10-CM

## 2018-10-24 DIAGNOSIS — M79.7 FIBROMYALGIA: ICD-10-CM

## 2018-10-24 RX ORDER — DIAZEPAM 2 MG/1
2 TABLET ORAL 2 TIMES DAILY PRN
Qty: 60 TABLET | Refills: 0 | Status: SHIPPED | OUTPATIENT
Start: 2018-10-24 | End: 2018-12-04 | Stop reason: SDUPTHER

## 2018-10-24 RX ORDER — TRAMADOL HYDROCHLORIDE 50 MG/1
50 TABLET ORAL 2 TIMES DAILY PRN
Qty: 60 TABLET | Refills: 0 | Status: SHIPPED | OUTPATIENT
Start: 2018-10-24 | End: 2018-12-04 | Stop reason: SDUPTHER

## 2018-10-24 NOTE — TELEPHONE ENCOUNTER
BRAULIO [id number] reviewed and consistent.  Sent to pharmacy      Patient notified & verbalized understanding.

## 2018-11-01 ENCOUNTER — CLINICAL SUPPORT (OUTPATIENT)
Dept: FAMILY MEDICINE CLINIC | Facility: CLINIC | Age: 83
End: 2018-11-01

## 2018-11-01 DIAGNOSIS — E53.8 B12 DEFICIENCY: Primary | ICD-10-CM

## 2018-11-01 PROCEDURE — 96372 THER/PROPH/DIAG INJ SC/IM: CPT | Performed by: FAMILY MEDICINE

## 2018-11-01 RX ORDER — CYANOCOBALAMIN 1000 UG/ML
1000 INJECTION, SOLUTION INTRAMUSCULAR; SUBCUTANEOUS ONCE
Status: COMPLETED | OUTPATIENT
Start: 2018-11-01 | End: 2018-11-01

## 2018-11-01 RX ADMIN — CYANOCOBALAMIN 1000 MCG: 1000 INJECTION, SOLUTION INTRAMUSCULAR; SUBCUTANEOUS at 13:56

## 2018-11-06 ENCOUNTER — OFFICE VISIT (OUTPATIENT)
Dept: FAMILY MEDICINE CLINIC | Facility: CLINIC | Age: 83
End: 2018-11-06

## 2018-11-06 VITALS
DIASTOLIC BLOOD PRESSURE: 85 MMHG | HEART RATE: 73 BPM | OXYGEN SATURATION: 97 % | TEMPERATURE: 98.6 F | SYSTOLIC BLOOD PRESSURE: 131 MMHG | BODY MASS INDEX: 29.06 KG/M2 | HEIGHT: 65 IN | WEIGHT: 174.4 LBS

## 2018-11-06 DIAGNOSIS — B37.0 ORAL CANDIDA: Primary | ICD-10-CM

## 2018-11-06 DIAGNOSIS — E53.8 B12 DEFICIENCY: ICD-10-CM

## 2018-11-06 PROCEDURE — 96372 THER/PROPH/DIAG INJ SC/IM: CPT | Performed by: NURSE PRACTITIONER

## 2018-11-06 PROCEDURE — 99213 OFFICE O/P EST LOW 20 MIN: CPT | Performed by: NURSE PRACTITIONER

## 2018-11-06 RX ORDER — DIPHENHYDRAMINE, LIDOCAINE, NYSTATIN
5 KIT ORAL 4 TIMES DAILY
Qty: 60 ML | Refills: 0 | Status: SHIPPED | OUTPATIENT
Start: 2018-11-06 | End: 2018-12-04

## 2018-11-06 RX ORDER — FLUTICASONE PROPIONATE 50 MCG
2 SPRAY, SUSPENSION (ML) NASAL DAILY
COMMUNITY
End: 2019-06-04 | Stop reason: SDUPTHER

## 2018-11-06 RX ORDER — ROSUVASTATIN CALCIUM 10 MG/1
TABLET, COATED ORAL
Qty: 90 TABLET | Refills: 1 | Status: SHIPPED | OUTPATIENT
Start: 2018-11-06 | End: 2018-12-04 | Stop reason: SDUPTHER

## 2018-11-06 RX ORDER — CYANOCOBALAMIN 1000 UG/ML
1000 INJECTION, SOLUTION INTRAMUSCULAR; SUBCUTANEOUS ONCE
Status: COMPLETED | OUTPATIENT
Start: 2018-11-06 | End: 2018-11-06

## 2018-11-06 RX ADMIN — CYANOCOBALAMIN 1000 MCG: 1000 INJECTION, SOLUTION INTRAMUSCULAR; SUBCUTANEOUS at 16:47

## 2018-11-06 NOTE — PROGRESS NOTES
Subjective   Tonia Ritter is a 83 y.o. female.     Chief Complaint: discomfort in mouth (blister on the tip of the tongue ) and Nasal Congestion (sores in nose as well as drainage )    Oral Pain    This is a new problem. The current episode started in the past 7 days. The problem occurs constantly. The problem has been unchanged. Pertinent negatives include no difficulty swallowing, facial pain, fever or oral bleeding. She has tried nothing for the symptoms.        Family History   Problem Relation Age of Onset   • Thyroid disease Mother    • Mental illness Father         nervous breakdown   • Breast cancer Maternal Aunt        Social History     Social History   • Marital status:      Spouse name: N/A   • Number of children: N/A   • Years of education: N/A     Occupational History   • Not on file.     Social History Main Topics   • Smoking status: Never Smoker   • Smokeless tobacco: Never Used   • Alcohol use No   • Drug use: No   • Sexual activity: Defer     Other Topics Concern   • Not on file     Social History Narrative   • No narrative on file       Past Medical History:   Diagnosis Date   • Acute bronchitis    • Allergic rhinitis    • Anxiety    • Atrial fibrillation (CMS/HCC)    • Back pain    • Burping    • Chronic pain    • Congestive heart failure (CMS/HCC)    • Depression    • Encounter for screening fecal occult blood testing 03/10/2016    NEGATIVE   • Fibromyalgia    • Flatulence    • GERD (gastroesophageal reflux disease)    • Hematuria    • Hyperlipidemia    • Hypertension    • Osteoarthritis    • Pacemaker    • PUD (peptic ulcer disease)    • Right shoulder pain        Review of Systems   Constitutional: Negative.  Negative for fever.   HENT: Positive for rhinorrhea.    Respiratory: Negative.    Cardiovascular: Negative.    Gastrointestinal: Negative.    Musculoskeletal: Negative.    Skin: Negative.    Neurological: Negative.    Psychiatric/Behavioral: Negative.        Objective  "  Physical Exam   Constitutional: She is oriented to person, place, and time. She appears well-developed and well-nourished.   HENT:   Right Ear: External ear normal.   Left Ear: External ear normal.   Tongue erythematous    Neck: Normal range of motion. Neck supple.   Cardiovascular: Normal rate, regular rhythm and normal heart sounds.    Pulmonary/Chest: Effort normal and breath sounds normal.   Neurological: She is alert and oriented to person, place, and time.   Skin: Skin is warm and dry.   Psychiatric: She has a normal mood and affect. Her behavior is normal. Judgment and thought content normal.   Nursing note and vitals reviewed.      Procedures    Vitals: Blood pressure 131/85, pulse 73, temperature 98.6 °F (37 °C), temperature source Oral, height 165.1 cm (65\"), weight 79.1 kg (174 lb 6.4 oz), SpO2 97 %, not currently breastfeeding.    Allergies:   Allergies   Allergen Reactions   • Reglan [Metoclopramide]    • Sulfa Antibiotics         During this visit the following were done:  Labs Reviewed []    Labs Ordered []    Radiology Reports Reviewed [x]    Radiology Ordered []    PCP Records Reviewed []    Referring Provider Records Reviewed []    ER Records Reviewed []    Hospital Records Reviewed []    History Obtained From Family []    Radiology Images Reviewed []    Other Reviewed []    Records Requested []      Assessment/Plan   Tonia was seen today for discomfort in mouth and nasal congestion.    Diagnoses and all orders for this visit:    Oral candida  -     nystatin susp + lidocaine viscous (MAGIC MOUTHWASH) oral suspension; Swish and spit 5 mL 4 (Four) Times a Day.               "

## 2018-11-14 ENCOUNTER — CLINICAL SUPPORT (OUTPATIENT)
Dept: FAMILY MEDICINE CLINIC | Facility: CLINIC | Age: 83
End: 2018-11-14

## 2018-11-14 DIAGNOSIS — E53.8 B12 DEFICIENCY: Primary | ICD-10-CM

## 2018-11-14 PROCEDURE — 96372 THER/PROPH/DIAG INJ SC/IM: CPT | Performed by: NURSE PRACTITIONER

## 2018-11-14 RX ORDER — CYANOCOBALAMIN 1000 UG/ML
1000 INJECTION, SOLUTION INTRAMUSCULAR; SUBCUTANEOUS ONCE
Status: COMPLETED | OUTPATIENT
Start: 2018-11-14 | End: 2018-11-14

## 2018-11-14 RX ADMIN — CYANOCOBALAMIN 1000 MCG: 1000 INJECTION, SOLUTION INTRAMUSCULAR; SUBCUTANEOUS at 14:55

## 2018-11-21 ENCOUNTER — CLINICAL SUPPORT (OUTPATIENT)
Dept: FAMILY MEDICINE CLINIC | Facility: CLINIC | Age: 83
End: 2018-11-21

## 2018-11-21 DIAGNOSIS — E53.8 B12 DEFICIENCY: Primary | ICD-10-CM

## 2018-11-21 PROCEDURE — 96372 THER/PROPH/DIAG INJ SC/IM: CPT | Performed by: NURSE PRACTITIONER

## 2018-11-21 RX ORDER — CYANOCOBALAMIN 1000 UG/ML
1000 INJECTION, SOLUTION INTRAMUSCULAR; SUBCUTANEOUS
Status: DISCONTINUED | OUTPATIENT
Start: 2018-11-21 | End: 2018-12-04

## 2018-11-21 RX ADMIN — CYANOCOBALAMIN 1000 MCG: 1000 INJECTION, SOLUTION INTRAMUSCULAR; SUBCUTANEOUS at 16:23

## 2018-12-04 ENCOUNTER — OFFICE VISIT (OUTPATIENT)
Dept: FAMILY MEDICINE CLINIC | Facility: CLINIC | Age: 83
End: 2018-12-04

## 2018-12-04 VITALS
BODY MASS INDEX: 28.16 KG/M2 | DIASTOLIC BLOOD PRESSURE: 77 MMHG | OXYGEN SATURATION: 99 % | HEIGHT: 65 IN | WEIGHT: 169 LBS | TEMPERATURE: 97.7 F | SYSTOLIC BLOOD PRESSURE: 124 MMHG | HEART RATE: 76 BPM

## 2018-12-04 DIAGNOSIS — M25.50 MULTIPLE JOINT PAIN: ICD-10-CM

## 2018-12-04 DIAGNOSIS — M79.7 FIBROMYALGIA: ICD-10-CM

## 2018-12-04 DIAGNOSIS — F41.9 ANXIETY: ICD-10-CM

## 2018-12-04 DIAGNOSIS — Z91.09 ENVIRONMENTAL ALLERGIES: ICD-10-CM

## 2018-12-04 DIAGNOSIS — I48.0 PAROXYSMAL ATRIAL FIBRILLATION (HCC): Primary | ICD-10-CM

## 2018-12-04 PROCEDURE — 99214 OFFICE O/P EST MOD 30 MIN: CPT | Performed by: NURSE PRACTITIONER

## 2018-12-04 RX ORDER — LUBIPROSTONE 24 UG/1
24 CAPSULE ORAL 2 TIMES DAILY WITH MEALS
Qty: 60 CAPSULE | Refills: 5 | Status: CANCELLED | OUTPATIENT
Start: 2018-12-04

## 2018-12-04 RX ORDER — DIAZEPAM 2 MG/1
2 TABLET ORAL 2 TIMES DAILY PRN
Qty: 60 TABLET | Refills: 0 | Status: SHIPPED | OUTPATIENT
Start: 2018-12-04 | End: 2019-01-22 | Stop reason: SDUPTHER

## 2018-12-04 RX ORDER — LISINOPRIL 5 MG/1
5 TABLET ORAL DAILY
Qty: 30 TABLET | Refills: 5 | Status: SHIPPED | OUTPATIENT
Start: 2018-12-04 | End: 2019-05-01 | Stop reason: SDUPTHER

## 2018-12-04 RX ORDER — FUROSEMIDE 20 MG/1
20 TABLET ORAL DAILY
Qty: 30 TABLET | Refills: 3 | Status: SHIPPED | OUTPATIENT
Start: 2018-12-04 | End: 2019-04-02 | Stop reason: SDUPTHER

## 2018-12-04 RX ORDER — FLUTICASONE PROPIONATE 50 MCG
2 SPRAY, SUSPENSION (ML) NASAL DAILY
Status: CANCELLED | OUTPATIENT
Start: 2018-12-04

## 2018-12-04 RX ORDER — MOMETASONE FUROATE 50 UG/1
2 SPRAY, METERED NASAL DAILY
Qty: 17 G | Refills: 5 | Status: CANCELLED | OUTPATIENT
Start: 2018-12-04

## 2018-12-04 RX ORDER — PAROXETINE 10 MG/1
10 TABLET, FILM COATED ORAL NIGHTLY
Qty: 30 TABLET | Refills: 5 | Status: SHIPPED | OUTPATIENT
Start: 2018-12-04 | End: 2019-09-05 | Stop reason: SDUPTHER

## 2018-12-04 RX ORDER — POTASSIUM CHLORIDE 750 MG/1
10 TABLET, FILM COATED, EXTENDED RELEASE ORAL DAILY
Qty: 30 TABLET | Refills: 5 | Status: SHIPPED | OUTPATIENT
Start: 2018-12-04 | End: 2019-07-24 | Stop reason: SDUPTHER

## 2018-12-04 RX ORDER — LORATADINE 10 MG/1
10 TABLET ORAL DAILY
Qty: 30 TABLET | Refills: 2 | Status: SHIPPED | OUTPATIENT
Start: 2018-12-04 | End: 2019-11-14

## 2018-12-04 RX ORDER — ERGOCALCIFEROL 1.25 MG/1
50000 CAPSULE ORAL WEEKLY
Qty: 4 CAPSULE | Refills: 2 | Status: CANCELLED | OUTPATIENT
Start: 2018-12-04

## 2018-12-04 RX ORDER — ROSUVASTATIN CALCIUM 10 MG/1
10 TABLET, COATED ORAL DAILY
Qty: 90 TABLET | Refills: 1 | Status: SHIPPED | OUTPATIENT
Start: 2018-12-04 | End: 2019-09-05 | Stop reason: SDUPTHER

## 2018-12-04 RX ORDER — SOTALOL HYDROCHLORIDE 120 MG/1
120 TABLET ORAL 2 TIMES DAILY
Qty: 60 TABLET | Refills: 3 | Status: CANCELLED | OUTPATIENT
Start: 2018-12-04

## 2018-12-04 RX ORDER — TRAMADOL HYDROCHLORIDE 50 MG/1
50 TABLET ORAL 2 TIMES DAILY PRN
Qty: 60 TABLET | Refills: 0 | Status: SHIPPED | OUTPATIENT
Start: 2018-12-04 | End: 2019-01-22 | Stop reason: SDUPTHER

## 2018-12-04 NOTE — PROGRESS NOTES
Subjective   Tonia Ritter is a 83 y.o. female.     Chief Complaint: Follow-up and Anxiety    History of Present Illness   Hypertension   This is a chronic problem. The current episode started more than 1 year ago. The problem is controlled. Pertinent negatives include no chest pain, peripheral edema or shortness of breath. Risk factors for coronary artery disease include dyslipidemia and sedentary lifestyle. Current antihypertension treatment includes diuretics and ACE inhibitors. The current treatment provides significant improvement. There are no compliance problems.    Hyperlipidemia   This is a chronic problem. The current episode started more than 1 year ago. Recent lipid tests were reviewed and are variable. Factors aggravating her hyperlipidemia include fatty foods. Pertinent negatives include no chest pain or shortness of breath. Current antihyperlipidemic treatment includes statins. The current treatment provides significant improvement of lipids. There are no compliance problems.  Risk factors for coronary artery disease include dyslipidemia, hypertension and a sedentary lifestyle.   Anxiety   Presents for follow-up visit. Symptoms include depressed mood, excessive worry, nervous/anxious behavior and restlessness. Patient reports no chest pain, insomnia, shortness of breath or suicidal ideas. Symptoms occur most days. The severity of symptoms is moderate. The quality of sleep is fair. Nighttime awakenings: occasional.   Compliance with medications is %. Symptoms controlled well.  No side effects from medication.  Pt able to continue ADLs with use of medication.   Back Pain   This is a chronic problem. The current episode started more than 1 year ago. The problem occurs daily. The problem has been waxing and waning since onset. The pain is present in the lumbar spine. The quality of the pain is described as aching. The pain does not radiate. The pain is moderate. The pain is worse during the day.  The symptoms are aggravated by bending and standing. Stiffness is present all day. Pertinent negatives include no bladder incontinence, bowel incontinence, chest pain or dysuria. Risk factors include menopause and sedentary lifestyle. Treatments tried: tramadol. The treatment provided significant relief. Pt able to continue ADLs with medication use.  Fibromyalgia   This is a chronic problem. The current episode started more than 1 year ago. The problem occurs daily. The problem has been unchanged. Associated symptoms include arthralgias and myalgias. Nothing aggravates the symptoms. She has tried acetaminophen, NSAIDs, oral narcotics and relaxation for the symptoms. The treatment provided moderate relief.   Pt has been taking Tramadol for her pain and is tolerating it well. Tramadol enables pt to continue with her ADLs and live her life without difficulty. She denies any side effects from the medication.      [facility] # [id number] Reviewed and is consistent.  S 10/2/2018 reviewed and is consistent.  Patient comfort assessment guide reviewed and updated today.    As part of the patient's treatment plan they are being prescribed a controlled substance/ substances with potential for abuse.  This patient has been made aware of the appropriate use of such medications, including potential risk of somnolence, limited ability to drive and/or work safely, and potential for overdose.  It has also been made clear these medications are for use by the patient only, without concomitant use of alcohol or other substances unless prescribed/advised by medical provider.    Patient has completed prescribing agreement detailing terms of continued prescribing of controlled substances including monitoring BRAULIO reports, urine drug screens, and pill counts.  The patient is aware BRAULIO reports are reviewed on a regular basis and scanned into the chart.    History and physical exam exhibit continued safe and appropriate use of controlled  substances.        Family History   Problem Relation Age of Onset   • Thyroid disease Mother    • Mental illness Father         nervous breakdown   • Breast cancer Maternal Aunt        Social History     Socioeconomic History   • Marital status:      Spouse name: Not on file   • Number of children: Not on file   • Years of education: Not on file   • Highest education level: Not on file   Social Needs   • Financial resource strain: Not on file   • Food insecurity - worry: Not on file   • Food insecurity - inability: Not on file   • Transportation needs - medical: Not on file   • Transportation needs - non-medical: Not on file   Occupational History   • Not on file   Tobacco Use   • Smoking status: Never Smoker   • Smokeless tobacco: Never Used   Substance and Sexual Activity   • Alcohol use: No   • Drug use: No   • Sexual activity: Defer   Other Topics Concern   • Not on file   Social History Narrative   • Not on file       Past Medical History:   Diagnosis Date   • Acute bronchitis    • Allergic rhinitis    • Anxiety    • Atrial fibrillation (CMS/HCC)    • Back pain    • Burping    • Chronic pain    • Congestive heart failure (CMS/HCC)    • Depression    • Encounter for screening fecal occult blood testing 03/10/2016    NEGATIVE   • Fibromyalgia    • Flatulence    • GERD (gastroesophageal reflux disease)    • Hematuria    • Hyperlipidemia    • Hypertension    • Osteoarthritis    • Pacemaker    • PUD (peptic ulcer disease)    • Right shoulder pain        Review of Systems   Constitutional: Negative.    HENT: Negative.    Respiratory: Negative.    Cardiovascular: Negative.    Gastrointestinal: Negative.    Musculoskeletal: Negative.    Skin: Negative.    Neurological: Negative.    Psychiatric/Behavioral: Negative.        Objective   Physical Exam   Constitutional: She is oriented to person, place, and time. She appears well-developed and well-nourished.   Neck: Normal range of motion. Neck supple.  "  Cardiovascular: Normal rate, regular rhythm and normal heart sounds.   Pulmonary/Chest: Effort normal and breath sounds normal.   Neurological: She is alert and oriented to person, place, and time.   Skin: Skin is warm and dry.   Psychiatric: She has a normal mood and affect. Her behavior is normal. Judgment and thought content normal.   Nursing note and vitals reviewed.      Procedures    Vitals: Blood pressure 124/77, pulse 76, temperature 97.7 °F (36.5 °C), temperature source Oral, height 165.1 cm (65\"), weight 76.7 kg (169 lb), SpO2 99 %, not currently breastfeeding.    Allergies:   Allergies   Allergen Reactions   • Reglan [Metoclopramide]    • Sulfa Antibiotics         During this visit the following were done:  Labs Reviewed []    Labs Ordered []    Radiology Reports Reviewed []    Radiology Ordered []    PCP Records Reviewed []    Referring Provider Records Reviewed []    ER Records Reviewed []    Hospital Records Reviewed []    History Obtained From Family []    Radiology Images Reviewed []    Other Reviewed []    Records Requested []      Assessment/Plan   Tonia was seen today for follow-up and anxiety.    Diagnoses and all orders for this visit:    Paroxysmal atrial fibrillation (CMS/HCC)    Anxiety  -     diazePAM (VALIUM) 2 MG tablet; Take 1 tablet by mouth 2 (Two) Times a Day As Needed for Anxiety.    Multiple joint pain  -     traMADol (ULTRAM) 50 MG tablet; Take 1 tablet by mouth 2 (Two) Times a Day As Needed for Moderate Pain .    Fibromyalgia  -     traMADol (ULTRAM) 50 MG tablet; Take 1 tablet by mouth 2 (Two) Times a Day As Needed for Moderate Pain .    Environmental allergies  -     loratadine (CLARITIN) 10 MG tablet; Take 1 tablet by mouth Daily.    Other orders  -     Cancel: fluticasone (FLONASE) 50 MCG/ACT nasal spray; 2 sprays into the nostril(s) as directed by provider Daily.  -     furosemide (LASIX) 20 MG tablet; Take 1 tablet by mouth Daily.  -     lisinopril (PRINIVIL,ZESTRIL) 5 MG " tablet; Take 1 tablet by mouth Daily.  -     Cancel: rivaroxaban (XARELTO) 20 MG tablet;   -     Cancel: sotalol (BETAPACE) 120 MG tablet; Take 1 tablet by mouth 2 (Two) Times a Day.  -     rosuvastatin (CRESTOR) 10 MG tablet; Take 1 tablet by mouth Daily. for cholesterol  -     potassium chloride (K-DUR) 10 MEQ CR tablet; Take 1 tablet by mouth Daily.  -     PARoxetine (PAXIL) 10 MG tablet; Take 1 tablet by mouth Every Night.  -     Cancel: mometasone (NASONEX) 50 MCG/ACT nasal spray; 2 sprays into the nostril(s) as directed by provider Daily.  -     Cancel: vitamin D (ERGOCALCIFEROL) 94967 units capsule capsule; Take 1 capsule by mouth 1 (One) Time Per Week.  -     Cancel: lubiprostone (AMITIZA) 24 MCG capsule; Take 1 capsule by mouth 2 (Two) Times a Day With Meals.

## 2018-12-18 ENCOUNTER — TELEPHONE (OUTPATIENT)
Dept: FAMILY MEDICINE CLINIC | Facility: CLINIC | Age: 83
End: 2018-12-18

## 2018-12-18 NOTE — TELEPHONE ENCOUNTER
Patient called reports she saw Dr. Bearden & he diagnosed her with Gastroparesis,is scheduled for an EGD on 1/07/18,her question is is there something she can take while she is waiting on her scope to increase her digestion.

## 2019-01-04 ENCOUNTER — TRANSCRIBE ORDERS (OUTPATIENT)
Dept: ADMINISTRATIVE | Facility: HOSPITAL | Age: 84
End: 2019-01-04

## 2019-01-04 ENCOUNTER — HOSPITAL ENCOUNTER (OUTPATIENT)
Dept: RESPIRATORY THERAPY | Facility: HOSPITAL | Age: 84
Discharge: HOME | End: 2019-01-04
Attending: SURGERY | Admitting: SURGERY

## 2019-01-04 DIAGNOSIS — I10 HYPERTENSION, UNSPECIFIED TYPE: ICD-10-CM

## 2019-01-04 DIAGNOSIS — I48.91 ATRIAL FIBRILLATION, UNSPECIFIED TYPE (HCC): ICD-10-CM

## 2019-01-04 DIAGNOSIS — I48.91 ATRIAL FIBRILLATION, UNSPECIFIED TYPE (HCC): Primary | ICD-10-CM

## 2019-01-04 PROCEDURE — 93010 ELECTROCARDIOGRAM REPORT: CPT | Performed by: INTERNAL MEDICINE

## 2019-01-04 PROCEDURE — 93005 ELECTROCARDIOGRAM TRACING: CPT | Performed by: SURGERY

## 2019-01-10 ENCOUNTER — TRANSCRIBE ORDERS (OUTPATIENT)
Dept: ADMINISTRATIVE | Facility: HOSPITAL | Age: 84
End: 2019-01-10

## 2019-01-10 DIAGNOSIS — R10.12 ABDOMINAL PAIN, LEFT UPPER QUADRANT: Primary | ICD-10-CM

## 2019-01-22 ENCOUNTER — TELEPHONE (OUTPATIENT)
Dept: FAMILY MEDICINE CLINIC | Facility: CLINIC | Age: 84
End: 2019-01-22

## 2019-01-22 DIAGNOSIS — M79.7 FIBROMYALGIA: ICD-10-CM

## 2019-01-22 DIAGNOSIS — F41.9 ANXIETY: ICD-10-CM

## 2019-01-22 DIAGNOSIS — M25.50 MULTIPLE JOINT PAIN: ICD-10-CM

## 2019-01-22 RX ORDER — DIAZEPAM 2 MG/1
2 TABLET ORAL 2 TIMES DAILY PRN
Qty: 60 TABLET | Refills: 0 | Status: SHIPPED | OUTPATIENT
Start: 2019-01-22 | End: 2019-03-05 | Stop reason: SDUPTHER

## 2019-01-22 RX ORDER — TRAMADOL HYDROCHLORIDE 50 MG/1
50 TABLET ORAL 2 TIMES DAILY PRN
Qty: 60 TABLET | Refills: 0 | Status: SHIPPED | OUTPATIENT
Start: 2019-01-22 | End: 2019-03-05 | Stop reason: SDUPTHER

## 2019-01-22 NOTE — TELEPHONE ENCOUNTER
Sent meds to pharmacy  [facility] # [id number] reviewed    Patient notified & verbalized understanding.

## 2019-01-23 ENCOUNTER — APPOINTMENT (OUTPATIENT)
Dept: ULTRASOUND IMAGING | Facility: HOSPITAL | Age: 84
End: 2019-01-23
Attending: SURGERY

## 2019-01-23 ENCOUNTER — LAB (OUTPATIENT)
Dept: FAMILY MEDICINE CLINIC | Facility: CLINIC | Age: 84
End: 2019-01-23

## 2019-01-23 DIAGNOSIS — I10 HYPERTENSION, UNSPECIFIED TYPE: ICD-10-CM

## 2019-01-23 DIAGNOSIS — E78.5 HYPERLIPIDEMIA, UNSPECIFIED HYPERLIPIDEMIA TYPE: ICD-10-CM

## 2019-01-23 DIAGNOSIS — E55.9 VITAMIN D DEFICIENCY: ICD-10-CM

## 2019-01-23 DIAGNOSIS — E53.8 B12 DEFICIENCY: Primary | ICD-10-CM

## 2019-01-23 LAB
25(OH)D3 SERPL-MCNC: 31 NG/ML
ALBUMIN SERPL-MCNC: 4.5 G/DL (ref 3.4–4.8)
ALBUMIN/GLOB SERPL: 1.6 G/DL (ref 1.5–2.5)
ALP SERPL-CCNC: 78 U/L (ref 35–104)
ALT SERPL W P-5'-P-CCNC: 11 U/L (ref 10–36)
ANION GAP SERPL CALCULATED.3IONS-SCNC: 4.6 MMOL/L (ref 3.6–11.2)
AST SERPL-CCNC: 22 U/L (ref 10–30)
BASOPHILS # BLD AUTO: 0.02 10*3/MM3 (ref 0–0.3)
BASOPHILS NFR BLD AUTO: 0.4 % (ref 0–2)
BILIRUB SERPL-MCNC: 0.5 MG/DL (ref 0.2–1.8)
BUN BLD-MCNC: 12 MG/DL (ref 7–21)
BUN/CREAT SERPL: 12.9 (ref 7–25)
CALCIUM SPEC-SCNC: 9.7 MG/DL (ref 7.7–10)
CHLORIDE SERPL-SCNC: 104 MMOL/L (ref 99–112)
CHOLEST SERPL-MCNC: 206 MG/DL (ref 0–200)
CO2 SERPL-SCNC: 31.4 MMOL/L (ref 24.3–31.9)
CREAT BLD-MCNC: 0.93 MG/DL (ref 0.43–1.29)
DEPRECATED RDW RBC AUTO: 45 FL (ref 37–54)
EOSINOPHIL # BLD AUTO: 0.11 10*3/MM3 (ref 0–0.7)
EOSINOPHIL NFR BLD AUTO: 2 % (ref 0–7)
ERYTHROCYTE [DISTWIDTH] IN BLOOD BY AUTOMATED COUNT: 13.5 % (ref 11.5–14.5)
GFR SERPL CREATININE-BSD FRML MDRD: 58 ML/MIN/1.73
GLOBULIN UR ELPH-MCNC: 2.8 GM/DL
GLUCOSE BLD-MCNC: 86 MG/DL (ref 70–110)
HCT VFR BLD AUTO: 40.8 % (ref 37–47)
HDLC SERPL-MCNC: 69 MG/DL (ref 60–100)
HGB BLD-MCNC: 13.1 G/DL (ref 12–16)
IMM GRANULOCYTES # BLD AUTO: 0.01 10*3/MM3 (ref 0–0.03)
IMM GRANULOCYTES NFR BLD AUTO: 0.2 % (ref 0–0.5)
LDLC SERPL CALC-MCNC: 108 MG/DL (ref 0–100)
LDLC/HDLC SERPL: 1.57 {RATIO}
LYMPHOCYTES # BLD AUTO: 1.78 10*3/MM3 (ref 1–3)
LYMPHOCYTES NFR BLD AUTO: 32.7 % (ref 16–46)
MAGNESIUM SERPL-MCNC: 2.4 MG/DL (ref 1.7–2.6)
MCH RBC QN AUTO: 30.6 PG (ref 27–33)
MCHC RBC AUTO-ENTMCNC: 32.1 G/DL (ref 33–37)
MCV RBC AUTO: 95.3 FL (ref 80–94)
MONOCYTES # BLD AUTO: 0.77 10*3/MM3 (ref 0.1–0.9)
MONOCYTES NFR BLD AUTO: 14.2 % (ref 0–12)
NEUTROPHILS # BLD AUTO: 2.75 10*3/MM3 (ref 1.4–6.5)
NEUTROPHILS NFR BLD AUTO: 50.5 % (ref 40–75)
OSMOLALITY SERPL CALC.SUM OF ELEC: 278.5 MOSM/KG (ref 273–305)
PLATELET # BLD AUTO: 180 10*3/MM3 (ref 130–400)
PMV BLD AUTO: 10.2 FL (ref 6–10)
POTASSIUM BLD-SCNC: 4.2 MMOL/L (ref 3.5–5.3)
PROT SERPL-MCNC: 7.3 G/DL (ref 6–8)
RBC # BLD AUTO: 4.28 10*6/MM3 (ref 4.2–5.4)
SODIUM BLD-SCNC: 140 MMOL/L (ref 135–153)
TRIGL SERPL-MCNC: 145 MG/DL (ref 0–150)
TSH SERPL DL<=0.05 MIU/L-ACNC: 1.66 MIU/ML (ref 0.55–4.78)
VIT B12 BLD-MCNC: 628 PG/ML (ref 211–911)
VLDLC SERPL-MCNC: 29 MG/DL
WBC NRBC COR # BLD: 5.44 10*3/MM3 (ref 4.5–12.5)

## 2019-01-23 PROCEDURE — 82607 VITAMIN B-12: CPT | Performed by: NURSE PRACTITIONER

## 2019-01-23 PROCEDURE — 80061 LIPID PANEL: CPT | Performed by: NURSE PRACTITIONER

## 2019-01-23 PROCEDURE — 83735 ASSAY OF MAGNESIUM: CPT | Performed by: NURSE PRACTITIONER

## 2019-01-23 PROCEDURE — 80053 COMPREHEN METABOLIC PANEL: CPT | Performed by: NURSE PRACTITIONER

## 2019-01-23 PROCEDURE — 85025 COMPLETE CBC W/AUTO DIFF WBC: CPT | Performed by: NURSE PRACTITIONER

## 2019-01-23 PROCEDURE — 84443 ASSAY THYROID STIM HORMONE: CPT | Performed by: NURSE PRACTITIONER

## 2019-01-23 PROCEDURE — 82306 VITAMIN D 25 HYDROXY: CPT | Performed by: NURSE PRACTITIONER

## 2019-01-23 RX ORDER — SOTALOL HYDROCHLORIDE 120 MG/1
TABLET ORAL
Qty: 60 TABLET | Refills: 3 | Status: SHIPPED | OUTPATIENT
Start: 2019-01-23 | End: 2019-05-18 | Stop reason: SDUPTHER

## 2019-01-29 ENCOUNTER — TELEPHONE (OUTPATIENT)
Dept: FAMILY MEDICINE CLINIC | Facility: CLINIC | Age: 84
End: 2019-01-29

## 2019-01-31 ENCOUNTER — HOSPITAL ENCOUNTER (OUTPATIENT)
Dept: ULTRASOUND IMAGING | Facility: HOSPITAL | Age: 84
Discharge: HOME | End: 2019-01-31
Attending: SURGERY | Admitting: SURGERY

## 2019-01-31 DIAGNOSIS — R10.12 ABDOMINAL PAIN, LEFT UPPER QUADRANT: ICD-10-CM

## 2019-01-31 PROCEDURE — 76700 US EXAM ABDOM COMPLETE: CPT

## 2019-01-31 PROCEDURE — 76700 US EXAM ABDOM COMPLETE: CPT | Performed by: RADIOLOGY

## 2019-02-06 RX ORDER — PAROXETINE 10 MG/1
TABLET, FILM COATED ORAL
Qty: 30 TABLET | Refills: 5 | OUTPATIENT
Start: 2019-02-06

## 2019-02-11 RX ORDER — LISINOPRIL 5 MG/1
TABLET ORAL
Qty: 30 TABLET | Refills: 5 | OUTPATIENT
Start: 2019-02-11

## 2019-03-05 ENCOUNTER — OFFICE VISIT (OUTPATIENT)
Dept: FAMILY MEDICINE CLINIC | Facility: CLINIC | Age: 84
End: 2019-03-05

## 2019-03-05 VITALS
HEART RATE: 70 BPM | OXYGEN SATURATION: 96 % | BODY MASS INDEX: 27.99 KG/M2 | TEMPERATURE: 98.5 F | DIASTOLIC BLOOD PRESSURE: 58 MMHG | WEIGHT: 168 LBS | SYSTOLIC BLOOD PRESSURE: 118 MMHG | HEIGHT: 65 IN

## 2019-03-05 DIAGNOSIS — K21.9 GASTROESOPHAGEAL REFLUX DISEASE, ESOPHAGITIS PRESENCE NOT SPECIFIED: ICD-10-CM

## 2019-03-05 DIAGNOSIS — E78.5 HYPERLIPIDEMIA, UNSPECIFIED HYPERLIPIDEMIA TYPE: ICD-10-CM

## 2019-03-05 DIAGNOSIS — I48.0 PAROXYSMAL ATRIAL FIBRILLATION (HCC): ICD-10-CM

## 2019-03-05 DIAGNOSIS — M79.7 FIBROMYALGIA: ICD-10-CM

## 2019-03-05 DIAGNOSIS — E53.8 B12 DEFICIENCY: ICD-10-CM

## 2019-03-05 DIAGNOSIS — I10 ESSENTIAL HYPERTENSION: Primary | ICD-10-CM

## 2019-03-05 DIAGNOSIS — M25.50 MULTIPLE JOINT PAIN: ICD-10-CM

## 2019-03-05 DIAGNOSIS — F41.9 ANXIETY: ICD-10-CM

## 2019-03-05 PROCEDURE — 96372 THER/PROPH/DIAG INJ SC/IM: CPT | Performed by: NURSE PRACTITIONER

## 2019-03-05 PROCEDURE — 99214 OFFICE O/P EST MOD 30 MIN: CPT | Performed by: NURSE PRACTITIONER

## 2019-03-05 RX ORDER — TRAMADOL HYDROCHLORIDE 50 MG/1
50 TABLET ORAL 2 TIMES DAILY PRN
Qty: 60 TABLET | Refills: 0 | Status: SHIPPED | OUTPATIENT
Start: 2019-03-05 | End: 2019-04-12 | Stop reason: SDUPTHER

## 2019-03-05 RX ORDER — FUROSEMIDE 20 MG/1
TABLET ORAL
Qty: 30 TABLET | Refills: 3 | OUTPATIENT
Start: 2019-03-05

## 2019-03-05 RX ORDER — DIAZEPAM 2 MG/1
2 TABLET ORAL 2 TIMES DAILY PRN
Qty: 60 TABLET | Refills: 0 | Status: SHIPPED | OUTPATIENT
Start: 2019-03-05 | End: 2019-04-12 | Stop reason: SDUPTHER

## 2019-03-05 RX ORDER — CYANOCOBALAMIN 1000 UG/ML
1000 INJECTION, SOLUTION INTRAMUSCULAR; SUBCUTANEOUS ONCE
Status: COMPLETED | OUTPATIENT
Start: 2019-03-05 | End: 2019-03-05

## 2019-03-05 RX ADMIN — CYANOCOBALAMIN 1000 MCG: 1000 INJECTION, SOLUTION INTRAMUSCULAR; SUBCUTANEOUS at 14:56

## 2019-03-05 NOTE — PROGRESS NOTES
Subjective   Tonia Ritter is a 83 y.o. female.     Chief Complaint: Follow-up and Anxiety    Atrial Fibrillation   Presents for follow-up (patient has PPM per cardiologist) visit. The symptoms have been stable. Past medical history includes atrial fibrillation. There are no medication compliance problems.   Hypertension   This is a chronic problem. The current episode started more than 1 year ago. The problem is controlled. Pertinent negatives include no chest pain, peripheral edema or shortness of breath. Risk factors for coronary artery disease include dyslipidemia and sedentary lifestyle. Current antihypertension treatment includes diuretics and ACE inhibitors. The current treatment provides significant improvement. There are no compliance problems.    Hyperlipidemia   This is a chronic problem. The current episode started more than 1 year ago. Recent lipid tests were reviewed and are variable. Factors aggravating her hyperlipidemia include fatty foods. Pertinent negatives include no chest pain or shortness of breath. Current antihyperlipidemic treatment includes statins. The current treatment provides significant improvement of lipids. There are no compliance problems.  Risk factors for coronary artery disease include dyslipidemia, hypertension and a sedentary lifestyle. Last lipid panel in January; total cholesterol 206 with .  Anxiety   Presents for follow-up visit. Symptoms include depressed mood, excessive worry, nervous/anxious behavior and restlessness. Patient reports no chest pain, insomnia, shortness of breath or suicidal ideas. Symptoms occur most days. The severity of symptoms is moderate. The quality of sleep is fair. Nighttime awakenings: occasional.   Compliance with medications is %. Symptoms controlled well.  No side effects from medication.  Pt able to continue ADLs with use of medication.   Back Pain   This is a chronic problem. The current episode started more than 1 year  ago. The problem occurs daily. The problem has been waxing and waning since onset. The pain is present in the lumbar spine. The quality of the pain is described as aching. The pain does not radiate. The pain is moderate. The pain is worse during the day. The symptoms are aggravated by bending and standing. Stiffness is present all day. Pertinent negatives include no bladder incontinence, bowel incontinence, chest pain or dysuria. Risk factors include menopause and sedentary lifestyle. Treatments tried: tramadol. The treatment provided significant relief. Pt able to continue ADLs with medication use.  Fibromyalgia   This is a chronic problem. The current episode started more than 1 year ago. The problem occurs daily. The problem has been unchanged. Associated symptoms include arthralgias and myalgias. Nothing aggravates the symptoms. She has tried acetaminophen, NSAIDs, oral narcotics and relaxation for the symptoms. The treatment provided moderate relief.   Pt has been taking Tramadol for her pain and is tolerating it well. Tramadol enables pt to continue with her ADLs and live her life without difficulty. She denies any side effects from the medication.      [facility] # [id number]  Reviewed and is consistent.  [facility] 12/4/2018 reviewed and is consistent.  Patient comfort assessment guide reviewed and updated today.    As part of the patient's treatment plan they are being prescribed a controlled substance/ substances with potential for abuse.  This patient has been made aware of the appropriate use of such medications, including potential risk of somnolence, limited ability to drive and/or work safely, and potential for overdose.  It has also been made clear these medications are for use by the patient only, without concomitant use of alcohol or other substances unless prescribed/advised by medical provider.    Patient has completed prescribing agreement detailing terms of continued prescribing of controlled substances  including monitoring BRAULIO reports, urine drug screens, and pill counts.  The patient is aware BRAULIO reports are reviewed on a regular basis and scanned into the chart.    History and physical exam exhibit continued safe and appropriate use of controlled substances.        Family History   Problem Relation Age of Onset   • Thyroid disease Mother    • Mental illness Father         nervous breakdown   • Breast cancer Maternal Aunt        Social History     Socioeconomic History   • Marital status:      Spouse name: Not on file   • Number of children: Not on file   • Years of education: Not on file   • Highest education level: Not on file   Social Needs   • Financial resource strain: Not on file   • Food insecurity - worry: Not on file   • Food insecurity - inability: Not on file   • Transportation needs - medical: Not on file   • Transportation needs - non-medical: Not on file   Occupational History   • Not on file   Tobacco Use   • Smoking status: Never Smoker   • Smokeless tobacco: Never Used   Substance and Sexual Activity   • Alcohol use: No   • Drug use: No   • Sexual activity: Defer   Other Topics Concern   • Not on file   Social History Narrative   • Not on file       Past Medical History:   Diagnosis Date   • Acute bronchitis    • Allergic rhinitis    • Anxiety    • Atrial fibrillation (CMS/HCC)    • Back pain    • Burping    • Chronic pain    • Congestive heart failure (CMS/HCC)    • Depression    • Encounter for screening fecal occult blood testing 03/10/2016    NEGATIVE   • Fibromyalgia    • Flatulence    • GERD (gastroesophageal reflux disease)    • Hematuria    • Hyperlipidemia    • Hypertension    • Osteoarthritis    • Pacemaker    • PUD (peptic ulcer disease)    • Right shoulder pain        Review of Systems   Constitutional: Negative.    HENT: Negative.    Respiratory: Negative.    Cardiovascular: Negative.    Gastrointestinal: Negative.    Musculoskeletal: Positive for back pain and  "myalgias.   Skin: Negative.    Neurological: Negative.        Objective   Physical Exam   Constitutional: She is oriented to person, place, and time. She appears well-developed and well-nourished.   Neck: Normal range of motion. Neck supple.   Cardiovascular: Normal rate, regular rhythm and normal heart sounds.   Pulmonary/Chest: Effort normal and breath sounds normal.   Neurological: She is alert and oriented to person, place, and time.   Skin: Skin is warm and dry.   Psychiatric: She has a normal mood and affect. Her behavior is normal. Judgment and thought content normal.   Nursing note and vitals reviewed.      Procedures    Vitals: Blood pressure 118/58, pulse 70, temperature 98.5 °F (36.9 °C), temperature source Oral, height 165.1 cm (65\"), weight 76.2 kg (168 lb), SpO2 96 %, not currently breastfeeding.    Allergies:   Allergies   Allergen Reactions   • Reglan [Metoclopramide]    • Sulfa Antibiotics         During this visit the following were done:  Labs Reviewed []    Labs Ordered []    Radiology Reports Reviewed []    Radiology Ordered []    PCP Records Reviewed []    Referring Provider Records Reviewed []    ER Records Reviewed []    Hospital Records Reviewed []    History Obtained From Family []    Radiology Images Reviewed []    Other Reviewed []    Records Requested []      Assessment/Plan   Tonia was seen today for follow-up and anxiety.    Diagnoses and all orders for this visit:    Essential hypertension    Anxiety  -     diazePAM (VALIUM) 2 MG tablet; Take 1 tablet by mouth 2 (Two) Times a Day As Needed for Anxiety.    Multiple joint pain  -     traMADol (ULTRAM) 50 MG tablet; Take 1 tablet by mouth 2 (Two) Times a Day As Needed for Moderate Pain .    Fibromyalgia  -     traMADol (ULTRAM) 50 MG tablet; Take 1 tablet by mouth 2 (Two) Times a Day As Needed for Moderate Pain .    Hyperlipidemia, unspecified hyperlipidemia type   Continue Crestor  Gastroesophageal reflux disease, esophagitis presence " not specified    Paroxysmal atrial fibrillation (CMS/HCC)   Continue to follow up with cardiologist

## 2019-03-21 ENCOUNTER — OFFICE VISIT (OUTPATIENT)
Dept: FAMILY MEDICINE CLINIC | Facility: CLINIC | Age: 84
End: 2019-03-21

## 2019-03-21 VITALS
DIASTOLIC BLOOD PRESSURE: 64 MMHG | OXYGEN SATURATION: 97 % | SYSTOLIC BLOOD PRESSURE: 122 MMHG | BODY MASS INDEX: 28.32 KG/M2 | TEMPERATURE: 97.6 F | WEIGHT: 170 LBS | HEART RATE: 71 BPM | HEIGHT: 65 IN

## 2019-03-21 DIAGNOSIS — R68.83 CHILLS (WITHOUT FEVER): Primary | ICD-10-CM

## 2019-03-21 DIAGNOSIS — J06.9 ACUTE URI: ICD-10-CM

## 2019-03-21 PROCEDURE — 99213 OFFICE O/P EST LOW 20 MIN: CPT | Performed by: FAMILY MEDICINE

## 2019-03-21 PROCEDURE — 80053 COMPREHEN METABOLIC PANEL: CPT | Performed by: FAMILY MEDICINE

## 2019-03-21 PROCEDURE — 85025 COMPLETE CBC W/AUTO DIFF WBC: CPT | Performed by: FAMILY MEDICINE

## 2019-03-21 NOTE — PROGRESS NOTES
"Subjective   Tonia Ritter is a 83 y.o. female.   Pt presents today with CC of Hot Flashes and Sinusitis      History of Present Illness   Patient complains of hot flashes.  She reports menopause \"decades ago\".  She does not typically have a problem with hot flashes.  She currently takes Paxil.  The hot flashes have not been severe though as there are new symptoms she wanted to be evaluated.  The only associated symptom was an upper respiratory infection.  She reports 2-3 days of nasal congestion, runny nose, and dry cough.  She has contacts with similar symptoms.  She would like evaluation.         The following portions of the patient's history were reviewed and updated as appropriate: allergies, current medications, past family history, past social history, past surgical history and problem list.    Review of Systems   Constitutional: Negative for fever and unexpected weight loss.   HENT: Positive for congestion, postnasal drip, rhinorrhea and sinus pressure. Negative for sore throat.    Eyes: Negative for blurred vision and visual disturbance.   Respiratory: Positive for cough. Negative for wheezing.    Cardiovascular: Negative for chest pain and palpitations.   Gastrointestinal: Negative for abdominal pain, diarrhea and nausea.   Genitourinary: Negative for dysuria.   Musculoskeletal: Negative for arthralgias and neck stiffness.   Skin: Negative for rash.   Neurological: Negative for seizures and syncope.       Objective   Physical Exam   Constitutional: She is oriented to person, place, and time. She appears well-developed and well-nourished.   HENT:   Head: Normocephalic and atraumatic.   Right Ear: External ear normal.   Left Ear: External ear normal.   Mouth/Throat: Oropharynx is clear and moist.   Clear rhinorrhea   Eyes: Conjunctivae are normal. No scleral icterus.   Neck: Normal range of motion. Neck supple. No thyromegaly present.   Cardiovascular: Normal rate, regular rhythm, normal heart sounds " and intact distal pulses.   No murmur heard.  Pulmonary/Chest: Effort normal and breath sounds normal. No respiratory distress. She has no wheezes. She has no rales.   Abdominal: Soft. There is no tenderness.   Lymphadenopathy:     She has no cervical adenopathy.   Neurological: She is alert and oriented to person, place, and time.   Skin: Skin is warm. No rash noted.   Nursing note and vitals reviewed.        Assessment/Plan   Tonia was seen today for hot flashes and sinusitis.    Diagnoses and all orders for this visit:    Chills (without fever)  -     CBC & Differential  -     Comprehensive Metabolic Panel  -     CBC Auto Differential    Will get lab work to ensure normal.  I suspect a recent upper respiratory illness to be the cause for her fluctuating hot and cold.  If her upper respiratory infection improves and the hot flashes continue, may consider further workup such as imaging of her chest and abdomen.  Could consider increasing her dose of SSRI.  I doubt bacterial sinusitis at this time as her exam was benign.  She is to follow-up as needed.         Patient's Body mass index is 28.29 kg/m². BMI is above normal parameters. Recommendations include: exercise counseling and nutrition counseling.

## 2019-03-22 ENCOUNTER — TELEPHONE (OUTPATIENT)
Dept: FAMILY MEDICINE CLINIC | Facility: CLINIC | Age: 84
End: 2019-03-22

## 2019-03-22 LAB
ALBUMIN SERPL-MCNC: 4.3 G/DL (ref 3.5–5.2)
ALBUMIN/GLOB SERPL: 1.6 G/DL
ALP SERPL-CCNC: 73 U/L (ref 39–117)
ALT SERPL W P-5'-P-CCNC: 9 U/L (ref 1–33)
ANION GAP SERPL CALCULATED.3IONS-SCNC: 11.9 MMOL/L
AST SERPL-CCNC: 17 U/L (ref 1–32)
BASOPHILS # BLD AUTO: 0.03 10*3/MM3 (ref 0–0.2)
BASOPHILS NFR BLD AUTO: 0.5 % (ref 0–1.5)
BILIRUB SERPL-MCNC: 0.3 MG/DL (ref 0.2–1.2)
BUN BLD-MCNC: 12 MG/DL (ref 8–23)
BUN/CREAT SERPL: 12.6 (ref 7–25)
CALCIUM SPEC-SCNC: 9.3 MG/DL (ref 8.6–10.5)
CHLORIDE SERPL-SCNC: 102 MMOL/L (ref 98–107)
CO2 SERPL-SCNC: 29.1 MMOL/L (ref 22–29)
CREAT BLD-MCNC: 0.95 MG/DL (ref 0.57–1)
DEPRECATED RDW RBC AUTO: 49.3 FL (ref 37–54)
EOSINOPHIL # BLD AUTO: 0.08 10*3/MM3 (ref 0–0.4)
EOSINOPHIL NFR BLD AUTO: 1.3 % (ref 0.3–6.2)
ERYTHROCYTE [DISTWIDTH] IN BLOOD BY AUTOMATED COUNT: 13.7 % (ref 12.3–15.4)
GFR SERPL CREATININE-BSD FRML MDRD: 56 ML/MIN/1.73
GLOBULIN UR ELPH-MCNC: 2.7 GM/DL
GLUCOSE BLD-MCNC: 118 MG/DL (ref 65–99)
HCT VFR BLD AUTO: 40.5 % (ref 34–46.6)
HGB BLD-MCNC: 12.4 G/DL (ref 12–15.9)
IMM GRANULOCYTES # BLD AUTO: 0.02 10*3/MM3 (ref 0–0.05)
IMM GRANULOCYTES NFR BLD AUTO: 0.3 % (ref 0–0.5)
LYMPHOCYTES # BLD AUTO: 1.72 10*3/MM3 (ref 0.7–3.1)
LYMPHOCYTES NFR BLD AUTO: 28.2 % (ref 19.6–45.3)
MCH RBC QN AUTO: 30 PG (ref 26.6–33)
MCHC RBC AUTO-ENTMCNC: 30.6 G/DL (ref 31.5–35.7)
MCV RBC AUTO: 98.1 FL (ref 79–97)
MONOCYTES # BLD AUTO: 0.55 10*3/MM3 (ref 0.1–0.9)
MONOCYTES NFR BLD AUTO: 9 % (ref 5–12)
NEUTROPHILS # BLD AUTO: 3.71 10*3/MM3 (ref 1.4–7)
NEUTROPHILS NFR BLD AUTO: 60.7 % (ref 42.7–76)
NRBC BLD AUTO-RTO: 0 /100 WBC (ref 0–0)
PLATELET # BLD AUTO: 171 10*3/MM3 (ref 140–450)
PMV BLD AUTO: 10.9 FL (ref 6–12)
POTASSIUM BLD-SCNC: 3.8 MMOL/L (ref 3.5–5.2)
PROT SERPL-MCNC: 7 G/DL (ref 6–8.5)
RBC # BLD AUTO: 4.13 10*6/MM3 (ref 3.77–5.28)
SODIUM BLD-SCNC: 143 MMOL/L (ref 136–145)
WBC NRBC COR # BLD: 6.11 10*3/MM3 (ref 3.4–10.8)

## 2019-03-22 NOTE — TELEPHONE ENCOUNTER
----- Message from Joey Orozco DO sent at 3/22/2019 12:40 PM EDT -----  Please call her.  I reviewed her labs, everything was stable, there was no explanation for her symptoms.  If her symptoms continue, or worsen, she should follow-up for further evaluation.      Left a message to return call.      Patient returned call & verbalized understanding.

## 2019-03-25 PROBLEM — R68.83 CHILLS (WITHOUT FEVER): Status: ACTIVE | Noted: 2019-03-25

## 2019-03-25 PROBLEM — J06.9 ACUTE URI: Status: ACTIVE | Noted: 2019-03-25

## 2019-03-28 ENCOUNTER — OFFICE VISIT (OUTPATIENT)
Dept: FAMILY MEDICINE CLINIC | Facility: CLINIC | Age: 84
End: 2019-03-28

## 2019-03-28 VITALS
WEIGHT: 169.8 LBS | TEMPERATURE: 98.8 F | DIASTOLIC BLOOD PRESSURE: 68 MMHG | HEIGHT: 65 IN | BODY MASS INDEX: 28.29 KG/M2 | OXYGEN SATURATION: 100 % | SYSTOLIC BLOOD PRESSURE: 120 MMHG | HEART RATE: 76 BPM

## 2019-03-28 DIAGNOSIS — B96.89 ACUTE BACTERIAL RHINOSINUSITIS: Primary | ICD-10-CM

## 2019-03-28 DIAGNOSIS — R23.2 HOT FLASHES: ICD-10-CM

## 2019-03-28 DIAGNOSIS — H61.21 IMPACTED CERUMEN OF RIGHT EAR: ICD-10-CM

## 2019-03-28 DIAGNOSIS — J01.90 ACUTE BACTERIAL RHINOSINUSITIS: Primary | ICD-10-CM

## 2019-03-28 PROBLEM — R68.83 CHILLS (WITHOUT FEVER): Status: RESOLVED | Noted: 2019-03-25 | Resolved: 2019-03-28

## 2019-03-28 PROCEDURE — 99214 OFFICE O/P EST MOD 30 MIN: CPT | Performed by: FAMILY MEDICINE

## 2019-03-28 PROCEDURE — 69209 REMOVE IMPACTED EAR WAX UNI: CPT | Performed by: FAMILY MEDICINE

## 2019-03-28 RX ORDER — AMOXICILLIN AND CLAVULANATE POTASSIUM 500; 125 MG/1; MG/1
1 TABLET, FILM COATED ORAL 3 TIMES DAILY
Qty: 30 TABLET | Refills: 0 | Status: SHIPPED | OUTPATIENT
Start: 2019-03-28 | End: 2019-06-05

## 2019-03-28 NOTE — PROGRESS NOTES
Subjective   Tonia Ritter is a 83 y.o. female.   Pt presents today with CC of Follow-up      History of Present Illness   She is here to follow-up on hot flashes.  They have been present now for about 2 weeks, they have not worsened there were still present.  Her labs did not indicate the reason for her symptoms.  She complains of the last few days of sinus pressure, pressure over her right maxillary sinus, some right eye pressure, some right ear pressure, runny nose with some bleeding on the tissues.  She is concerned about sinusitis.  She denies fever.  Denies shortness of breath or abdominal pain.         The following portions of the patient's history were reviewed and updated as appropriate: allergies, current medications, past family history, past social history, past surgical history and problem list.    Review of Systems   Constitutional: Positive for fever. Negative for chills and unexpected weight loss.   HENT: Positive for congestion, ear pain, rhinorrhea and sinus pressure. Negative for sore throat.    Eyes: Negative for blurred vision and visual disturbance.   Respiratory: Positive for cough. Negative for wheezing.    Cardiovascular: Negative for chest pain and palpitations.   Gastrointestinal: Negative for abdominal pain, diarrhea and nausea.   Genitourinary: Negative for dysuria and flank pain.   Musculoskeletal: Negative for arthralgias and neck stiffness.   Skin: Negative for rash.   Neurological: Negative for seizures and syncope.       Objective   Physical Exam   Constitutional: She is oriented to person, place, and time. She appears well-developed and well-nourished.   HENT:   Head: Normocephalic and atraumatic.   Right Ear: External ear normal.   Left Ear: External ear normal.   Mouth/Throat: Oropharynx is clear and moist.   Clear rhinorrhea.  Initially, only part of her TM could be visualized on the right.  After irrigation it was fully visible, right ear is not infected.   Eyes:  Conjunctivae are normal. No scleral icterus.   Neck: Normal range of motion. Neck supple. No thyromegaly present.   Cardiovascular: Normal rate, regular rhythm, normal heart sounds and intact distal pulses.   No murmur heard.  Pulmonary/Chest: Effort normal and breath sounds normal. No respiratory distress. She has no wheezes. She has no rales.   Abdominal: Soft. There is no tenderness.   Lymphadenopathy:     She has no cervical adenopathy.   Neurological: She is alert and oriented to person, place, and time. No cranial nerve deficit.   Nursing note and vitals reviewed.        Assessment/Plan   Tonia was seen today for follow-up.    Diagnoses and all orders for this visit:    Acute bacterial rhinosinusitis  -     amoxicillin-clavulanate (AUGMENTIN) 500-125 MG per tablet; Take 1 tablet by mouth 3 (Three) Times a Day.    Hot flashes    We discussed further workup for hot flashes.  She has been menopausal for nearly 50 years and has not had hot flashes to speak of.  I do not think this is hormonal.  As she has symptoms that could be explained by rhinosinusitis, we will treat for sinusitis for 10 days.  If it does not resolve her symptoms we will consider further workup such as imaging.    Cerumen impaction    Ear Cerumen Removal Instrumentation  Date/Time: 3/28/2019 1:03 PM  Performed by: Joey Orozco DO  Authorized by: Joey Orozco DO   Consent: Verbal consent obtained.  Risks and benefits: risks, benefits and alternatives were discussed  Consent given by: patient  Patient understanding: patient states understanding of the procedure being performed    Anesthesia:  Local Anesthetic: none  Location details: right ear  Patient tolerance: Patient tolerated the procedure well with no immediate complications  Procedure type: irrigation            Patient's Body mass index is 28.26 kg/m². BMI is above normal parameters. Recommendations include: exercise counseling and nutrition counseling.

## 2019-04-02 RX ORDER — FUROSEMIDE 20 MG/1
20 TABLET ORAL DAILY
Qty: 30 TABLET | Refills: 3 | Status: SHIPPED | OUTPATIENT
Start: 2019-04-02 | End: 2019-06-18 | Stop reason: SDUPTHER

## 2019-04-12 ENCOUNTER — TELEPHONE (OUTPATIENT)
Dept: FAMILY MEDICINE CLINIC | Facility: CLINIC | Age: 84
End: 2019-04-12

## 2019-04-12 DIAGNOSIS — M79.7 FIBROMYALGIA: ICD-10-CM

## 2019-04-12 DIAGNOSIS — F41.9 ANXIETY: ICD-10-CM

## 2019-04-12 DIAGNOSIS — M25.50 MULTIPLE JOINT PAIN: ICD-10-CM

## 2019-04-12 RX ORDER — TRAMADOL HYDROCHLORIDE 50 MG/1
50 TABLET ORAL 2 TIMES DAILY PRN
Qty: 60 TABLET | Refills: 0 | Status: SHIPPED | OUTPATIENT
Start: 2019-04-12 | End: 2019-05-16 | Stop reason: SDUPTHER

## 2019-04-12 RX ORDER — DIAZEPAM 2 MG/1
2 TABLET ORAL 2 TIMES DAILY PRN
Qty: 60 TABLET | Refills: 0 | Status: SHIPPED | OUTPATIENT
Start: 2019-04-12 | End: 2019-05-16 | Stop reason: SDUPTHER

## 2019-04-24 ENCOUNTER — TELEPHONE (OUTPATIENT)
Dept: FAMILY MEDICINE CLINIC | Facility: CLINIC | Age: 84
End: 2019-04-24

## 2019-04-24 RX ORDER — GUAIFENESIN AND CODEINE PHOSPHATE 100; 10 MG/5ML; MG/5ML
5 SOLUTION ORAL 3 TIMES DAILY PRN
Qty: 180 ML | Refills: 0 | Status: SHIPPED | OUTPATIENT
Start: 2019-04-24 | End: 2019-06-05

## 2019-04-24 NOTE — TELEPHONE ENCOUNTER
Patient called reports she has a cough,not deep,just below her throat she reports & is requesting a cough syrup.

## 2019-05-01 RX ORDER — LISINOPRIL 5 MG/1
5 TABLET ORAL DAILY
Qty: 90 TABLET | Refills: 3 | Status: SHIPPED | OUTPATIENT
Start: 2019-05-01 | End: 2019-09-05 | Stop reason: SDUPTHER

## 2019-05-16 ENCOUNTER — TELEPHONE (OUTPATIENT)
Dept: FAMILY MEDICINE CLINIC | Facility: CLINIC | Age: 84
End: 2019-05-16

## 2019-05-16 DIAGNOSIS — M79.7 FIBROMYALGIA: ICD-10-CM

## 2019-05-16 DIAGNOSIS — M25.50 MULTIPLE JOINT PAIN: ICD-10-CM

## 2019-05-16 DIAGNOSIS — F41.9 ANXIETY: ICD-10-CM

## 2019-05-16 RX ORDER — TRAMADOL HYDROCHLORIDE 50 MG/1
50 TABLET ORAL 2 TIMES DAILY PRN
Qty: 60 TABLET | Refills: 0 | Status: SHIPPED | OUTPATIENT
Start: 2019-05-16 | End: 2019-06-18 | Stop reason: SDUPTHER

## 2019-05-16 RX ORDER — DIAZEPAM 2 MG/1
2 TABLET ORAL 2 TIMES DAILY PRN
Qty: 60 TABLET | Refills: 0 | Status: SHIPPED | OUTPATIENT
Start: 2019-05-16 | End: 2019-06-18 | Stop reason: SDUPTHER

## 2019-05-16 NOTE — TELEPHONE ENCOUNTER
[facility] [id number] reviewed and consistent.  Sent     Patient notified.

## 2019-05-20 RX ORDER — SOTALOL HYDROCHLORIDE 120 MG/1
TABLET ORAL
Qty: 60 TABLET | Refills: 3 | Status: SHIPPED | OUTPATIENT
Start: 2019-05-20 | End: 2019-09-05 | Stop reason: SDUPTHER

## 2019-06-04 RX ORDER — FLUTICASONE PROPIONATE 50 MCG
2 SPRAY, SUSPENSION (ML) NASAL DAILY
Qty: 15.8 ML | Refills: 2 | Status: SHIPPED | OUTPATIENT
Start: 2019-06-04 | End: 2019-06-05

## 2019-06-05 ENCOUNTER — OFFICE VISIT (OUTPATIENT)
Dept: FAMILY MEDICINE CLINIC | Facility: CLINIC | Age: 84
End: 2019-06-05

## 2019-06-05 VITALS
HEIGHT: 65 IN | SYSTOLIC BLOOD PRESSURE: 124 MMHG | HEART RATE: 80 BPM | TEMPERATURE: 98.2 F | OXYGEN SATURATION: 97 % | WEIGHT: 170 LBS | BODY MASS INDEX: 28.32 KG/M2 | DIASTOLIC BLOOD PRESSURE: 70 MMHG

## 2019-06-05 DIAGNOSIS — M79.7 FIBROMYALGIA: ICD-10-CM

## 2019-06-05 DIAGNOSIS — M19.011 PRIMARY OSTEOARTHRITIS OF BOTH SHOULDERS: ICD-10-CM

## 2019-06-05 DIAGNOSIS — I48.0 PAROXYSMAL ATRIAL FIBRILLATION (HCC): ICD-10-CM

## 2019-06-05 DIAGNOSIS — E78.2 MIXED HYPERLIPIDEMIA: ICD-10-CM

## 2019-06-05 DIAGNOSIS — I10 ESSENTIAL HYPERTENSION: Primary | ICD-10-CM

## 2019-06-05 DIAGNOSIS — M19.012 PRIMARY OSTEOARTHRITIS OF BOTH SHOULDERS: ICD-10-CM

## 2019-06-05 DIAGNOSIS — K21.9 GASTROESOPHAGEAL REFLUX DISEASE, ESOPHAGITIS PRESENCE NOT SPECIFIED: ICD-10-CM

## 2019-06-05 DIAGNOSIS — J30.1 ALLERGIC RHINITIS DUE TO POLLEN, UNSPECIFIED SEASONALITY: ICD-10-CM

## 2019-06-05 PROCEDURE — 99214 OFFICE O/P EST MOD 30 MIN: CPT | Performed by: NURSE PRACTITIONER

## 2019-06-05 RX ORDER — MOMETASONE FUROATE 50 UG/1
2 SPRAY, METERED NASAL DAILY
Qty: 17 G | Refills: 5 | Status: SHIPPED | OUTPATIENT
Start: 2019-06-05 | End: 2019-09-05 | Stop reason: SDUPTHER

## 2019-06-05 RX ORDER — RANITIDINE 150 MG/1
TABLET ORAL
Refills: 4 | COMMUNITY
Start: 2019-05-18 | End: 2019-09-05 | Stop reason: SDUPTHER

## 2019-06-05 RX ORDER — POTASSIUM CHLORIDE 750 MG/1
10 TABLET, EXTENDED RELEASE ORAL DAILY
Refills: 5 | COMMUNITY
Start: 2019-05-18 | End: 2019-09-05 | Stop reason: SDUPTHER

## 2019-06-05 NOTE — PROGRESS NOTES
Subjective   Tonia Ritter is a 83 y.o. female.     Chief Complaint: Follow-up; Hypertension; and Anxiety    History of Present Illness   Atrial Fibrillation   Presents for follow-up (patient has PPM per cardiologist) visit. The symptoms have been stable. Past medical history includes atrial fibrillation. There are no medication compliance problems.   Hypertension   This is a chronic problem. The current episode started more than 1 year ago. The problem is controlled. Pertinent negatives include no chest pain, peripheral edema or shortness of breath. Risk factors for coronary artery disease include dyslipidemia and sedentary lifestyle. Current antihypertension treatment includes diuretics and ACE inhibitors. The current treatment provides significant improvement. There are no compliance problems.    Hyperlipidemia   This is a chronic problem. The current episode started more than 1 year ago. Recent lipid tests were reviewed and are variable. Factors aggravating her hyperlipidemia include fatty foods. Pertinent negatives include no chest pain or shortness of breath. Current antihyperlipidemic treatment includes statins. The current treatment provides significant improvement of lipids. There are no compliance problems.  Risk factors for coronary artery disease include dyslipidemia, hypertension and a sedentary lifestyle. Last lipid panel in January; total cholesterol 206 with .  Anxiety   Presents for follow-up visit. Symptoms include depressed mood, excessive worry, nervous/anxious behavior and restlessness. Patient reports no chest pain, insomnia, shortness of breath or suicidal ideas. Symptoms occur most days. The severity of symptoms is moderate. The quality of sleep is fair. Nighttime awakenings: occasional.   Compliance with medications is %. Symptoms controlled well.  No side effects from medication.  Pt able to continue ADLs with use of medication.   Back Pain   This is a chronic problem. The  current episode started more than 1 year ago. The problem occurs daily. The problem has been waxing and waning since onset. The pain is present in the lumbar spine. The quality of the pain is described as aching. The pain does not radiate. The pain is moderate. The pain is worse during the day. The symptoms are aggravated by bending and standing. Stiffness is present all day. Pertinent negatives include no bladder incontinence, bowel incontinence, chest pain or dysuria. Risk factors include menopause and sedentary lifestyle. Treatments tried: tramadol. The treatment provided significant relief. Pt able to continue ADLs with medication use.  Fibromyalgia   This is a chronic problem. The current episode started more than 1 year ago. The problem occurs daily. The problem has been unchanged. Associated symptoms include arthralgias and myalgias. Nothing aggravates the symptoms. She has tried acetaminophen, NSAIDs, oral narcotics and relaxation for the symptoms. The treatment provided moderate relief.   Pt has been taking Tramadol for her pain and is tolerating it well. Tramadol enables pt to continue with her ADLs and live her life without difficulty. She denies any side effects from the medication.      [facility] # [id number]  Reviewed and is consistent.  [facility] 3/5/2019 reviewed and is consistent.  Patient comfort assessment guide reviewed and updated today.    As part of the patient's treatment plan they are being prescribed a controlled substance/ substances with potential for abuse.  This patient has been made aware of the appropriate use of such medications, including potential risk of somnolence, limited ability to drive and/or work safely, and potential for overdose.  It has also been made clear these medications are for use by the patient only, without concomitant use of alcohol or other substances unless prescribed/advised by medical provider.    Patient has completed prescribing agreement detailing terms of continued  prescribing of controlled substances including monitoring BRAULIO reports, urine drug screens, and pill counts.  The patient is aware BRAULIO reports are reviewed on a regular basis and scanned into the chart.    History and physical exam exhibit continued safe and appropriate use of controlled substances.        Family History   Problem Relation Age of Onset   • Thyroid disease Mother    • Mental illness Father         nervous breakdown   • Breast cancer Maternal Aunt        Social History     Socioeconomic History   • Marital status:      Spouse name: Not on file   • Number of children: Not on file   • Years of education: Not on file   • Highest education level: Not on file   Tobacco Use   • Smoking status: Never Smoker   • Smokeless tobacco: Never Used   Substance and Sexual Activity   • Alcohol use: No   • Drug use: No   • Sexual activity: Defer       Past Medical History:   Diagnosis Date   • Acute bronchitis    • Allergic rhinitis    • Anxiety    • Atrial fibrillation (CMS/HCC)    • Back pain    • Burping    • Chills (without fever) 3/25/2019   • Chronic pain    • Congestive heart failure (CMS/HCC)    • Depression    • Encounter for screening fecal occult blood testing 03/10/2016    NEGATIVE   • Fibromyalgia    • Flatulence    • GERD (gastroesophageal reflux disease)    • Hematuria    • Hyperlipidemia    • Hypertension    • Osteoarthritis    • Pacemaker    • PUD (peptic ulcer disease)    • Right shoulder pain        Review of Systems   Constitutional: Negative.    HENT: Negative.    Respiratory: Negative.    Cardiovascular: Negative.    Gastrointestinal: Negative.    Musculoskeletal: Negative.    Skin: Negative.    Neurological: Negative.    Psychiatric/Behavioral: Negative.        Objective   Physical Exam   Constitutional: She is oriented to person, place, and time. She appears well-developed and well-nourished.   Neck: Normal range of motion. Neck supple.   Cardiovascular: Normal rate, regular rhythm  "and normal heart sounds.   Pulmonary/Chest: Effort normal and breath sounds normal.   Neurological: She is alert and oriented to person, place, and time.   Skin: Skin is warm and dry.   Psychiatric: She has a normal mood and affect. Her behavior is normal. Judgment and thought content normal.   Nursing note and vitals reviewed.      Procedures    Vitals: Blood pressure 124/70, pulse 80, temperature 98.2 °F (36.8 °C), temperature source Oral, height 165.1 cm (65\"), weight 77.1 kg (170 lb), SpO2 97 %, not currently breastfeeding.    Allergies:   Allergies   Allergen Reactions   • Reglan [Metoclopramide]    • Sulfa Antibiotics         During this visit the following were done:  Labs Reviewed []    Labs Ordered []    Radiology Reports Reviewed []    Radiology Ordered []    PCP Records Reviewed []    Referring Provider Records Reviewed []    ER Records Reviewed []    Hospital Records Reviewed []    History Obtained From Family []    Radiology Images Reviewed []    Other Reviewed []    Records Requested []      Assessment/Plan   Tonia was seen today for follow-up, hypertension and anxiety.    Diagnoses and all orders for this visit:    Essential hypertension  -     CBC & Differential; Future  -     Comprehensive Metabolic Panel; Future  -     Lipid Panel; Future  -     TSH; Future    Paroxysmal atrial fibrillation (CMS/HCC)  -     CBC & Differential; Future  -     Comprehensive Metabolic Panel; Future  -     Lipid Panel; Future  -     TSH; Future    Gastroesophageal reflux disease, esophagitis presence not specified  -     CBC & Differential; Future  -     Comprehensive Metabolic Panel; Future  -     Lipid Panel; Future  -     TSH; Future    Fibromyalgia  -     CBC & Differential; Future  -     Comprehensive Metabolic Panel; Future  -     Lipid Panel; Future  -     TSH; Future    Primary osteoarthritis of both shoulders  -     CBC & Differential; Future  -     Comprehensive Metabolic Panel; Future  -     Lipid Panel; " Future  -     TSH; Future    Mixed hyperlipidemia  -     CBC & Differential; Future  -     Comprehensive Metabolic Panel; Future  -     Lipid Panel; Future  -     TSH; Future    Allergic rhinitis due to pollen, unspecified seasonality  -     mometasone (NASONEX) 50 MCG/ACT nasal spray; 2 sprays into the nostril(s) as directed by provider Daily.  -     CBC & Differential; Future  -     Comprehensive Metabolic Panel; Future  -     Lipid Panel; Future  -     TSH; Future

## 2019-06-14 NOTE — TELEPHONE ENCOUNTER
----- Message from TEODORA Red sent at 8/2/2018 12:57 PM EDT -----  Normal mammogram.  Please let pt know        Left a message to return call.    Patient notified & verbalized understanding.  
detailed exam

## 2019-06-18 ENCOUNTER — TELEPHONE (OUTPATIENT)
Dept: FAMILY MEDICINE CLINIC | Facility: CLINIC | Age: 84
End: 2019-06-18

## 2019-06-18 DIAGNOSIS — F41.9 ANXIETY: ICD-10-CM

## 2019-06-18 DIAGNOSIS — M79.7 FIBROMYALGIA: ICD-10-CM

## 2019-06-18 DIAGNOSIS — M25.50 MULTIPLE JOINT PAIN: ICD-10-CM

## 2019-06-18 RX ORDER — FUROSEMIDE 20 MG/1
20 TABLET ORAL DAILY
Qty: 30 TABLET | Refills: 3 | Status: SHIPPED | OUTPATIENT
Start: 2019-06-18 | End: 2019-09-05 | Stop reason: SDUPTHER

## 2019-06-18 RX ORDER — TRAMADOL HYDROCHLORIDE 50 MG/1
50 TABLET ORAL 2 TIMES DAILY PRN
Qty: 60 TABLET | Refills: 0 | Status: SHIPPED | OUTPATIENT
Start: 2019-06-18 | End: 2019-07-19 | Stop reason: SDUPTHER

## 2019-06-18 RX ORDER — DIAZEPAM 2 MG/1
2 TABLET ORAL 2 TIMES DAILY PRN
Qty: 60 TABLET | Refills: 0 | Status: SHIPPED | OUTPATIENT
Start: 2019-06-18 | End: 2019-07-19 | Stop reason: SDUPTHER

## 2019-07-02 ENCOUNTER — TELEPHONE (OUTPATIENT)
Dept: FAMILY MEDICINE CLINIC | Facility: CLINIC | Age: 84
End: 2019-07-02

## 2019-07-02 ENCOUNTER — LAB (OUTPATIENT)
Dept: FAMILY MEDICINE CLINIC | Facility: CLINIC | Age: 84
End: 2019-07-02

## 2019-07-02 DIAGNOSIS — E78.2 MIXED HYPERLIPIDEMIA: ICD-10-CM

## 2019-07-02 DIAGNOSIS — K21.9 GASTROESOPHAGEAL REFLUX DISEASE, ESOPHAGITIS PRESENCE NOT SPECIFIED: ICD-10-CM

## 2019-07-02 DIAGNOSIS — M19.012 PRIMARY OSTEOARTHRITIS OF BOTH SHOULDERS: ICD-10-CM

## 2019-07-02 DIAGNOSIS — I48.0 PAROXYSMAL ATRIAL FIBRILLATION (HCC): ICD-10-CM

## 2019-07-02 DIAGNOSIS — I10 ESSENTIAL HYPERTENSION: ICD-10-CM

## 2019-07-02 DIAGNOSIS — M19.011 PRIMARY OSTEOARTHRITIS OF BOTH SHOULDERS: ICD-10-CM

## 2019-07-02 DIAGNOSIS — J30.1 ALLERGIC RHINITIS DUE TO POLLEN, UNSPECIFIED SEASONALITY: ICD-10-CM

## 2019-07-02 DIAGNOSIS — M79.7 FIBROMYALGIA: ICD-10-CM

## 2019-07-02 DIAGNOSIS — L98.9 SKIN LESIONS: Primary | ICD-10-CM

## 2019-07-02 LAB
ALBUMIN SERPL-MCNC: 4.2 G/DL (ref 3.5–5.2)
ALBUMIN/GLOB SERPL: 1.4 G/DL
ALP SERPL-CCNC: 72 U/L (ref 39–117)
ALT SERPL W P-5'-P-CCNC: 9 U/L (ref 1–33)
ANION GAP SERPL CALCULATED.3IONS-SCNC: 10.4 MMOL/L (ref 5–15)
AST SERPL-CCNC: 22 U/L (ref 1–32)
BASOPHILS # BLD AUTO: 0.03 10*3/MM3 (ref 0–0.2)
BASOPHILS NFR BLD AUTO: 0.5 % (ref 0–1.5)
BILIRUB SERPL-MCNC: 0.3 MG/DL (ref 0.2–1.2)
BUN BLD-MCNC: 13 MG/DL (ref 8–23)
BUN/CREAT SERPL: 13.3 (ref 7–25)
CALCIUM SPEC-SCNC: 9.7 MG/DL (ref 8.6–10.5)
CHLORIDE SERPL-SCNC: 104 MMOL/L (ref 98–107)
CHOLEST SERPL-MCNC: 223 MG/DL (ref 0–200)
CO2 SERPL-SCNC: 28.6 MMOL/L (ref 22–29)
CREAT BLD-MCNC: 0.98 MG/DL (ref 0.57–1)
DEPRECATED RDW RBC AUTO: 47.1 FL (ref 37–54)
EOSINOPHIL # BLD AUTO: 0.11 10*3/MM3 (ref 0–0.4)
EOSINOPHIL NFR BLD AUTO: 2 % (ref 0.3–6.2)
ERYTHROCYTE [DISTWIDTH] IN BLOOD BY AUTOMATED COUNT: 13.2 % (ref 12.3–15.4)
GFR SERPL CREATININE-BSD FRML MDRD: 54 ML/MIN/1.73
GLOBULIN UR ELPH-MCNC: 2.9 GM/DL
GLUCOSE BLD-MCNC: 83 MG/DL (ref 65–99)
HCT VFR BLD AUTO: 42.8 % (ref 34–46.6)
HDLC SERPL-MCNC: 64 MG/DL (ref 40–60)
HGB BLD-MCNC: 12.9 G/DL (ref 12–15.9)
IMM GRANULOCYTES # BLD AUTO: 0.01 10*3/MM3 (ref 0–0.05)
IMM GRANULOCYTES NFR BLD AUTO: 0.2 % (ref 0–0.5)
LDLC SERPL CALC-MCNC: 133 MG/DL (ref 0–100)
LDLC/HDLC SERPL: 2.08 {RATIO}
LYMPHOCYTES # BLD AUTO: 1.88 10*3/MM3 (ref 0.7–3.1)
LYMPHOCYTES NFR BLD AUTO: 34.1 % (ref 19.6–45.3)
MCH RBC QN AUTO: 29.5 PG (ref 26.6–33)
MCHC RBC AUTO-ENTMCNC: 30.1 G/DL (ref 31.5–35.7)
MCV RBC AUTO: 97.9 FL (ref 79–97)
MONOCYTES # BLD AUTO: 0.55 10*3/MM3 (ref 0.1–0.9)
MONOCYTES NFR BLD AUTO: 10 % (ref 5–12)
NEUTROPHILS # BLD AUTO: 2.94 10*3/MM3 (ref 1.7–7)
NEUTROPHILS NFR BLD AUTO: 53.2 % (ref 42.7–76)
NRBC BLD AUTO-RTO: 0 /100 WBC (ref 0–0.2)
PLATELET # BLD AUTO: 176 10*3/MM3 (ref 140–450)
PMV BLD AUTO: 11 FL (ref 6–12)
POTASSIUM BLD-SCNC: 4.3 MMOL/L (ref 3.5–5.2)
PROT SERPL-MCNC: 7.1 G/DL (ref 6–8.5)
RBC # BLD AUTO: 4.37 10*6/MM3 (ref 3.77–5.28)
SODIUM BLD-SCNC: 143 MMOL/L (ref 136–145)
TRIGL SERPL-MCNC: 129 MG/DL (ref 0–150)
TSH SERPL DL<=0.05 MIU/L-ACNC: 1.43 MIU/ML (ref 0.27–4.2)
VLDLC SERPL-MCNC: 25.8 MG/DL (ref 5–40)
WBC NRBC COR # BLD: 5.52 10*3/MM3 (ref 3.4–10.8)

## 2019-07-02 PROCEDURE — 80053 COMPREHEN METABOLIC PANEL: CPT | Performed by: NURSE PRACTITIONER

## 2019-07-02 PROCEDURE — 80061 LIPID PANEL: CPT | Performed by: NURSE PRACTITIONER

## 2019-07-02 PROCEDURE — 84443 ASSAY THYROID STIM HORMONE: CPT | Performed by: NURSE PRACTITIONER

## 2019-07-02 PROCEDURE — 85025 COMPLETE CBC W/AUTO DIFF WBC: CPT | Performed by: NURSE PRACTITIONER

## 2019-07-10 NOTE — PROGRESS NOTES
Her labs look ok with the exception of her cholesterol.  I think she may have stopped taking the cholesterol medication.    She might want to consider restarting the medication.   Please let her know.

## 2019-07-11 ENCOUNTER — TELEPHONE (OUTPATIENT)
Dept: FAMILY MEDICINE CLINIC | Facility: CLINIC | Age: 84
End: 2019-07-11

## 2019-07-11 NOTE — TELEPHONE ENCOUNTER
----- Message from TEODORA Red sent at 7/10/2019  5:57 PM EDT -----  Her labs look ok with the exception of her cholesterol.  I think she may have stopped taking the cholesterol medication.    She might want to consider restarting the medication.   Please let her know.       Patient notified & verbalized understanding.

## 2019-07-19 ENCOUNTER — TELEPHONE (OUTPATIENT)
Dept: FAMILY MEDICINE CLINIC | Facility: CLINIC | Age: 84
End: 2019-07-19

## 2019-07-19 DIAGNOSIS — M79.7 FIBROMYALGIA: ICD-10-CM

## 2019-07-19 DIAGNOSIS — F41.9 ANXIETY: ICD-10-CM

## 2019-07-19 DIAGNOSIS — M25.50 MULTIPLE JOINT PAIN: ICD-10-CM

## 2019-07-19 RX ORDER — TRAMADOL HYDROCHLORIDE 50 MG/1
50 TABLET ORAL 2 TIMES DAILY PRN
Qty: 60 TABLET | Refills: 0 | Status: SHIPPED | OUTPATIENT
Start: 2019-07-19 | End: 2019-08-27 | Stop reason: SDUPTHER

## 2019-07-19 RX ORDER — DIAZEPAM 2 MG/1
2 TABLET ORAL 2 TIMES DAILY PRN
Qty: 60 TABLET | Refills: 0 | Status: SHIPPED | OUTPATIENT
Start: 2019-07-19 | End: 2019-08-27 | Stop reason: SDUPTHER

## 2019-07-19 NOTE — PROGRESS NOTES
[facility] # [id number]  Reviewed and is consistent.  [facility] 6/2019 reviewed and is consistent.  Patient comfort assessment guide reviewed and updated today.    As part of the patient's treatment plan they are being prescribed a controlled substance/ substances with potential for abuse.  This patient has been made aware of the appropriate use of such medications, including potential risk of somnolence, limited ability to drive and/or work safely, and potential for overdose.  It has also been made clear these medications are for use by the patient only, without concomitant use of alcohol or other substances unless prescribed/advised by medical provider.    Patient has completed prescribing agreement detailing terms of continued prescribing of controlled substances including monitoring BRAULIO reports, urine drug screens, and pill counts.  The patient is aware BRAULIO reports are reviewed on a regular basis and scanned into the chart.    History and physical exam exhibit continued safe and appropriate use of controlled substances.

## 2019-07-24 RX ORDER — POTASSIUM CHLORIDE 750 MG/1
TABLET, EXTENDED RELEASE ORAL
Qty: 30 TABLET | Refills: 5 | Status: SHIPPED | OUTPATIENT
Start: 2019-07-24 | End: 2019-09-05

## 2019-08-12 RX ORDER — ROSUVASTATIN CALCIUM 10 MG/1
TABLET, COATED ORAL
Qty: 90 TABLET | Refills: 1 | Status: SHIPPED | OUTPATIENT
Start: 2019-08-12 | End: 2019-09-05

## 2019-08-27 ENCOUNTER — TELEPHONE (OUTPATIENT)
Dept: FAMILY MEDICINE CLINIC | Facility: CLINIC | Age: 84
End: 2019-08-27

## 2019-08-27 DIAGNOSIS — M79.7 FIBROMYALGIA: ICD-10-CM

## 2019-08-27 DIAGNOSIS — M25.50 MULTIPLE JOINT PAIN: ICD-10-CM

## 2019-08-27 DIAGNOSIS — F41.9 ANXIETY: ICD-10-CM

## 2019-08-27 RX ORDER — TRAMADOL HYDROCHLORIDE 50 MG/1
50 TABLET ORAL 2 TIMES DAILY PRN
Qty: 60 TABLET | Refills: 0 | Status: SHIPPED | OUTPATIENT
Start: 2019-08-27 | End: 2019-10-07 | Stop reason: SDUPTHER

## 2019-08-27 RX ORDER — DIAZEPAM 2 MG/1
2 TABLET ORAL 2 TIMES DAILY PRN
Qty: 60 TABLET | Refills: 0 | Status: SHIPPED | OUTPATIENT
Start: 2019-08-27 | End: 2019-10-07 | Stop reason: SDUPTHER

## 2019-08-27 NOTE — TELEPHONE ENCOUNTER
BRAULIO [id number] reviewed and consistent. Sent      Attempted to notify patient,not available at either listed number at this time.      Patient notified.

## 2019-09-05 ENCOUNTER — OFFICE VISIT (OUTPATIENT)
Dept: FAMILY MEDICINE CLINIC | Facility: CLINIC | Age: 84
End: 2019-09-05

## 2019-09-05 VITALS
HEART RATE: 70 BPM | HEIGHT: 65 IN | WEIGHT: 171 LBS | OXYGEN SATURATION: 96 % | BODY MASS INDEX: 28.49 KG/M2 | DIASTOLIC BLOOD PRESSURE: 80 MMHG | TEMPERATURE: 98.2 F | SYSTOLIC BLOOD PRESSURE: 112 MMHG

## 2019-09-05 DIAGNOSIS — R14.3 EXCESSIVE GAS: ICD-10-CM

## 2019-09-05 DIAGNOSIS — J30.1 ALLERGIC RHINITIS DUE TO POLLEN, UNSPECIFIED SEASONALITY: ICD-10-CM

## 2019-09-05 DIAGNOSIS — M79.7 FIBROMYALGIA: ICD-10-CM

## 2019-09-05 DIAGNOSIS — M25.50 MULTIPLE JOINT PAIN: ICD-10-CM

## 2019-09-05 DIAGNOSIS — E78.2 MIXED HYPERLIPIDEMIA: ICD-10-CM

## 2019-09-05 DIAGNOSIS — Z91.09 ENVIRONMENTAL ALLERGIES: ICD-10-CM

## 2019-09-05 DIAGNOSIS — I48.0 PAROXYSMAL ATRIAL FIBRILLATION (HCC): ICD-10-CM

## 2019-09-05 DIAGNOSIS — I10 ESSENTIAL HYPERTENSION: Primary | ICD-10-CM

## 2019-09-05 DIAGNOSIS — K21.9 GASTROESOPHAGEAL REFLUX DISEASE, ESOPHAGITIS PRESENCE NOT SPECIFIED: ICD-10-CM

## 2019-09-05 DIAGNOSIS — F41.9 ANXIETY: ICD-10-CM

## 2019-09-05 PROCEDURE — 99214 OFFICE O/P EST MOD 30 MIN: CPT | Performed by: NURSE PRACTITIONER

## 2019-09-05 RX ORDER — POTASSIUM CHLORIDE 750 MG/1
10 TABLET, EXTENDED RELEASE ORAL DAILY
Qty: 90 TABLET | Refills: 3 | Status: SHIPPED | OUTPATIENT
Start: 2019-09-05 | End: 2020-04-20

## 2019-09-05 RX ORDER — ACETAMINOPHEN 500 MG
500 TABLET ORAL EVERY 6 HOURS PRN
Status: CANCELLED | OUTPATIENT
Start: 2019-09-05

## 2019-09-05 RX ORDER — DIAZEPAM 2 MG/1
2 TABLET ORAL 2 TIMES DAILY PRN
Qty: 60 TABLET | Refills: 0 | Status: CANCELLED | OUTPATIENT
Start: 2019-09-05

## 2019-09-05 RX ORDER — TRAMADOL HYDROCHLORIDE 50 MG/1
50 TABLET ORAL 2 TIMES DAILY PRN
Qty: 60 TABLET | Refills: 0 | Status: CANCELLED | OUTPATIENT
Start: 2019-09-05

## 2019-09-05 RX ORDER — SOTALOL HYDROCHLORIDE 120 MG/1
120 TABLET ORAL 2 TIMES DAILY
Qty: 180 TABLET | Refills: 3 | Status: SHIPPED | OUTPATIENT
Start: 2019-09-05 | End: 2020-08-06

## 2019-09-05 RX ORDER — ROSUVASTATIN CALCIUM 10 MG/1
10 TABLET, COATED ORAL DAILY
Qty: 90 TABLET | Refills: 3 | Status: SHIPPED | OUTPATIENT
Start: 2019-09-05 | End: 2020-04-14 | Stop reason: SDUPTHER

## 2019-09-05 RX ORDER — LORATADINE 10 MG/1
10 TABLET ORAL DAILY
Qty: 30 TABLET | Refills: 2 | Status: CANCELLED | OUTPATIENT
Start: 2019-09-05

## 2019-09-05 RX ORDER — RANITIDINE 150 MG/1
150 TABLET ORAL 2 TIMES DAILY
Qty: 180 TABLET | Refills: 1 | Status: SHIPPED | OUTPATIENT
Start: 2019-09-05 | End: 2020-09-02

## 2019-09-05 RX ORDER — PAROXETINE 10 MG/1
10 TABLET, FILM COATED ORAL NIGHTLY
Qty: 90 TABLET | Refills: 3 | Status: SHIPPED | OUTPATIENT
Start: 2019-09-05 | End: 2020-12-23 | Stop reason: SDUPTHER

## 2019-09-05 RX ORDER — FUROSEMIDE 20 MG/1
20 TABLET ORAL DAILY
Qty: 90 TABLET | Refills: 3 | Status: SHIPPED | OUTPATIENT
Start: 2019-09-05 | End: 2020-10-27 | Stop reason: SDUPTHER

## 2019-09-05 RX ORDER — MOMETASONE FUROATE 50 UG/1
2 SPRAY, METERED NASAL DAILY
Qty: 17 G | Refills: 5 | Status: SHIPPED | OUTPATIENT
Start: 2019-09-05 | End: 2021-01-08 | Stop reason: SDUPTHER

## 2019-09-05 RX ORDER — LISINOPRIL 5 MG/1
5 TABLET ORAL DAILY
Qty: 90 TABLET | Refills: 3 | Status: SHIPPED | OUTPATIENT
Start: 2019-09-05 | End: 2020-09-25 | Stop reason: SDUPTHER

## 2019-09-05 NOTE — PROGRESS NOTES
Subjective   Tonia Ritter is a 84 y.o. female.     Chief Complaint: Follow-up and Hypertension    Hypertension   This is a chronic problem. The current episode started more than 1 year ago. The problem is controlled. Pertinent negatives include no chest pain, palpitations, peripheral edema or shortness of breath. There are no associated agents to hypertension. Current antihypertension treatment includes diuretics, ACE inhibitors and beta blockers. The current treatment provides significant improvement. There are no compliance problems.  atrial fib; PPM.   Hyperlipidemia   This is a chronic problem. The current episode started more than 1 year ago. The problem is controlled. Factors aggravating her hyperlipidemia include beta blockers and fatty foods. Pertinent negatives include no chest pain or shortness of breath. Current antihyperlipidemic treatment includes statins. The current treatment provides significant improvement of lipids. Risk factors for coronary artery disease include hypertension, dyslipidemia, a sedentary lifestyle and post-menopausal.   Heartburn   She complains of heartburn. She reports no chest pain. This is a chronic problem. The current episode started more than 1 year ago. The problem occurs rarely. The problem has been waxing and waning. The heartburn does not wake her from sleep. The heartburn does not limit her activity. The heartburn doesn't change with position. The symptoms are aggravated by certain foods. She has tried a histamine-2 antagonist for the symptoms. The treatment provided significant relief.   Atrial Fibrillation   Presents for follow-up (pt continues to follow with cardiologist, Dr Melendez) visit. Symptoms are negative for chest pain, palpitations and shortness of breath. The symptoms have been stable. Past medical history includes atrial fibrillation and hyperlipidemia.   Anxiety   Presents for follow-up visit. Symptoms include decreased concentration, irritability and  nervous/anxious behavior. Symptoms occur occasionally. The severity of symptoms is moderate. The quality of sleep is good. Nighttime awakenings: occasional.  Compliance with medications is %.  Pt has taken diazepam for years and tolerates it well. Her symptoms are well controlled with the medication and without side effects. Pt is able to continue her ADLs with use of medication. She denies any suicidal thoughts or ideations.      Fibromyalgia   This is a chronic problem. The current episode started more than 1 year ago. The problem occurs daily. The problem has been unchanged. Associated symptoms include arthralgias and myalgias. Nothing aggravates the symptoms. She has tried acetaminophen, NSAIDs, oral narcotics and relaxation for the symptoms. The treatment provided moderate relief.   Pt has been taking Tramadol for her pain and is tolerating it well. Tramadol enables pt to continue with her ADLs and live her life without difficulty. She denies any side effects from the medication.      Family History   Problem Relation Age of Onset   • Thyroid disease Mother    • Mental illness Father         nervous breakdown   • Breast cancer Maternal Aunt        Social History     Socioeconomic History   • Marital status:      Spouse name: Not on file   • Number of children: Not on file   • Years of education: Not on file   • Highest education level: Not on file   Tobacco Use   • Smoking status: Never Smoker   • Smokeless tobacco: Never Used   Substance and Sexual Activity   • Alcohol use: No   • Drug use: No   • Sexual activity: Defer       Past Medical History:   Diagnosis Date   • Acute bronchitis    • Allergic rhinitis    • Anxiety    • Atrial fibrillation (CMS/HCC)    • Back pain    • Burping    • Chills (without fever) 3/25/2019   • Chronic pain    • Congestive heart failure (CMS/HCC)    • Depression    • Encounter for screening fecal occult blood testing 03/10/2016    NEGATIVE   • Fibromyalgia    •  "Flatulence    • GERD (gastroesophageal reflux disease)    • Hematuria    • Hyperlipidemia    • Hypertension    • Osteoarthritis    • Pacemaker    • PUD (peptic ulcer disease)    • Right shoulder pain        Review of Systems   Constitutional: Negative.    HENT: Negative.    Respiratory: Negative.  Negative for shortness of breath.    Cardiovascular: Negative.  Negative for chest pain and palpitations.   Gastrointestinal: Positive for heartburn.   Musculoskeletal: Negative.    Skin: Negative.    Neurological: Negative.    Psychiatric/Behavioral: Negative.        Objective   Physical Exam   Constitutional: She is oriented to person, place, and time. She appears well-developed and well-nourished.   Neck: Normal range of motion. Neck supple.   Cardiovascular: Normal rate, regular rhythm and normal heart sounds.   Pulmonary/Chest: Effort normal and breath sounds normal.   Neurological: She is alert and oriented to person, place, and time.   Skin: Skin is warm and dry.   Psychiatric: She has a normal mood and affect. Her behavior is normal. Judgment and thought content normal.   Nursing note and vitals reviewed.      Procedures    Vitals: Blood pressure 112/80, pulse 70, temperature 98.2 °F (36.8 °C), temperature source Oral, height 165.1 cm (65\"), weight 77.6 kg (171 lb), SpO2 96 %, not currently breastfeeding.    Allergies:   Allergies   Allergen Reactions   • Reglan [Metoclopramide]    • Sulfa Antibiotics         During this visit the following were done:  Labs Reviewed []    Labs Ordered []    Radiology Reports Reviewed []    Radiology Ordered []    PCP Records Reviewed []    Referring Provider Records Reviewed []    ER Records Reviewed []    Hospital Records Reviewed []    History Obtained From Family []    Radiology Images Reviewed []    Other Reviewed []    Records Requested []      Assessment/Plan   Tonia was seen today for follow-up and hypertension.    Diagnoses and all orders for this visit:    Essential " hypertension    furosemide (LASIX) 20 MG tablet; Take 1 tablet by mouth Daily.  -     lisinopril (PRINIVIL,ZESTRIL) 5 MG tablet; Take 1 tablet by mouth Daily.   potassium chloride (K-DUR,KLOR-CON) 10 MEQ CR tablet; Take 1 tablet by mouth Daily.    Allergic rhinitis due to pollen, unspecified seasonality  -     mometasone (NASONEX) 50 MCG/ACT nasal spray; 2 sprays into the nostril(s) as directed by provider Daily.    Anxiety     Continue PARoxetine (PAXIL) 10 MG tablet; Take 1 tablet by mouth Every Night.     continue diazepam prn     Fibromyalgia   Continue tramdol prn    Mixed hyperlipidemia   -     rosuvastatin (CRESTOR) 10 MG tablet; Take 1 tablet by mouth Daily. for cholesterol    Gastroesophageal reflux disease, esophagitis presence not specified   -     raNITIdine (ZANTAC) 150 MG tablet; Take 1 tablet by mouth 2 (Two) Times a Day.    Paroxysmal atrial fibrillation (CMS/HCC)   -     rivaroxaban (XARELTO) 20 MG tablet; Take 1 tablet by mouth Daily With Dinner.   -     sotalol (BETAPACE) 120 MG tablet; Take 1 tablet by mouth 2 (Two) Times a Day.    -     Continue to follow with cardiology

## 2019-09-10 RX ORDER — POTASSIUM CHLORIDE 750 MG/1
TABLET, EXTENDED RELEASE ORAL
Qty: 30 TABLET | Refills: 5 | Status: SHIPPED | OUTPATIENT
Start: 2019-09-10 | End: 2019-11-14

## 2019-09-16 RX ORDER — ROSUVASTATIN CALCIUM 10 MG/1
TABLET, COATED ORAL
Qty: 90 TABLET | Refills: 1 | Status: SHIPPED | OUTPATIENT
Start: 2019-09-16 | End: 2019-11-14

## 2019-10-02 RX ORDER — LISINOPRIL 5 MG/1
TABLET ORAL
Qty: 30 TABLET | Refills: 5 | OUTPATIENT
Start: 2019-10-02

## 2019-10-07 ENCOUNTER — TELEPHONE (OUTPATIENT)
Dept: FAMILY MEDICINE CLINIC | Facility: CLINIC | Age: 84
End: 2019-10-07

## 2019-10-07 DIAGNOSIS — F41.9 ANXIETY: ICD-10-CM

## 2019-10-07 DIAGNOSIS — M25.50 MULTIPLE JOINT PAIN: ICD-10-CM

## 2019-10-07 DIAGNOSIS — M79.7 FIBROMYALGIA: ICD-10-CM

## 2019-10-07 RX ORDER — DIAZEPAM 2 MG/1
2 TABLET ORAL 2 TIMES DAILY PRN
Qty: 60 TABLET | Refills: 0 | Status: SHIPPED | OUTPATIENT
Start: 2019-10-07 | End: 2019-11-20 | Stop reason: SDUPTHER

## 2019-10-07 RX ORDER — TRAMADOL HYDROCHLORIDE 50 MG/1
50 TABLET ORAL 2 TIMES DAILY PRN
Qty: 60 TABLET | Refills: 0 | Status: SHIPPED | OUTPATIENT
Start: 2019-10-07 | End: 2019-11-20 | Stop reason: SDUPTHER

## 2019-10-09 RX ORDER — SOTALOL HYDROCHLORIDE 120 MG/1
TABLET ORAL
Qty: 60 TABLET | Refills: 3 | OUTPATIENT
Start: 2019-10-09

## 2019-10-22 RX ORDER — FLUTICASONE PROPIONATE 50 MCG
SPRAY, SUSPENSION (ML) NASAL
Qty: 16 G | Refills: 2 | OUTPATIENT
Start: 2019-10-22

## 2019-11-14 ENCOUNTER — OFFICE VISIT (OUTPATIENT)
Dept: FAMILY MEDICINE CLINIC | Facility: CLINIC | Age: 84
End: 2019-11-14

## 2019-11-14 VITALS
HEIGHT: 65 IN | TEMPERATURE: 97.6 F | OXYGEN SATURATION: 97 % | RESPIRATION RATE: 14 BRPM | SYSTOLIC BLOOD PRESSURE: 130 MMHG | WEIGHT: 172.8 LBS | HEART RATE: 73 BPM | BODY MASS INDEX: 28.79 KG/M2 | DIASTOLIC BLOOD PRESSURE: 62 MMHG

## 2019-11-14 DIAGNOSIS — H65.01 NON-RECURRENT ACUTE SEROUS OTITIS MEDIA OF RIGHT EAR: Primary | ICD-10-CM

## 2019-11-14 PROCEDURE — 99213 OFFICE O/P EST LOW 20 MIN: CPT | Performed by: NURSE PRACTITIONER

## 2019-11-14 RX ORDER — AMOXICILLIN 875 MG/1
875 TABLET, COATED ORAL 2 TIMES DAILY
Qty: 20 TABLET | Refills: 0 | Status: SHIPPED | OUTPATIENT
Start: 2019-11-14 | End: 2019-12-05

## 2019-11-14 NOTE — PROGRESS NOTES
Subjective   Tonia Ritter is a 84 y.o. female.     Chief Complaint: Nasal Congestion; Sinusitis; and Earache (left side)    Earache    There is pain in the right ear. This is a new problem. The current episode started 1 to 4 weeks ago. The problem occurs constantly. The problem has been unchanged. There has been no fever. The pain is moderate. Associated symptoms include coughing and rhinorrhea. She has tried nothing for the symptoms.        Family History   Problem Relation Age of Onset   • Thyroid disease Mother    • Mental illness Father         nervous breakdown   • Breast cancer Maternal Aunt        Social History     Socioeconomic History   • Marital status:      Spouse name: Not on file   • Number of children: Not on file   • Years of education: Not on file   • Highest education level: Not on file   Tobacco Use   • Smoking status: Never Smoker   • Smokeless tobacco: Never Used   Substance and Sexual Activity   • Alcohol use: No   • Drug use: No   • Sexual activity: Defer       Past Medical History:   Diagnosis Date   • Acute bronchitis    • Allergic rhinitis    • Anxiety    • Atrial fibrillation (CMS/HCC)    • Back pain    • Burping    • Chills (without fever) 3/25/2019   • Chronic pain    • Congestive heart failure (CMS/HCC)    • Depression    • Encounter for screening fecal occult blood testing 03/10/2016    NEGATIVE   • Fibromyalgia    • Flatulence    • GERD (gastroesophageal reflux disease)    • Hematuria    • Hyperlipidemia    • Hypertension    • Osteoarthritis    • Pacemaker    • PUD (peptic ulcer disease)    • Right shoulder pain        Review of Systems   Constitutional: Negative.    HENT: Positive for congestion, ear pain, postnasal drip, rhinorrhea and sinus pressure.    Respiratory: Positive for cough.    Cardiovascular: Negative.    Gastrointestinal: Negative.    Musculoskeletal: Negative.    Skin: Negative.    Neurological: Negative.    Psychiatric/Behavioral: Negative.   "      Objective   Physical Exam   Constitutional: She is oriented to person, place, and time. She appears well-developed and well-nourished.   HENT:   Right Ear: External ear normal.   Left Ear: External ear normal.   Mouth/Throat: Oropharynx is clear and moist.   Right TM erythematous and bulging   Eyes: Conjunctivae are normal.   Neck: Normal range of motion. Neck supple.   Cardiovascular: Normal rate, regular rhythm and normal heart sounds.   Pulmonary/Chest: Effort normal and breath sounds normal.   Lymphadenopathy:     She has no cervical adenopathy.   Neurological: She is alert and oriented to person, place, and time.   Skin: Skin is warm and dry.   Psychiatric: She has a normal mood and affect. Her behavior is normal. Judgment and thought content normal.   Nursing note and vitals reviewed.      Procedures    Vitals: Blood pressure 130/62, pulse 73, temperature 97.6 °F (36.4 °C), temperature source Oral, resp. rate 14, height 165.1 cm (65\"), weight 78.4 kg (172 lb 12.8 oz), SpO2 97 %, not currently breastfeeding.    Allergies:   Allergies   Allergen Reactions   • Reglan [Metoclopramide]    • Sulfa Antibiotics         During this visit the following were done:  Labs Reviewed []    Labs Ordered []    Radiology Reports Reviewed []    Radiology Ordered []    PCP Records Reviewed []    Referring Provider Records Reviewed []    ER Records Reviewed []    Hospital Records Reviewed []    History Obtained From Family []    Radiology Images Reviewed []    Other Reviewed []    Records Requested []      Assessment/Plan   Tonia was seen today for nasal congestion, sinusitis and earache.    Diagnoses and all orders for this visit:    Non-recurrent acute serous otitis media of right ear  -     amoxicillin (AMOXIL) 875 MG tablet; Take 1 tablet by mouth 2 (Two) Times a Day.               "

## 2019-11-20 ENCOUNTER — TELEPHONE (OUTPATIENT)
Dept: FAMILY MEDICINE CLINIC | Facility: CLINIC | Age: 84
End: 2019-11-20

## 2019-11-20 DIAGNOSIS — F41.9 ANXIETY: ICD-10-CM

## 2019-11-20 DIAGNOSIS — M79.7 FIBROMYALGIA: ICD-10-CM

## 2019-11-20 DIAGNOSIS — M25.50 MULTIPLE JOINT PAIN: ICD-10-CM

## 2019-11-20 RX ORDER — DIAZEPAM 2 MG/1
2 TABLET ORAL 2 TIMES DAILY PRN
Qty: 60 TABLET | Refills: 0 | Status: SHIPPED | OUTPATIENT
Start: 2019-11-20 | End: 2019-12-18 | Stop reason: SDUPTHER

## 2019-11-20 RX ORDER — TRAMADOL HYDROCHLORIDE 50 MG/1
50 TABLET ORAL 2 TIMES DAILY PRN
Qty: 60 TABLET | Refills: 0 | Status: SHIPPED | OUTPATIENT
Start: 2019-11-20 | End: 2019-12-18 | Stop reason: SDUPTHER

## 2019-12-05 ENCOUNTER — OFFICE VISIT (OUTPATIENT)
Dept: FAMILY MEDICINE CLINIC | Facility: CLINIC | Age: 84
End: 2019-12-05

## 2019-12-05 VITALS
TEMPERATURE: 98.2 F | BODY MASS INDEX: 28.76 KG/M2 | OXYGEN SATURATION: 97 % | DIASTOLIC BLOOD PRESSURE: 68 MMHG | HEART RATE: 75 BPM | HEIGHT: 65 IN | WEIGHT: 172.6 LBS | SYSTOLIC BLOOD PRESSURE: 128 MMHG

## 2019-12-05 DIAGNOSIS — N39.0 ACUTE UTI: ICD-10-CM

## 2019-12-05 DIAGNOSIS — R82.90 ABNORMAL URINE: ICD-10-CM

## 2019-12-05 DIAGNOSIS — E78.2 MIXED HYPERLIPIDEMIA: ICD-10-CM

## 2019-12-05 DIAGNOSIS — K21.9 GASTROESOPHAGEAL REFLUX DISEASE, ESOPHAGITIS PRESENCE NOT SPECIFIED: ICD-10-CM

## 2019-12-05 DIAGNOSIS — K59.04 CHRONIC IDIOPATHIC CONSTIPATION: ICD-10-CM

## 2019-12-05 DIAGNOSIS — Z00.00 MEDICARE ANNUAL WELLNESS VISIT, SUBSEQUENT: Primary | ICD-10-CM

## 2019-12-05 DIAGNOSIS — I10 ESSENTIAL HYPERTENSION: ICD-10-CM

## 2019-12-05 DIAGNOSIS — F41.9 ANXIETY: ICD-10-CM

## 2019-12-05 DIAGNOSIS — M79.7 FIBROMYALGIA: ICD-10-CM

## 2019-12-05 LAB
BILIRUB BLD-MCNC: NEGATIVE MG/DL
CLARITY, POC: ABNORMAL
COLOR UR: YELLOW
GLUCOSE UR STRIP-MCNC: NEGATIVE MG/DL
KETONES UR QL: NEGATIVE
LEUKOCYTE EST, POC: ABNORMAL
NITRITE UR-MCNC: NEGATIVE MG/ML
PH UR: 6.5 [PH] (ref 5–8)
PROT UR STRIP-MCNC: ABNORMAL MG/DL
RBC # UR STRIP: ABNORMAL /UL
SP GR UR: 1.01 (ref 1–1.03)
UROBILINOGEN UR QL: NORMAL

## 2019-12-05 PROCEDURE — 81003 URINALYSIS AUTO W/O SCOPE: CPT | Performed by: NURSE PRACTITIONER

## 2019-12-05 PROCEDURE — 87086 URINE CULTURE/COLONY COUNT: CPT | Performed by: NURSE PRACTITIONER

## 2019-12-05 PROCEDURE — G0439 PPPS, SUBSEQ VISIT: HCPCS | Performed by: NURSE PRACTITIONER

## 2019-12-05 RX ORDER — FLUTICASONE PROPIONATE 50 MCG
SPRAY, SUSPENSION (ML) NASAL
Refills: 2 | COMMUNITY
Start: 2019-10-22 | End: 2020-03-05

## 2019-12-05 RX ORDER — NITROFURANTOIN 25; 75 MG/1; MG/1
100 CAPSULE ORAL 2 TIMES DAILY
Qty: 14 CAPSULE | Refills: 0 | Status: SHIPPED | OUTPATIENT
Start: 2019-12-05 | End: 2020-06-03

## 2019-12-05 NOTE — PROGRESS NOTES
Subsequent Medicare Wellness Visit   The ABC's of the Annual Wellness Visit    Chief Complaint   Patient presents with   • Medicare Wellness-subsequent       HPI:  Tonia Ritter, -1935, is a 84 y.o. female who presents for a Subsequent Medicare Wellness Visit.  Pt states also that she has been having some lower back pain for the past two days.  She denies fever, n/v/d.      Recent Hospitalizations:  No hospitalization(s) within the last year..    Current Medical Providers:  Patient Care Team:  Althea River APRN as PCP - Gail Toney APRN as PCP - Family Medicine  Dr Al MD, cardiologist    Health Habits and Functional and Cognitive Screening and Depression Screening:  Functional & Cognitive Status 2019   Do you have difficulty preparing food and eating? No   Do you have difficulty bathing yourself, getting dressed or grooming yourself? No   Do you have difficulty using the toilet? No   Do you have difficulty moving around from place to place? No   Do you have trouble with steps or getting out of a bed or a chair? No   Current Diet Unhealthy Diet   Dental Exam Up to date   Eye Exam Up to date   Exercise (times per week) 0 times per week   Current Exercise Activities Include Walking   Do you need help using the phone?  No   Are you deaf or do you have serious difficulty hearing?  No   Do you need help with transportation? No   Do you need help shopping? No   Do you need help preparing meals?  No   Do you need help with housework?  No   Do you need help with laundry? No   Do you need help taking your medications? No   Do you need help managing money? No   Do you ever drive or ride in a car without wearing a seat belt? No   Have you felt unusual stress, anger or loneliness in the last month? No   Who do you live with? Other   If you need help, do you have trouble finding someone available to you? No   Have you been bothered in the last four weeks by sexual problems? -   Do you have  difficulty concentrating, remembering or making decisions? -       Compared to one year ago, the patient feels her physical health is the same and her mental health is the same.    Depression Screen:  PHQ-2/PHQ-9 Depression Screening 12/5/2019   Little interest or pleasure in doing things 0   Feeling down, depressed, or hopeless 0   Total Score 0         Past Medical/Family/Social History:  The following portions of the patient's history were reviewed and updated as appropriate: allergies, current medications, past family history, past medical history, past social history, past surgical history and problem list.    Allergies   Allergen Reactions   • Reglan [Metoclopramide]    • Sulfa Antibiotics          Current Outpatient Medications:   •  acetaminophen (TYLENOL) 500 MG tablet, Take 500 mg by mouth every 6 (six) hours as needed for mild pain (1-3)., Disp: , Rfl:   •  diazePAM (VALIUM) 2 MG tablet, Take 1 tablet by mouth 2 (Two) Times a Day As Needed for Anxiety., Disp: 60 tablet, Rfl: 0  •  fluticasone (FLONASE) 50 MCG/ACT nasal spray, , Disp: , Rfl: 2  •  furosemide (LASIX) 20 MG tablet, Take 1 tablet by mouth Daily., Disp: 90 tablet, Rfl: 3  •  lisinopril (PRINIVIL,ZESTRIL) 5 MG tablet, Take 1 tablet by mouth Daily., Disp: 90 tablet, Rfl: 3  •  PARoxetine (PAXIL) 10 MG tablet, Take 1 tablet by mouth Every Night., Disp: 90 tablet, Rfl: 3  •  potassium chloride (K-DUR,KLOR-CON) 10 MEQ CR tablet, Take 1 tablet by mouth Daily., Disp: 90 tablet, Rfl: 3  •  raNITIdine (ZANTAC) 150 MG tablet, Take 1 tablet by mouth 2 (Two) Times a Day., Disp: 180 tablet, Rfl: 1  •  rivaroxaban (XARELTO) 20 MG tablet, Take 1 tablet by mouth Daily With Dinner., Disp: 90 tablet, Rfl: 3  •  rosuvastatin (CRESTOR) 10 MG tablet, Take 1 tablet by mouth Daily. for cholesterol, Disp: 90 tablet, Rfl: 3  •  sotalol (BETAPACE) 120 MG tablet, Take 1 tablet by mouth 2 (Two) Times a Day., Disp: 180 tablet, Rfl: 3  •  traMADol (ULTRAM) 50 MG tablet,  Take 1 tablet by mouth 2 (Two) Times a Day As Needed for Moderate Pain ., Disp: 60 tablet, Rfl: 0  •  mometasone (NASONEX) 50 MCG/ACT nasal spray, 2 sprays into the nostril(s) as directed by provider Daily., Disp: 17 g, Rfl: 5  •  nitrofurantoin, macrocrystal-monohydrate, (MACROBID) 100 MG capsule, Take 1 capsule by mouth 2 (Two) Times a Day., Disp: 14 capsule, Rfl: 0    Aspirin use counseling: Does not need ASA (and currently is not on it)    Current medication list contains no high risk medications.  No harmful drug interactions have been identified.     Family History   Problem Relation Age of Onset   • Thyroid disease Mother    • Mental illness Father         nervous breakdown   • Breast cancer Maternal Aunt        Social History     Tobacco Use   • Smoking status: Never Smoker   • Smokeless tobacco: Never Used   Substance Use Topics   • Alcohol use: No       Past Surgical History:   Procedure Laterality Date   • [facility]    • CARD[facility]OVERSION  09/01/2017    [facility]   • CHOLECYSTECTOMY  2004   • COLONOSCOPY  05/2013   • DILATATION AND CURETTAGE     • EPIDIDYMAL CYST EXCISION     • ESOPHAGOSCOPY / EGD  01/2019   • EYE SURGERY     • HEMORRHOIDECTOMY  01/2019   • MAMMO BILATERAL  10/2014   • NECK SURGERY     • PACEMAKER IMPLANTATION     • PAP SMEAR  03/2016   • SKIN LESION EXCISION         Patient Active Problem List   Diagnosis   • Right shoulder pain   • PUD (peptic ulcer disease)   • Osteoarthritis   • Hypertension   • Hyperlipidemia   • Acute bronchitis   • Allergic rhinitis   • Anxiety   • Atrial fibrillation (CMS/HCC)   • Back pain   • Burping   • Chronic pain   • Depression   • Encounter for screening fecal occult blood testing   • Flatulence   • GERD (gastroesophageal reflux disease)   • Hematuria   • Fibromyalgia   • Constipation   • Pain of upper abdomen   • Pacemaker   • S/P ablation of atrial fibrillation   • Acute URI   • Acute bacterial rhinosinusitis   • Hot flashes  "  • Impacted cerumen of right ear       Review of Systems    Objective     Vitals:    12/05/19 1052   BP: 128/68   BP Location: Right arm   Patient Position: Sitting   Pulse: 75   Temp: 98.2 °F (36.8 °C)   TempSrc: Oral   SpO2: 97%   Weight: 78.3 kg (172 lb 9.6 oz)   Height: 165.1 cm (65\")       Patient's Body mass index is 28.72 kg/m². BMI is within normal parameters. No follow-up required..       Visual Acuity Screening    Right eye Left eye Both eyes   Without correction: 20/40 20/50 20/30   With correction:          The patient has no evidence of cognitve impairment.     Physical Exam    Recent Lab Results:  Lab Results   Component Value Date    GLU 92 08/11/2016     Lab Results   Component Value Date    CHOL 223 (H) 07/02/2019    TRIG 129 07/02/2019    HDL 64 (H) 07/02/2019    VLDL 25.8 07/02/2019    LDLHDL 2.08 07/02/2019       Assessment/Plan   Age-appropriate Screening Schedule:  Refer to the list below for future screening recommendations based on patient's age, sex and/or medical conditions.      Health Maintenance   Topic Date Due   • PNEUMOCOCCAL VACCINES (65+ LOW/MEDIUM RISK) (2 of 2 - PPSV23) 12/05/2019 (Originally 8/4/2016)   • ZOSTER VACCINE (1 of 2) 12/05/2019 (Originally 9/3/1987)   • DXA SCAN  01/04/2020   • LIPID PANEL  07/02/2020   • MAMMOGRAM  08/02/2020   • TDAP/TD VACCINES (2 - Td) 10/04/2026   • INFLUENZA VACCINE  Addressed       Medicare Risks and Personalized Health Plan:  Advance Directive Discussion  Chronic Pain   Colon Cancer Screening  Glaucoma Risk  Immunizations Discussed/Encouraged (specific immunizations; Hepatitis A Vaccine/Series, Influenza, Pneumococcal 23, Prevnar and Shingrix )  Inactivity/Sedentary  Osteoprorosis Risk  Polypharmacy      CMS-Preventive Services Quick Reference  Medicare Preventive Services Addressed:  Annual Wellness Visit (AWV)  Bone Density Measurements  Colorectal Cancer Screening, Colonoscopy  Diabetes Screening-Lab Order for either glucose quantitative " blood (except reagent strip), glucose;post glucose dose(includes glucose), or glucose tolerance test-3 specimens(includes glucose)  Glaucoma screening (for individuals with diabetes mellitus, family history of glaucoma, -Americans (> or =) age 50, -Americans (> or =) age 65)  Influenza Vaccine and Administration  Pneumococcal Vaccine and Administration  Screening Mammography     Advance Care Planning:  Patient does not have an advance directive - not interested in additional information    Diagnoses and all orders for this visit:    1. Medicare annual wellness visit, subsequent (Primary)    2. Acute UTI  -     POCT urinalysis dipstick, automated  -     Urine Culture - Urine, Urine, Clean Catch  -     nitrofurantoin, macrocrystal-monohydrate, (MACROBID) 100 MG capsule; Take 1 capsule by mouth 2 (Two) Times a Day.  Dispense: 14 capsule; Refill: 0    3. Abnormal urine  -     POCT urinalysis dipstick, automated  -     Urine Culture - Urine, Urine, Clean Catch  -     nitrofurantoin, macrocrystal-monohydrate, (MACROBID) 100 MG capsule; Take 1 capsule by mouth 2 (Two) Times a Day.  Dispense: 14 capsule; Refill: 0    4. Essential hypertension    5. Mixed hyperlipidemia    6. Gastroesophageal reflux disease, esophagitis presence not specified    7. Chronic idiopathic constipation    8. Fibromyalgia    9. Anxiety        An After Visit Summary and PPPS with all of these plans were given to the patient.      Follow Up:  Return in about 3 months (around 3/5/2020).

## 2019-12-06 LAB — BACTERIA SPEC AEROBE CULT: NORMAL

## 2019-12-18 ENCOUNTER — TELEPHONE (OUTPATIENT)
Dept: FAMILY MEDICINE CLINIC | Facility: CLINIC | Age: 84
End: 2019-12-18

## 2019-12-18 DIAGNOSIS — M25.50 MULTIPLE JOINT PAIN: ICD-10-CM

## 2019-12-18 DIAGNOSIS — M79.7 FIBROMYALGIA: ICD-10-CM

## 2019-12-18 DIAGNOSIS — F41.9 ANXIETY: ICD-10-CM

## 2019-12-18 RX ORDER — DIAZEPAM 2 MG/1
2 TABLET ORAL 2 TIMES DAILY PRN
Qty: 60 TABLET | Refills: 0 | Status: SHIPPED | OUTPATIENT
Start: 2019-12-18 | End: 2020-01-23 | Stop reason: SDUPTHER

## 2019-12-18 RX ORDER — TRAMADOL HYDROCHLORIDE 50 MG/1
50 TABLET ORAL 2 TIMES DAILY PRN
Qty: 60 TABLET | Refills: 0 | Status: SHIPPED | OUTPATIENT
Start: 2019-12-18 | End: 2020-01-23 | Stop reason: SDUPTHER

## 2020-01-14 ENCOUNTER — OFFICE VISIT (OUTPATIENT)
Dept: FAMILY MEDICINE CLINIC | Facility: CLINIC | Age: 85
End: 2020-01-14

## 2020-01-14 VITALS
TEMPERATURE: 98.2 F | WEIGHT: 173 LBS | DIASTOLIC BLOOD PRESSURE: 64 MMHG | SYSTOLIC BLOOD PRESSURE: 100 MMHG | HEART RATE: 80 BPM | HEIGHT: 65 IN | OXYGEN SATURATION: 98 % | BODY MASS INDEX: 28.82 KG/M2

## 2020-01-14 DIAGNOSIS — B02.9 HERPES ZOSTER WITHOUT COMPLICATION: Primary | ICD-10-CM

## 2020-01-14 PROCEDURE — 96372 THER/PROPH/DIAG INJ SC/IM: CPT | Performed by: NURSE PRACTITIONER

## 2020-01-14 PROCEDURE — 99213 OFFICE O/P EST LOW 20 MIN: CPT | Performed by: NURSE PRACTITIONER

## 2020-01-14 RX ORDER — CYANOCOBALAMIN 1000 UG/ML
1000 INJECTION, SOLUTION INTRAMUSCULAR; SUBCUTANEOUS ONCE
Status: COMPLETED | OUTPATIENT
Start: 2020-01-14 | End: 2020-01-14

## 2020-01-14 RX ORDER — ACYCLOVIR 400 MG/1
400 TABLET ORAL
Qty: 35 TABLET | Refills: 0 | Status: SHIPPED | OUTPATIENT
Start: 2020-01-14 | End: 2020-06-03

## 2020-01-14 RX ORDER — METHYLPREDNISOLONE ACETATE 40 MG/ML
40 INJECTION, SUSPENSION INTRA-ARTICULAR; INTRALESIONAL; INTRAMUSCULAR; SOFT TISSUE ONCE
Status: COMPLETED | OUTPATIENT
Start: 2020-01-14 | End: 2020-01-14

## 2020-01-14 RX ORDER — ACYCLOVIR 50 MG/G
OINTMENT TOPICAL EVERY 4 HOURS
Qty: 30 G | Refills: 0 | Status: SHIPPED | OUTPATIENT
Start: 2020-01-14 | End: 2020-06-03

## 2020-01-14 RX ADMIN — METHYLPREDNISOLONE ACETATE 40 MG: 40 INJECTION, SUSPENSION INTRA-ARTICULAR; INTRALESIONAL; INTRAMUSCULAR; SOFT TISSUE at 18:06

## 2020-01-14 RX ADMIN — CYANOCOBALAMIN 1000 MCG: 1000 INJECTION, SOLUTION INTRAMUSCULAR; SUBCUTANEOUS at 18:05

## 2020-01-14 NOTE — PROGRESS NOTES
Subjective   Tonia Ritter is a 84 y.o. female.     Chief Complaint: Rash (back )    Rash   This is a new problem. The current episode started in the past 7 days. The problem has been gradually worsening since onset. Location: mid upper shoulder level. The rash is characterized by blistering and itchiness. She was exposed to nothing. Associated symptoms include fatigue. (Pain in left shoulder area and intermittent pain in left side of head) Past treatments include nothing.        Family History   Problem Relation Age of Onset   • Thyroid disease Mother    • Mental illness Father         nervous breakdown   • Breast cancer Maternal Aunt        Social History     Socioeconomic History   • Marital status:      Spouse name: Not on file   • Number of children: Not on file   • Years of education: Not on file   • Highest education level: Not on file   Tobacco Use   • Smoking status: Never Smoker   • Smokeless tobacco: Never Used   Substance and Sexual Activity   • Alcohol use: No   • Drug use: No   • Sexual activity: Defer       Past Medical History:   Diagnosis Date   • Acute bronchitis    • Allergic rhinitis    • Anxiety    • Atrial fibrillation (CMS/HCC)    • Back pain    • Burping    • Chills (without fever) 3/25/2019   • Chronic pain    • Congestive heart failure (CMS/HCC)    • Depression    • Encounter for screening fecal occult blood testing 03/10/2016    NEGATIVE   • Fibromyalgia    • Flatulence    • GERD (gastroesophageal reflux disease)    • Hematuria    • Hyperlipidemia    • Hypertension    • Osteoarthritis    • Pacemaker    • PUD (peptic ulcer disease)    • Right shoulder pain        Review of Systems   Constitutional: Positive for fatigue.   HENT: Negative.    Respiratory: Negative.    Cardiovascular: Negative.    Gastrointestinal: Negative.    Musculoskeletal: Positive for arthralgias and myalgias.   Skin: Positive for rash.   Neurological: Negative.    Psychiatric/Behavioral: Negative.   "      Objective   Physical Exam   Constitutional: She is oriented to person, place, and time. She appears well-developed and well-nourished.   Neck: Normal range of motion. Neck supple.   Cardiovascular: Normal rate, regular rhythm and normal heart sounds.   Pulmonary/Chest: Effort normal and breath sounds normal.   Neurological: She is alert and oriented to person, place, and time.   Skin: Skin is warm and dry.   4-5 small fluid filled blisters noted to upper back area   Psychiatric: She has a normal mood and affect. Her behavior is normal. Judgment and thought content normal.   Nursing note and vitals reviewed.      Procedures    Vitals: Blood pressure 100/64, pulse 80, temperature 98.2 °F (36.8 °C), temperature source Oral, height 165.1 cm (65\"), weight 78.5 kg (173 lb), SpO2 98 %, not currently breastfeeding.    Allergies:   Allergies   Allergen Reactions   • Reglan [Metoclopramide]    • Sulfa Antibiotics         During this visit the following were done:  Labs Reviewed []    Labs Ordered []    Radiology Reports Reviewed []    Radiology Ordered []    PCP Records Reviewed []    Referring Provider Records Reviewed []    ER Records Reviewed []    Hospital Records Reviewed []    History Obtained From Family []    Radiology Images Reviewed []    Other Reviewed []    Records Requested []      Assessment/Plan   Tonia was seen today for rash.    Diagnoses and all orders for this visit:    Herpes zoster without complication  -     acyclovir (ZOVIRAX) 5 % ointment; Apply  topically to the appropriate area as directed Every 4 (Four) Hours.  -     acyclovir (ZOVIRAX) 400 MG tablet; Take 1 tablet by mouth 5 (Five) Times a Day. Take no more than 5 doses a day.  -     methylPREDNISolone acetate (DEPO-medrol) injection 40 mg  -     cyanocobalamin injection 1,000 mcg               "

## 2020-01-16 ENCOUNTER — TELEPHONE (OUTPATIENT)
Dept: FAMILY MEDICINE CLINIC | Facility: CLINIC | Age: 85
End: 2020-01-16

## 2020-01-16 NOTE — TELEPHONE ENCOUNTER
I spoke with Tonia.  She isnt running a fever.  I explained to her Shingles would cause body aches and fatigue and to let us know if she felt like she needs to be seen.  She expressed understanding.

## 2020-01-16 NOTE — TELEPHONE ENCOUNTER
Antibiotics will not help with shingles at all. The shingles causes fatigue and body aches.  Does she have a fever?  She didn't yesterday.

## 2020-01-16 NOTE — TELEPHONE ENCOUNTER
"Patient called indicating she was recently diagnosed with Shingles and is \"just hurting all over\".  She wants to know if maybe you could give her an antibiotic. (She left a voicemail).  Please advise.   "

## 2020-01-23 DIAGNOSIS — M25.50 MULTIPLE JOINT PAIN: ICD-10-CM

## 2020-01-23 DIAGNOSIS — F41.9 ANXIETY: ICD-10-CM

## 2020-01-23 DIAGNOSIS — M79.7 FIBROMYALGIA: ICD-10-CM

## 2020-01-23 RX ORDER — TRAMADOL HYDROCHLORIDE 50 MG/1
50 TABLET ORAL 2 TIMES DAILY PRN
Qty: 60 TABLET | Refills: 0 | Status: SHIPPED | OUTPATIENT
Start: 2020-01-23 | End: 2020-02-27 | Stop reason: SDUPTHER

## 2020-01-23 RX ORDER — DIAZEPAM 2 MG/1
2 TABLET ORAL 2 TIMES DAILY PRN
Qty: 60 TABLET | Refills: 0 | Status: SHIPPED | OUTPATIENT
Start: 2020-01-23 | End: 2020-02-27 | Stop reason: SDUPTHER

## 2020-01-23 NOTE — TELEPHONE ENCOUNTER
Micheal was reviewed request #[id number].  Was appropriate.  Her UDS was negative for benzodiazepines in December, according to Micheal she takes approximately 2/day, this is suspicious.  She has a follow-up with her PCP coming up.  Refills sent.

## 2020-01-24 NOTE — TELEPHONE ENCOUNTER
Micheal was reviewed request #[id number].  Was appropriate.  Her UDS was negative for benzodiazepines in December, according to Micheal she takes approximately 2/day, this is suspicious.  She has a follow-up with her PCP coming up.  Refills sent.      Left a detailed message.

## 2020-02-27 ENCOUNTER — TELEPHONE (OUTPATIENT)
Dept: FAMILY MEDICINE CLINIC | Facility: CLINIC | Age: 85
End: 2020-02-27

## 2020-02-27 DIAGNOSIS — M79.7 FIBROMYALGIA: ICD-10-CM

## 2020-02-27 DIAGNOSIS — F41.9 ANXIETY: ICD-10-CM

## 2020-02-27 DIAGNOSIS — M25.50 MULTIPLE JOINT PAIN: ICD-10-CM

## 2020-02-27 RX ORDER — TRAMADOL HYDROCHLORIDE 50 MG/1
50 TABLET ORAL 2 TIMES DAILY PRN
Qty: 60 TABLET | Refills: 0 | Status: SHIPPED | OUTPATIENT
Start: 2020-02-27 | End: 2020-03-30 | Stop reason: SDUPTHER

## 2020-02-27 RX ORDER — DIAZEPAM 2 MG/1
2 TABLET ORAL 2 TIMES DAILY PRN
Qty: 60 TABLET | Refills: 0 | Status: SHIPPED | OUTPATIENT
Start: 2020-02-27 | End: 2020-03-30 | Stop reason: SDUPTHER

## 2020-02-27 NOTE — TELEPHONE ENCOUNTER
[facility] [id number] reviewed and consistent.  Sent       Patient notified.

## 2020-03-05 ENCOUNTER — OFFICE VISIT (OUTPATIENT)
Dept: FAMILY MEDICINE CLINIC | Facility: CLINIC | Age: 85
End: 2020-03-05

## 2020-03-05 VITALS
TEMPERATURE: 97.8 F | WEIGHT: 173 LBS | OXYGEN SATURATION: 98 % | SYSTOLIC BLOOD PRESSURE: 128 MMHG | HEIGHT: 65 IN | DIASTOLIC BLOOD PRESSURE: 70 MMHG | BODY MASS INDEX: 28.82 KG/M2 | HEART RATE: 79 BPM

## 2020-03-05 DIAGNOSIS — I48.0 PAROXYSMAL ATRIAL FIBRILLATION (HCC): ICD-10-CM

## 2020-03-05 DIAGNOSIS — F41.9 ANXIETY: ICD-10-CM

## 2020-03-05 DIAGNOSIS — K21.9 GASTROESOPHAGEAL REFLUX DISEASE, ESOPHAGITIS PRESENCE NOT SPECIFIED: ICD-10-CM

## 2020-03-05 DIAGNOSIS — E78.2 MIXED HYPERLIPIDEMIA: ICD-10-CM

## 2020-03-05 DIAGNOSIS — I10 ESSENTIAL HYPERTENSION: Primary | ICD-10-CM

## 2020-03-05 DIAGNOSIS — M79.7 FIBROMYALGIA: ICD-10-CM

## 2020-03-05 DIAGNOSIS — J30.1 ALLERGIC RHINITIS DUE TO POLLEN, UNSPECIFIED SEASONALITY: ICD-10-CM

## 2020-03-05 PROCEDURE — 85025 COMPLETE CBC W/AUTO DIFF WBC: CPT | Performed by: NURSE PRACTITIONER

## 2020-03-05 PROCEDURE — 84443 ASSAY THYROID STIM HORMONE: CPT | Performed by: NURSE PRACTITIONER

## 2020-03-05 PROCEDURE — 83735 ASSAY OF MAGNESIUM: CPT | Performed by: NURSE PRACTITIONER

## 2020-03-05 PROCEDURE — 80053 COMPREHEN METABOLIC PANEL: CPT | Performed by: NURSE PRACTITIONER

## 2020-03-05 PROCEDURE — 36415 COLL VENOUS BLD VENIPUNCTURE: CPT | Performed by: NURSE PRACTITIONER

## 2020-03-05 PROCEDURE — 84439 ASSAY OF FREE THYROXINE: CPT | Performed by: NURSE PRACTITIONER

## 2020-03-05 PROCEDURE — 99214 OFFICE O/P EST MOD 30 MIN: CPT | Performed by: NURSE PRACTITIONER

## 2020-03-05 PROCEDURE — 82607 VITAMIN B-12: CPT | Performed by: NURSE PRACTITIONER

## 2020-03-05 RX ORDER — AZELASTINE 1 MG/ML
2 SPRAY, METERED NASAL 2 TIMES DAILY
Qty: 30 ML | Refills: 5 | Status: SHIPPED | OUTPATIENT
Start: 2020-03-05 | End: 2020-12-08

## 2020-03-05 NOTE — PROGRESS NOTES
Subjective   Tonia Ritter is a 84 y.o. female.     Chief Complaint: Hypertension; Hyperlipidemia; and Heartburn    URI    This is a chronic (environmental allergies) problem. The current episode started more than 1 year ago. The problem has been unchanged. There has been no fever. Associated symptoms include rhinorrhea. She has tried antihistamine for the symptoms. The treatment provided mild relief.   Hypertension   This is a chronic problem. The current episode started more than 1 year ago. The problem is controlled. Pertinent negatives include no chest pain, palpitations, peripheral edema or shortness of breath. There are no associated agents to hypertension. Current antihypertension treatment includes diuretics, ACE inhibitors and beta blockers. The current treatment provides significant improvement. There are no compliance problems.  atrial fib; PPM.   Hyperlipidemia   This is a chronic problem. The current episode started more than 1 year ago. The problem is controlled. Factors aggravating her hyperlipidemia include beta blockers and fatty foods. Pertinent negatives include no chest pain or shortness of breath. Current antihyperlipidemic treatment includes statins. The current treatment provides significant improvement of lipids. Risk factors for coronary artery disease include hypertension, dyslipidemia, a sedentary lifestyle and post-menopausal.   Heartburn   She complains of heartburn. She reports no chest pain. This is a chronic problem. The current episode started more than 1 year ago. The problem occurs rarely. The problem has been waxing and waning. The heartburn does not wake her from sleep. The heartburn does not limit her activity. The heartburn doesn't change with position. The symptoms are aggravated by certain foods. She has tried a histamine-2 antagonist for the symptoms. The treatment provided significant relief.   Atrial Fibrillation   Presents for follow-up (pt continues to follow with  cardiologist, Dr Melendez) visit. Symptoms are negative for chest pain, palpitations and shortness of breath. The symptoms have been stable. Past medical history includes atrial fibrillation and hyperlipidemia.   Anxiety   Presents for follow-up visit. Symptoms include decreased concentration, irritability and nervous/anxious behavior. Symptoms occur occasionally. The severity of symptoms is moderate. The quality of sleep is good. Nighttime awakenings: occasional.  Compliance with medications is %.  Pt has taken diazepam for years and tolerates it well. Her symptoms are well controlled with the medication and without side effects. Pt is able to continue her ADLs with use of medication. She denies any suicidal thoughts or ideations.      Fibromyalgia   This is a chronic problem. The current episode started more than 1 year ago. The problem occurs daily. The problem has been unchanged. Associated symptoms include arthralgias and myalgias. Nothing aggravates the symptoms. She has tried acetaminophen, NSAIDs, oral narcotics and relaxation for the symptoms. The treatment provided moderate relief.   Pt has been taking Tramadol for her pain and is tolerating it well. Tramadol enables pt to continue with her ADLs and live her life without difficulty. She denies any side effects from the medication.       Family History   Problem Relation Age of Onset   • Thyroid disease Mother    • Mental illness Father         nervous breakdown   • Breast cancer Maternal Aunt        Social History     Socioeconomic History   • Marital status:      Spouse name: Not on file   • Number of children: Not on file   • Years of education: Not on file   • Highest education level: Not on file   Tobacco Use   • Smoking status: Never Smoker   • Smokeless tobacco: Never Used   Substance and Sexual Activity   • Alcohol use: No   • Drug use: No   • Sexual activity: Defer       Past Medical History:   Diagnosis Date   • Acute bronchitis    •  "Allergic rhinitis    • Anxiety    • Atrial fibrillation (CMS/[facility])    • Back pain    • Burping    • Chills (without fever) 3/25/2019   • Chronic pain    • Congestive heart failure (CMS/HCC)    • Depression    • Encounter for screening fecal occult blood testing 03/10/2016    NEGATIVE   • Fibromyalgia    • Flatulence    • GERD (gastroesophageal reflux disease)    • Hematuria    • Hyperlipidemia    • Hypertension    • Osteoarthritis    • Pacemaker    • PUD (peptic ulcer disease)    • Right shoulder pain        Review of Systems   Constitutional: Negative.    HENT: Positive for rhinorrhea.    Respiratory: Negative.    Cardiovascular: Negative.    Gastrointestinal: Negative.    Musculoskeletal: Negative.    Skin: Negative.    Neurological: Negative.    Psychiatric/Behavioral: Negative.        Objective   Physical Exam   Constitutional: She is oriented to person, place, and time. She appears well-developed and well-nourished.   Neck: Normal range of motion. Neck supple.   Cardiovascular: Normal rate, regular rhythm and normal heart sounds.   Pulmonary/Chest: Effort normal and breath sounds normal.   Neurological: She is alert and oriented to person, place, and time.   Skin: Skin is warm and dry.   Psychiatric: She has a normal mood and affect. Her behavior is normal. Judgment and thought content normal.   Nursing note and vitals reviewed.      Procedures    Vitals: Blood pressure 128/70, pulse 79, temperature 97.8 °F (36.6 °C), temperature source Oral, height 165.1 cm (65\"), weight 78.5 kg (173 lb), SpO2 98 %, not currently breastfeeding.    Body mass index is 28.79 kg/m².     Allergies:   Allergies   Allergen Reactions   • Reglan [Metoclopramide]    • Sulfa Antibiotics         During this visit the following were done:  Labs Reviewed []    Labs Ordered []    Radiology Reports Reviewed []    Radiology Ordered []    PCP Records Reviewed []    Referring Provider Records Reviewed []    ER Records Reviewed []    Hospital " Records Reviewed []    History Obtained From Family []    Radiology Images Reviewed []    Other Reviewed []    Records Requested []      Assessment/Plan   Tonia was seen today for hypertension, hyperlipidemia and heartburn.    Diagnoses and all orders for this visit:    Essential hypertension  -     CBC & Differential; Future  -     Comprehensive Metabolic Panel; Future  -     Magnesium; Future  -     TSH; Future  -     Vitamin B12; Future  -     T4, Free; Future  -     CBC & Differential  -     Comprehensive Metabolic Panel  -     Magnesium  -     TSH  -     Vitamin B12  -     T4, Free  -     CBC Auto Differential    Mixed hyperlipidemia  -     CBC & Differential; Future  -     Comprehensive Metabolic Panel; Future  -     Magnesium; Future  -     TSH; Future  -     Vitamin B12; Future  -     T4, Free; Future  -     CBC & Differential  -     Comprehensive Metabolic Panel  -     Magnesium  -     TSH  -     Vitamin B12  -     T4, Free  -     CBC Auto Differential    Gastroesophageal reflux disease, esophagitis presence not specified  -     CBC & Differential; Future  -     Comprehensive Metabolic Panel; Future  -     Magnesium; Future  -     TSH; Future  -     Vitamin B12; Future  -     T4, Free; Future  -     CBC & Differential  -     Comprehensive Metabolic Panel  -     Magnesium  -     TSH  -     Vitamin B12  -     T4, Free  -     CBC Auto Differential    Fibromyalgia  -     CBC & Differential; Future  -     Comprehensive Metabolic Panel; Future  -     Magnesium; Future  -     TSH; Future  -     Vitamin B12; Future  -     T4, Free; Future  -     CBC & Differential  -     Comprehensive Metabolic Panel  -     Magnesium  -     TSH  -     Vitamin B12  -     T4, Free  -     CBC Auto Differential    Anxiety  -     CBC & Differential; Future  -     Comprehensive Metabolic Panel; Future  -     Magnesium; Future  -     TSH; Future  -     Vitamin B12; Future  -     T4, Free; Future  -     CBC & Differential  -      Comprehensive Metabolic Panel  -     Magnesium  -     TSH  -     Vitamin B12  -     T4, Free  -     CBC Auto Differential    Allergic rhinitis due to pollen, unspecified seasonality  -     Start azelastine (ASTELIN) 0.1 % nasal spray; 2 sprays into the nostril(s) as directed by provider 2 (Two) Times a Day. Use in each nostril as directed   Discontinue flonase  -     CBC & Differential; Future  -     Comprehensive Metabolic Panel; Future  -     Magnesium; Future  -     TSH; Future  -     Vitamin B12; Future  -     T4, Free; Future  -     CBC & Differential  -     Comprehensive Metabolic Panel  -     Magnesium  -     TSH  -     Vitamin B12  -     T4, Free  -     CBC Auto Differential    Paroxysmal atrial fibrillation (CMS/HCC)  -     CBC & Differential; Future  -     Comprehensive Metabolic Panel; Future  -     Magnesium; Future  -     TSH; Future  -     Vitamin B12; Future  -     T4, Free; Future  -     CBC & Differential  -     Comprehensive Metabolic Panel  -     Magnesium  -     TSH  -     Vitamin B12  -     T4, Free  -     CBC Auto Differential         Braulio # [id number]  Reviewed and is consistent.  S 12/5/2019 reviewed and is consistent.  Patient comfort assessment guide reviewed and updated today.    As part of the patient's treatment plan they are being prescribed a controlled substance/ substances with potential for abuse.  This patient has been made aware of the appropriate use of such medications, including potential risk of somnolence, limited ability to drive and/or work safely, and potential for overdose.  It has also been made clear these medications are for use by the patient only, without concomitant use of alcohol or other substances unless prescribed/advised by medical provider.    Patient has completed prescribing agreement detailing terms of continued prescribing of controlled substances including monitoring BRAULIO reports, urine drug screens, and pill counts.  The patient is aware BRAULIO  reports are reviewed on a regular basis and scanned into the chart.    History and physical exam exhibit continued safe and appropriate use of controlled substances.

## 2020-03-06 ENCOUNTER — TELEPHONE (OUTPATIENT)
Dept: FAMILY MEDICINE CLINIC | Facility: CLINIC | Age: 85
End: 2020-03-06

## 2020-03-06 LAB
ALBUMIN SERPL-MCNC: 4.4 G/DL (ref 3.5–5.2)
ALBUMIN/GLOB SERPL: 1.8 G/DL
ALP SERPL-CCNC: 66 U/L (ref 39–117)
ALT SERPL W P-5'-P-CCNC: 7 U/L (ref 1–33)
ANION GAP SERPL CALCULATED.3IONS-SCNC: 14 MMOL/L (ref 5–15)
AST SERPL-CCNC: 23 U/L (ref 1–32)
BASOPHILS # BLD AUTO: 0.02 10*3/MM3 (ref 0–0.2)
BASOPHILS NFR BLD AUTO: 0.3 % (ref 0–1.5)
BILIRUB SERPL-MCNC: 0.5 MG/DL (ref 0.2–1.2)
BUN BLD-MCNC: 12 MG/DL (ref 8–23)
BUN/CREAT SERPL: 11.3 (ref 7–25)
CALCIUM SPEC-SCNC: 9.4 MG/DL (ref 8.6–10.5)
CHLORIDE SERPL-SCNC: 100 MMOL/L (ref 98–107)
CO2 SERPL-SCNC: 27 MMOL/L (ref 22–29)
CREAT BLD-MCNC: 1.06 MG/DL (ref 0.57–1)
DEPRECATED RDW RBC AUTO: 44.5 FL (ref 37–54)
EOSINOPHIL # BLD AUTO: 0.08 10*3/MM3 (ref 0–0.4)
EOSINOPHIL NFR BLD AUTO: 1.3 % (ref 0.3–6.2)
ERYTHROCYTE [DISTWIDTH] IN BLOOD BY AUTOMATED COUNT: 12.9 % (ref 12.3–15.4)
GFR SERPL CREATININE-BSD FRML MDRD: 50 ML/MIN/1.73
GLOBULIN UR ELPH-MCNC: 2.4 GM/DL
GLUCOSE BLD-MCNC: 92 MG/DL (ref 65–99)
HCT VFR BLD AUTO: 39.2 % (ref 34–46.6)
HGB BLD-MCNC: 12.8 G/DL (ref 12–15.9)
IMM GRANULOCYTES # BLD AUTO: 0.02 10*3/MM3 (ref 0–0.05)
IMM GRANULOCYTES NFR BLD AUTO: 0.3 % (ref 0–0.5)
LYMPHOCYTES # BLD AUTO: 1.51 10*3/MM3 (ref 0.7–3.1)
LYMPHOCYTES NFR BLD AUTO: 25 % (ref 19.6–45.3)
MAGNESIUM SERPL-MCNC: 2.2 MG/DL (ref 1.6–2.4)
MCH RBC QN AUTO: 30.7 PG (ref 26.6–33)
MCHC RBC AUTO-ENTMCNC: 32.7 G/DL (ref 31.5–35.7)
MCV RBC AUTO: 94 FL (ref 79–97)
MONOCYTES # BLD AUTO: 0.63 10*3/MM3 (ref 0.1–0.9)
MONOCYTES NFR BLD AUTO: 10.4 % (ref 5–12)
NEUTROPHILS # BLD AUTO: 3.77 10*3/MM3 (ref 1.7–7)
NEUTROPHILS NFR BLD AUTO: 62.7 % (ref 42.7–76)
NRBC BLD AUTO-RTO: 0 /100 WBC (ref 0–0.2)
PLATELET # BLD AUTO: 169 10*3/MM3 (ref 140–450)
PMV BLD AUTO: 10.6 FL (ref 6–12)
POTASSIUM BLD-SCNC: 4 MMOL/L (ref 3.5–5.2)
PROT SERPL-MCNC: 6.8 G/DL (ref 6–8.5)
RBC # BLD AUTO: 4.17 10*6/MM3 (ref 3.77–5.28)
SODIUM BLD-SCNC: 141 MMOL/L (ref 136–145)
T4 FREE SERPL-MCNC: 1 NG/DL (ref 0.93–1.7)
TSH SERPL DL<=0.05 MIU/L-ACNC: 0.97 UIU/ML (ref 0.27–4.2)
VIT B12 BLD-MCNC: 626 PG/ML (ref 211–946)
WBC NRBC COR # BLD: 6.03 10*3/MM3 (ref 3.4–10.8)

## 2020-03-06 RX ORDER — LEVOCETIRIZINE DIHYDROCHLORIDE 5 MG/1
5 TABLET, FILM COATED ORAL EVERY EVENING
Qty: 30 TABLET | Refills: 5 | Status: SHIPPED | OUTPATIENT
Start: 2020-03-06 | End: 2021-03-03 | Stop reason: SINTOL

## 2020-03-06 NOTE — TELEPHONE ENCOUNTER
----- Message from TEODORA Red sent at 3/6/2020  8:28 AM EST -----  Her labs are stable at this time. Please let her know.       Stable letter mailed.

## 2020-03-30 DIAGNOSIS — M25.50 MULTIPLE JOINT PAIN: ICD-10-CM

## 2020-03-30 DIAGNOSIS — M79.7 FIBROMYALGIA: ICD-10-CM

## 2020-03-30 DIAGNOSIS — F41.9 ANXIETY: ICD-10-CM

## 2020-03-30 RX ORDER — TRAMADOL HYDROCHLORIDE 50 MG/1
50 TABLET ORAL 2 TIMES DAILY PRN
Qty: 60 TABLET | Refills: 0 | Status: SHIPPED | OUTPATIENT
Start: 2020-03-30 | End: 2020-04-28 | Stop reason: SDUPTHER

## 2020-03-30 RX ORDER — DIAZEPAM 2 MG/1
2 TABLET ORAL 2 TIMES DAILY PRN
Qty: 60 TABLET | Refills: 0 | Status: SHIPPED | OUTPATIENT
Start: 2020-03-30 | End: 2020-04-28 | Stop reason: SDUPTHER

## 2020-03-31 NOTE — TELEPHONE ENCOUNTER
[facility] # [id number]  Reviewed and is consistent.  [facility] 12/2019 reviewed and is consistent.  Patient comfort assessment guide reviewed and updated today.    As part of the patient's treatment plan they are being prescribed a controlled substance/ substances with potential for abuse.  This patient has been made aware of the appropriate use of such medications, including potential risk of somnolence, limited ability to drive and/or work safely, and potential for overdose.  It has also been made clear these medications are for use by the patient only, without concomitant use of alcohol or other substances unless prescribed/advised by medical provider.    Patient has completed prescribing agreement detailing terms of continued prescribing of controlled substances including monitoring BRAULIO reports, urine drug screens, and pill counts.  The patient is aware BRAULIO reports are reviewed on a regular basis and scanned into the chart.    History and physical exam exhibit continued safe and appropriate use of controlled substances.

## 2020-04-06 ENCOUNTER — TELEPHONE (OUTPATIENT)
Dept: FAMILY MEDICINE CLINIC | Facility: CLINIC | Age: 85
End: 2020-04-06

## 2020-04-06 NOTE — TELEPHONE ENCOUNTER
Patient called states she started getting sick Saturday she went to urgent care they checked her for strep and flu told her they thought it was the start of flu. She has been having a temp of 99,chills,coughing and a little bit of SOB. Please advise

## 2020-04-06 NOTE — TELEPHONE ENCOUNTER
She said they did not treat her she is requesting a antibiotic. She will call tomorrow with a update.

## 2020-04-06 NOTE — TELEPHONE ENCOUNTER
Did they treat her for flu?  Continue to watch her symptoms.  If her breathing worsens she needs to let us know tomorrow.  Have her call tomorrow with update on her symptoms please.

## 2020-04-06 NOTE — TELEPHONE ENCOUNTER
"Tonia called back and said to let you know she's \"pretty sick\" and hurting in her left lung.  She feels like she needs and antibiotic despite what you said earlier.    Please advise.   "

## 2020-04-07 RX ORDER — DOXYCYCLINE 100 MG/1
100 CAPSULE ORAL 2 TIMES DAILY
Qty: 20 CAPSULE | Refills: 0 | Status: SHIPPED | OUTPATIENT
Start: 2020-04-07 | End: 2020-06-03

## 2020-04-08 ENCOUNTER — TELEPHONE (OUTPATIENT)
Dept: FAMILY MEDICINE CLINIC | Facility: CLINIC | Age: 85
End: 2020-04-08

## 2020-04-08 NOTE — TELEPHONE ENCOUNTER
Patient called had a recent ER visit [facility] diagnosed with Pneumonia & pyuria per patient,i faxed a request  for her ER records/labs for your review,she wants someone to call if she needs to do anything else.

## 2020-04-08 NOTE — TELEPHONE ENCOUNTER
I had called her in an antibiotic yesterday.  Did they give her medication at her ER visit?  She needs to take any medication that they gave her.  She should not need two antibiotics.  Continue to follow their instructions from ER and observe her symptoms.     Spoke with patient,she reports she never filled the one you sent in her son made her go to the ER,they gave her Doxycycline,omnicef & zithromax,she is taking them as directed.

## 2020-04-08 NOTE — TELEPHONE ENCOUNTER
I had called her in an antibiotic yesterday.  Did they give her medication at her ER visit?  She needs to take any medication that they gave her.  She should not need two antibiotics.  Continue to follow their instructions from ER and observe her symptoms.

## 2020-04-14 RX ORDER — ROSUVASTATIN CALCIUM 10 MG/1
10 TABLET, COATED ORAL DAILY
Qty: 90 TABLET | Refills: 3 | Status: SHIPPED | OUTPATIENT
Start: 2020-04-14 | End: 2021-04-08 | Stop reason: SDUPTHER

## 2020-04-20 RX ORDER — POTASSIUM CHLORIDE 750 MG/1
TABLET, EXTENDED RELEASE ORAL
Qty: 30 TABLET | Refills: 5 | Status: SHIPPED | OUTPATIENT
Start: 2020-04-20 | End: 2020-11-20

## 2020-04-20 RX ORDER — DOXYCYCLINE 100 MG/1
CAPSULE ORAL
Qty: 20 CAPSULE | Refills: 0 | OUTPATIENT
Start: 2020-04-20

## 2020-04-21 ENCOUNTER — TELEPHONE (OUTPATIENT)
Dept: FAMILY MEDICINE CLINIC | Facility: CLINIC | Age: 85
End: 2020-04-21

## 2020-04-21 NOTE — TELEPHONE ENCOUNTER
PATIENT CALLED, STATES SHE HAS FINISHED ANTIBIOTICS. PATIENT STATES SHE IS STILL HAVING SOME PAIN IN LEFT SIDE LUNG WITH DEEP BREATHS. PATIENT WANTS TO KNOW IF SHE NEEDS MORE ANTIBIOTICS, OR IF SHE NEEDS TO DO ANOTHER CHEST XRAY TO CHECK ON PNEUMONIA.

## 2020-04-22 ENCOUNTER — HOSPITAL ENCOUNTER (OUTPATIENT)
Dept: GENERAL RADIOLOGY | Facility: HOSPITAL | Age: 85
Discharge: HOME | End: 2020-04-22
Admitting: NURSE PRACTITIONER

## 2020-04-22 DIAGNOSIS — J18.9 PNEUMONIA DUE TO INFECTIOUS ORGANISM, UNSPECIFIED LATERALITY, UNSPECIFIED PART OF LUNG: Primary | ICD-10-CM

## 2020-04-22 DIAGNOSIS — J18.9 PNEUMONIA DUE TO INFECTIOUS ORGANISM, UNSPECIFIED LATERALITY, UNSPECIFIED PART OF LUNG: ICD-10-CM

## 2020-04-22 PROCEDURE — 71046 X-RAY EXAM CHEST 2 VIEWS: CPT

## 2020-04-22 PROCEDURE — 71046 X-RAY EXAM CHEST 2 VIEWS: CPT | Performed by: RADIOLOGY

## 2020-04-23 ENCOUNTER — TELEPHONE (OUTPATIENT)
Dept: FAMILY MEDICINE CLINIC | Facility: CLINIC | Age: 85
End: 2020-04-23

## 2020-04-23 DIAGNOSIS — J18.9 PNEUMONIA DUE TO INFECTIOUS ORGANISM, UNSPECIFIED LATERALITY, UNSPECIFIED PART OF LUNG: Primary | ICD-10-CM

## 2020-04-23 RX ORDER — LEVOFLOXACIN 500 MG/1
500 TABLET, FILM COATED ORAL DAILY
Qty: 7 TABLET | Refills: 0 | Status: SHIPPED | OUTPATIENT
Start: 2020-04-23 | End: 2020-06-03

## 2020-04-23 RX ORDER — SACCHAROMYCES BOULARDII 250 MG
250 CAPSULE ORAL 2 TIMES DAILY
Qty: 60 CAPSULE | Refills: 0 | Status: SHIPPED | OUTPATIENT
Start: 2020-04-23 | End: 2020-12-08

## 2020-04-23 NOTE — PROGRESS NOTES
She had finished [facility]  Prior to that I had written her a prescription for doxycycline.  Did she take that prescription?  She had said before that she didn't fill it.   Her chest xray does show that she continues to have pneumonia.  She needs to take the doxycycline script I gave her.  Please let her know

## 2020-04-23 NOTE — TELEPHONE ENCOUNTER
----- Message from TEODORA Red sent at 4/23/2020  9:28 AM EDT -----  She had finished antibiotics that she got from [facility].  Prior to that I had written her a prescription for doxycycline.  Did she take that prescription?  She had said before that she didn't fill it.   Her chest xray does show that she continues to have pneumonia.  She needs to take the doxycycline script I gave her.  Please let her know    She said she has done took it she finished it about a week ago.She also took the cefdinir she got from [facility].

## 2020-04-28 DIAGNOSIS — M79.7 FIBROMYALGIA: ICD-10-CM

## 2020-04-28 DIAGNOSIS — M25.50 MULTIPLE JOINT PAIN: ICD-10-CM

## 2020-04-28 DIAGNOSIS — F41.9 ANXIETY: ICD-10-CM

## 2020-04-28 RX ORDER — DIAZEPAM 2 MG/1
2 TABLET ORAL 2 TIMES DAILY PRN
Qty: 60 TABLET | Refills: 0 | Status: SHIPPED | OUTPATIENT
Start: 2020-04-28 | End: 2020-06-03 | Stop reason: SDUPTHER

## 2020-04-28 RX ORDER — TRAMADOL HYDROCHLORIDE 50 MG/1
50 TABLET ORAL 2 TIMES DAILY PRN
Qty: 60 TABLET | Refills: 0 | Status: SHIPPED | OUTPATIENT
Start: 2020-04-28 | End: 2020-06-03 | Stop reason: SDUPTHER

## 2020-06-03 ENCOUNTER — OFFICE VISIT (OUTPATIENT)
Dept: FAMILY MEDICINE CLINIC | Facility: CLINIC | Age: 85
End: 2020-06-03

## 2020-06-03 VITALS
TEMPERATURE: 98.3 F | BODY MASS INDEX: 28.49 KG/M2 | WEIGHT: 171 LBS | OXYGEN SATURATION: 96 % | HEART RATE: 60 BPM | SYSTOLIC BLOOD PRESSURE: 108 MMHG | DIASTOLIC BLOOD PRESSURE: 58 MMHG | HEIGHT: 65 IN

## 2020-06-03 DIAGNOSIS — E55.9 VITAMIN D DEFICIENCY: ICD-10-CM

## 2020-06-03 DIAGNOSIS — R30.0 DYSURIA: ICD-10-CM

## 2020-06-03 DIAGNOSIS — I10 ESSENTIAL HYPERTENSION: ICD-10-CM

## 2020-06-03 DIAGNOSIS — I48.0 PAROXYSMAL ATRIAL FIBRILLATION (HCC): ICD-10-CM

## 2020-06-03 DIAGNOSIS — M25.50 MULTIPLE JOINT PAIN: ICD-10-CM

## 2020-06-03 DIAGNOSIS — K21.9 GASTROESOPHAGEAL REFLUX DISEASE, ESOPHAGITIS PRESENCE NOT SPECIFIED: ICD-10-CM

## 2020-06-03 DIAGNOSIS — E78.2 MIXED HYPERLIPIDEMIA: ICD-10-CM

## 2020-06-03 DIAGNOSIS — M79.7 FIBROMYALGIA: ICD-10-CM

## 2020-06-03 DIAGNOSIS — F41.9 ANXIETY: Primary | ICD-10-CM

## 2020-06-03 LAB
BILIRUB BLD-MCNC: NEGATIVE MG/DL
CLARITY, POC: CLEAR
COLOR UR: YELLOW
GLUCOSE UR STRIP-MCNC: NEGATIVE MG/DL
KETONES UR QL: NEGATIVE
LEUKOCYTE EST, POC: ABNORMAL
NITRITE UR-MCNC: NEGATIVE MG/ML
PH UR: 6.5 [PH] (ref 5–8)
PROT UR STRIP-MCNC: NEGATIVE MG/DL
RBC # UR STRIP: ABNORMAL /UL
SP GR UR: 1.01 (ref 1–1.03)
UROBILINOGEN UR QL: NORMAL

## 2020-06-03 PROCEDURE — 99214 OFFICE O/P EST MOD 30 MIN: CPT | Performed by: NURSE PRACTITIONER

## 2020-06-03 PROCEDURE — 87086 URINE CULTURE/COLONY COUNT: CPT | Performed by: NURSE PRACTITIONER

## 2020-06-03 PROCEDURE — 80053 COMPREHEN METABOLIC PANEL: CPT | Performed by: NURSE PRACTITIONER

## 2020-06-03 PROCEDURE — 81003 URINALYSIS AUTO W/O SCOPE: CPT | Performed by: NURSE PRACTITIONER

## 2020-06-03 PROCEDURE — 82306 VITAMIN D 25 HYDROXY: CPT | Performed by: NURSE PRACTITIONER

## 2020-06-03 RX ORDER — TRAMADOL HYDROCHLORIDE 50 MG/1
50 TABLET ORAL 2 TIMES DAILY PRN
Qty: 60 TABLET | Refills: 0 | Status: SHIPPED | OUTPATIENT
Start: 2020-06-03 | End: 2020-07-06 | Stop reason: SDUPTHER

## 2020-06-03 RX ORDER — DIAZEPAM 2 MG/1
2 TABLET ORAL 2 TIMES DAILY PRN
Qty: 60 TABLET | Refills: 0 | Status: SHIPPED | OUTPATIENT
Start: 2020-06-03 | End: 2020-07-06 | Stop reason: SDUPTHER

## 2020-06-03 NOTE — PROGRESS NOTES
Subjective   Tonia Ritter is a 84 y.o. female.     Chief Complaint: Follow-up and Hypertension    History of Present Illness   URI    This is a chronic (environmental allergies) problem. The current episode started more than 1 year ago. The problem has been unchanged. There has been no fever. Associated symptoms include rhinorrhea. She has tried antihistamine for the symptoms. The treatment provided mild relief.   Hypertension   This is a chronic problem. The current episode started more than 1 year ago. The problem is controlled. Pertinent negatives include no chest pain, palpitations, peripheral edema or shortness of breath. There are no associated agents to hypertension. Current antihypertension treatment includes diuretics, ACE inhibitors and beta blockers. The current treatment provides significant improvement. There are no compliance problems.  atrial fib; PPM.   Hyperlipidemia   This is a chronic problem. The current episode started more than 1 year ago. The problem is controlled. Factors aggravating her hyperlipidemia include beta blockers and fatty foods. Pertinent negatives include no chest pain or shortness of breath. Current antihyperlipidemic treatment includes statins. The current treatment provides significant improvement of lipids. Risk factors for coronary artery disease include hypertension, dyslipidemia, a sedentary lifestyle and post-menopausal.   Heartburn   She complains of heartburn. She reports no chest pain. This is a chronic problem. The current episode started more than 1 year ago. The problem occurs rarely. The problem has been waxing and waning. The heartburn does not wake her from sleep. The heartburn does not limit her activity. The heartburn doesn't change with position. The symptoms are aggravated by certain foods. She has tried a histamine-2 antagonist for the symptoms. The treatment provided significant relief.   Atrial Fibrillation   Presents for follow-up (pt continues to  follow with cardiologist, Dr Melendez) visit. Symptoms are negative for chest pain, palpitations and shortness of breath. The symptoms have been stable. Past medical history includes atrial fibrillation and hyperlipidemia.   Anxiety   Presents for follow-up visit. Symptoms include decreased concentration, irritability and nervous/anxious behavior. Symptoms occur occasionally. The severity of symptoms is moderate. The quality of sleep is good. Nighttime awakenings: occasional.  Compliance with medications is %.  Pt has taken diazepam for years and tolerates it well. Her symptoms are well controlled with the medication and without side effects. Pt is able to continue her ADLs with use of medication. She denies any suicidal thoughts or ideations.      Fibromyalgia   This is a chronic problem. The current episode started more than 1 year ago. The problem occurs daily. The problem has been unchanged. Associated symptoms include arthralgias and myalgias. Nothing aggravates the symptoms. She has tried acetaminophen, NSAIDs, oral narcotics and relaxation for the symptoms. The treatment provided moderate relief.   Pt has been taking Tramadol for her pain and is tolerating it well. Tramadol enables pt to continue with her ADLs and live her life without difficulty. She denies any side effects from the medication.         Family History   Problem Relation Age of Onset   • Thyroid disease Mother    • Mental illness Father         nervous breakdown   • Breast cancer Maternal Aunt        Social History     Socioeconomic History   • Marital status:      Spouse name: Not on file   • Number of children: Not on file   • Years of education: Not on file   • Highest education level: Not on file   Tobacco Use   • Smoking status: Never Smoker   • Smokeless tobacco: Never Used   Substance and Sexual Activity   • Alcohol use: No   • Drug use: No   • Sexual activity: Defer       Past Medical History:   Diagnosis Date   • Acute  "bronchitis    • Allergic rhinitis    • Anxiety    • Atrial fibrillation (CMS/[facility])    • Back pain    • Burping    • Chills (without fever) 3/25/2019   • Chronic pain    • Congestive heart failure (CMS/[facility])    • Depression    • Encounter for screening fecal occult blood testing 03/10/2016    NEGATIVE   • Fibromyalgia    • Flatulence    • GERD (gastroesophageal reflux disease)    • Hematuria    • Hyperlipidemia    • Hypertension    • Osteoarthritis    • Pacemaker    • PUD (peptic ulcer disease)    • Right shoulder pain        Review of Systems   HENT: Negative.    Respiratory: Negative.    Cardiovascular: Negative.    Gastrointestinal: Negative.    Musculoskeletal: Negative.    Skin: Negative.    Neurological: Negative.    Psychiatric/Behavioral: Negative.        Objective   Physical Exam   Constitutional: She is oriented to person, place, and time. She appears well-developed and well-nourished.   Neck: Normal range of motion. Neck supple.   Cardiovascular: Normal rate, regular rhythm and normal heart sounds.   Pulmonary/Chest: Effort normal and breath sounds normal.   Neurological: She is alert and oriented to person, place, and time.   Skin: Skin is warm and dry.   Psychiatric: She has a normal mood and affect. Her behavior is normal. Judgment and thought content normal.   Nursing note and vitals reviewed.      Procedures    Vitals: Blood pressure 108/58, pulse 60, temperature 98.3 °F (36.8 °C), height 165.1 cm (65\"), weight 77.6 kg (171 lb), SpO2 96 %, not currently breastfeeding.    Body mass index is 28.46 kg/m².     Allergies:   Allergies   Allergen Reactions   • Reglan [Metoclopramide]    • Sulfa Antibiotics         During this visit the following were done:  Labs Reviewed []    Labs Ordered []    Radiology Reports Reviewed []    Radiology Ordered []    PCP Records Reviewed []    Referring Provider Records Reviewed []    ER Records Reviewed []    Hospital Records Reviewed []    History Obtained From Family []  "   Radiology Images Reviewed []    Other Reviewed []    Records Requested []      Assessment/Plan   Tonia was seen today for follow-up and hypertension.    Diagnoses and all orders for this visit:    Anxiety   -     diazePAM (VALIUM) 2 MG tablet; Take 1 tablet by mouth 2 (Two) Times a Day As Needed for Anxiety.    Fibromyalgia   -     traMADol (ULTRAM) 50 MG tablet; Take 1 tablet by mouth 2 (Two) Times a Day As Needed for Moderate Pain .    Essential hypertension  -  Continue current medications.   Stable    Mixed hyperlipidemia   Continue current medications.     Paroxysmal atrial fibrillation (CMS/HCC)   Continue to follow with cardiologist    Vitamin D deficiency  -     Vitamin D 25 Hydroxy; Future     Multiple joint pain  -     traMADol (ULTRAM) 50 MG tablet; Take 1 tablet by mouth 2 (Two) Times a Day As Needed for Moderate Pain .           Braulio # [id number] Reviewed and is consistent.  S 3/5/2020 reviewed and is consistent.  Patient comfort assessment guide reviewed and updated today.    As part of the patient's treatment plan they are being prescribed a controlled substance/ substances with potential for abuse.  This patient has been made aware of the appropriate use of such medications, including potential risk of somnolence, limited ability to drive and/or work safely, and potential for overdose.  It has also been made clear these medications are for use by the patient only, without concomitant use of alcohol or other substances unless prescribed/advised by medical provider.    Patient has completed prescribing agreement detailing terms of continued prescribing of controlled substances including monitoring BRAULIO reports, urine drug screens, and pill counts.  The patient is aware BRAULIO reports are reviewed on a regular basis and scanned into the chart.    History and physical exam exhibit continued safe and appropriate use of controlled substances.

## 2020-06-04 ENCOUNTER — TELEPHONE (OUTPATIENT)
Dept: FAMILY MEDICINE CLINIC | Facility: CLINIC | Age: 85
End: 2020-06-04

## 2020-06-04 DIAGNOSIS — R94.4 DECREASED GFR: Primary | ICD-10-CM

## 2020-06-04 LAB
25(OH)D3 SERPL-MCNC: 21.9 NG/ML (ref 30–100)
ALBUMIN SERPL-MCNC: 4.3 G/DL (ref 3.5–5.2)
ALBUMIN/GLOB SERPL: 1.5 G/DL
ALP SERPL-CCNC: 63 U/L (ref 39–117)
ALT SERPL W P-5'-P-CCNC: 8 U/L (ref 1–33)
ANION GAP SERPL CALCULATED.3IONS-SCNC: 11.5 MMOL/L (ref 5–15)
AST SERPL-CCNC: 19 U/L (ref 1–32)
BACTERIA SPEC AEROBE CULT: NO GROWTH
BILIRUB SERPL-MCNC: 0.4 MG/DL (ref 0.2–1.2)
BUN BLD-MCNC: 14 MG/DL (ref 8–23)
BUN/CREAT SERPL: 12.7 (ref 7–25)
CALCIUM SPEC-SCNC: 9.3 MG/DL (ref 8.6–10.5)
CHLORIDE SERPL-SCNC: 103 MMOL/L (ref 98–107)
CO2 SERPL-SCNC: 28.5 MMOL/L (ref 22–29)
CREAT BLD-MCNC: 1.1 MG/DL (ref 0.57–1)
GFR SERPL CREATININE-BSD FRML MDRD: 48 ML/MIN/1.73
GLOBULIN UR ELPH-MCNC: 2.8 GM/DL
GLUCOSE BLD-MCNC: 97 MG/DL (ref 65–99)
POTASSIUM BLD-SCNC: 4 MMOL/L (ref 3.5–5.2)
PROT SERPL-MCNC: 7.1 G/DL (ref 6–8.5)
SODIUM BLD-SCNC: 143 MMOL/L (ref 136–145)

## 2020-06-04 RX ORDER — ERGOCALCIFEROL 1.25 MG/1
50000 CAPSULE ORAL WEEKLY
Qty: 8 CAPSULE | Refills: 0 | Status: SHIPPED | OUTPATIENT
Start: 2020-06-04 | End: 2020-07-24

## 2020-06-04 NOTE — TELEPHONE ENCOUNTER
----- Message from TEODORA Red sent at 6/4/2020  3:19 PM EDT -----  Her vitamin D is low and I have sent a prescription for weekly vitamin D to her pharmacy.  She is to take it once a week for 8 weeks.Her kidney function is also decreased.  It has been low in the past but now it is much lower than it was.  I do feel_   that she needs to follow with nephrology for an evaluation.  If she agreeable?      Spoke with patient & she verbalized understanding,she is agreeable to seeing Nephrology as long as it is local.

## 2020-07-06 ENCOUNTER — TELEPHONE (OUTPATIENT)
Dept: FAMILY MEDICINE CLINIC | Facility: CLINIC | Age: 85
End: 2020-07-06

## 2020-07-06 DIAGNOSIS — M25.50 MULTIPLE JOINT PAIN: ICD-10-CM

## 2020-07-06 DIAGNOSIS — F41.9 ANXIETY: ICD-10-CM

## 2020-07-06 DIAGNOSIS — M79.7 FIBROMYALGIA: ICD-10-CM

## 2020-07-06 RX ORDER — TRAMADOL HYDROCHLORIDE 50 MG/1
50 TABLET ORAL 2 TIMES DAILY PRN
Qty: 60 TABLET | Refills: 0 | Status: SHIPPED | OUTPATIENT
Start: 2020-07-06 | End: 2020-08-06 | Stop reason: SDUPTHER

## 2020-07-06 RX ORDER — DIAZEPAM 2 MG/1
2 TABLET ORAL 2 TIMES DAILY PRN
Qty: 60 TABLET | Refills: 0 | Status: SHIPPED | OUTPATIENT
Start: 2020-07-06 | End: 2020-08-06 | Stop reason: SDUPTHER

## 2020-07-06 NOTE — PROGRESS NOTES
[facility] [place] # [id number]  Reviewed and is consistent.  [facility] 6/2020 reviewed and is consistent.  Patient comfort assessment guide reviewed and updated today.    As part of the patient's treatment plan they are being prescribed a controlled substance/ substances with potential for abuse.  This patient has been made aware of the appropriate use of such medications, including potential risk of somnolence, limited ability to drive and/or work safely, and potential for overdose.  It has also been made clear these medications are for use by the patient only, without concomitant use of alcohol or other substances unless prescribed/advised by medical provider.    Patient has completed prescribing agreement detailing terms of continued prescribing of controlled substances including monitoring BRAULIO reports, urine drug screens, and pill counts.  The patient is aware BRAULIO reports are reviewed on a regular basis and scanned into the chart.    History and physical exam exhibit continued safe and appropriate use of controlled substances.

## 2020-07-23 ENCOUNTER — TRANSCRIBE ORDERS (OUTPATIENT)
Dept: ADMINISTRATIVE | Facility: HOSPITAL | Age: 85
End: 2020-07-23

## 2020-07-23 DIAGNOSIS — N18.9 CHRONIC KIDNEY DISEASE, UNSPECIFIED CKD STAGE: Primary | ICD-10-CM

## 2020-08-06 ENCOUNTER — OFFICE VISIT (OUTPATIENT)
Dept: FAMILY MEDICINE CLINIC | Facility: CLINIC | Age: 85
End: 2020-08-06

## 2020-08-06 VITALS
WEIGHT: 172.2 LBS | OXYGEN SATURATION: 98 % | HEIGHT: 65 IN | BODY MASS INDEX: 28.69 KG/M2 | HEART RATE: 80 BPM | DIASTOLIC BLOOD PRESSURE: 70 MMHG | TEMPERATURE: 96.9 F | SYSTOLIC BLOOD PRESSURE: 119 MMHG

## 2020-08-06 DIAGNOSIS — L98.9 SKIN LESION: ICD-10-CM

## 2020-08-06 DIAGNOSIS — M79.7 FIBROMYALGIA: ICD-10-CM

## 2020-08-06 DIAGNOSIS — M25.50 MULTIPLE JOINT PAIN: ICD-10-CM

## 2020-08-06 DIAGNOSIS — H61.21 IMPACTED CERUMEN OF RIGHT EAR: Primary | ICD-10-CM

## 2020-08-06 DIAGNOSIS — F41.9 ANXIETY: ICD-10-CM

## 2020-08-06 PROCEDURE — 69209 REMOVE IMPACTED EAR WAX UNI: CPT | Performed by: NURSE PRACTITIONER

## 2020-08-06 PROCEDURE — 99213 OFFICE O/P EST LOW 20 MIN: CPT | Performed by: NURSE PRACTITIONER

## 2020-08-06 RX ORDER — TRAMADOL HYDROCHLORIDE 50 MG/1
50 TABLET ORAL 2 TIMES DAILY PRN
Qty: 60 TABLET | Refills: 0 | Status: SHIPPED | OUTPATIENT
Start: 2020-08-06 | End: 2020-09-02 | Stop reason: SDUPTHER

## 2020-08-06 RX ORDER — SOTALOL HYDROCHLORIDE 120 MG/1
TABLET ORAL
Qty: 180 EACH | Refills: 1 | Status: SHIPPED | OUTPATIENT
Start: 2020-08-06 | End: 2021-07-08 | Stop reason: SDUPTHER

## 2020-08-06 RX ORDER — DIAZEPAM 2 MG/1
2 TABLET ORAL 2 TIMES DAILY PRN
Qty: 60 TABLET | Refills: 0 | Status: SHIPPED | OUTPATIENT
Start: 2020-08-06 | End: 2020-09-02 | Stop reason: SDUPTHER

## 2020-08-06 NOTE — PROGRESS NOTES
Subjective   Tonia Ritter is a 84 y.o. female.     Chief Complaint: Sore on head.    Abrasion   This is a new problem. The current episode started 1 to 4 weeks ago. The problem occurs constantly. The problem has been unchanged. Associated symptoms comments: Small area and scalp that is tender. Nothing aggravates the symptoms. She has tried nothing for the symptoms.   Ear Fullness    There is pain in the right ear. This is a new problem. The current episode started in the past 7 days. The problem occurs constantly. The problem has been unchanged. There has been no fever. The patient is experiencing no pain. She has tried nothing for the symptoms.        Family History   Problem Relation Age of Onset   • Thyroid disease Mother    • Mental illness Father         nervous breakdown   • Breast cancer Maternal Aunt        Social History     Socioeconomic History   • Marital status:      Spouse name: Not on file   • Number of children: Not on file   • Years of education: Not on file   • Highest education level: Not on file   Tobacco Use   • Smoking status: Never Smoker   • Smokeless tobacco: Never Used   Substance and Sexual Activity   • Alcohol use: No   • Drug use: No   • Sexual activity: Defer       Past Medical History:   Diagnosis Date   • Acute bronchitis    • Allergic rhinitis    • Anxiety    • Atrial fibrillation (CMS/HCC)    • Back pain    • Burping    • Chills (without fever) 3/25/2019   • Chronic pain    • Congestive heart failure (CMS/HCC)    • Depression    • Encounter for screening fecal occult blood testing 03/10/2016    NEGATIVE   • Fibromyalgia    • Flatulence    • GERD (gastroesophageal reflux disease)    • Hematuria    • Hyperlipidemia    • Hypertension    • Osteoarthritis    • Pacemaker    • PUD (peptic ulcer disease)    • Right shoulder pain        Review of Systems   Constitutional: Negative.    HENT: Negative.    Respiratory: Negative.    Cardiovascular: Negative.    Gastrointestinal:  "Negative.    Musculoskeletal: Negative.    Skin: Negative.    Neurological: Negative.    Psychiatric/Behavioral: Negative.        Objective   Physical Exam   Constitutional: She is oriented to person, place, and time. She appears well-developed and well-nourished.   HENT:   Cerumen impaction right ear canal   Neck: Normal range of motion. Neck supple.   Cardiovascular: Normal rate, regular rhythm and normal heart sounds.   Pulmonary/Chest: Effort normal and breath sounds normal.   Neurological: She is alert and oriented to person, place, and time.   Skin: Skin is warm and dry.   Pea-sized excoriated area to scalp   Psychiatric: She has a normal mood and affect. Her behavior is normal. Judgment and thought content normal.   Nursing note and vitals reviewed.      Ear Cerumen Removal  Date/Time: 8/6/2020 6:14 PM  Performed by: Gail Kessler APRN  Authorized by: Gail Kessler APRN   Consent: Verbal consent obtained.  Risks and benefits: risks, benefits and alternatives were discussed  Consent given by: patient  Patient understanding: patient states understanding of the procedure being performed  Patient identity confirmed: verbally with patient    Anesthesia:  Local Anesthetic: none  Location details: right ear  Patient tolerance: Patient tolerated the procedure well with no immediate complications  Procedure type: irrigation   Sedation:  Patient sedated: no            Vitals: Blood pressure 119/70, pulse 80, temperature 96.9 °F (36.1 °C), height 165.1 cm (65\"), weight 78.1 kg (172 lb 3.2 oz), SpO2 98 %, not currently breastfeeding.    Body mass index is 28.66 kg/m².     Allergies:   Allergies   Allergen Reactions   • Reglan [Metoclopramide]    • Sulfa Antibiotics         During this visit the following were done:  Labs Reviewed []    Labs Ordered []    Radiology Reports Reviewed []    Radiology Ordered []    PCP Records Reviewed []    Referring Provider Records Reviewed []    ER Records Reviewed []  "   Hospital Records Reviewed []    History Obtained From Family []    Radiology Images Reviewed []    Other Reviewed []    Records Requested []      Assessment/Plan   Tonia was seen today for sore on head..    Diagnoses and all orders for this visit:    Impacted cerumen of right ear  -     Ear Cerumen Removal    Skin lesion  -     mupirocin (Bactroban) 2 % ointment; Apply  topically to the appropriate area as directed 3 (Three) Times a Day.

## 2020-09-02 ENCOUNTER — OFFICE VISIT (OUTPATIENT)
Dept: FAMILY MEDICINE CLINIC | Facility: CLINIC | Age: 85
End: 2020-09-02

## 2020-09-02 VITALS
SYSTOLIC BLOOD PRESSURE: 98 MMHG | DIASTOLIC BLOOD PRESSURE: 56 MMHG | BODY MASS INDEX: 28.32 KG/M2 | TEMPERATURE: 97.7 F | OXYGEN SATURATION: 97 % | WEIGHT: 170 LBS | HEIGHT: 65 IN | HEART RATE: 81 BPM

## 2020-09-02 DIAGNOSIS — F41.9 ANXIETY: ICD-10-CM

## 2020-09-02 DIAGNOSIS — M25.50 MULTIPLE JOINT PAIN: ICD-10-CM

## 2020-09-02 DIAGNOSIS — I10 ESSENTIAL HYPERTENSION: Primary | ICD-10-CM

## 2020-09-02 DIAGNOSIS — E78.2 MIXED HYPERLIPIDEMIA: ICD-10-CM

## 2020-09-02 DIAGNOSIS — I48.0 PAROXYSMAL ATRIAL FIBRILLATION (HCC): ICD-10-CM

## 2020-09-02 DIAGNOSIS — M79.7 FIBROMYALGIA: ICD-10-CM

## 2020-09-02 DIAGNOSIS — K21.9 GASTROESOPHAGEAL REFLUX DISEASE, ESOPHAGITIS PRESENCE NOT SPECIFIED: ICD-10-CM

## 2020-09-02 PROCEDURE — 99214 OFFICE O/P EST MOD 30 MIN: CPT | Performed by: NURSE PRACTITIONER

## 2020-09-02 PROCEDURE — 80053 COMPREHEN METABOLIC PANEL: CPT | Performed by: NURSE PRACTITIONER

## 2020-09-02 PROCEDURE — 85025 COMPLETE CBC W/AUTO DIFF WBC: CPT | Performed by: NURSE PRACTITIONER

## 2020-09-02 RX ORDER — DIAZEPAM 2 MG/1
2 TABLET ORAL 2 TIMES DAILY PRN
Qty: 60 TABLET | Refills: 0 | Status: SHIPPED | OUTPATIENT
Start: 2020-09-02 | End: 2020-10-13

## 2020-09-02 RX ORDER — TRAMADOL HYDROCHLORIDE 50 MG/1
50 TABLET ORAL 2 TIMES DAILY PRN
Qty: 60 TABLET | Refills: 0 | Status: SHIPPED | OUTPATIENT
Start: 2020-09-02 | End: 2020-10-13 | Stop reason: SDUPTHER

## 2020-09-02 NOTE — PROGRESS NOTES
Subjective   Tonia Ritter is a 84 y.o. female.     Chief Complaint: Follow-up and Anxiety    History of Present Illness   Hypertension   This is a chronic problem. The current episode started more than 1 year ago. The problem is controlled. Pertinent negatives include no chest pain, palpitations, peripheral edema or shortness of breath. There are no associated agents to hypertension. Current antihypertension treatment includes diuretics, ACE inhibitors and beta blockers. The current treatment provides significant improvement. There are no compliance problems.  atrial fib; PPM.   Hyperlipidemia   This is a chronic problem. The current episode started more than 1 year ago. The problem is controlled. Factors aggravating her hyperlipidemia include beta blockers and fatty foods. Pertinent negatives include no chest pain or shortness of breath. Current antihyperlipidemic treatment includes statins. The current treatment provides significant improvement of lipids. Risk factors for coronary artery disease include hypertension, dyslipidemia, a sedentary lifestyle and post-menopausal.   Heartburn   She complains of heartburn. She reports no chest pain. This is a chronic problem. The current episode started more than 1 year ago. The problem occurs rarely. The problem has been waxing and waning. The heartburn does not wake her from sleep. The heartburn does not limit her activity. The heartburn doesn't change with position. The symptoms are aggravated by certain foods. She has tried a histamine-2 antagonist for the symptoms. The treatment provided significant relief.   Atrial Fibrillation   Presents for follow-up (pt continues to follow with cardiologist, Dr Melendez) visit. Symptoms are negative for chest pain, palpitations and shortness of breath. The symptoms have been stable. Past medical history includes atrial fibrillation and hyperlipidemia.   Anxiety   Presents for follow-up visit. Symptoms include decreased  concentration, irritability and nervous/anxious behavior. Symptoms occur occasionally. The severity of symptoms is moderate. The quality of sleep is good. Nighttime awakenings: occasional.  Compliance with medications is %.  Pt has taken diazepam for years and tolerates it well. Her symptoms are well controlled with the medication and without side effects. Pt is able to continue her ADLs with use of medication. She denies any suicidal thoughts or ideations. Fibromyalgia   This is a chronic problem. The current episode started more than 1 year ago. The problem occurs daily. The problem has been unchanged. Associated symptoms include arthralgias and myalgias. Nothing aggravates the symptoms. She has tried acetaminophen, NSAIDs, oral narcotics and relaxation for the symptoms. The treatment provided moderate relief. Pt has been taking Tramadol for her pain and is tolerating it well. Tramadol enables pt to continue with her ADLs and live her life without difficulty. She denies any side effects from the medication.       Family History   Problem Relation Age of Onset   • Thyroid disease Mother    • Mental illness Father         nervous breakdown   • Breast cancer Maternal Aunt        Social History     Socioeconomic History   • Marital status:      Spouse name: Not on file   • Number of children: Not on file   • Years of education: Not on file   • Highest education level: Not on file   Tobacco Use   • Smoking status: Never Smoker   • Smokeless tobacco: Never Used   Substance and Sexual Activity   • Alcohol use: No   • Drug use: No   • Sexual activity: Defer       Past Medical History:   Diagnosis Date   • Acute bronchitis    • Allergic rhinitis    • Anxiety    • Atrial fibrillation (CMS/HCC)    • Back pain    • Burping    • Chills (without fever) 3/25/2019   • Chronic pain    • Congestive heart failure (CMS/HCC)    • Depression    • Encounter for screening fecal occult blood testing 03/10/2016    NEGATIVE   •  "Fibromyalgia    • Flatulence    • GERD (gastroesophageal reflux disease)    • Hematuria    • Hyperlipidemia    • Hypertension    • Osteoarthritis    • Pacemaker    • PUD (peptic ulcer disease)    • Right shoulder pain        Review of Systems   Constitutional: Negative.    HENT: Negative.    Respiratory: Negative.    Cardiovascular: Negative.    Gastrointestinal: Negative.    Musculoskeletal: Negative.    Skin: Negative.    Neurological: Negative.    Psychiatric/Behavioral: Negative.        Objective   Physical Exam   Constitutional: She is oriented to person, place, and time. She appears well-developed and well-nourished.   Neck: Normal range of motion. Neck supple.   Cardiovascular: Normal rate, regular rhythm and normal heart sounds.   Pulmonary/Chest: Effort normal and breath sounds normal.   Neurological: She is alert and oriented to person, place, and time.   Skin: Skin is warm and dry.   Psychiatric: She has a normal mood and affect. Her behavior is normal. Judgment and thought content normal.   Nursing note and vitals reviewed.      Procedures    Vitals: Blood pressure 98/56, pulse 81, temperature 97.7 °F (36.5 °C), height 165.1 cm (65\"), weight 77.1 kg (170 lb), SpO2 97 %, not currently breastfeeding.    Body mass index is 28.29 kg/m².     Allergies:   Allergies   Allergen Reactions   • Reglan [Metoclopramide]    • Sulfa Antibiotics         During this visit the following were done:  Labs Reviewed []    Labs Ordered []    Radiology Reports Reviewed []    Radiology Ordered []    PCP Records Reviewed []    Referring Provider Records Reviewed []    ER Records Reviewed []    Hospital Records Reviewed []    History Obtained From Family []    Radiology Images Reviewed []    Other Reviewed []    Records Requested []      Assessment/Plan   Tonia was seen today for follow-up and anxiety.    Diagnoses and all orders for this visit:    Essential hypertension  -     CBC & Differential; Future  -     Comprehensive " Metabolic Panel; Future  -     CBC & Differential  -     Comprehensive Metabolic Panel  -     CBC Auto Differential    Mixed hyperlipidemia   Stable.  Continue crestor    Gastroesophageal reflux disease, esophagitis presence not specified   Stable.      Paroxysmal atrial fibrillation (CMS/HCC)   Stable.  Continue to follow with cardiology    Anxiety  -     Continue diazePAM (VALIUM) 2 MG tablet; Take 1 tablet by mouth 2 (Two) Times a Day As Needed for Anxiety.    Fibromyalgia  -     Continue traMADol (ULTRAM) 50 MG tablet; Take 1 tablet by mouth 2 (Two) Times a Day As Needed for Moderate Pain .      Braulio # [id number] Reviewed and is consistent.  UDS 6/4/2020 reviewed and is consistent.  Patient comfort assessment guide reviewed and updated today.    As part of the patient's treatment plan they are being prescribed a controlled substance/ substances with potential for abuse.  This patient has been made aware of the appropriate use of such medications, including potential risk of somnolence, limited ability to drive and/or work safely, and potential for overdose.  It has also been made clear these medications are for use by the patient only, without concomitant use of alcohol or other substances unless prescribed/advised by medical provider.    Patient has completed prescribing agreement detailing terms of continued prescribing of controlled substances including monitoring BRAULIO reports, urine drug screens, and pill counts.  The patient is aware BRAULIO reports are reviewed on a regular basis and scanned into the chart.    History and physical exam exhibit continued safe and appropriate use of controlled substances.

## 2020-09-03 ENCOUNTER — TELEPHONE (OUTPATIENT)
Dept: FAMILY MEDICINE CLINIC | Facility: CLINIC | Age: 85
End: 2020-09-03

## 2020-09-03 LAB
ALBUMIN SERPL-MCNC: 4.4 G/DL (ref 3.5–5.2)
ALBUMIN/GLOB SERPL: 1.5 G/DL
ALP SERPL-CCNC: 77 U/L (ref 39–117)
ALT SERPL W P-5'-P-CCNC: 7 U/L (ref 1–33)
ANION GAP SERPL CALCULATED.3IONS-SCNC: 9.9 MMOL/L (ref 5–15)
AST SERPL-CCNC: 23 U/L (ref 1–32)
BASOPHILS # BLD AUTO: 0.03 10*3/MM3 (ref 0–0.2)
BASOPHILS NFR BLD AUTO: 0.6 % (ref 0–1.5)
BILIRUB SERPL-MCNC: 0.4 MG/DL (ref 0–1.2)
BUN SERPL-MCNC: 13 MG/DL (ref 8–23)
BUN/CREAT SERPL: 13.5 (ref 7–25)
CALCIUM SPEC-SCNC: 9.6 MG/DL (ref 8.6–10.5)
CHLORIDE SERPL-SCNC: 102 MMOL/L (ref 98–107)
CO2 SERPL-SCNC: 29.1 MMOL/L (ref 22–29)
CREAT SERPL-MCNC: 0.96 MG/DL (ref 0.57–1)
DEPRECATED RDW RBC AUTO: 42.2 FL (ref 37–54)
EOSINOPHIL # BLD AUTO: 0.1 10*3/MM3 (ref 0–0.4)
EOSINOPHIL NFR BLD AUTO: 1.9 % (ref 0.3–6.2)
ERYTHROCYTE [DISTWIDTH] IN BLOOD BY AUTOMATED COUNT: 12.6 % (ref 12.3–15.4)
GFR SERPL CREATININE-BSD FRML MDRD: 56 ML/MIN/1.73
GLOBULIN UR ELPH-MCNC: 2.9 GM/DL
GLUCOSE SERPL-MCNC: 83 MG/DL (ref 65–99)
HCT VFR BLD AUTO: 39.5 % (ref 34–46.6)
HGB BLD-MCNC: 12.7 G/DL (ref 12–15.9)
IMM GRANULOCYTES # BLD AUTO: 0.01 10*3/MM3 (ref 0–0.05)
IMM GRANULOCYTES NFR BLD AUTO: 0.2 % (ref 0–0.5)
LYMPHOCYTES # BLD AUTO: 1.43 10*3/MM3 (ref 0.7–3.1)
LYMPHOCYTES NFR BLD AUTO: 26.7 % (ref 19.6–45.3)
MCH RBC QN AUTO: 29.6 PG (ref 26.6–33)
MCHC RBC AUTO-ENTMCNC: 32.2 G/DL (ref 31.5–35.7)
MCV RBC AUTO: 92.1 FL (ref 79–97)
MONOCYTES # BLD AUTO: 0.66 10*3/MM3 (ref 0.1–0.9)
MONOCYTES NFR BLD AUTO: 12.3 % (ref 5–12)
NEUTROPHILS NFR BLD AUTO: 3.13 10*3/MM3 (ref 1.7–7)
NEUTROPHILS NFR BLD AUTO: 58.3 % (ref 42.7–76)
NRBC BLD AUTO-RTO: 0 /100 WBC (ref 0–0.2)
PLATELET # BLD AUTO: 170 10*3/MM3 (ref 140–450)
PMV BLD AUTO: 11 FL (ref 6–12)
POTASSIUM SERPL-SCNC: 4.2 MMOL/L (ref 3.5–5.2)
PROT SERPL-MCNC: 7.3 G/DL (ref 6–8.5)
RBC # BLD AUTO: 4.29 10*6/MM3 (ref 3.77–5.28)
SODIUM SERPL-SCNC: 141 MMOL/L (ref 136–145)
WBC # BLD AUTO: 5.36 10*3/MM3 (ref 3.4–10.8)

## 2020-09-03 NOTE — TELEPHONE ENCOUNTER
----- Message from TEODORA Red sent at 9/3/2020  1:35 PM EDT -----  Her labs appear to be improved from previous labs.  Her kidney function has improved also.  Please let her know.    No answer no voicemail.    Patient returned call and notified of lab results.

## 2020-09-03 NOTE — PROGRESS NOTES
Her labs appear to be improved from previous labs.  Her kidney function has improved also.  Please let her know.

## 2020-09-11 ENCOUNTER — HOSPITAL ENCOUNTER (OUTPATIENT)
Dept: ULTRASOUND IMAGING | Facility: HOSPITAL | Age: 85
Discharge: HOME | End: 2020-09-11
Admitting: INTERNAL MEDICINE

## 2020-09-11 DIAGNOSIS — N18.9 CHRONIC KIDNEY DISEASE, UNSPECIFIED CKD STAGE: ICD-10-CM

## 2020-09-11 PROCEDURE — 76775 US EXAM ABDO BACK WALL LIM: CPT

## 2020-09-11 PROCEDURE — 76775 US EXAM ABDO BACK WALL LIM: CPT | Performed by: RADIOLOGY

## 2020-09-17 ENCOUNTER — APPOINTMENT (OUTPATIENT)
Dept: ULTRASOUND IMAGING | Facility: HOSPITAL | Age: 85
End: 2020-09-17

## 2020-09-25 RX ORDER — LISINOPRIL 5 MG/1
5 TABLET ORAL DAILY
Qty: 90 TABLET | Refills: 1 | Status: SHIPPED | OUTPATIENT
Start: 2020-09-25 | End: 2021-01-22 | Stop reason: SDUPTHER

## 2020-10-12 DIAGNOSIS — F41.9 ANXIETY: ICD-10-CM

## 2020-10-13 DIAGNOSIS — M79.7 FIBROMYALGIA: ICD-10-CM

## 2020-10-13 DIAGNOSIS — M25.50 MULTIPLE JOINT PAIN: ICD-10-CM

## 2020-10-13 RX ORDER — DIAZEPAM 2 MG/1
TABLET ORAL
Qty: 60 TABLET | Refills: 0 | Status: SHIPPED | OUTPATIENT
Start: 2020-10-13 | End: 2020-11-17 | Stop reason: SDUPTHER

## 2020-10-13 RX ORDER — TRAMADOL HYDROCHLORIDE 50 MG/1
50 TABLET ORAL 2 TIMES DAILY PRN
Qty: 60 TABLET | Refills: 0 | Status: SHIPPED | OUTPATIENT
Start: 2020-10-13 | End: 2020-11-17 | Stop reason: SDUPTHER

## 2020-10-13 RX ORDER — RIVAROXABAN 20 MG/1
TABLET, FILM COATED ORAL
Qty: 90 TABLET | Refills: 3 | Status: SHIPPED | OUTPATIENT
Start: 2020-10-13 | End: 2021-07-08 | Stop reason: SDUPTHER

## 2020-10-27 RX ORDER — FUROSEMIDE 20 MG/1
20 TABLET ORAL DAILY
Qty: 90 TABLET | Refills: 3 | Status: SHIPPED | OUTPATIENT
Start: 2020-10-27 | End: 2020-10-29 | Stop reason: SDUPTHER

## 2020-10-29 RX ORDER — FUROSEMIDE 20 MG/1
20 TABLET ORAL DAILY
Qty: 90 TABLET | Refills: 3 | Status: SHIPPED | OUTPATIENT
Start: 2020-10-29 | End: 2021-04-08 | Stop reason: SDUPTHER

## 2020-11-17 DIAGNOSIS — F41.9 ANXIETY: ICD-10-CM

## 2020-11-17 DIAGNOSIS — M79.7 FIBROMYALGIA: ICD-10-CM

## 2020-11-17 DIAGNOSIS — M25.50 MULTIPLE JOINT PAIN: ICD-10-CM

## 2020-11-17 RX ORDER — DIAZEPAM 2 MG/1
2 TABLET ORAL 2 TIMES DAILY PRN
Qty: 60 TABLET | Refills: 0 | Status: SHIPPED | OUTPATIENT
Start: 2020-11-17 | End: 2020-12-23 | Stop reason: SDUPTHER

## 2020-11-17 RX ORDER — TRAMADOL HYDROCHLORIDE 50 MG/1
50 TABLET ORAL 2 TIMES DAILY PRN
Qty: 60 TABLET | Refills: 0 | Status: SHIPPED | OUTPATIENT
Start: 2020-11-17 | End: 2020-12-22

## 2020-11-20 RX ORDER — POTASSIUM CHLORIDE 750 MG/1
TABLET, EXTENDED RELEASE ORAL
Qty: 90 TABLET | Refills: 3 | Status: SHIPPED | OUTPATIENT
Start: 2020-11-20 | End: 2021-07-08 | Stop reason: SDUPTHER

## 2020-12-08 ENCOUNTER — OFFICE VISIT (OUTPATIENT)
Dept: FAMILY MEDICINE CLINIC | Facility: CLINIC | Age: 85
End: 2020-12-08

## 2020-12-08 VITALS
SYSTOLIC BLOOD PRESSURE: 118 MMHG | WEIGHT: 168.6 LBS | TEMPERATURE: 96.8 F | HEART RATE: 80 BPM | BODY MASS INDEX: 28.09 KG/M2 | DIASTOLIC BLOOD PRESSURE: 68 MMHG | HEIGHT: 65 IN | OXYGEN SATURATION: 97 %

## 2020-12-08 DIAGNOSIS — Z00.00 MEDICARE ANNUAL WELLNESS VISIT, SUBSEQUENT: Primary | ICD-10-CM

## 2020-12-08 DIAGNOSIS — E78.2 MIXED HYPERLIPIDEMIA: ICD-10-CM

## 2020-12-08 DIAGNOSIS — E78.5 HYPERLIPIDEMIA, UNSPECIFIED HYPERLIPIDEMIA TYPE: ICD-10-CM

## 2020-12-08 DIAGNOSIS — E55.9 VITAMIN D DEFICIENCY: ICD-10-CM

## 2020-12-08 DIAGNOSIS — M79.7 FIBROMYALGIA: ICD-10-CM

## 2020-12-08 DIAGNOSIS — J30.1 ALLERGIC RHINITIS DUE TO POLLEN, UNSPECIFIED SEASONALITY: ICD-10-CM

## 2020-12-08 DIAGNOSIS — F41.9 ANXIETY: ICD-10-CM

## 2020-12-08 DIAGNOSIS — I10 ESSENTIAL HYPERTENSION: ICD-10-CM

## 2020-12-08 DIAGNOSIS — I48.0 PAROXYSMAL ATRIAL FIBRILLATION (HCC): ICD-10-CM

## 2020-12-08 PROCEDURE — G0439 PPPS, SUBSEQ VISIT: HCPCS | Performed by: NURSE PRACTITIONER

## 2020-12-08 RX ORDER — CEPHALEXIN 500 MG/1
500 CAPSULE ORAL 2 TIMES DAILY
COMMUNITY
Start: 2020-11-24 | End: 2021-02-11

## 2020-12-08 NOTE — PROGRESS NOTES
Subsequent Medicare Wellness Visit   The ABC's of the Annual Wellness Visit    Chief Complaint   Patient presents with   • Headache     sharp/shooting pain       HPI:  Tonia Ritter, -1935, is a 85 y.o. female who presents for a Subsequent Medicare Wellness Visit.    Recent Hospitalizations:  No hospitalization(s) within the last year..    Current Medical Providers:  Patient Care Team:  Gail Kessler APRN as PCP - General (Family Medicine)    Health Habits and Functional and Cognitive Screening and Depression Screening:  Functional & Cognitive Status 2020   Do you have difficulty preparing food and eating? No   Do you have difficulty bathing yourself, getting dressed or grooming yourself? No   Do you have difficulty using the toilet? No   Do you have difficulty moving around from place to place? No   Do you have trouble with steps or getting out of a bed or a chair? No   Current Diet Frequent Junk Food   Dental Exam Up to date   Eye Exam Up to date   Exercise (times per week) 0 times per week   Current Exercise Activities Include None   Do you need help using the phone?  No   Are you deaf or do you have serious difficulty hearing?  No   Do you need help with transportation? No   Do you need help shopping? No   Do you need help preparing meals?  No   Do you need help with housework?  No   Do you need help with laundry? No   Do you need help taking your medications? No   Do you need help managing money? No   Do you ever drive or ride in a car without wearing a seat belt? No   Have you felt unusual stress, anger or loneliness in the last month? -   Who do you live with? -   If you need help, do you have trouble finding someone available to you? -   Have you been bothered in the last four weeks by sexual problems? -   Do you have difficulty concentrating, remembering or making decisions? -       Compared to one year ago, the patient feels her physical health is the same and her mental health is  the same.    Depression Screen:  PHQ-2/PHQ-9 Depression Screening 12/8/2020   Little interest or pleasure in doing things 0   Feeling down, depressed, or hopeless 0   Total Score 0         Past Medical/Family/Social History:  The following portions of the patient's history were reviewed and updated as appropriate: allergies, current medications, past family history, past medical history, past social history, past surgical history and problem list.    Allergies   Allergen Reactions   • Reglan [Metoclopramide]    • Sulfa Antibiotics          Current Outpatient Medications:   •  acetaminophen (TYLENOL) 500 MG tablet, Take 500 mg by mouth every 6 (six) hours as needed for mild pain (1-3)., Disp: , Rfl:   •  cephalexin (KEFLEX) 500 MG capsule, Take 500 mg by mouth 2 (two) times a day., Disp: , Rfl:   •  diazePAM (VALIUM) 2 MG tablet, Take 1 tablet by mouth 2 (Two) Times a Day As Needed for Anxiety., Disp: 60 tablet, Rfl: 0  •  furosemide (LASIX) 20 MG tablet, Take 1 tablet by mouth Daily., Disp: 90 tablet, Rfl: 3  •  levocetirizine (XYZAL) 5 MG tablet, Take 1 tablet by mouth Every Evening., Disp: 30 tablet, Rfl: 5  •  lisinopril (PRINIVIL,ZESTRIL) 5 MG tablet, Take 1 tablet by mouth Daily., Disp: 90 tablet, Rfl: 1  •  mometasone (NASONEX) 50 MCG/ACT nasal spray, 2 sprays into the nostril(s) as directed by provider Daily., Disp: 17 g, Rfl: 5  •  mupirocin (Bactroban) 2 % ointment, Apply  topically to the appropriate area as directed 3 (Three) Times a Day., Disp: 30 g, Rfl: 0  •  PARoxetine (PAXIL) 10 MG tablet, Take 1 tablet by mouth Every Night., Disp: 90 tablet, Rfl: 3  •  potassium chloride (K-DUR,KLOR-CON) 10 MEQ CR tablet, TAKE ONE TABLET BY MOUTH EVERY DAY, Disp: 90 tablet, Rfl: 3  •  rosuvastatin (CRESTOR) 10 MG tablet, Take 1 tablet by mouth Daily. for cholesterol, Disp: 90 tablet, Rfl: 3  •  Sotalol HCl, AF, 120 MG tablet, TAKE ONE TABLET BY MOUTH TWICE A DAY, Disp: 180 each, Rfl: 1  •  traMADol (ULTRAM) 50 MG  tablet, Take 1 tablet by mouth 2 (Two) Times a Day As Needed for Moderate Pain ., Disp: 60 tablet, Rfl: 0  •  Xarelto 20 MG tablet, TAKE ONE TABLET BY MOUTH EVERY DAY WITH DINNER, Disp: 90 tablet, Rfl: 3    Aspirin use counseling: Does not need ASA (and currently is not on it)    Current medication list contains no high risk medications.  No harmful drug interactions have been identified.     Family History   Problem Relation Age of Onset   • Thyroid disease Mother    • Mental illness Father         nervous breakdown   • Breast cancer Maternal Aunt        Social History     Tobacco Use   • Smoking status: Never Smoker   • Smokeless tobacco: Never Used   Substance Use Topics   • Alcohol use: No       Past Surgical History:   Procedure Laterality Date   • CARDIOVERSION  05/2017    [facility]    • CARDIOVERSION  09/01/2017    [facility]   • CHOLECYSTECTOMY  2004   • COLONOSCOPY  05/2013   • DILATATION AND CURETTAGE     • EPIDIDYMAL CYST EXCISION     • ESOPHAGOSCOPY / EGD  01/2019   • EYE SURGERY     • HEMORRHOIDECTOMY  01/2019   • MAMMO BILATERAL  10/2014   • NECK SURGERY     • PACEMAKER IMPLANTATION     • PAP SMEAR  03/2016   • SKIN LESION EXCISION         Patient Active Problem List   Diagnosis   • Right shoulder pain   • PUD (peptic ulcer disease)   • Osteoarthritis   • Hypertension   • Hyperlipidemia   • Acute bronchitis   • Allergic rhinitis   • Anxiety   • Atrial fibrillation (CMS/HCC)   • Back pain   • Burping   • Chronic pain   • Depression   • Encounter for screening fecal occult blood testing   • Flatulence   • GERD (gastroesophageal reflux disease)   • Hematuria   • Fibromyalgia   • Constipation   • Pain of upper abdomen   • Pacemaker   • S/P ablation of atrial fibrillation   • Acute URI   • Acute bacterial rhinosinusitis   • Hot flashes   • Impacted cerumen of right ear       Review of Systems   Constitutional: Negative.    HENT: Negative.    Respiratory: Negative.    Cardiovascular: Negative.   "  Gastrointestinal: Negative.    Musculoskeletal: Negative.    Skin: Negative.    Neurological: Negative.    Psychiatric/Behavioral: Negative.        Objective     Vitals:    12/08/20 1128   BP: 118/68   BP Location: Right arm   Patient Position: Sitting   Pulse: 80   Temp: 96.8 °F (36 °C)   SpO2: 97%   Weight: 76.5 kg (168 lb 9.6 oz)   Height: 165.1 cm (65\")       Patient's Body mass index is 28.06 kg/m². BMI is within normal parameters. No follow-up required..       Visual Acuity Screening    Right eye Left eye Both eyes   Without correction:      With correction: 20/50 20/70 20/50       The patient has no evidence of cognitve impairment.     Physical Exam  Vitals signs and nursing note reviewed.   Constitutional:       Appearance: She is well-developed.   Neck:      Musculoskeletal: Normal range of motion and neck supple.   Cardiovascular:      Rate and Rhythm: Normal rate and regular rhythm.      Heart sounds: Normal heart sounds.   Pulmonary:      Effort: Pulmonary effort is normal.      Breath sounds: Normal breath sounds.   Skin:     General: Skin is warm and dry.   Neurological:      Mental Status: She is alert and oriented to person, place, and time.   Psychiatric:         Behavior: Behavior normal.         Thought Content: Thought content normal.         Judgment: Judgment normal.         Recent Lab Results:  Lab Results   Component Value Date    GLU 92 08/11/2016     Lab Results   Component Value Date    CHOL 223 (H) 07/02/2019    TRIG 129 07/02/2019    HDL 64 (H) 07/02/2019    VLDL 25.8 07/02/2019    LDLHDL 2.08 07/02/2019       Assessment/Plan   Age-appropriate Screening Schedule:  Refer to the list below for future screening recommendations based on patient's age, sex and/or medical conditions.      Health Maintenance   Topic Date Due   • ZOSTER VACCINE (1 of 2) 09/03/1987   • PAP SMEAR  05/19/2016   • DXA SCAN  01/04/2020   • LIPID PANEL  07/02/2020   • INFLUENZA VACCINE  08/01/2020   • MAMMOGRAM  " 08/02/2020   • TDAP/TD VACCINES (3 - Td) 10/04/2026       Medicare Risks and Personalized Health Plan:  Cardiovascular risk  Dementia/Memory   Diabetic Lab Screening   Glaucoma Risk  Immunizations Discussed/Encouraged (specific immunizations; Influenza, Pneumococcal 23, Prevnar and Shingrix )  Inactivity/Sedentary  Osteoprorosis Risk  Polypharmacy      CMS-Preventive Services Quick Reference  Medicare Preventive Services Addressed:  Annual Wellness Visit (AWV)  Cardiovascular Disease Screening Tests (may do this order every 5 years in beneficiaries without signs or symptoms of cardiovascular disease)  Diabetes Screening-Lab Order for either glucose quantitative blood (except reagent strip), glucose;post glucose dose(includes glucose), or glucose tolerance test-3 specimens(includes glucose)  Glaucoma screening (for individuals with diabetes mellitus, family history of glaucoma, -Americans (> or =) age 50, -Americans (> or =) age 65)  Influenza Vaccine and Administration  Pneumococcal Vaccine and Administration    Advance Care Planning:  ACP discussion was declined by the patient. Patient does not have an advance directive, information provided.    Diagnoses and all orders for this visit:    1. Medicare annual wellness visit, subsequent (Primary)    2. Anxiety    3. Fibromyalgia    4. Essential hypertension    5. Mixed hyperlipidemia    6. Paroxysmal atrial fibrillation (CMS/[facility])    7. Vitamin D deficiency    8. Allergic rhinitis due to pollen, unspecified seasonality    9. Hyperlipidemia, unspecified hyperlipidemia type        An After Visit Summary and PPPS with all of these plans were given to the patient.      Follow Up:  Return in about 4 months (around 4/8/2021).

## 2020-12-08 NOTE — PATIENT INSTRUCTIONS
Advance Directive    Advance directives are legal documents that let you make choices ahead of time about your health care and medical treatment in case you become unable to communicate for yourself. Advance directives are a way for you to make known your wishes to family, friends, and health care providers. This can let others know about your end-of-life care if you become unable to communicate.  Discussing and writing advance directives should happen over time rather than all at once. Advance directives can be changed depending on your situation and what you want, even after you have signed the advance directives.  There are different types of advance directives, such as:  · Medical power of .  · Living will.  · Do not resuscitate (DNR) or do not attempt resuscitation (DNAR) order.  Health care proxy and medical power of   A health care proxy is also called a health care agent. This is a person who is appointed to make medical decisions for you in cases where you are unable to make the decisions yourself. Generally, people choose someone they know well and trust to represent their preferences. Make sure to ask this person for an agreement to act as your proxy. A proxy may have to exercise judgment in the event of a medical decision for which your wishes are not known.  A medical power of  is a legal document that names your health care proxy. Depending on the laws in your state, after the document is written, it may also need to be:  · Signed.  · Notarized.  · Dated.  · Copied.  · Witnessed.  · Incorporated into your medical record.  You may also want to appoint someone to manage your money in a situation in which you are unable to do so. This is called a durable power of  for finances. It is a separate legal document from the durable power of  for health care. You may choose the same person or someone different from your health care proxy to act as your agent in money  matters.  If you do not appoint a proxy, or if there is a concern that the proxy is not acting in your best interests, a court may appoint a guardian to act on your behalf.  Living will  A living will is a set of instructions that state your wishes about medical care when you cannot express them yourself. Health care providers should keep a copy of your living will in your medical record. You may want to give a copy to family members or friends. To alert caregivers in case of an emergency, you can place a card in your wallet to let them know that you have a living will and where they can find it. A living will is used if you become:  · Terminally ill.  · Disabled.  · Unable to communicate or make decisions.  Items to consider in your living will include:  · To use or not to use life-support equipment, such as dialysis machines and breathing machines (ventilators).  · A DNR or DNAR order. This tells health care providers not to use cardiopulmonary resuscitation (CPR) if breathing or heartbeat stops.  · To use or not to use tube feeding.  · To be given or not to be given food and fluids.  · Comfort (palliative) care when the goal becomes comfort rather than a cure.  · Donation of organs and tissues.  A living will does not give instructions for distributing your money and property if you should pass away.  DNR or DNAR  A DNR or DNAR order is a request not to have CPR in the event that your heart stops beating or you stop breathing. If a DNR or DNAR order has not been made and shared, a health care provider will try to help any patient whose heart has stopped or who has stopped breathing. If you plan to have surgery, talk with your health care provider about how your DNR or DNAR order will be followed if problems occur.  What if I do not have an advance directive?  If you do not have an advance directive, some states assign family decision makers to act on your behalf based on how closely you are related to them. Each  state has its own laws about advance directives. You may want to check with your health care provider, , or state representative about the laws in your state.  Summary  · Advance directives are the legal documents that allow you to make choices ahead of time about your health care and medical treatment in case you become unable to tell others about your care.  · The process of discussing and writing advance directives should happen over time. You can change the advance directives, even after you have signed them.  · Advance directives include DNR or DNAR orders, living valadez, and designating an agent as your medical power of .  This information is not intended to replace advice given to you by your health care provider. Make sure you discuss any questions you have with your health care provider.  Document Revised: 07/16/2020 Document Reviewed: 07/16/2020  Elsevier Patient Education © 2020 [employer]

## 2020-12-20 DIAGNOSIS — M25.50 MULTIPLE JOINT PAIN: ICD-10-CM

## 2020-12-20 DIAGNOSIS — M79.7 FIBROMYALGIA: ICD-10-CM

## 2020-12-22 RX ORDER — TRAMADOL HYDROCHLORIDE 50 MG/1
TABLET ORAL
Qty: 60 TABLET | Refills: 0 | Status: SHIPPED | OUTPATIENT
Start: 2020-12-22 | End: 2021-02-10 | Stop reason: SDUPTHER

## 2020-12-23 DIAGNOSIS — F41.9 ANXIETY: ICD-10-CM

## 2020-12-23 RX ORDER — PAROXETINE 10 MG/1
10 TABLET, FILM COATED ORAL NIGHTLY
Qty: 90 TABLET | Refills: 3 | Status: SHIPPED | OUTPATIENT
Start: 2020-12-23 | End: 2021-04-08 | Stop reason: SDUPTHER

## 2020-12-23 RX ORDER — DIAZEPAM 2 MG/1
2 TABLET ORAL 2 TIMES DAILY PRN
Qty: 60 TABLET | Refills: 0 | Status: SHIPPED | OUTPATIENT
Start: 2020-12-23 | End: 2021-02-10 | Stop reason: SDUPTHER

## 2021-01-08 ENCOUNTER — OFFICE VISIT (OUTPATIENT)
Dept: FAMILY MEDICINE CLINIC | Facility: CLINIC | Age: 86
End: 2021-01-08

## 2021-01-08 VITALS
WEIGHT: 169.6 LBS | HEART RATE: 81 BPM | OXYGEN SATURATION: 98 % | DIASTOLIC BLOOD PRESSURE: 82 MMHG | TEMPERATURE: 96.4 F | BODY MASS INDEX: 28.26 KG/M2 | SYSTOLIC BLOOD PRESSURE: 138 MMHG | HEIGHT: 65 IN

## 2021-01-08 DIAGNOSIS — J32.0 CHRONIC MAXILLARY SINUSITIS: Primary | ICD-10-CM

## 2021-01-08 DIAGNOSIS — E53.8 B12 DEFICIENCY: ICD-10-CM

## 2021-01-08 DIAGNOSIS — J30.1 ALLERGIC RHINITIS DUE TO POLLEN, UNSPECIFIED SEASONALITY: ICD-10-CM

## 2021-01-08 PROCEDURE — 99213 OFFICE O/P EST LOW 20 MIN: CPT | Performed by: NURSE PRACTITIONER

## 2021-01-08 PROCEDURE — 96372 THER/PROPH/DIAG INJ SC/IM: CPT | Performed by: NURSE PRACTITIONER

## 2021-01-08 RX ORDER — MOMETASONE FUROATE 50 UG/1
2 SPRAY, METERED NASAL DAILY
Qty: 17 G | Refills: 5 | Status: SHIPPED | OUTPATIENT
Start: 2021-01-08 | End: 2021-01-21 | Stop reason: CLARIF

## 2021-01-08 RX ORDER — CYANOCOBALAMIN 1000 UG/ML
1000 INJECTION, SOLUTION INTRAMUSCULAR; SUBCUTANEOUS ONCE
Status: COMPLETED | OUTPATIENT
Start: 2021-01-08 | End: 2021-01-08

## 2021-01-08 RX ADMIN — CYANOCOBALAMIN 1000 MCG: 1000 INJECTION, SOLUTION INTRAMUSCULAR; SUBCUTANEOUS at 16:12

## 2021-01-08 NOTE — PROGRESS NOTES
"Chief Complaint  Headache    Subjective          Tonia Ritter presents to [facility] for   History of Present Illness   Patient has been having issues with her tear ducts and following with ophthalmology.  She has been seeing Dr. Carrington and he has given her bacitracin ointment and a prescription for Keflex and she has been using these as prescribed.  She states that she does have chronic issues with runny nose and sinus issues.  She does have a lot of sinus pressure around her nasal area and has sores inside her nose.  She does have a history of chronic allergies.  She has been using Nasonex for her symptoms.  She has also been taking Xyzal.  Since her symptoms have not been relieved over the past couple of months, she has requested to be evaluated by an ENT.  I agree with her request today.      Objective   Vital Signs:   /82 (BP Location: Right arm, Patient Position: Sitting)   Pulse 81   Temp 96.4 °F (35.8 °C)   Ht 165.1 cm (65\")   Wt 76.9 kg (169 lb 9.6 oz)   SpO2 98%   BMI 28.22 kg/m²     Physical Exam  Vitals signs and nursing note reviewed.   Constitutional:       Appearance: She is well-developed.   Neck:      Musculoskeletal: Normal range of motion and neck supple.   Cardiovascular:      Rate and Rhythm: Normal rate and regular rhythm.      Heart sounds: Normal heart sounds.   Pulmonary:      Effort: Pulmonary effort is normal.      Breath sounds: Normal breath sounds.   Skin:     General: Skin is warm and dry.   Neurological:      Mental Status: She is alert and oriented to person, place, and time.   Psychiatric:         Behavior: Behavior normal.         Thought Content: Thought content normal.         Judgment: Judgment normal.        Result Review :                 Assessment and Plan    Problem List Items Addressed This Visit        Allergies and Adverse Reactions    Allergic rhinitis    Relevant Medications    mometasone (Nasonex) 50 MCG/ACT nasal " spray      Other Visit Diagnoses     Chronic maxillary sinusitis    -  Primary    Relevant Orders    Ambulatory Referral to ENT (Otolaryngology) (Completed)    B12 deficiency        Relevant Medications    cyanocobalamin injection 1,000 mcg (Start on 1/8/2021  4:04 PM)          Follow Up   Return for Next scheduled follow up.  Patient was given instructions and counseling regarding her condition or for health maintenance advice. Please see specific information pulled into the AVS if appropriate.

## 2021-01-18 NOTE — TELEPHONE ENCOUNTER
HISTORY and [address]       NAME:  Guerita Ludwig  MRN: [id number]   YOB: 1968   Date: 1/18/2021   Age: 50 [facility]. Per [facility] OK.  I have discussed this with Dr Harley.  She has had so many antibiotics and she needs another one too.  I am going to send in a prescription for levaquin.  I am also sending her a prescription for probiotics that she needs to take twice daily for the next 30 days.  This should keep her from getting C Diff due to the antibiotics.  She also needs to  some greek yogurt to eat.  This will help protect her colon also.    She needs to let us know next week if she is not feeling better.  Please let her know.  

## 2021-01-21 ENCOUNTER — TELEPHONE (OUTPATIENT)
Dept: FAMILY MEDICINE CLINIC | Facility: CLINIC | Age: 86
End: 2021-01-21

## 2021-01-21 RX ORDER — FLUTICASONE PROPIONATE 50 MCG
2 SPRAY, SUSPENSION (ML) NASAL DAILY
Qty: 16 G | Refills: 5 | Status: SHIPPED | OUTPATIENT
Start: 2021-01-21 | End: 2021-02-11

## 2021-01-21 NOTE — TELEPHONE ENCOUNTER
Patient requested a call back. Patient stated her prescription for mometasone (Nasonex) 50 MCG/ACT nasal spray is not covered by her insurance and is too expensive for her. Patient would like to know if she could be prescribed fluticasone (FLONASE) 50 MCG/ACT nasal spray instead.    Please call and advise. Patient call back [phone]    [address] - [phone] [facility] [phone] FX

## 2021-01-22 RX ORDER — LISINOPRIL 5 MG/1
5 TABLET ORAL DAILY
Qty: 90 TABLET | Refills: 1 | Status: SHIPPED | OUTPATIENT
Start: 2021-01-22 | End: 2021-04-08 | Stop reason: SDUPTHER

## 2021-01-26 ENCOUNTER — TRANSCRIBE ORDERS (OUTPATIENT)
Dept: ADMINISTRATIVE | Facility: HOSPITAL | Age: 86
End: 2021-01-26

## 2021-01-26 ENCOUNTER — LAB (OUTPATIENT)
Dept: LAB | Facility: HOSPITAL | Age: 86
End: 2021-01-26

## 2021-01-26 DIAGNOSIS — N18.9 CHRONIC KIDNEY DISEASE, UNSPECIFIED CKD STAGE: ICD-10-CM

## 2021-01-26 DIAGNOSIS — N18.9 CHRONIC KIDNEY DISEASE, UNSPECIFIED CKD STAGE: Primary | ICD-10-CM

## 2021-01-26 PROCEDURE — 80053 COMPREHEN METABOLIC PANEL: CPT

## 2021-01-26 PROCEDURE — 36415 COLL VENOUS BLD VENIPUNCTURE: CPT

## 2021-01-26 PROCEDURE — 81001 URINALYSIS AUTO W/SCOPE: CPT

## 2021-01-26 PROCEDURE — 84156 ASSAY OF PROTEIN URINE: CPT

## 2021-01-26 PROCEDURE — 82570 ASSAY OF URINE CREATININE: CPT

## 2021-01-27 LAB
ALBUMIN SERPL-MCNC: 4.1 G/DL (ref 3.5–5.2)
ALBUMIN/GLOB SERPL: 1.4 G/DL
ALP SERPL-CCNC: 72 U/L (ref 39–117)
ALT SERPL W P-5'-P-CCNC: 9 U/L (ref 1–33)
ANION GAP SERPL CALCULATED.3IONS-SCNC: 9.9 MMOL/L (ref 5–15)
AST SERPL-CCNC: 21 U/L (ref 1–32)
BACTERIA UR QL AUTO: ABNORMAL /HPF
BILIRUB SERPL-MCNC: 0.4 MG/DL (ref 0–1.2)
BILIRUB UR QL STRIP: NEGATIVE
BUN SERPL-MCNC: 14 MG/DL (ref 8–23)
BUN/CREAT SERPL: 13 (ref 7–25)
CALCIUM SPEC-SCNC: 9.7 MG/DL (ref 8.6–10.5)
CHLORIDE SERPL-SCNC: 103 MMOL/L (ref 98–107)
CLARITY UR: CLEAR
CO2 SERPL-SCNC: 27.1 MMOL/L (ref 22–29)
COLOR UR: YELLOW
CREAT SERPL-MCNC: 1.08 MG/DL (ref 0.57–1)
CREAT UR-MCNC: 87.3 MG/DL
GFR SERPL CREATININE-BSD FRML MDRD: 48 ML/MIN/1.73
GLOBULIN UR ELPH-MCNC: 2.9 GM/DL
GLUCOSE SERPL-MCNC: 100 MG/DL (ref 65–99)
GLUCOSE UR STRIP-MCNC: NEGATIVE MG/DL
HGB UR QL STRIP.AUTO: NEGATIVE
HYALINE CASTS UR QL AUTO: ABNORMAL /LPF
KETONES UR QL STRIP: NEGATIVE
LEUKOCYTE ESTERASE UR QL STRIP.AUTO: ABNORMAL
NITRITE UR QL STRIP: NEGATIVE
PH UR STRIP.AUTO: 6.5 [PH] (ref 5–8)
POTASSIUM SERPL-SCNC: 3.8 MMOL/L (ref 3.5–5.2)
PROT SERPL-MCNC: 7 G/DL (ref 6–8.5)
PROT UR QL STRIP: NEGATIVE
PROT UR-MCNC: 12 MG/DL
PROT/CREAT UR: 137.5 MG/G CREA (ref 0–200)
RBC # UR: ABNORMAL /HPF
REF LAB TEST METHOD: ABNORMAL
SODIUM SERPL-SCNC: 140 MMOL/L (ref 136–145)
SP GR UR STRIP: 1.01 (ref 1–1.03)
SQUAMOUS #/AREA URNS HPF: ABNORMAL /HPF
UROBILINOGEN UR QL STRIP: ABNORMAL
WBC UR QL AUTO: ABNORMAL /HPF

## 2021-02-10 ENCOUNTER — TELEPHONE (OUTPATIENT)
Dept: FAMILY MEDICINE CLINIC | Facility: CLINIC | Age: 86
End: 2021-02-10

## 2021-02-10 DIAGNOSIS — F41.9 ANXIETY: ICD-10-CM

## 2021-02-10 DIAGNOSIS — M25.50 MULTIPLE JOINT PAIN: ICD-10-CM

## 2021-02-10 DIAGNOSIS — M79.7 FIBROMYALGIA: ICD-10-CM

## 2021-02-10 RX ORDER — TRAMADOL HYDROCHLORIDE 50 MG/1
50 TABLET ORAL 2 TIMES DAILY PRN
Qty: 60 TABLET | Refills: 0 | Status: SHIPPED | OUTPATIENT
Start: 2021-02-10 | End: 2021-03-11 | Stop reason: SDUPTHER

## 2021-02-10 RX ORDER — DIAZEPAM 2 MG/1
2 TABLET ORAL 2 TIMES DAILY PRN
Qty: 60 TABLET | Refills: 0 | Status: SHIPPED | OUTPATIENT
Start: 2021-02-10 | End: 2021-03-11 | Stop reason: SDUPTHER

## 2021-02-10 NOTE — TELEPHONE ENCOUNTER
.Braulio reviewed and is consistent.  UDS reviewed and is consistent.  Patient comfort assessment guide reviewed and updated today.    As part of the patient's treatment plan they are being prescribed a controlled substance/ substances with potential for abuse.  This patient has been made aware of the appropriate use of such medications, including potential risk of somnolence, limited ability to drive and/or work safely, and potential for overdose.  It has also been made clear these medications are for use by the patient only, without concomitant use of alcohol or other substances unless prescribed/advised by medical provider.    Patient has completed prescribing agreement detailing terms of continued prescribing of controlled substances including monitoring BRAULIO reports, urine drug screens, and pill counts.  The patient is aware BRAULIO reports are reviewed on a regular basis and scanned into the chart.    History and physical exam exhibit continued safe and appropriate use of controlled substances.

## 2021-02-10 NOTE — TELEPHONE ENCOUNTER
PATIENT CALLED AND STATED SHE DID GO TO THE EYE DOCTOR AND HE STATED HER TEAR DUCT WAS COMPLETELY BLOCKED.  HE DOES THINK SHE MAY HAVE TO HAVE SURGERY.  THERE'S NOTHING SCHEDULED AT THIS TIME.    SHE STATED SHE ALSO WENT TO THE ENT.  TEODORA KELLY GAVE HER SOME EYE SPRAY AND SOME ANTIBIOTICS (CEFDINIR).  SHE ALSO WROTE HER A PRESCRIPTION FOR FAMOTIDINE FOR HER GASTROPARESIS.      CALLBACK:  [phone]

## 2021-02-10 NOTE — TELEPHONE ENCOUNTER
Caller: Tonia Ritter    Relationship: Self    Best call back number: [phone]    Medication needed:   Requested Prescriptions     Pending Prescriptions Disp Refills   • traMADol (ULTRAM) 50 MG tablet 60 tablet 0   • diazePAM (VALIUM) 2 MG tablet 60 tablet 0     Sig: Take 1 tablet by mouth 2 (Two) Times a Day As Needed for Anxiety.       When do you need the refill by: TODAY    What details did the patient provide when requesting the medication: PATIENT HAS 51/2 TO 6 DAYS OF MEDICATION REMAINING.    Does the patient have less than a 3 day supply:  [] Yes  [x] No    What is the patient's preferred pharmacy: [address] [phone] [facility] [phone]

## 2021-02-11 ENCOUNTER — TRANSCRIBE ORDERS (OUTPATIENT)
Dept: ADMINISTRATIVE | Facility: HOSPITAL | Age: 86
End: 2021-02-11

## 2021-02-11 ENCOUNTER — OFFICE VISIT (OUTPATIENT)
Dept: FAMILY MEDICINE CLINIC | Facility: CLINIC | Age: 86
End: 2021-02-11

## 2021-02-11 VITALS
TEMPERATURE: 97.1 F | HEIGHT: 65 IN | WEIGHT: 167 LBS | OXYGEN SATURATION: 98 % | HEART RATE: 79 BPM | BODY MASS INDEX: 27.82 KG/M2 | DIASTOLIC BLOOD PRESSURE: 78 MMHG | SYSTOLIC BLOOD PRESSURE: 118 MMHG

## 2021-02-11 DIAGNOSIS — L02.91 ABSCESS: Primary | ICD-10-CM

## 2021-02-11 DIAGNOSIS — J32.0 CHRONIC MAXILLARY SINUSITIS: Primary | ICD-10-CM

## 2021-02-11 PROCEDURE — 87077 CULTURE AEROBIC IDENTIFY: CPT | Performed by: NURSE PRACTITIONER

## 2021-02-11 PROCEDURE — 87205 SMEAR GRAM STAIN: CPT | Performed by: NURSE PRACTITIONER

## 2021-02-11 PROCEDURE — 87186 SC STD MICRODIL/AGAR DIL: CPT | Performed by: NURSE PRACTITIONER

## 2021-02-11 PROCEDURE — 99213 OFFICE O/P EST LOW 20 MIN: CPT | Performed by: NURSE PRACTITIONER

## 2021-02-11 PROCEDURE — 87070 CULTURE OTHR SPECIMN AEROBIC: CPT | Performed by: NURSE PRACTITIONER

## 2021-02-11 RX ORDER — DOXYCYCLINE 100 MG/1
100 CAPSULE ORAL 2 TIMES DAILY
Qty: 20 CAPSULE | Refills: 0 | Status: SHIPPED | OUTPATIENT
Start: 2021-02-11 | End: 2021-03-03

## 2021-02-11 RX ORDER — FAMOTIDINE 20 MG/1
20 TABLET, FILM COATED ORAL 2 TIMES DAILY
COMMUNITY
End: 2021-07-08 | Stop reason: SDUPTHER

## 2021-02-11 RX ORDER — AZELASTINE 1 MG/ML
2 SPRAY, METERED NASAL 3 TIMES DAILY
COMMUNITY
End: 2021-07-08 | Stop reason: SDUPTHER

## 2021-02-11 RX ORDER — CEFDINIR 300 MG/1
300 CAPSULE ORAL 2 TIMES DAILY
COMMUNITY
End: 2021-03-03

## 2021-02-11 RX ORDER — DOXYCYCLINE 100 MG/1
100 CAPSULE ORAL 2 TIMES DAILY
Qty: 20 CAPSULE | Refills: 0 | Status: SHIPPED | OUTPATIENT
Start: 2021-02-11 | End: 2021-02-11 | Stop reason: SDUPTHER

## 2021-02-11 RX ORDER — BACITRACIN 500 [USP'U]/G
OINTMENT OPHTHALMIC
COMMUNITY
Start: 2020-12-24 | End: 2021-02-11

## 2021-02-11 NOTE — PROGRESS NOTES
"Chief Complaint  Mass (stomach )    Subjective          Tonia Ritter presents to [facility]  Abrasion  This is a recurrent problem. The current episode started in the past 7 days. The problem occurs constantly. The problem has been gradually worsening. Associated symptoms comments: Patient has a pimple that has become infected over the past few days.  She states that she has squeezed white drainage from the area but it has become very painful. Nothing aggravates the symptoms. She has tried nothing for the symptoms.          Objective   Vital Signs:   /78 (BP Location: Right arm, Patient Position: Sitting, Cuff Size: Adult)   Pulse 79   Temp 97.1 °F (36.2 °C)   Ht 165.1 cm (65\")   Wt 75.8 kg (167 lb)   SpO2 98%   BMI 27.79 kg/m²     Physical Exam  Vitals signs and nursing note reviewed.   Constitutional:       Appearance: She is well-developed.   Neck:      Musculoskeletal: Normal range of motion and neck supple.   Cardiovascular:      Rate and Rhythm: Normal rate.   Pulmonary:      Effort: Pulmonary effort is normal.   Skin:     General: Skin is warm and dry.      Comments: Opened abscess area to superior pubic area; white thick drainage noted at area; very tender   Neurological:      Mental Status: She is alert and oriented to person, place, and time.   Psychiatric:         Behavior: Behavior normal.         Thought Content: Thought content normal.         Judgment: Judgment normal.        Result Review :                 Assessment and Plan    Diagnoses and all orders for this visit:    1. Abscess (Primary)  -     Wound Culture - Wound, Abdominal Wall; Future  -     Discontinue: doxycycline (MONODOX) 100 MG capsule; Take 1 capsule by mouth 2 (Two) Times a Day.  Dispense: 20 capsule; Refill: 0  -     Discontinue: mupirocin (Bactroban) 2 % ointment; Apply  topically to the appropriate area as directed 3 (Three) Times a Day.  Dispense: 30 g; Refill: 0  -     Wound " Culture - Wound, Abdominal Wall  -     doxycycline (MONODOX) 100 MG capsule; Take 1 capsule by mouth 2 (Two) Times a Day.  Dispense: 20 capsule; Refill: 0  -     mupirocin (Bactroban) 2 % ointment; Apply  topically to the appropriate area as directed 3 (Three) Times a Day.  Dispense: 30 g; Refill: 0    I have evacuated the lack thick drainage from the open area.  Antibiotics given today.  Advised patient to keep the area clean and dry.  If area does not heal within the next few days, she is to return to the office for reevaluation.      Follow Up   Return for Next scheduled follow up.  Patient was given instructions and counseling regarding her condition or for health maintenance advice. Please see specific information pulled into the AVS if appropriate.

## 2021-02-16 LAB
BACTERIA SPEC AEROBE CULT: ABNORMAL
BACTERIA SPEC AEROBE CULT: ABNORMAL
GRAM STN SPEC: ABNORMAL

## 2021-03-02 ENCOUNTER — TELEPHONE (OUTPATIENT)
Dept: FAMILY MEDICINE CLINIC | Facility: CLINIC | Age: 86
End: 2021-03-02

## 2021-03-02 NOTE — TELEPHONE ENCOUNTER
PATIENT CALLED AND WANTED TO KNOW IF MIRNA COULD GET A BLACKHEAD OFF HER FACE WITHOUT SQUEEZING? PLEASE CALL TO ADVISE    ANDRE: [phone]    ALSO  NEEDS HELP CLIPPING HER TOE NAILS AND DIDN'T KNOW IF THE OFFICE HELPS WITH THAT AS WELL?     PATIENT WANTS TO KNOW IF SHE SHOULD HAVE A COVD VACCINE?

## 2021-03-02 NOTE — TELEPHONE ENCOUNTER
We don't have the right equipment to cut nails here.  If she needs referral to podiatry I will be happy to do that.   We have no equipment for blackheads.  She should get the vaccine.     Spoke with patient & she verbalized understanding,she is too busy right now with other apts. To go to [podiatry,will let you know when she can.

## 2021-03-03 ENCOUNTER — OFFICE VISIT (OUTPATIENT)
Dept: FAMILY MEDICINE CLINIC | Facility: CLINIC | Age: 86
End: 2021-03-03

## 2021-03-03 VITALS
TEMPERATURE: 96.4 F | DIASTOLIC BLOOD PRESSURE: 84 MMHG | HEART RATE: 80 BPM | SYSTOLIC BLOOD PRESSURE: 130 MMHG | HEIGHT: 65 IN | OXYGEN SATURATION: 99 % | BODY MASS INDEX: 27.59 KG/M2 | WEIGHT: 165.6 LBS

## 2021-03-03 DIAGNOSIS — J30.2 SEASONAL ALLERGIES: Primary | ICD-10-CM

## 2021-03-03 DIAGNOSIS — J30.9 ALLERGIC SINUSITIS: ICD-10-CM

## 2021-03-03 PROCEDURE — 99213 OFFICE O/P EST LOW 20 MIN: CPT | Performed by: FAMILY MEDICINE

## 2021-03-03 PROCEDURE — 96372 THER/PROPH/DIAG INJ SC/IM: CPT | Performed by: FAMILY MEDICINE

## 2021-03-03 RX ORDER — METHYLPREDNISOLONE ACETATE 40 MG/ML
40 INJECTION, SUSPENSION INTRA-ARTICULAR; INTRALESIONAL; INTRAMUSCULAR; SOFT TISSUE ONCE
Status: COMPLETED | OUTPATIENT
Start: 2021-03-03 | End: 2021-03-03

## 2021-03-03 RX ORDER — LORATADINE 10 MG/1
10 TABLET ORAL DAILY
Qty: 30 TABLET | Refills: 5 | Status: SHIPPED | OUTPATIENT
Start: 2021-03-03 | End: 2021-04-02 | Stop reason: SDUPTHER

## 2021-03-03 RX ADMIN — METHYLPREDNISOLONE ACETATE 40 MG: 40 INJECTION, SUSPENSION INTRA-ARTICULAR; INTRALESIONAL; INTRAMUSCULAR; SOFT TISSUE at 17:09

## 2021-03-03 NOTE — PROGRESS NOTES
"Subjective   Tonia Ritter is a 85 y.o. female.   Pt presents today with CC of Nasal Congestion      History of Present Illness   Patient is a pleasant 85-year-old female with history of nasal sinus congestion from seasonal allergies.  She did not tolerate Astelin because it \"burns\".  Xyzal makes her sleepy.  She has not tried other options.  She reports runny nose and itchy eyes.  Denies cough or shortness of breath.       The following portions of the patient's history were reviewed and updated as appropriate: allergies, current medications, past family history, past medical history, past social history, past surgical history and problem list.    Review of Systems   Constitutional: Negative for chills, fever and unexpected weight loss.   HENT: Positive for postnasal drip and rhinorrhea. Negative for congestion and sore throat.    Eyes: Negative for blurred vision and visual disturbance.   Respiratory: Negative for cough and wheezing.    Cardiovascular: Negative for chest pain and palpitations.   Gastrointestinal: Negative for abdominal pain and diarrhea.   Endocrine: Negative for cold intolerance and heat intolerance.   Genitourinary: Negative for dysuria.   Musculoskeletal: Negative for arthralgias and neck stiffness.   Neurological: Negative for dizziness, seizures and syncope.   Psychiatric/Behavioral: Negative for self-injury, suicidal ideas and depressed mood.       Objective   Physical Exam  Vitals signs and nursing note reviewed.   Constitutional:       Appearance: She is well-developed.   HENT:      Head: Normocephalic and atraumatic.      Right Ear: External ear normal.      Left Ear: External ear normal.      Nose: Nose normal.   Eyes:      Conjunctiva/sclera: Conjunctivae normal.      Pupils: Pupils are equal, round, and reactive to light.   Neck:      Musculoskeletal: Normal range of motion and neck supple.   Cardiovascular:      Rate and Rhythm: Normal rate and regular rhythm.      Heart sounds: " Normal heart sounds.   Pulmonary:      Effort: Pulmonary effort is normal.      Breath sounds: Normal breath sounds.   Abdominal:      General: Bowel sounds are normal.      Palpations: Abdomen is soft.   Skin:     General: Skin is warm and dry.   Neurological:      Mental Status: She is alert and oriented to person, place, and time.   Psychiatric:         Behavior: Behavior normal.           Assessment/Plan   Diagnoses and all orders for this visit:    1. Seasonal allergies (Primary)  -     loratadine (Claritin) 10 MG tablet; Take 1 tablet by mouth Daily.  Dispense: 30 tablet; Refill: 5  -     methylPREDNISolone acetate (DEPO-medrol) injection 40 mg    2. Allergic sinusitis  -     methylPREDNISolone acetate (DEPO-medrol) injection 40 mg    We discussed treatment options.  She requested a steroid shot.  40 mg Depo-Medrol given.  The risk and benefits of this were discussed and she was agreeable to proceed.  Recommend taking Claritin as it is less sedating than Xyzal.  We may consider half dose of Claritin, or dosing Claritin every other day.  Singulair might also be another option.  She did not tolerate nasal sprays. no signs of infection today.             Patient's Body mass index is 27.56 kg/m². BMI is above normal parameters. Recommendations include: exercise counseling and nutrition counseling.

## 2021-03-11 DIAGNOSIS — F41.9 ANXIETY: ICD-10-CM

## 2021-03-11 DIAGNOSIS — M25.50 MULTIPLE JOINT PAIN: ICD-10-CM

## 2021-03-11 DIAGNOSIS — M79.7 FIBROMYALGIA: ICD-10-CM

## 2021-03-11 RX ORDER — TRAMADOL HYDROCHLORIDE 50 MG/1
50 TABLET ORAL 2 TIMES DAILY PRN
Qty: 60 TABLET | Refills: 0 | Status: SHIPPED | OUTPATIENT
Start: 2021-03-11 | End: 2021-04-08 | Stop reason: SDUPTHER

## 2021-03-11 RX ORDER — DIAZEPAM 2 MG/1
2 TABLET ORAL 2 TIMES DAILY PRN
Qty: 60 TABLET | Refills: 0 | Status: SHIPPED | OUTPATIENT
Start: 2021-03-11 | End: 2021-04-08 | Stop reason: SDUPTHER

## 2021-03-11 NOTE — TELEPHONE ENCOUNTER
Caller: Tonia Ritter    Relationship: Self    Best call back number: [phone]    Medication needed:   Requested Prescriptions     Pending Prescriptions Disp Refills   • traMADol (ULTRAM) 50 MG tablet 60 tablet 0     Sig: Take 1 tablet by mouth 2 (Two) Times a Day As Needed for Moderate Pain .   • diazePAM (VALIUM) 2 MG tablet 60 tablet 0     Sig: Take 1 tablet by mouth 2 (Two) Times a Day As Needed for Anxiety.       Does the patient have less than a 3 day supply:  []Yes  [x] No    What is the patient's preferred pharmacy: [address] - [phone] [facility] [phone] FX

## 2021-03-17 ENCOUNTER — HOSPITAL ENCOUNTER (OUTPATIENT)
Dept: CT IMAGING | Facility: HOSPITAL | Age: 86
Discharge: HOME | End: 2021-03-17
Admitting: NURSE PRACTITIONER

## 2021-03-17 DIAGNOSIS — J32.0 CHRONIC MAXILLARY SINUSITIS: ICD-10-CM

## 2021-03-17 PROCEDURE — 70486 CT MAXILLOFACIAL W/O DYE: CPT

## 2021-03-17 PROCEDURE — 70486 CT MAXILLOFACIAL W/O DYE: CPT | Performed by: RADIOLOGY

## 2021-04-02 DIAGNOSIS — J30.2 SEASONAL ALLERGIES: ICD-10-CM

## 2021-04-02 RX ORDER — LORATADINE 10 MG/1
10 TABLET ORAL DAILY
Qty: 90 TABLET | Refills: 1 | Status: SHIPPED | OUTPATIENT
Start: 2021-04-02 | End: 2021-07-08 | Stop reason: SDUPTHER

## 2021-04-02 NOTE — TELEPHONE ENCOUNTER
Caller: Tonia Ritter    Relationship: Self    Best call back number: [phone]     Medication needed:   Requested Prescriptions     Pending Prescriptions Disp Refills   • loratadine (Claritin) 10 MG tablet 30 tablet 5     Sig: Take 1 tablet by mouth Daily.       When do you need the refill by: TODAY  What additional details did the patient provide when requesting the medication: PATIENT ONLY HAS ONE PILL LEFT    Does the patient have less than a 3 day supply:  [x] Yes  [] No    What is the patient's preferred pharmacy: [address] [phone] [facility] [phone]

## 2021-04-06 ENCOUNTER — IMMUNIZATION (OUTPATIENT)
Dept: VACCINE CLINIC | Facility: HOSPITAL | Age: 86
End: 2021-04-06

## 2021-04-06 PROCEDURE — 91300 HC SARSCOV02 VAC 30MCG/0.3ML IM: CPT | Performed by: INTERNAL MEDICINE

## 2021-04-06 PROCEDURE — 0001A: CPT | Performed by: INTERNAL MEDICINE

## 2021-04-08 ENCOUNTER — OFFICE VISIT (OUTPATIENT)
Dept: FAMILY MEDICINE CLINIC | Facility: CLINIC | Age: 86
End: 2021-04-08

## 2021-04-08 VITALS
TEMPERATURE: 96.9 F | SYSTOLIC BLOOD PRESSURE: 104 MMHG | HEIGHT: 65 IN | DIASTOLIC BLOOD PRESSURE: 80 MMHG | OXYGEN SATURATION: 96 % | WEIGHT: 163 LBS | BODY MASS INDEX: 27.16 KG/M2 | HEART RATE: 79 BPM

## 2021-04-08 DIAGNOSIS — E78.2 MIXED HYPERLIPIDEMIA: ICD-10-CM

## 2021-04-08 DIAGNOSIS — I10 ESSENTIAL HYPERTENSION: ICD-10-CM

## 2021-04-08 DIAGNOSIS — E53.8 B12 DEFICIENCY: ICD-10-CM

## 2021-04-08 DIAGNOSIS — I48.0 PAROXYSMAL ATRIAL FIBRILLATION (HCC): ICD-10-CM

## 2021-04-08 DIAGNOSIS — M25.50 MULTIPLE JOINT PAIN: ICD-10-CM

## 2021-04-08 DIAGNOSIS — E55.9 VITAMIN D DEFICIENCY: ICD-10-CM

## 2021-04-08 DIAGNOSIS — M79.7 FIBROMYALGIA: ICD-10-CM

## 2021-04-08 DIAGNOSIS — F41.9 ANXIETY: Primary | ICD-10-CM

## 2021-04-08 PROCEDURE — 99214 OFFICE O/P EST MOD 30 MIN: CPT | Performed by: NURSE PRACTITIONER

## 2021-04-08 RX ORDER — LISINOPRIL 5 MG/1
5 TABLET ORAL DAILY
Qty: 90 TABLET | Refills: 1 | Status: SHIPPED | OUTPATIENT
Start: 2021-04-08 | End: 2021-07-08 | Stop reason: SDUPTHER

## 2021-04-08 RX ORDER — PAROXETINE 10 MG/1
10 TABLET, FILM COATED ORAL NIGHTLY
Qty: 90 TABLET | Refills: 3 | Status: SHIPPED | OUTPATIENT
Start: 2021-04-08 | End: 2021-07-08 | Stop reason: SDUPTHER

## 2021-04-08 RX ORDER — DIAZEPAM 2 MG/1
2 TABLET ORAL 2 TIMES DAILY PRN
Qty: 60 TABLET | Refills: 0 | Status: SHIPPED | OUTPATIENT
Start: 2021-04-08 | End: 2021-05-14 | Stop reason: SDUPTHER

## 2021-04-08 RX ORDER — TRAMADOL HYDROCHLORIDE 50 MG/1
50 TABLET ORAL 2 TIMES DAILY PRN
Qty: 60 TABLET | Refills: 0 | Status: SHIPPED | OUTPATIENT
Start: 2021-04-08 | End: 2021-05-14 | Stop reason: SDUPTHER

## 2021-04-08 RX ORDER — FUROSEMIDE 20 MG/1
20 TABLET ORAL DAILY
Qty: 90 TABLET | Refills: 3 | Status: SHIPPED | OUTPATIENT
Start: 2021-04-08 | End: 2021-07-08 | Stop reason: SDUPTHER

## 2021-04-08 RX ORDER — ROSUVASTATIN CALCIUM 10 MG/1
10 TABLET, COATED ORAL DAILY
Qty: 90 TABLET | Refills: 3 | Status: SHIPPED | OUTPATIENT
Start: 2021-04-08 | End: 2021-07-08 | Stop reason: SDUPTHER

## 2021-04-08 NOTE — PROGRESS NOTES
Chief Complaint  Anxiety    Subjective          Tonia Ritter presents to [facility]  History of Present Illness  Hypertension   This is a chronic problem. The current episode started more than 1 year ago. The problem is controlled. Pertinent negatives include no chest pain, palpitations, peripheral edema or shortness of breath. There are no associated agents to hypertension. Current antihypertension treatment includes diuretics, ACE inhibitors and beta blockers. The current treatment provides significant improvement. There are no compliance problems.  atrial fib; PPM.   Hyperlipidemia   This is a chronic problem. The current episode started more than 1 year ago. The problem is controlled. Factors aggravating her hyperlipidemia include beta blockers and fatty foods. Pertinent negatives include no chest pain or shortness of breath. Current antihyperlipidemic treatment includes statins. The current treatment provides significant improvement of lipids. Risk factors for coronary artery disease include hypertension, dyslipidemia, a sedentary lifestyle and post-menopausal.   Heartburn   She complains of heartburn. She reports no chest pain. This is a chronic problem. The current episode started more than 1 year ago. The problem occurs rarely. The problem has been waxing and waning. The heartburn does not wake her from sleep. The heartburn does not limit her activity. The heartburn doesn't change with position. The symptoms are aggravated by certain foods. She has tried a histamine-2 antagonist for the symptoms. The treatment provided significant relief.   Atrial Fibrillation   Presents for follow-up (pt continues to follow with cardiologist, Dr Melendez) visit. Symptoms are negative for chest pain, palpitations and shortness of breath. The symptoms have been stable. Past medical history includes atrial fibrillation and hyperlipidemia.   Anxiety   Presents for follow-up visit. Symptoms  "include decreased concentration, irritability and nervous/anxious behavior. Symptoms occur occasionally. The severity of symptoms is moderate. The quality of sleep is good. Nighttime awakenings: occasional.  Compliance with medications is %.  Pt has taken diazepam for years and tolerates it well. Her symptoms are well controlled with the medication and without side effects. Pt is able to continue her ADLs with use of medication. She denies any suicidal thoughts or ideations.   Fibromyalgia   This is a chronic problem. The current episode started more than 1 year ago. The problem occurs daily. The problem has been unchanged. Associated symptoms include arthralgias and myalgias. Nothing aggravates the symptoms. She has tried acetaminophen, NSAIDs, oral narcotics and relaxation for the symptoms. The treatment provided moderate relief. Pt has been taking Tramadol for her pain and is tolerating it well. Tramadol enables pt to continue with her ADLs and live her life without difficulty. She denies any side effects from the medication.       Objective   Vital Signs:   /80 (BP Location: Right arm, Patient Position: Sitting, Cuff Size: Adult)   Pulse 79   Temp 96.9 °F (36.1 °C)   Ht 165.1 cm (65\")   Wt 73.9 kg (163 lb)   SpO2 96%   BMI 27.12 kg/m²     Physical Exam  Vitals and nursing note reviewed.   Constitutional:       Appearance: She is well-developed.   Cardiovascular:      Rate and Rhythm: Normal rate and regular rhythm.      Heart sounds: Normal heart sounds.   Pulmonary:      Effort: Pulmonary effort is normal.      Breath sounds: Normal breath sounds.   Musculoskeletal:      Cervical back: Normal range of motion and neck supple.   Skin:     General: Skin is warm and dry.   Neurological:      Mental Status: She is alert and oriented to person, place, and time.   Psychiatric:         Behavior: Behavior normal.         Thought Content: Thought content normal.         Judgment: Judgment normal.      "   Result Review :                 Assessment and Plan    Diagnoses and all orders for this visit:    1. Anxiety (Primary)  -     diazePAM (VALIUM) 2 MG tablet; Take 1 tablet by mouth 2 (Two) Times a Day As Needed for Anxiety.  Dispense: 60 tablet; Refill: 0  -     PARoxetine (Paxil) 10 MG tablet; Take 1 tablet by mouth Every Night.  Dispense: 90 tablet; Refill: 3  -     CBC & Differential; Future  -     Comprehensive Metabolic Panel; Future  -     Lipid Panel; Future  -     Magnesium; Future  -     TSH; Future  -     Vitamin B12; Future    2. Fibromyalgia  -     traMADol (ULTRAM) 50 MG tablet; Take 1 tablet by mouth 2 (Two) Times a Day As Needed for Moderate Pain .  Dispense: 60 tablet; Refill: 0  -     CBC & Differential; Future  -     Comprehensive Metabolic Panel; Future  -     Lipid Panel; Future  -     Magnesium; Future  -     TSH; Future  -     Vitamin B12; Future    3. Multiple joint pain  -     traMADol (ULTRAM) 50 MG tablet; Take 1 tablet by mouth 2 (Two) Times a Day As Needed for Moderate Pain .  Dispense: 60 tablet; Refill: 0  -     CBC & Differential; Future  -     Comprehensive Metabolic Panel; Future  -     Lipid Panel; Future  -     Magnesium; Future  -     TSH; Future  -     Vitamin B12; Future    4. Essential hypertension  -     lisinopril (PRINIVIL,ZESTRIL) 5 MG tablet; Take 1 tablet by mouth Daily.  Dispense: 90 tablet; Refill: 1  -     furosemide (LASIX) 20 MG tablet; Take 1 tablet by mouth Daily.  Dispense: 90 tablet; Refill: 3  -     CBC & Differential; Future  -     Comprehensive Metabolic Panel; Future  -     Lipid Panel; Future  -     Magnesium; Future  -     TSH; Future  -     Vitamin B12; Future    5. Mixed hyperlipidemia  -     rosuvastatin (CRESTOR) 10 MG tablet; Take 1 tablet by mouth Daily. for cholesterol  Dispense: 90 tablet; Refill: 3  -     CBC & Differential; Future  -     Comprehensive Metabolic Panel; Future  -     Lipid Panel; Future  -     Magnesium; Future  -     TSH;  Future  -     Vitamin B12; Future    6. Paroxysmal atrial fibrillation (CMS/HCC)  -     CBC & Differential; Future  -     Comprehensive Metabolic Panel; Future  -     Lipid Panel; Future  -     Magnesium; Future  -     TSH; Future  -     Vitamin B12; Future    7. B12 deficiency  -     Vitamin B12; Future    8. Vitamin D deficiency  -     Vitamin D 25 Hydroxy; Future    Braulio reviewed and is consistent.  UDS 12/8/2020 reviewed and is consistent.  Patient comfort assessment guide reviewed and updated today.    As part of the patient's treatment plan they are being prescribed a controlled substance/ substances with potential for abuse.  This patient has been made aware of the appropriate use of such medications, including potential risk of somnolence, limited ability to drive and/or work safely, and potential for overdose.  It has also been made clear these medications are for use by the patient only, without concomitant use of alcohol or other substances unless prescribed/advised by medical provider.    Patient has completed prescribing agreement detailing terms of continued prescribing of controlled substances including monitoring BRAULIO reports, urine drug screens, and pill counts.  The patient is aware BRAULIO reports are reviewed on a regular basis and scanned into the chart.    History and physical exam exhibit continued safe and appropriate use of controlled substances.      Follow Up   Return in about 3 months (around 7/8/2021), or Dr Orozco.  Patient was given instructions and counseling regarding her condition or for health maintenance advice. Please see specific information pulled into the AVS if appropriate.

## 2021-04-21 ENCOUNTER — LAB (OUTPATIENT)
Dept: FAMILY MEDICINE CLINIC | Facility: CLINIC | Age: 86
End: 2021-04-21

## 2021-04-21 DIAGNOSIS — F41.9 ANXIETY: ICD-10-CM

## 2021-04-21 DIAGNOSIS — M25.50 MULTIPLE JOINT PAIN: ICD-10-CM

## 2021-04-21 DIAGNOSIS — E53.8 B12 DEFICIENCY: ICD-10-CM

## 2021-04-21 DIAGNOSIS — E78.2 MIXED HYPERLIPIDEMIA: ICD-10-CM

## 2021-04-21 DIAGNOSIS — M79.7 FIBROMYALGIA: ICD-10-CM

## 2021-04-21 DIAGNOSIS — E55.9 VITAMIN D DEFICIENCY: ICD-10-CM

## 2021-04-21 DIAGNOSIS — I48.0 PAROXYSMAL ATRIAL FIBRILLATION (HCC): ICD-10-CM

## 2021-04-21 DIAGNOSIS — I10 ESSENTIAL HYPERTENSION: ICD-10-CM

## 2021-04-21 PROCEDURE — 84443 ASSAY THYROID STIM HORMONE: CPT | Performed by: NURSE PRACTITIONER

## 2021-04-21 PROCEDURE — 82306 VITAMIN D 25 HYDROXY: CPT | Performed by: NURSE PRACTITIONER

## 2021-04-21 PROCEDURE — 82607 VITAMIN B-12: CPT | Performed by: NURSE PRACTITIONER

## 2021-04-21 PROCEDURE — 83735 ASSAY OF MAGNESIUM: CPT | Performed by: NURSE PRACTITIONER

## 2021-04-21 PROCEDURE — 85025 COMPLETE CBC W/AUTO DIFF WBC: CPT | Performed by: NURSE PRACTITIONER

## 2021-04-21 PROCEDURE — 36415 COLL VENOUS BLD VENIPUNCTURE: CPT

## 2021-04-21 PROCEDURE — 80061 LIPID PANEL: CPT | Performed by: NURSE PRACTITIONER

## 2021-04-21 PROCEDURE — 80053 COMPREHEN METABOLIC PANEL: CPT | Performed by: NURSE PRACTITIONER

## 2021-04-22 LAB
25(OH)D3 SERPL-MCNC: 17.8 NG/ML (ref 30–100)
ALBUMIN SERPL-MCNC: 4.2 G/DL (ref 3.5–5.2)
ALBUMIN/GLOB SERPL: 1.6 G/DL
ALP SERPL-CCNC: 76 U/L (ref 39–117)
ALT SERPL W P-5'-P-CCNC: 11 U/L (ref 1–33)
ANION GAP SERPL CALCULATED.3IONS-SCNC: 9.8 MMOL/L (ref 5–15)
AST SERPL-CCNC: 23 U/L (ref 1–32)
BASOPHILS # BLD AUTO: 0.03 10*3/MM3 (ref 0–0.2)
BASOPHILS NFR BLD AUTO: 0.5 % (ref 0–1.5)
BILIRUB SERPL-MCNC: 0.5 MG/DL (ref 0–1.2)
BUN SERPL-MCNC: 15 MG/DL (ref 8–23)
BUN/CREAT SERPL: 16 (ref 7–25)
CALCIUM SPEC-SCNC: 9.3 MG/DL (ref 8.6–10.5)
CHLORIDE SERPL-SCNC: 103 MMOL/L (ref 98–107)
CHOLEST SERPL-MCNC: 184 MG/DL (ref 0–200)
CO2 SERPL-SCNC: 29.2 MMOL/L (ref 22–29)
CREAT SERPL-MCNC: 0.94 MG/DL (ref 0.57–1)
DEPRECATED RDW RBC AUTO: 45.1 FL (ref 37–54)
EOSINOPHIL # BLD AUTO: 0.09 10*3/MM3 (ref 0–0.4)
EOSINOPHIL NFR BLD AUTO: 1.6 % (ref 0.3–6.2)
ERYTHROCYTE [DISTWIDTH] IN BLOOD BY AUTOMATED COUNT: 13.2 % (ref 12.3–15.4)
GFR SERPL CREATININE-BSD FRML MDRD: 57 ML/MIN/1.73
GLOBULIN UR ELPH-MCNC: 2.6 GM/DL
GLUCOSE SERPL-MCNC: 74 MG/DL (ref 65–99)
HCT VFR BLD AUTO: 42.2 % (ref 34–46.6)
HDLC SERPL-MCNC: 76 MG/DL (ref 40–60)
HGB BLD-MCNC: 13.8 G/DL (ref 12–15.9)
IMM GRANULOCYTES # BLD AUTO: 0.01 10*3/MM3 (ref 0–0.05)
IMM GRANULOCYTES NFR BLD AUTO: 0.2 % (ref 0–0.5)
LDLC SERPL CALC-MCNC: 92 MG/DL (ref 0–100)
LDLC/HDLC SERPL: 1.18 {RATIO}
LYMPHOCYTES # BLD AUTO: 1.88 10*3/MM3 (ref 0.7–3.1)
LYMPHOCYTES NFR BLD AUTO: 34.4 % (ref 19.6–45.3)
MAGNESIUM SERPL-MCNC: 2.2 MG/DL (ref 1.6–2.4)
MCH RBC QN AUTO: 30.9 PG (ref 26.6–33)
MCHC RBC AUTO-ENTMCNC: 32.7 G/DL (ref 31.5–35.7)
MCV RBC AUTO: 94.4 FL (ref 79–97)
MONOCYTES # BLD AUTO: 0.54 10*3/MM3 (ref 0.1–0.9)
MONOCYTES NFR BLD AUTO: 9.9 % (ref 5–12)
NEUTROPHILS NFR BLD AUTO: 2.92 10*3/MM3 (ref 1.7–7)
NEUTROPHILS NFR BLD AUTO: 53.4 % (ref 42.7–76)
NRBC BLD AUTO-RTO: 0 /100 WBC (ref 0–0.2)
PLATELET # BLD AUTO: 155 10*3/MM3 (ref 140–450)
PMV BLD AUTO: 11.1 FL (ref 6–12)
POTASSIUM SERPL-SCNC: 4.3 MMOL/L (ref 3.5–5.2)
PROT SERPL-MCNC: 6.8 G/DL (ref 6–8.5)
RBC # BLD AUTO: 4.47 10*6/MM3 (ref 3.77–5.28)
SODIUM SERPL-SCNC: 142 MMOL/L (ref 136–145)
TRIGL SERPL-MCNC: 90 MG/DL (ref 0–150)
TSH SERPL DL<=0.05 MIU/L-ACNC: 1.63 UIU/ML (ref 0.27–4.2)
VIT B12 BLD-MCNC: 489 PG/ML (ref 211–946)
VLDLC SERPL-MCNC: 16 MG/DL (ref 5–40)
WBC # BLD AUTO: 5.47 10*3/MM3 (ref 3.4–10.8)

## 2021-04-26 ENCOUNTER — TELEPHONE (OUTPATIENT)
Dept: FAMILY MEDICINE CLINIC | Facility: CLINIC | Age: 86
End: 2021-04-26

## 2021-04-26 ENCOUNTER — CLINICAL SUPPORT (OUTPATIENT)
Dept: FAMILY MEDICINE CLINIC | Facility: CLINIC | Age: 86
End: 2021-04-26

## 2021-04-26 ENCOUNTER — IMMUNIZATION (OUTPATIENT)
Dept: VACCINE CLINIC | Facility: HOSPITAL | Age: 86
End: 2021-04-26

## 2021-04-26 VITALS — SYSTOLIC BLOOD PRESSURE: 122 MMHG | DIASTOLIC BLOOD PRESSURE: 78 MMHG

## 2021-04-26 PROCEDURE — 0002A: CPT | Performed by: INTERNAL MEDICINE

## 2021-04-26 PROCEDURE — 91300 HC SARSCOV02 VAC 30MCG/0.3ML IM: CPT | Performed by: INTERNAL MEDICINE

## 2021-04-26 NOTE — TELEPHONE ENCOUNTER
----- Message from Linntete Harley MD sent at 4/25/2021 10:59 PM EDT -----  Please have her come in for a pill count. Both controlled medications.      Patient notified & verbalized understanding.

## 2021-05-05 RX ORDER — SOTALOL HYDROCHLORIDE 120 MG/1
TABLET ORAL
Refills: 1 | OUTPATIENT
Start: 2021-05-05

## 2021-05-13 ENCOUNTER — TELEPHONE (OUTPATIENT)
Dept: FAMILY MEDICINE CLINIC | Facility: CLINIC | Age: 86
End: 2021-05-13

## 2021-05-13 DIAGNOSIS — M25.50 MULTIPLE JOINT PAIN: ICD-10-CM

## 2021-05-13 DIAGNOSIS — F41.9 ANXIETY: ICD-10-CM

## 2021-05-13 DIAGNOSIS — M79.7 FIBROMYALGIA: ICD-10-CM

## 2021-05-13 RX ORDER — DIAZEPAM 2 MG/1
2 TABLET ORAL 2 TIMES DAILY PRN
Qty: 60 TABLET | Refills: 0 | Status: CANCELLED | OUTPATIENT
Start: 2021-05-13

## 2021-05-13 RX ORDER — TRAMADOL HYDROCHLORIDE 50 MG/1
50 TABLET ORAL 2 TIMES DAILY PRN
Qty: 60 TABLET | Refills: 0 | Status: CANCELLED | OUTPATIENT
Start: 2021-05-13

## 2021-05-13 NOTE — TELEPHONE ENCOUNTER
Caller: Tonia Ritter    Relationship: Self    Best call back number: [phone]    Caller requesting test results: PATIENT    What test was performed: LABS    When was the test performed: LABS    Where was the test performed: OUR OFFICE    Additional notes:

## 2021-05-13 NOTE — TELEPHONE ENCOUNTER
Caller: Tonia Ritter    Relationship: Self    Best call back number: [phone]    Medication needed:   Requested Prescriptions     Pending Prescriptions Disp Refills   • diazePAM (VALIUM) 2 MG tablet 60 tablet 0     Sig: Take 1 tablet by mouth 2 (Two) Times a Day As Needed for Anxiety.   • traMADol (ULTRAM) 50 MG tablet 60 tablet 0     Sig: Take 1 tablet by mouth 2 (Two) Times a Day As Needed for Moderate Pain .       When do you need the refill by: SOON    What additional details did the patient provide when requesting the medication: PATIENT HAS ABOUT 4-5 DAYS OF MEDICATION REMAINING.    Does the patient have less than a 3 day supply:  [] Yes  [x] No    What is the patient's preferred pharmacy: [address] [phone] [facility] [phone] FX

## 2021-05-14 DIAGNOSIS — M79.7 FIBROMYALGIA: ICD-10-CM

## 2021-05-14 DIAGNOSIS — F41.9 ANXIETY: ICD-10-CM

## 2021-05-14 DIAGNOSIS — M25.50 MULTIPLE JOINT PAIN: ICD-10-CM

## 2021-05-14 RX ORDER — DIAZEPAM 2 MG/1
2 TABLET ORAL 2 TIMES DAILY PRN
Qty: 120 TABLET | Refills: 0 | Status: SHIPPED | OUTPATIENT
Start: 2021-05-14 | End: 2021-07-08

## 2021-05-14 RX ORDER — TRAMADOL HYDROCHLORIDE 50 MG/1
50 TABLET ORAL 2 TIMES DAILY PRN
Qty: 120 TABLET | Refills: 0 | Status: SHIPPED | OUTPATIENT
Start: 2021-05-14 | End: 2021-07-08 | Stop reason: SDUPTHER

## 2021-05-14 NOTE — TELEPHONE ENCOUNTER
I sent a 60-day supply of each of these medicines.  This is enough to get to the middle of July.  Going forward, medication refills on controlled substances will require appointments, as is required by the controlled substance agreement.

## 2021-05-14 NOTE — TELEPHONE ENCOUNTER
Your labs look okay.  Your vitamin D levels have come down.  Your bones need vitamin D to stay strong to prevent fractures.  Recommend taking Centrum Silver, this contains many vitamins that would benefit you.  And enough vitamin D to keep your numbers from dropping.  We can discuss further at follow-up.

## 2021-05-19 ENCOUNTER — OFFICE VISIT (OUTPATIENT)
Dept: FAMILY MEDICINE CLINIC | Facility: CLINIC | Age: 86
End: 2021-05-19

## 2021-05-19 VITALS
HEART RATE: 79 BPM | DIASTOLIC BLOOD PRESSURE: 76 MMHG | BODY MASS INDEX: 27.56 KG/M2 | SYSTOLIC BLOOD PRESSURE: 130 MMHG | TEMPERATURE: 96.9 F | WEIGHT: 165.4 LBS | OXYGEN SATURATION: 97 % | HEIGHT: 65 IN

## 2021-05-19 DIAGNOSIS — S09.91XA TRAUMA OF EAR CANAL, INITIAL ENCOUNTER: Primary | ICD-10-CM

## 2021-05-19 PROCEDURE — 99213 OFFICE O/P EST LOW 20 MIN: CPT | Performed by: FAMILY MEDICINE

## 2021-05-19 NOTE — PROGRESS NOTES
Subjective   Tonia Ritter is a 85 y.o. female.   Pt presents today with CC of Ear Problem      History of Present Illness   Patient was seen in ENT yesterday.  Her right ear was examined, reportedly, there was wax buildup in her ear and instrumentation was used to move it.  She states that she noticed bleeding later that day.  She takes Xarelto.  She reports steady bleeding through the night.  She would like evaluation of this.  She reports that this point she is having difficulty hearing.       The following portions of the patient's history were reviewed and updated as appropriate: allergies, current medications, past family history, past medical history, past social history, past surgical history and problem list.    Review of Systems   Constitutional: Negative for chills, fever and unexpected weight loss.   HENT: Positive for ear discharge, ear pain and hearing loss. Negative for congestion and sore throat.    Eyes: Negative for blurred vision and visual disturbance.   Respiratory: Negative for cough and wheezing.    Cardiovascular: Negative for chest pain and palpitations.   Gastrointestinal: Negative for abdominal pain and diarrhea.   Endocrine: Negative for cold intolerance and heat intolerance.   Genitourinary: Negative for dysuria.   Musculoskeletal: Negative for arthralgias and neck stiffness.   Neurological: Negative for dizziness, seizures and syncope.   Psychiatric/Behavioral: Negative for self-injury, suicidal ideas and depressed mood.       Objective   Physical Exam  Vitals and nursing note reviewed.   Constitutional:       Appearance: She is well-developed.   HENT:      Head: Normocephalic and atraumatic.      Left Ear: Tympanic membrane, ear canal and external ear normal.      Ears:      Comments: Right ear has dried clotted blood within the ear canal.  Nothing is visible except clotted blood.    Sterile normal saline was gently sprayed into her ear canal with a 10 mL syringe.  The dried blood  was diluted out revealing a large chunk of cerumen.  The edge of the TM was visualized and appeared normal.  The source of the bleeding was not visualized.  It is not currently bleeding.  She tolerated this well.  At this time, her hearing is still decreased from baseline.     Nose: Nose normal.   Eyes:      Conjunctiva/sclera: Conjunctivae normal.      Pupils: Pupils are equal, round, and reactive to light.   Cardiovascular:      Rate and Rhythm: Normal rate and regular rhythm.      Heart sounds: Normal heart sounds.   Pulmonary:      Effort: Pulmonary effort is normal.      Breath sounds: Normal breath sounds.   Abdominal:      General: Bowel sounds are normal.      Palpations: Abdomen is soft.   Musculoskeletal:      Cervical back: Normal range of motion and neck supple.   Skin:     General: Skin is warm and dry.   Neurological:      Mental Status: She is alert and oriented to person, place, and time.   Psychiatric:         Behavior: Behavior normal.           Assessment/Plan   Diagnoses and all orders for this visit:    1. Trauma of ear canal, initial encounter (Primary)    It is unclear where the bleeding had come from.  I did not see any excoriations of the ear canal.  There is a large chunk of wax that is occluding about 90% of the canal.  The ear is not currently bleeding.  Her hearing has not come back though I expect that it will.  I recommend keeping the ear canal dry with tissue or paper towel just with her finger.  Recommend follow-up next week if her hearing is not 100% improved.  At this time, I do not feel as though specialist attention is needed.  I recommended against holding doses of Xarelto.  Follow-up next week if needed.  Otherwise, at follow-up we will consider flushing her ear out with a little more pressure.  This time we did very gently only to get the blood out of the way to ensure there was no sign of significant pathology.

## 2021-06-15 ENCOUNTER — TRANSCRIBE ORDERS (OUTPATIENT)
Dept: ADMINISTRATIVE | Facility: HOSPITAL | Age: 86
End: 2021-06-15

## 2021-06-15 ENCOUNTER — LAB (OUTPATIENT)
Dept: LAB | Facility: HOSPITAL | Age: 86
End: 2021-06-15

## 2021-06-15 DIAGNOSIS — N18.9 CHRONIC KIDNEY DISEASE, UNSPECIFIED CKD STAGE: Primary | ICD-10-CM

## 2021-06-15 DIAGNOSIS — N18.9 CHRONIC KIDNEY DISEASE, UNSPECIFIED CKD STAGE: ICD-10-CM

## 2021-06-15 PROCEDURE — 80053 COMPREHEN METABOLIC PANEL: CPT

## 2021-06-15 PROCEDURE — 81001 URINALYSIS AUTO W/SCOPE: CPT

## 2021-06-15 PROCEDURE — 84156 ASSAY OF PROTEIN URINE: CPT

## 2021-06-15 PROCEDURE — 36415 COLL VENOUS BLD VENIPUNCTURE: CPT

## 2021-06-15 PROCEDURE — 82570 ASSAY OF URINE CREATININE: CPT

## 2021-06-16 LAB
ALBUMIN SERPL-MCNC: 4.2 G/DL (ref 3.5–5.2)
ALBUMIN/GLOB SERPL: 1.7 G/DL
ALP SERPL-CCNC: 67 U/L (ref 39–117)
ALT SERPL W P-5'-P-CCNC: 9 U/L (ref 1–33)
ANION GAP SERPL CALCULATED.3IONS-SCNC: 9.2 MMOL/L (ref 5–15)
AST SERPL-CCNC: 24 U/L (ref 1–32)
BACTERIA UR QL AUTO: ABNORMAL /HPF
BILIRUB SERPL-MCNC: 0.4 MG/DL (ref 0–1.2)
BILIRUB UR QL STRIP: NEGATIVE
BUN SERPL-MCNC: 15 MG/DL (ref 8–23)
BUN/CREAT SERPL: 14.6 (ref 7–25)
CALCIUM SPEC-SCNC: 9.3 MG/DL (ref 8.6–10.5)
CHLORIDE SERPL-SCNC: 103 MMOL/L (ref 98–107)
CLARITY UR: CLEAR
CO2 SERPL-SCNC: 26.8 MMOL/L (ref 22–29)
COLOR UR: YELLOW
CREAT SERPL-MCNC: 1.03 MG/DL (ref 0.57–1)
CREAT UR-MCNC: 59.8 MG/DL
GFR SERPL CREATININE-BSD FRML MDRD: 51 ML/MIN/1.73
GLOBULIN UR ELPH-MCNC: 2.5 GM/DL
GLUCOSE SERPL-MCNC: 94 MG/DL (ref 65–99)
GLUCOSE UR STRIP-MCNC: NEGATIVE MG/DL
HGB UR QL STRIP.AUTO: NEGATIVE
HYALINE CASTS UR QL AUTO: ABNORMAL /LPF
KETONES UR QL STRIP: NEGATIVE
LEUKOCYTE ESTERASE UR QL STRIP.AUTO: ABNORMAL
NITRITE UR QL STRIP: NEGATIVE
PH UR STRIP.AUTO: 7 [PH] (ref 5–8)
POTASSIUM SERPL-SCNC: 4.3 MMOL/L (ref 3.5–5.2)
PROT SERPL-MCNC: 6.7 G/DL (ref 6–8.5)
PROT UR QL STRIP: NEGATIVE
PROT UR-MCNC: 7 MG/DL
PROT/CREAT UR: 117.1 MG/G CREA (ref 0–200)
RBC # UR: ABNORMAL /HPF
REF LAB TEST METHOD: ABNORMAL
SODIUM SERPL-SCNC: 139 MMOL/L (ref 136–145)
SP GR UR STRIP: 1.01 (ref 1–1.03)
SQUAMOUS #/AREA URNS HPF: ABNORMAL /HPF
UROBILINOGEN UR QL STRIP: ABNORMAL
WBC UR QL AUTO: ABNORMAL /HPF

## 2021-06-28 DIAGNOSIS — I10 ESSENTIAL HYPERTENSION: ICD-10-CM

## 2021-06-28 RX ORDER — LISINOPRIL 5 MG/1
TABLET ORAL
Qty: 90 TABLET | Refills: 1 | OUTPATIENT
Start: 2021-06-28

## 2021-07-08 ENCOUNTER — OFFICE VISIT (OUTPATIENT)
Dept: FAMILY MEDICINE CLINIC | Facility: CLINIC | Age: 86
End: 2021-07-08

## 2021-07-08 VITALS
HEIGHT: 65 IN | HEART RATE: 80 BPM | BODY MASS INDEX: 27.63 KG/M2 | WEIGHT: 165.8 LBS | TEMPERATURE: 97.5 F | OXYGEN SATURATION: 95 % | SYSTOLIC BLOOD PRESSURE: 128 MMHG | DIASTOLIC BLOOD PRESSURE: 80 MMHG

## 2021-07-08 DIAGNOSIS — I48.0 PAROXYSMAL ATRIAL FIBRILLATION (HCC): Primary | ICD-10-CM

## 2021-07-08 DIAGNOSIS — E78.2 MIXED HYPERLIPIDEMIA: ICD-10-CM

## 2021-07-08 DIAGNOSIS — M25.50 MULTIPLE JOINT PAIN: ICD-10-CM

## 2021-07-08 DIAGNOSIS — M79.7 FIBROMYALGIA: ICD-10-CM

## 2021-07-08 DIAGNOSIS — J30.2 SEASONAL ALLERGIES: ICD-10-CM

## 2021-07-08 DIAGNOSIS — I10 ESSENTIAL HYPERTENSION: ICD-10-CM

## 2021-07-08 DIAGNOSIS — F41.9 ANXIETY: ICD-10-CM

## 2021-07-08 PROCEDURE — 99214 OFFICE O/P EST MOD 30 MIN: CPT | Performed by: FAMILY MEDICINE

## 2021-07-08 RX ORDER — FAMOTIDINE 20 MG/1
20 TABLET, FILM COATED ORAL 2 TIMES DAILY
Qty: 180 TABLET | Refills: 0 | Status: SHIPPED | OUTPATIENT
Start: 2021-07-08 | End: 2021-10-07 | Stop reason: SDUPTHER

## 2021-07-08 RX ORDER — FUROSEMIDE 20 MG/1
20 TABLET ORAL EVERY OTHER DAY
Qty: 45 TABLET | Refills: 3 | Status: SHIPPED | OUTPATIENT
Start: 2021-07-08 | End: 2021-12-15 | Stop reason: SDUPTHER

## 2021-07-08 RX ORDER — AZELASTINE 1 MG/ML
2 SPRAY, METERED NASAL DAILY
Qty: 30 ML | Refills: 2 | Status: SHIPPED | OUTPATIENT
Start: 2021-07-08 | End: 2022-08-01

## 2021-07-08 RX ORDER — ROSUVASTATIN CALCIUM 10 MG/1
10 TABLET, COATED ORAL DAILY
Qty: 90 TABLET | Refills: 3 | Status: SHIPPED | OUTPATIENT
Start: 2021-07-08 | End: 2021-12-15 | Stop reason: SDUPTHER

## 2021-07-08 RX ORDER — LISINOPRIL 5 MG/1
5 TABLET ORAL DAILY
Qty: 90 TABLET | Refills: 1 | Status: SHIPPED | OUTPATIENT
Start: 2021-07-08 | End: 2021-10-07 | Stop reason: SDUPTHER

## 2021-07-08 RX ORDER — LORATADINE 10 MG/1
10 TABLET ORAL DAILY
Qty: 90 TABLET | Refills: 1 | Status: SHIPPED | OUTPATIENT
Start: 2021-07-08 | End: 2021-10-07 | Stop reason: SDUPTHER

## 2021-07-08 RX ORDER — POTASSIUM CHLORIDE 750 MG/1
10 TABLET, EXTENDED RELEASE ORAL DAILY
Qty: 90 TABLET | Refills: 3 | Status: SHIPPED | OUTPATIENT
Start: 2021-07-08 | End: 2021-09-07

## 2021-07-08 RX ORDER — DIAZEPAM 2 MG/1
2 TABLET ORAL EVERY 12 HOURS PRN
Qty: 21 TABLET | Refills: 0 | Status: SHIPPED | OUTPATIENT
Start: 2021-07-08 | End: 2021-10-07

## 2021-07-08 RX ORDER — PAROXETINE 10 MG/1
10 TABLET, FILM COATED ORAL NIGHTLY
Qty: 90 TABLET | Refills: 3 | Status: SHIPPED | OUTPATIENT
Start: 2021-07-08 | End: 2021-12-15 | Stop reason: SDUPTHER

## 2021-07-08 RX ORDER — SOTALOL HYDROCHLORIDE 120 MG/1
1 TABLET ORAL 2 TIMES DAILY
Qty: 180 EACH | Refills: 1 | Status: SHIPPED | OUTPATIENT
Start: 2021-07-08 | End: 2021-10-07 | Stop reason: SDUPTHER

## 2021-07-08 RX ORDER — TRAMADOL HYDROCHLORIDE 50 MG/1
50 TABLET ORAL 2 TIMES DAILY PRN
Qty: 30 TABLET | Refills: 0 | Status: SHIPPED | OUTPATIENT
Start: 2021-07-08 | End: 2021-10-07 | Stop reason: SDUPTHER

## 2021-07-08 NOTE — PROGRESS NOTES
Subjective   Tonia Ritter is a 85 y.o. female.   Pt presents today with CC of Hypertension      History of Present Illness   1.  Patient is a pleasant 85-year-old female here to follow-up for medication refills.  She has paroxysmal atrial fibrillation for which she takes sotalol 120 mg twice a day, Xarelto 20 mg daily, and she takes potassium 10 mEq daily to keep her potassium above 4.0.  #2 she has hyperlipidemia for which she takes Crestor.  #3 she has seasonal allergies for which she takes Claritin and azelastine.  #4 she has anxiety for which she takes Paxil 10 mg daily.  She was previously taking diazepam 2 mg every 12 hours as needed.  Historically she has not had this medication and multiple drug screens.  This had continuously brought up concern for diversion.  She failed a pill count earlier this spring further suggesting this to be the case.    #5 she has multiple joint pain for which she takes tramadol 50 mg as needed.  She was previously taking this medication at least daily.  She states that she does not take this medication often.  Typically only after being active, she might take it in the evening.  Despite this, she has been getting it filled each month for multiple doses daily.  As above, there was questionable pill count and drug screens in the past.  She is agreeable to taper.           The following portions of the patient's history were reviewed and updated as appropriate: allergies, current medications, past family history, past medical history, past social history, past surgical history and problem list.    Review of Systems   Constitutional: Negative for chills, fever and unexpected weight loss.   HENT: Negative for congestion and sore throat.    Eyes: Negative for blurred vision and visual disturbance.   Respiratory: Negative for cough and wheezing.    Cardiovascular: Negative for chest pain and palpitations.   Gastrointestinal: Negative for abdominal pain and diarrhea.   Endocrine:  Negative for cold intolerance and heat intolerance.   Genitourinary: Negative for dysuria.   Musculoskeletal: Positive for arthralgias. Negative for neck stiffness.   Neurological: Negative for dizziness, seizures and syncope.   Psychiatric/Behavioral: Negative for self-injury, suicidal ideas and depressed mood.       Objective   Physical Exam  Vitals and nursing note reviewed.   Constitutional:       Appearance: She is well-developed.   HENT:      Head: Normocephalic and atraumatic.      Right Ear: External ear normal.      Left Ear: External ear normal.      Nose: Nose normal.   Eyes:      Conjunctiva/sclera: Conjunctivae normal.      Pupils: Pupils are equal, round, and reactive to light.   Cardiovascular:      Rate and Rhythm: Normal rate and regular rhythm.      Heart sounds: Normal heart sounds.   Pulmonary:      Effort: Pulmonary effort is normal.      Breath sounds: Normal breath sounds.   Abdominal:      General: Bowel sounds are normal.      Palpations: Abdomen is soft.   Musculoskeletal:      Cervical back: Normal range of motion and neck supple.   Skin:     General: Skin is warm and dry.   Neurological:      Mental Status: She is alert and oriented to person, place, and time.   Psychiatric:         Behavior: Behavior normal.           Assessment/Plan   Diagnoses and all orders for this visit:    1. Paroxysmal atrial fibrillation (CMS/HCC) (Primary)  -     Sotalol HCl, AF, 120 MG tablet; Take 1 tablet by mouth 2 (Two) Times a Day.  Dispense: 180 each; Refill: 1  -     potassium chloride (K-DUR,KLOR-CON) 10 MEQ CR tablet; Take 1 tablet by mouth Daily.  Dispense: 90 tablet; Refill: 3  -     rivaroxaban (Xarelto) 20 MG tablet; Take 1 tablet by mouth Daily With Dinner.  Dispense: 90 tablet; Refill: 3    2. Mixed hyperlipidemia  -     rosuvastatin (CRESTOR) 10 MG tablet; Take 1 tablet by mouth Daily. for cholesterol  Dispense: 90 tablet; Refill: 3    3. Seasonal allergies  -     loratadine (Claritin) 10 MG  tablet; Take 1 tablet by mouth Daily.  Dispense: 90 tablet; Refill: 1  -     azelastine (ASTELIN) 0.1 % nasal spray; 2 sprays into the nostril(s) as directed by provider Daily. Use in each nostril as directed  Dispense: 30 mL; Refill: 2    4. Anxiety  -     PARoxetine (Paxil) 10 MG tablet; Take 1 tablet by mouth Every Night.  Dispense: 90 tablet; Refill: 3  -     diazePAM (VALIUM) 2 MG tablet; Take 1 tablet by mouth Every 12 (Twelve) Hours As Needed for Anxiety for up to 90 days.  Dispense: 21 tablet; Refill: 0  As she has been reportedly taking this medication regularly still, will give a final 21 tablet prescription to take daily for a few days, every other day for a few days, and stop.  If she needs further help with anxiety, we will consider other medication options.  5. Essential hypertension  -     furosemide (LASIX) 20 MG tablet; Take 1 tablet by mouth Every Other Day.  Dispense: 45 tablet; Refill: 3  -     lisinopril (PRINIVIL,ZESTRIL) 5 MG tablet; Take 1 tablet by mouth Daily.  Dispense: 90 tablet; Refill: 1      6. Multiple joint pain  -     traMADol (ULTRAM) 50 MG tablet; Take 1 tablet by mouth 2 (Two) Times a Day As Needed for Moderate Pain  for up to 90 days.  Dispense: 30 tablet; Refill: 0  She was previously taking this medication twice a day.  Because of questionable drug screens and pill count failure earlier this spring, will decrease to #30 every 90 days.  Refill will not be sent without appointment.  We may consider stopping this medication in the future.  #7 GERD  -     famotidine (PEPCID) 20 MG tablet; Take 1 tablet by mouth 2 (Two) Times a Day.  Dispense: 180 tablet; Refill: 0                    Patient's Body mass index is 27.59 kg/m². indicating that she is overweight (BMI 25-29.9). Obesity-related health conditions include the following: hypertension. Obesity is unchanged. BMI is is above average; BMI management plan is completed. We discussed portion control and increasing exercise..

## 2021-08-05 DIAGNOSIS — I48.0 PAROXYSMAL ATRIAL FIBRILLATION (HCC): ICD-10-CM

## 2021-08-05 RX ORDER — SOTALOL HYDROCHLORIDE 120 MG/1
1 TABLET ORAL 2 TIMES DAILY
Qty: 180 EACH | Refills: 1 | OUTPATIENT
Start: 2021-08-05

## 2021-08-05 NOTE — TELEPHONE ENCOUNTER
Caller: Tonia Ritter    Relationship: Self    Best call back number: [phone]    Medication needed:   Requested Prescriptions     Pending Prescriptions Disp Refills   • Sotalol HCl, AF, 120 MG tablet 180 each 1     Sig: Take 1 tablet by mouth 2 (Two) Times a Day.       When do you need the refill by: 08/06/21    What additional details did the patient provide when requesting the medication:     Does the patient have less than a 3 day supply:  [x] Yes  [] No    What is the patient's preferred pharmacy: [address] [phone] [facility] [phone]

## 2021-09-06 DIAGNOSIS — I48.0 PAROXYSMAL ATRIAL FIBRILLATION (HCC): ICD-10-CM

## 2021-09-07 RX ORDER — POTASSIUM CHLORIDE 750 MG/1
TABLET, EXTENDED RELEASE ORAL
Qty: 90 TABLET | Refills: 3 | Status: SHIPPED | OUTPATIENT
Start: 2021-09-07 | End: 2022-10-07 | Stop reason: SDUPTHER

## 2021-09-24 DIAGNOSIS — I10 ESSENTIAL HYPERTENSION: ICD-10-CM

## 2021-09-24 RX ORDER — LISINOPRIL 5 MG/1
5 TABLET ORAL DAILY
Qty: 90 TABLET | Refills: 1 | OUTPATIENT
Start: 2021-09-24

## 2021-09-24 NOTE — TELEPHONE ENCOUNTER
Caller: Tonia Ritter    Relationship: Self      Medication requested (name and dosage): lisinopril (PRINIVIL,ZESTRIL) 5 MG tablet    Pharmacy where request should be sent: [address] - [phone] [facility] [phone]      Additional details provided by patient: N/A    Best call back number: [phone]    Does the patient have less than a 3 day supply:  [] Yes  [x] No    Chao Cool Rep   09/24/21 12:41 EDT

## 2021-10-07 ENCOUNTER — OFFICE VISIT (OUTPATIENT)
Dept: FAMILY MEDICINE CLINIC | Facility: CLINIC | Age: 86
End: 2021-10-07

## 2021-10-07 VITALS
WEIGHT: 166.8 LBS | SYSTOLIC BLOOD PRESSURE: 118 MMHG | BODY MASS INDEX: 27.79 KG/M2 | DIASTOLIC BLOOD PRESSURE: 76 MMHG | HEART RATE: 82 BPM | HEIGHT: 65 IN | OXYGEN SATURATION: 97 % | TEMPERATURE: 97.7 F

## 2021-10-07 DIAGNOSIS — M25.50 MULTIPLE JOINT PAIN: ICD-10-CM

## 2021-10-07 DIAGNOSIS — J30.2 SEASONAL ALLERGIES: ICD-10-CM

## 2021-10-07 DIAGNOSIS — I10 ESSENTIAL HYPERTENSION: ICD-10-CM

## 2021-10-07 DIAGNOSIS — F41.9 ANXIETY: ICD-10-CM

## 2021-10-07 DIAGNOSIS — I48.0 PAROXYSMAL ATRIAL FIBRILLATION (HCC): ICD-10-CM

## 2021-10-07 DIAGNOSIS — L02.91 ABSCESS: ICD-10-CM

## 2021-10-07 DIAGNOSIS — K21.9 GASTROESOPHAGEAL REFLUX DISEASE WITHOUT ESOPHAGITIS: Primary | ICD-10-CM

## 2021-10-07 PROCEDURE — 82570 ASSAY OF URINE CREATININE: CPT | Performed by: INTERNAL MEDICINE

## 2021-10-07 PROCEDURE — 80053 COMPREHEN METABOLIC PANEL: CPT | Performed by: INTERNAL MEDICINE

## 2021-10-07 PROCEDURE — 84156 ASSAY OF PROTEIN URINE: CPT | Performed by: INTERNAL MEDICINE

## 2021-10-07 PROCEDURE — 36415 COLL VENOUS BLD VENIPUNCTURE: CPT | Performed by: FAMILY MEDICINE

## 2021-10-07 PROCEDURE — 82043 UR ALBUMIN QUANTITATIVE: CPT | Performed by: INTERNAL MEDICINE

## 2021-10-07 PROCEDURE — 81001 URINALYSIS AUTO W/SCOPE: CPT | Performed by: INTERNAL MEDICINE

## 2021-10-07 PROCEDURE — 99214 OFFICE O/P EST MOD 30 MIN: CPT | Performed by: FAMILY MEDICINE

## 2021-10-07 RX ORDER — TRAMADOL HYDROCHLORIDE 50 MG/1
50 TABLET ORAL 2 TIMES DAILY PRN
Qty: 30 TABLET | Refills: 0 | Status: SHIPPED | OUTPATIENT
Start: 2021-10-07 | End: 2022-01-05

## 2021-10-07 RX ORDER — LISINOPRIL 5 MG/1
5 TABLET ORAL DAILY
Qty: 90 TABLET | Refills: 1 | Status: SHIPPED | OUTPATIENT
Start: 2021-10-07 | End: 2021-12-15 | Stop reason: SDUPTHER

## 2021-10-07 RX ORDER — DIAZEPAM 2 MG/1
2 TABLET ORAL EVERY 12 HOURS PRN
Qty: 21 TABLET | Refills: 0 | Status: CANCELLED | OUTPATIENT
Start: 2021-10-07 | End: 2022-01-05

## 2021-10-07 RX ORDER — FAMOTIDINE 20 MG/1
20 TABLET, FILM COATED ORAL 2 TIMES DAILY
Qty: 180 TABLET | Refills: 0 | Status: SHIPPED | OUTPATIENT
Start: 2021-10-07 | End: 2021-12-15 | Stop reason: SDUPTHER

## 2021-10-07 RX ORDER — LORATADINE 10 MG/1
10 TABLET ORAL DAILY
Qty: 90 TABLET | Refills: 1 | Status: SHIPPED | OUTPATIENT
Start: 2021-10-07 | End: 2021-11-16

## 2021-10-07 RX ORDER — SOTALOL HYDROCHLORIDE 120 MG/1
1 TABLET ORAL 2 TIMES DAILY
Qty: 180 EACH | Refills: 1 | Status: SHIPPED | OUTPATIENT
Start: 2021-10-07 | End: 2022-02-16 | Stop reason: SDUPTHER

## 2021-10-07 NOTE — PROGRESS NOTES
Subjective   Tonia Ritter is a 86 y.o. female.   Pt presents today with CC of Hypertension (follow up )      History of Present Illness   1.  Patient is a pleasant 86-year-old female with chronic pain.  She does not deal with pain on a regular basis.  She is taking tramadol perhaps once a week or twice a week.  There was previously concerns about diversion.  A family member that was suspected of taking her medications is no longer around her home.  She does still suffer from pain from time to time with osteoarthritis.  NSAIDs are relatively contraindicated because of Xarelto.  She request small tramadol refill.  #2 she reports that her anxiety is much improved.  She takes paroxetine, and is no longer taking Valium.  #3 she has GERD for which he takes Pepcid.  She does well on with her current regimen.  #4 she has chronic kidney disease and follows with nephrology.  She has blood and urinalysis as requested by nephrology.  She is can have that done today.  #5 she has paroxysmal atrial fibrillation for which she takes sotalol.  She is doing well on her current regimen.  She has pacemaker.       The following portions of the patient's history were reviewed and updated as appropriate: allergies, current medications, past family history, past medical history, past social history, past surgical history and problem list.    Review of Systems   Constitutional: Negative for chills, fever and unexpected weight loss.   HENT: Negative for congestion and sore throat.    Eyes: Negative for blurred vision and visual disturbance.   Respiratory: Negative for cough and wheezing.    Cardiovascular: Negative for chest pain and palpitations.   Gastrointestinal: Negative for abdominal pain and diarrhea.   Endocrine: Negative for cold intolerance and heat intolerance.   Genitourinary: Negative for dysuria.   Musculoskeletal: Negative for arthralgias and neck stiffness.   Neurological: Negative for dizziness, seizures and syncope.    Psychiatric/Behavioral: Negative for self-injury, suicidal ideas and depressed mood.       Objective   Physical Exam  Vitals and nursing note reviewed.   Constitutional:       Appearance: She is well-developed.   HENT:      Head: Normocephalic and atraumatic.      Right Ear: External ear normal.      Left Ear: External ear normal.      Nose: Nose normal.   Eyes:      Conjunctiva/sclera: Conjunctivae normal.      Pupils: Pupils are equal, round, and reactive to light.   Cardiovascular:      Rate and Rhythm: Normal rate and regular rhythm.      Heart sounds: Normal heart sounds.   Pulmonary:      Effort: Pulmonary effort is normal.      Breath sounds: Normal breath sounds.   Abdominal:      General: Bowel sounds are normal.      Palpations: Abdomen is soft.   Musculoskeletal:      Cervical back: Normal range of motion and neck supple.   Skin:     General: Skin is warm and dry.   Neurological:      Mental Status: She is alert and oriented to person, place, and time.   Psychiatric:         Behavior: Behavior normal.           Assessment/Plan   Diagnoses and all orders for this visit:    1. Gastroesophageal reflux disease without esophagitis (Primary)  -     famotidine (PEPCID) 20 MG tablet; Take 1 tablet by mouth 2 (Two) Times a Day.  Dispense: 180 tablet; Refill: 0  -  2. Anxiety  -Continue paroxetine.  Refill not needed.  3. Essential hypertension  -     lisinopril (PRINIVIL,ZESTRIL) 5 MG tablet; Take 1 tablet by mouth Daily.  Dispense: 90 tablet; Refill: 1  -Continue lisinopril.  Well-controlled today.  4. Seasonal allergies  -     loratadine (Claritin) 10 MG tablet; Take 1 tablet by mouth Daily.  Dispense: 90 tablet; Refill: 1  5. Paroxysmal atrial fibrillation (HCC)  -     Sotalol HCl, AF, 120 MG tablet; Take 1 tablet by mouth 2 (Two) Times a Day.  Dispense: 180 each; Refill: 1  -6. Multiple joint pain  -     traMADol (ULTRAM) 50 MG tablet; Take 1 tablet by mouth 2 (Two) Times a Day As Needed for Moderate Pain   for up to 90 days.  Dispense: 30 tablet; Refill: 0  -Valium has been discontinued.  Small refill of tramadol the next 90 days sent.  We may consider random pill count going forward.  The problem seems to have been removed from her home.  This was a social issue.  As NSAIDs are not an option, will continue tramadol at low dose, low frequency for now.                  Patient's Body mass index is 27.76 kg/m². indicating that she is overweight (BMI 25-29.9). Obesity-related health conditions include the following: hypertension. Obesity is unchanged. BMI is is above average; BMI management plan is completed. We discussed portion control and increasing exercise..

## 2021-10-08 LAB
ALBUMIN SERPL-MCNC: 4.5 G/DL (ref 3.5–5.2)
ALBUMIN UR-MCNC: 16.9 MG/DL
ALBUMIN/GLOB SERPL: 1.8 G/DL
ALP SERPL-CCNC: 69 U/L (ref 39–117)
ALT SERPL W P-5'-P-CCNC: 11 U/L (ref 1–33)
ANION GAP SERPL CALCULATED.3IONS-SCNC: 9.2 MMOL/L (ref 5–15)
AST SERPL-CCNC: 25 U/L (ref 1–32)
BACTERIA UR QL AUTO: ABNORMAL /HPF
BILIRUB SERPL-MCNC: 0.4 MG/DL (ref 0–1.2)
BILIRUB UR QL STRIP: NEGATIVE
BUN SERPL-MCNC: 12 MG/DL (ref 8–23)
BUN/CREAT SERPL: 11.4 (ref 7–25)
CALCIUM SPEC-SCNC: 9.5 MG/DL (ref 8.6–10.5)
CHLORIDE SERPL-SCNC: 103 MMOL/L (ref 98–107)
CLARITY UR: CLEAR
CO2 SERPL-SCNC: 28.8 MMOL/L (ref 22–29)
COLOR UR: ABNORMAL
CREAT SERPL-MCNC: 1.05 MG/DL (ref 0.57–1)
CREAT UR-MCNC: 158 MG/DL
CREAT UR-MCNC: 158 MG/DL
GFR SERPL CREATININE-BSD FRML MDRD: 50 ML/MIN/1.73
GLOBULIN UR ELPH-MCNC: 2.5 GM/DL
GLUCOSE SERPL-MCNC: 78 MG/DL (ref 65–99)
GLUCOSE UR STRIP-MCNC: NEGATIVE MG/DL
HGB UR QL STRIP.AUTO: NEGATIVE
HYALINE CASTS UR QL AUTO: ABNORMAL /LPF
KETONES UR QL STRIP: NEGATIVE
LEUKOCYTE ESTERASE UR QL STRIP.AUTO: ABNORMAL
MICROALBUMIN/CREAT UR: 107 MG/G
NITRITE UR QL STRIP: NEGATIVE
PH UR STRIP.AUTO: 7 [PH] (ref 5–8)
POTASSIUM SERPL-SCNC: 4.4 MMOL/L (ref 3.5–5.2)
PROT SERPL-MCNC: 7 G/DL (ref 6–8.5)
PROT UR QL STRIP: ABNORMAL
PROT UR-MCNC: 45 MG/DL
PROT/CREAT UR: 284.8 MG/G CREA (ref 0–200)
RBC # UR: ABNORMAL /HPF
REF LAB TEST METHOD: ABNORMAL
SODIUM SERPL-SCNC: 141 MMOL/L (ref 136–145)
SP GR UR STRIP: 1.02 (ref 1–1.03)
SQUAMOUS #/AREA URNS HPF: ABNORMAL /HPF
UROBILINOGEN UR QL STRIP: ABNORMAL
WBC UR QL AUTO: ABNORMAL /HPF

## 2021-10-18 ENCOUNTER — LAB (OUTPATIENT)
Dept: FAMILY MEDICINE CLINIC | Facility: CLINIC | Age: 86
End: 2021-10-18

## 2021-10-18 ENCOUNTER — CLINICAL SUPPORT (OUTPATIENT)
Dept: FAMILY MEDICINE CLINIC | Facility: CLINIC | Age: 86
End: 2021-10-18

## 2021-10-18 ENCOUNTER — OFFICE VISIT (OUTPATIENT)
Dept: FAMILY MEDICINE CLINIC | Facility: CLINIC | Age: 86
End: 2021-10-18

## 2021-10-18 ENCOUNTER — TRANSCRIBE ORDERS (OUTPATIENT)
Dept: ADMINISTRATIVE | Facility: HOSPITAL | Age: 86
End: 2021-10-18

## 2021-10-18 ENCOUNTER — LAB (OUTPATIENT)
Dept: LAB | Facility: HOSPITAL | Age: 86
End: 2021-10-18

## 2021-10-18 DIAGNOSIS — H04.559: Primary | ICD-10-CM

## 2021-10-18 DIAGNOSIS — Z01.818 OTHER SPECIFIED PRE-OPERATIVE EXAMINATION: ICD-10-CM

## 2021-10-18 DIAGNOSIS — Z79.01 ANTICOAGULATED: ICD-10-CM

## 2021-10-18 DIAGNOSIS — Z01.818 PRE-OP TESTING: Primary | ICD-10-CM

## 2021-10-18 DIAGNOSIS — Z01.810 ENCOUNTER FOR PRE-OPERATIVE CARDIOVASCULAR CLEARANCE: Primary | ICD-10-CM

## 2021-10-18 DIAGNOSIS — Z95.0 PACEMAKER: ICD-10-CM

## 2021-10-18 DIAGNOSIS — Z01.818 OTHER SPECIFIED PRE-OPERATIVE EXAMINATION: Primary | ICD-10-CM

## 2021-10-18 PROCEDURE — U0004 COV-19 TEST NON-CDC HGH THRU: HCPCS | Performed by: FAMILY MEDICINE

## 2021-10-18 PROCEDURE — C9803 HOPD COVID-19 SPEC COLLECT: HCPCS

## 2021-10-18 PROCEDURE — 36415 COLL VENOUS BLD VENIPUNCTURE: CPT

## 2021-10-18 PROCEDURE — U0005 INFEC AGEN DETEC AMPLI PROBE: HCPCS | Performed by: FAMILY MEDICINE

## 2021-10-18 PROCEDURE — 80048 BASIC METABOLIC PNL TOTAL CA: CPT | Performed by: OPHTHALMOLOGY

## 2021-10-18 PROCEDURE — 93005 ELECTROCARDIOGRAM TRACING: CPT | Performed by: FAMILY MEDICINE

## 2021-10-18 PROCEDURE — 99214 OFFICE O/P EST MOD 30 MIN: CPT | Performed by: FAMILY MEDICINE

## 2021-10-18 NOTE — PROGRESS NOTES
Subjective   Tonia Ritter is a 86 y.o. female.   Pt presents today with CC of Medical Clearance (eye surgery )      History of Present Illness   Patient is an 86-year-old female scheduled for a surgery under general anesthesia by ophthalmologist subspecialist for a left tear duct surgery.  The surgery will be on 10/21/2021.  She needs blood work and an EKG. she denies chest pain or shortness of breath.  Of note, she does take Xarelto, and she has a sulfa allergy.  Both of these were noted on her clearance form.           The following portions of the patient's history were reviewed and updated as appropriate: allergies, current medications, past family history, past medical history, past social history, past surgical history and problem list.    Review of Systems   Constitutional: Negative for chills, fever and unexpected weight loss.   HENT: Negative for congestion and sore throat.    Eyes: Negative for blurred vision and visual disturbance.   Respiratory: Negative for cough and wheezing.    Cardiovascular: Negative for chest pain and palpitations.   Gastrointestinal: Negative for abdominal pain and diarrhea.   Endocrine: Negative for cold intolerance and heat intolerance.   Genitourinary: Negative for dysuria.   Musculoskeletal: Negative for arthralgias and neck stiffness.   Neurological: Negative for dizziness, seizures and syncope.   Psychiatric/Behavioral: Negative for self-injury, suicidal ideas and depressed mood.       Objective   Physical Exam  Vitals and nursing note reviewed.   Constitutional:       Appearance: She is well-developed.   HENT:      Head: Normocephalic and atraumatic.      Right Ear: External ear normal.      Left Ear: External ear normal.      Nose: Nose normal.   Eyes:      Conjunctiva/sclera: Conjunctivae normal.      Pupils: Pupils are equal, round, and reactive to light.   Cardiovascular:      Rate and Rhythm: Normal rate and regular rhythm.      Heart sounds: Normal heart sounds.    Pulmonary:      Effort: Pulmonary effort is normal.      Breath sounds: Normal breath sounds.   Abdominal:      General: Bowel sounds are normal.      Palpations: Abdomen is soft.   Musculoskeletal:      Cervical back: Normal range of motion and neck supple.   Skin:     General: Skin is warm and dry.   Neurological:      Mental Status: She is alert and oriented to person, place, and time.   Psychiatric:         Behavior: Behavior normal.         ECG 12 Lead    Date/Time: 10/18/2021 4:56 PM  Performed by: Joey Orozco DO  Authorized by: Joey Orozco DO   Comparison: compared with previous ECG from 1/4/2019  Similar to previous ECG  Rhythm: paced    Clinical impression: abnormal EKG  Comments: EKG is very similar to prior.  No acute problems seen.              Assessment/Plan   Diagnoses and all orders for this visit:    1. Pre-op testing (Primary)  -     Cancel: COVID-19,[facility]),[facility], NP/OP SWAB IN UTM/VTM/[facility] TRANSPORT MEDIA,24 HR TAT - Swab, Nasopharynx  -     COVID-19 PCR, [facility], NP SWAB IN [facility] VIRAL TRANSPORT MEDIA/ORAL SWISH 24-30 HR TAT - Swab, Nasopharynx  -     ECG 12 Lead    2. Anticoagulated  Recommend holding Xarelto 2 days ahead of time.  That is, she is to take her Xarelto tonight, but hold tomorrow night, and the night before her surgery.  Surgery is on the morning of the 21st, her last dose of Xarelto will be tonight on the 18th.  She is to resume taking Xarelto the night after surgery.  That is, the evening of the 22nd.  3. Pacemaker  -     ECG 12 Lead

## 2021-10-19 ENCOUNTER — TELEPHONE (OUTPATIENT)
Dept: FAMILY MEDICINE CLINIC | Facility: CLINIC | Age: 86
End: 2021-10-19

## 2021-10-19 LAB
ANION GAP SERPL CALCULATED.3IONS-SCNC: 11.3 MMOL/L (ref 5–15)
BUN SERPL-MCNC: 11 MG/DL (ref 8–23)
BUN/CREAT SERPL: 12.8 (ref 7–25)
CALCIUM SPEC-SCNC: 8.8 MG/DL (ref 8.6–10.5)
CHLORIDE SERPL-SCNC: 102 MMOL/L (ref 98–107)
CO2 SERPL-SCNC: 28.7 MMOL/L (ref 22–29)
CREAT SERPL-MCNC: 0.86 MG/DL (ref 0.57–1)
GFR SERPL CREATININE-BSD FRML MDRD: 63 ML/MIN/1.73
GLUCOSE SERPL-MCNC: 78 MG/DL (ref 65–99)
POTASSIUM SERPL-SCNC: 4.2 MMOL/L (ref 3.5–5.2)
SARS-COV-2 RNA NOSE QL NAA+PROBE: NOT DETECTED
SODIUM SERPL-SCNC: 142 MMOL/L (ref 136–145)

## 2021-10-19 NOTE — TELEPHONE ENCOUNTER
I reviewed her labs, no concerns.  Covid test has not come back yet.  Assuming it is negative, you are cleared for your surgery.   The only concern we have is your Xarelto.  I recommend holding that tonight and tomorrow night in anticipation for your surgery on Thursday.  Your next dose of Xarelto will be on Friday night.   Xarelto is a blood thinner and increases your chance of bleeding.(If cardiology or the surgeon had a different plan, that is fine, this is my recommendation).     Please let me know if you have any questions     Faxed to Dr. Ley,left a message for her to return call.      Left a second message to return call.

## 2021-10-20 ENCOUNTER — TELEPHONE (OUTPATIENT)
Dept: FAMILY MEDICINE CLINIC | Facility: CLINIC | Age: 86
End: 2021-10-20

## 2021-10-20 NOTE — TELEPHONE ENCOUNTER
Left voicemail to return call.       Patient notified & verbalized understanding,she already knew to hold the Xarelto & had not taken it last night.

## 2021-10-20 NOTE — TELEPHONE ENCOUNTER
----- Message from Joey Orozco DO sent at 10/20/2021 10:05 AM EDT -----  Covid was negative.  Please ensure that her eye surgeon gets this result.  Her surgery is tomorrow.  She needed it for clearance.      Faxed & patient notified.

## 2021-10-25 ENCOUNTER — NURSE TRIAGE (OUTPATIENT)
Dept: CALL CENTER | Facility: HOSPITAL | Age: 86
End: 2021-10-25

## 2021-10-26 ENCOUNTER — HOSPITAL ENCOUNTER (EMERGENCY)
Facility: HOSPITAL | Age: 86
Discharge: HOME | End: 2021-10-26
Attending: EMERGENCY MEDICINE | Admitting: EMERGENCY MEDICINE

## 2021-10-26 ENCOUNTER — APPOINTMENT (OUTPATIENT)
Dept: CT IMAGING | Facility: HOSPITAL | Age: 86
End: 2021-10-26

## 2021-10-26 VITALS
TEMPERATURE: 98 F | BODY MASS INDEX: 27.66 KG/M2 | DIASTOLIC BLOOD PRESSURE: 112 MMHG | OXYGEN SATURATION: 99 % | HEART RATE: 79 BPM | HEIGHT: 65 IN | WEIGHT: 166 LBS | SYSTOLIC BLOOD PRESSURE: 178 MMHG | RESPIRATION RATE: 18 BRPM

## 2021-10-26 DIAGNOSIS — R04.0 MILD EPISTAXIS: ICD-10-CM

## 2021-10-26 DIAGNOSIS — H59.89: Primary | ICD-10-CM

## 2021-10-26 LAB
ALBUMIN SERPL-MCNC: 4.33 G/DL (ref 3.5–5.2)
ALBUMIN/GLOB SERPL: 1.4 G/DL
ALP SERPL-CCNC: 77 U/L (ref 39–117)
ALT SERPL W P-5'-P-CCNC: 12 U/L (ref 1–33)
ANION GAP SERPL CALCULATED.3IONS-SCNC: 9.4 MMOL/L (ref 5–15)
APTT PPP: 29.8 SECONDS (ref 25.5–35.4)
AST SERPL-CCNC: 25 U/L (ref 1–32)
BASOPHILS # BLD AUTO: 0.04 10*3/MM3 (ref 0–0.2)
BASOPHILS NFR BLD AUTO: 0.7 % (ref 0–1.5)
BILIRUB SERPL-MCNC: 0.4 MG/DL (ref 0–1.2)
BUN SERPL-MCNC: 11 MG/DL (ref 8–23)
BUN/CREAT SERPL: 10.2 (ref 7–25)
CALCIUM SPEC-SCNC: 9.2 MG/DL (ref 8.6–10.5)
CHLORIDE SERPL-SCNC: 101 MMOL/L (ref 98–107)
CO2 SERPL-SCNC: 29.6 MMOL/L (ref 22–29)
CREAT SERPL-MCNC: 1.08 MG/DL (ref 0.57–1)
DEPRECATED RDW RBC AUTO: 48.2 FL (ref 37–54)
EOSINOPHIL # BLD AUTO: 0.1 10*3/MM3 (ref 0–0.4)
EOSINOPHIL NFR BLD AUTO: 1.7 % (ref 0.3–6.2)
ERYTHROCYTE [DISTWIDTH] IN BLOOD BY AUTOMATED COUNT: 13.8 % (ref 12.3–15.4)
GFR SERPL CREATININE-BSD FRML MDRD: 48 ML/MIN/1.73
GLOBULIN UR ELPH-MCNC: 3.1 GM/DL
GLUCOSE SERPL-MCNC: 110 MG/DL (ref 65–99)
HCT VFR BLD AUTO: 39.2 % (ref 34–46.6)
HGB BLD-MCNC: 12.3 G/DL (ref 12–15.9)
IMM GRANULOCYTES # BLD AUTO: 0.02 10*3/MM3 (ref 0–0.05)
IMM GRANULOCYTES NFR BLD AUTO: 0.3 % (ref 0–0.5)
INR PPP: 1.07 (ref 0.9–1.1)
LYMPHOCYTES # BLD AUTO: 1.77 10*3/MM3 (ref 0.7–3.1)
LYMPHOCYTES NFR BLD AUTO: 29.3 % (ref 19.6–45.3)
MCH RBC QN AUTO: 29.9 PG (ref 26.6–33)
MCHC RBC AUTO-ENTMCNC: 31.4 G/DL (ref 31.5–35.7)
MCV RBC AUTO: 95.4 FL (ref 79–97)
MONOCYTES # BLD AUTO: 0.78 10*3/MM3 (ref 0.1–0.9)
MONOCYTES NFR BLD AUTO: 12.9 % (ref 5–12)
NEUTROPHILS NFR BLD AUTO: 3.34 10*3/MM3 (ref 1.7–7)
NEUTROPHILS NFR BLD AUTO: 55.1 % (ref 42.7–76)
NRBC BLD AUTO-RTO: 0 /100 WBC (ref 0–0.2)
PLATELET # BLD AUTO: 146 10*3/MM3 (ref 140–450)
PMV BLD AUTO: 9.8 FL (ref 6–12)
POTASSIUM SERPL-SCNC: 3.8 MMOL/L (ref 3.5–5.2)
PROT SERPL-MCNC: 7.4 G/DL (ref 6–8.5)
PROTHROMBIN TIME: 14.3 SECONDS (ref 12.8–14.5)
RBC # BLD AUTO: 4.11 10*6/MM3 (ref 3.77–5.28)
SODIUM SERPL-SCNC: 140 MMOL/L (ref 136–145)
WBC # BLD AUTO: 6.05 10*3/MM3 (ref 3.4–10.8)

## 2021-10-26 PROCEDURE — 99284 EMERGENCY DEPT VISIT MOD MDM: CPT

## 2021-10-26 PROCEDURE — 70486 CT MAXILLOFACIAL W/O DYE: CPT

## 2021-10-26 PROCEDURE — 99283 EMERGENCY DEPT VISIT LOW MDM: CPT

## 2021-10-26 PROCEDURE — 80053 COMPREHEN METABOLIC PANEL: CPT | Performed by: PHYSICIAN ASSISTANT

## 2021-10-26 PROCEDURE — 85730 THROMBOPLASTIN TIME PARTIAL: CPT | Performed by: PHYSICIAN ASSISTANT

## 2021-10-26 PROCEDURE — 85610 PROTHROMBIN TIME: CPT | Performed by: PHYSICIAN ASSISTANT

## 2021-10-26 PROCEDURE — 85025 COMPLETE CBC W/AUTO DIFF WBC: CPT | Performed by: PHYSICIAN ASSISTANT

## 2021-10-26 RX ORDER — ACETAMINOPHEN 500 MG
1000 TABLET ORAL ONCE
Status: COMPLETED | OUTPATIENT
Start: 2021-10-26 | End: 2021-10-26

## 2021-10-26 RX ADMIN — ACETAMINOPHEN 1000 MG: 500 TABLET ORAL at 02:14

## 2021-10-26 NOTE — TELEPHONE ENCOUNTER
"Caller reporting a constant nosebleed since last evening; called EMS to home earlier and did not want to go to ED at that time.  Instruction provided per protocol.    Reason for Disposition  • [1] Bleeding present > 30 minutes AND [2] using correct method of direct pressure    Additional Information  • Negative: Fainted or too weak to stand following large blood loss  • Negative: Sounds like a life-threatening emergency to the triager  • Negative: Nosebleed followed a nose injury    Answer Assessment - Initial Assessment Questions  1. AMOUNT OF BLEEDING: \"How bad is the bleeding?\" \"How much blood was lost?\" \"Has the bleeding stopped?\"    - MILD: needed a couple tissues    - MODERATE: needed many tissues    - SEVERE: large blood clots, soaked many tissues, lasted more than 30 minutes       Moderate per patient  2. ONSET: \"When did the nosebleed start?\"       10/24/21  3. FREQUENCY: \"How many nosebleeds have you had in the last 24 hours?\"       constant  4. RECURRENT SYMPTOMS: \"Have there been other recent nosebleeds?\" If Yes, ask: \"How long did it take you to stop the bleeding?\" \"What worked best?\"       Denies  5. CAUSE: \"What do you think caused this nosebleed?\"      Tear duct surgery on 10/21/21  6. LOCAL FACTORS: \"Do you have any cold symptoms?\", \"Have you been rubbing or picking at your nose?\"      Denies  7. SYSTEMIC FACTORS: \"Do you have high blood pressure or any bleeding problems?\"      HTN  8. BLOOD THINNERS: \"Do you take any blood thinners?\" (e.g., coumadin, heparin, aspirin, Plavix)      Off of them at this time  9. OTHER SYMPTOMS: \"Do you have any other symptoms?\" (e.g., lightheadedness)      Denies  10. PREGNANCY: \"Is there any chance you are pregnant?\" \"When was your last menstrual period?\"        NA    Protocols used: NOSEBLEED-ADULT-AH      "

## 2021-10-26 NOTE — ED NOTES
CASSIE De Souza at bedside placing guaze with KY jelly on it to pt's left nare. Pt tolerated well.      Daly Becerra, RN  10/26/21 0135

## 2021-10-27 ENCOUNTER — TELEPHONE (OUTPATIENT)
Dept: FAMILY MEDICINE CLINIC | Facility: CLINIC | Age: 86
End: 2021-10-27

## 2021-10-27 NOTE — ED PROVIDER NOTES
Subjective   86-year-old female presents the ER with complaints of bleeding.  Patient is status post from tear duct surgery performed by Dr. Ley 5 days prior to arrival.  Patient states she is having some light bleeding around her tear duct on her medial left eye as well as some bleeding out of her left nare.  Patient states that she is on anticoagulation however she was told to stop it.          Review of Systems   Constitutional: Negative.  Negative for fever.   HENT: Negative.    Respiratory: Negative.    Cardiovascular: Negative.  Negative for chest pain.   Gastrointestinal: Negative.  Negative for abdominal pain.   Endocrine: Negative.    Genitourinary: Negative.  Negative for dysuria.   Skin: Positive for wound.   Neurological: Negative.    Psychiatric/Behavioral: Negative.    All other systems reviewed and are negative.      Past Medical History:   Diagnosis Date   • Acute bronchitis    • Allergic rhinitis    • Anxiety    • Atrial fibrillation (HCC)    • Back pain    • Burping    • Chills (without fever) 3/25/2019   • Chronic pain    • Congestive heart failure (HCC)    • Depression    • Encounter for screening fecal occult blood testing 03/10/2016    NEGATIVE   • Fibromyalgia    • Flatulence    • GERD (gastroesophageal reflux disease)    • Hematuria    • Hyperlipidemia    • Hypertension    • Osteoarthritis    • Pacemaker    • PUD (peptic ulcer disease)    • Right shoulder pain        Allergies   Allergen Reactions   • Reglan [Metoclopramide]    • Sulfa Antibiotics        Past Surgical History:   Procedure Laterality Date   • CARDIOVERSION  05/2017    [facility]    • CARDIOVERSION  09/01/2017    [facility]   • CHOLECYSTECTOMY  2004   • COLONOSCOPY  05/2013   • DILATATION AND CURETTAGE     • EPIDIDYMAL CYST EXCISION     • ESOPHAGOSCOPY / EGD  01/2019   • EYE SURGERY     • HEMORRHOIDECTOMY  01/2019   • MAMMO BILATERAL  10/2014   • NECK SURGERY     • PACEMAKER IMPLANTATION     • PAP SMEAR  03/2016   •  SKIN LESION EXCISION         Family History   Problem Relation Age of Onset   • Thyroid disease Mother    • Mental illness Father         nervous breakdown   • Breast cancer Maternal Aunt        Social History     Socioeconomic History   • Marital status:    Tobacco Use   • Smoking status: Never Smoker   • Smokeless tobacco: Never Used   Substance and Sexual Activity   • Alcohol use: No   • Drug use: No   • Sexual activity: Defer           Objective   Physical Exam  Vitals and nursing note reviewed.   Constitutional:       General: She is not in acute distress.     Appearance: She is well-developed. She is not diaphoretic.   HENT:      Head: Normocephalic and atraumatic.      Right Ear: External ear normal.      Left Ear: External ear normal.      Nose: Nose normal.      Comments: Residual bleeding within the left naris.  No active bleeding noted on exam.  Eyes:      Conjunctiva/sclera: Conjunctivae normal.   Neck:      Vascular: No JVD.      Trachea: No tracheal deviation.   Cardiovascular:      Rate and Rhythm: Normal rate. Rhythm irregular.      Heart sounds: Normal heart sounds. No murmur heard.      Pulmonary:      Effort: Pulmonary effort is normal. No respiratory distress.      Breath sounds: Normal breath sounds. No wheezing.   Abdominal:      Palpations: Abdomen is soft.      Tenderness: There is no abdominal tenderness.   Musculoskeletal:         General: No deformity. Normal range of motion.      Cervical back: Normal range of motion and neck supple.   Skin:     General: Skin is warm and dry.      Coloration: Skin is not pale.      Findings: No erythema or rash.      Comments: Postsurgical changes to the medial left eye.  There is some mild bleeding however no concerning complications at this point time.   Neurological:      Mental Status: She is alert and oriented to person, place, and time.      Cranial Nerves: No cranial nerve deficit.   Psychiatric:         Behavior: Behavior normal.          Thought Content: Thought content normal.         Procedures           ED Course  ED Course as of 10/27/21 0518   Tue Oct 26, 2021   0209 CT sinus rad interpreted:  There are missing portions of the left nasal bone, anteromedial left orbit, and anterior left ethmoid air cells with associated soft tissue density, which is new since the prior examination. This may represent expected postoperative changes, but the CT  appearance is nonspecific. Post septal left retro-orbital soft tissues are within expected limits. Mucosal thickening in the left frontal sinus, left ethmoid air cells, and left sphenoid sinus. No air-fluid levels. [RB]   0209 Patient's work-up is unremarkable.  Patient is no longer bleeding out of the left naris.  Patient has no active bleeding at the surgical site of the medial left eye.  Patient will be instructed to follow-up with Dr. Ley. [RB]      ED Course User Index  [RB] Matt Posada II, PA                                           MDM  Number of Diagnoses or Management Options  Mild epistaxis: new and requires workup  Postoperative surgical complication involving left eye associated with non-ophthalmic procedure, unspecified complication: new and requires workup     Amount and/or Complexity of Data Reviewed  Clinical lab tests: ordered and reviewed  Tests in the radiology section of CPT®: ordered and reviewed    Risk of Complications, Morbidity, and/or Mortality  Presenting problems: low  Diagnostic procedures: low  Management options: low    Patient Progress  Patient progress: stable      Final diagnoses:   Postoperative surgical complication involving left eye associated with non-ophthalmic procedure, unspecified complication   Mild epistaxis       ED Disposition  ED Disposition     ED Disposition Condition Comment    Discharge Stable           Ludwin Lynn MD  [address] [place] [address] [place], [place] [id number] [phone]    Schedule an appointment as soon as possible for a visit             Medication List      No changes were made to your prescriptions during this visit.          Matt Posada II, PA  10/27/21 0518

## 2021-10-27 NOTE — TELEPHONE ENCOUNTER
"Patient called reports she has had a recent Tear Duct surgery & her mouth is very sore,she is requesting a \"Mouth Rinse\".  "

## 2021-10-28 ENCOUNTER — HOSPITAL ENCOUNTER (EMERGENCY)
Facility: HOSPITAL | Age: 86
Discharge: HOME | End: 2021-10-28
Attending: EMERGENCY MEDICINE | Admitting: EMERGENCY MEDICINE

## 2021-10-28 VITALS
HEIGHT: 65 IN | WEIGHT: 166 LBS | TEMPERATURE: 98 F | BODY MASS INDEX: 27.66 KG/M2 | SYSTOLIC BLOOD PRESSURE: 169 MMHG | RESPIRATION RATE: 16 BRPM | DIASTOLIC BLOOD PRESSURE: 106 MMHG | HEART RATE: 80 BPM | OXYGEN SATURATION: 95 %

## 2021-10-28 DIAGNOSIS — R04.0 EPISTAXIS: Primary | ICD-10-CM

## 2021-10-28 DIAGNOSIS — J02.9 SORE THROAT: Primary | ICD-10-CM

## 2021-10-28 LAB
ALBUMIN SERPL-MCNC: 4.39 G/DL (ref 3.5–5.2)
ALBUMIN/GLOB SERPL: 1.6 G/DL
ALP SERPL-CCNC: 73 U/L (ref 39–117)
ALT SERPL W P-5'-P-CCNC: 10 U/L (ref 1–33)
ANION GAP SERPL CALCULATED.3IONS-SCNC: 8.2 MMOL/L (ref 5–15)
APTT PPP: 29.2 SECONDS (ref 25.5–35.4)
AST SERPL-CCNC: 20 U/L (ref 1–32)
BASOPHILS # BLD AUTO: 0.03 10*3/MM3 (ref 0–0.2)
BASOPHILS NFR BLD AUTO: 0.4 % (ref 0–1.5)
BILIRUB SERPL-MCNC: 0.4 MG/DL (ref 0–1.2)
BUN SERPL-MCNC: 13 MG/DL (ref 8–23)
BUN/CREAT SERPL: 14.3 (ref 7–25)
CALCIUM SPEC-SCNC: 9.6 MG/DL (ref 8.6–10.5)
CHLORIDE SERPL-SCNC: 106 MMOL/L (ref 98–107)
CO2 SERPL-SCNC: 28.8 MMOL/L (ref 22–29)
CREAT SERPL-MCNC: 0.91 MG/DL (ref 0.57–1)
DEPRECATED RDW RBC AUTO: 50.1 FL (ref 37–54)
EOSINOPHIL # BLD AUTO: 0.14 10*3/MM3 (ref 0–0.4)
EOSINOPHIL NFR BLD AUTO: 2.1 % (ref 0.3–6.2)
ERYTHROCYTE [DISTWIDTH] IN BLOOD BY AUTOMATED COUNT: 13.5 % (ref 12.3–15.4)
GFR SERPL CREATININE-BSD FRML MDRD: 59 ML/MIN/1.73
GLOBULIN UR ELPH-MCNC: 2.7 GM/DL
GLUCOSE SERPL-MCNC: 101 MG/DL (ref 65–99)
HCT VFR BLD AUTO: 41.3 % (ref 34–46.6)
HGB BLD-MCNC: 12.2 G/DL (ref 12–15.9)
IMM GRANULOCYTES # BLD AUTO: 0.01 10*3/MM3 (ref 0–0.05)
IMM GRANULOCYTES NFR BLD AUTO: 0.1 % (ref 0–0.5)
INR PPP: 0.94 (ref 0.9–1.1)
LYMPHOCYTES # BLD AUTO: 1.76 10*3/MM3 (ref 0.7–3.1)
LYMPHOCYTES NFR BLD AUTO: 26.3 % (ref 19.6–45.3)
MCH RBC QN AUTO: 29.8 PG (ref 26.6–33)
MCHC RBC AUTO-ENTMCNC: 29.5 G/DL (ref 31.5–35.7)
MCV RBC AUTO: 100.7 FL (ref 79–97)
MONOCYTES # BLD AUTO: 0.83 10*3/MM3 (ref 0.1–0.9)
MONOCYTES NFR BLD AUTO: 12.4 % (ref 5–12)
NEUTROPHILS NFR BLD AUTO: 3.91 10*3/MM3 (ref 1.7–7)
NEUTROPHILS NFR BLD AUTO: 58.7 % (ref 42.7–76)
NRBC BLD AUTO-RTO: 0 /100 WBC (ref 0–0.2)
PLATELET # BLD AUTO: 156 10*3/MM3 (ref 140–450)
PMV BLD AUTO: 9.8 FL (ref 6–12)
POTASSIUM SERPL-SCNC: 4 MMOL/L (ref 3.5–5.2)
PROT SERPL-MCNC: 7.1 G/DL (ref 6–8.5)
PROTHROMBIN TIME: 13 SECONDS (ref 12.8–14.5)
RBC # BLD AUTO: 4.1 10*6/MM3 (ref 3.77–5.28)
SODIUM SERPL-SCNC: 143 MMOL/L (ref 136–145)
WBC # BLD AUTO: 6.68 10*3/MM3 (ref 3.4–10.8)

## 2021-10-28 PROCEDURE — 99284 EMERGENCY DEPT VISIT MOD MDM: CPT

## 2021-10-28 PROCEDURE — 80053 COMPREHEN METABOLIC PANEL: CPT | Performed by: PHYSICIAN ASSISTANT

## 2021-10-28 PROCEDURE — 85025 COMPLETE CBC W/AUTO DIFF WBC: CPT | Performed by: PHYSICIAN ASSISTANT

## 2021-10-28 PROCEDURE — 85610 PROTHROMBIN TIME: CPT | Performed by: PHYSICIAN ASSISTANT

## 2021-10-28 PROCEDURE — 85730 THROMBOPLASTIN TIME PARTIAL: CPT | Performed by: PHYSICIAN ASSISTANT

## 2021-10-28 RX ORDER — DIPHENHYDRAMINE, LIDOCAINE, NYSTATIN
5 KIT ORAL 4 TIMES DAILY PRN
Qty: 60 ML | Refills: 0 | Status: SHIPPED | OUTPATIENT
Start: 2021-10-28 | End: 2021-11-29 | Stop reason: SDUPTHER

## 2021-10-28 RX ADMIN — TRANEXAMIC ACID 500 MG: 100 INJECTION, SOLUTION INTRAVENOUS at 09:45

## 2021-10-29 ENCOUNTER — PATIENT OUTREACH (OUTPATIENT)
Dept: CASE MANAGEMENT | Facility: OTHER | Age: 86
End: 2021-10-29

## 2021-10-29 ENCOUNTER — NURSE TRIAGE (OUTPATIENT)
Dept: CALL CENTER | Facility: HOSPITAL | Age: 86
End: 2021-10-29

## 2021-10-29 NOTE — OUTREACH NOTE
Ambulatory Case Management Note    Patient Outreach    RN-ACM outreach with patient.  Patient had ED visits at [facility] 10/26/21 and 10/28/21.  Clinical impression noted as epistaxis. Patient is status post tear duct surgery.  Patient was treated and discharged to home to follow with Dr. Ley, Ophthalmology.  No medication changes noted at either discharge.     Care Evaluation    Questions/Answers      Most Recent Value   Suggested Appointments --  [Patient reported having an appointment with Dr. Brown in about 10 days.  Next PCP is December.  Patient declined scheduling of earlier PCP appointment. ]   Annual Wellness Visit:  Patient Has Completed  [Next AWV due 12/21.  An appointment is scheduled. ]   Care Gaps Addressed Colon Cancer Screening,  Mammogram,  Flu Shot   Colon Cancer Screening Type Exempt   Flu Shot Status Patient will Complete   Mammogram Status Exempt   Care Gap Comments Covid-19 vaccines are complete.  Patient is noted as eligible for booster dose.   Other Patient Education/Resources  24/[facility] Nurse Call Line  [AVS, education, and recommended f/u reviewed with patient. ]   Medication Adherence --  [Patient has daughter for assistance with medicaton adherence as needed. ]   Healthy Lifestyle (Self-Efficacy) self-reports important symptoms to medical professional,  recognizes when to contact medical assistance,  recognizes when to stop activity          Zoey Carrillo RN  Ambulatory Case Management    10/29/2021, 15:36 EDT

## 2021-10-29 NOTE — TELEPHONE ENCOUNTER
She was seen at [facility] on 10/28/2021 for a nose bleed. She states her nose is raw inside. She is requesting medication to be healing to the nose. Explained she can otc neosporin otherwise call PCP when office opens.     Reason for Disposition  • Prescription request for new medicine (not a refill)    Additional Information  • Negative: [1] Intentional drug overdose AND [2] suicidal thoughts or ideas  • Negative: Drug overdose and triager unable to answer question  • Negative: Caller requesting information unrelated to medicine  • Negative: Caller requesting information about COVID-19 Vaccine  • Negative: Caller requesting information about Emergency Contraception  • Negative: Caller requesting information about Combined Birth Control Pills  • Negative: Caller requesting information about Progestin Birth Control Pills  • Negative: Caller requesting information about Post-Op pain or medicines  • Negative: Caller requesting a prescription antibiotic (such as Penicillin) for Strep throat and has a positive culture result  • Negative: Caller requesting a prescription anti-viral med (such as Tamiflu) and has influenza (flu)  symptoms  • Negative: Immunization reaction suspected  • Negative: Rash while taking a medicine or within 3 days of stopping it  • Negative: [1] Asthma and [2] having symptoms of asthma (cough, wheezing, etc.)  • Negative: [1] Symptom of illness (e.g., headache, abdominal pain, earache, vomiting) AND [2] more than mild  • Negative: Breastfeeding questions about mother's medicines and diet  • Negative: MORE THAN A DOUBLE DOSE of a prescription or over-the-counter (OTC) drug  • Negative: [1] DOUBLE DOSE (an extra dose or lesser amount) of prescription drug AND [2] any symptoms (e.g., dizziness, nausea, pain, sleepiness)  • Negative: [1] DOUBLE DOSE (an extra dose or lesser amount) of over-the-counter (OTC) drug AND [2] any symptoms (e.g., dizziness, nausea, pain, sleepiness)  • Negative: Took another  "person's prescription drug  • Negative: [1] DOUBLE DOSE (an extra dose or lesser amount) of prescription drug AND [2] NO symptoms (Exception: a double dose of antibiotics)  • Negative: Diabetes drug error or overdose (e.g., took wrong type of insulin or took extra dose)  • Negative: [1] Prescription refill request for ESSENTIAL medicine (i.e., likelihood of harm to patient if not taken) AND [2] triager unable to refill per department policy  • Negative: [1] Prescription not at pharmacy AND [2] was prescribed by PCP recently (Exception: triager has access to EMR and prescription is recorded there. Go to Home Care and confirm for pharmacy.)  • Negative: [1] Pharmacy calling with prescription question AND [2] triager unable to answer question  • Negative: [1] Caller has URGENT medicine question about med that PCP or specialist prescribed AND [2] triager unable to answer question  • Negative: Medicine patch causing local rash or itching  • Negative: [1] Caller has medicine question about med NOT prescribed by PCP AND [2] triager unable to answer question (e.g., compatibility with other med, storage)    Answer Assessment - Initial Assessment Questions  1. NAME of MEDICATION: \"What medicine are you calling about?\"      Something healing for a raw nose.   2. QUESTION: \"What is your question?\" (e.g., medication refill, side effect)      Something for a raw nose.   3. PRESCRIBING HCP: \"Who prescribed it?\" Reason: if prescribed by specialist, call should be referred to that group.      No one   4. SYMPTOMS: \"Do you have any symptoms?\"      none  5. SEVERITY: If symptoms are present, ask \"Are they mild, moderate or severe?\"      Mild   6. PREGNANCY:  \"Is there any chance that you are pregnant?\" \"When was your last menstrual period?\"      no    Protocols used: MEDICATION QUESTION CALL-ADULT-AH      "

## 2021-11-11 NOTE — TELEPHONE ENCOUNTER
Medication refill authorized. Please encourage follow up for office visit appt. To my knowledge there is not.  It is just slow digestion and she needs a high fiber diet    Patient notified & verbalized understanding.

## 2021-11-16 ENCOUNTER — OFFICE VISIT (OUTPATIENT)
Dept: FAMILY MEDICINE CLINIC | Facility: CLINIC | Age: 86
End: 2021-11-16

## 2021-11-16 VITALS
BODY MASS INDEX: 27.19 KG/M2 | HEIGHT: 65 IN | OXYGEN SATURATION: 98 % | TEMPERATURE: 97.1 F | SYSTOLIC BLOOD PRESSURE: 118 MMHG | HEART RATE: 79 BPM | DIASTOLIC BLOOD PRESSURE: 60 MMHG | WEIGHT: 163.2 LBS

## 2021-11-16 DIAGNOSIS — J06.9 VIRAL URI WITH COUGH: Primary | ICD-10-CM

## 2021-11-16 PROCEDURE — 99213 OFFICE O/P EST LOW 20 MIN: CPT | Performed by: NURSE PRACTITIONER

## 2021-11-16 RX ORDER — AMOXICILLIN 875 MG/1
875 TABLET, COATED ORAL 2 TIMES DAILY
Qty: 14 TABLET | Refills: 0 | Status: SHIPPED | OUTPATIENT
Start: 2021-11-16 | End: 2021-11-23

## 2021-11-16 NOTE — PROGRESS NOTES
Chief Complaint  Nasal Congestion    Subjective          Tonia Ritter is a 86 y.o. female who presents today to [facility] for initial evaluation     HPI:   History of Present Illness     Presents to clinic for complaint of congestion.  Yesterday started feeling like needs to clear throat. Today started coughing up stuff. Associated symptoms: clear drainage. Took mucinex yesterday and that helped. Denies ill contacts.     The following portions of the patient's history were reviewed and updated as appropriate: allergies, current medications, past family history, past medical history, past social history, past surgical history and problem list.    Objective     Problem List:  Patient Active Problem List   Diagnosis   • Right shoulder pain   • PUD (peptic ulcer disease)   • Osteoarthritis   • Hypertension   • Hyperlipidemia   • Acute bronchitis   • Allergic rhinitis   • Anxiety   • Atrial fibrillation (HCC)   • Back pain   • Burping   • Chronic pain   • Depression   • Encounter for screening fecal occult blood testing   • Flatulence   • GERD (gastroesophageal reflux disease)   • Hematuria   • Constipation   • Pain of upper abdomen   • Pacemaker   • S/P ablation of atrial fibrillation   • Acute URI   • Acute bacterial rhinosinusitis   • Hot flashes   • Impacted cerumen of right ear       Allergy:   Allergies   Allergen Reactions   • Reglan [Metoclopramide]    • Sulfa Antibiotics         Discontinued Medications:  Medications Discontinued During This Encounter   Medication Reason   • loratadine (Claritin) 10 MG tablet Historical Med - Therapy completed   • mupirocin (Bactroban) 2 % ointment Historical Med - Therapy completed       Current Medications:   Current Outpatient Medications   Medication Sig Dispense Refill   • acetaminophen (TYLENOL) 500 MG tablet Take 500 mg by mouth every 6 (six) hours as needed for mild pain (1-3).     • azelastine (ASTELIN) 0.1 % nasal spray 2 sprays  into the nostril(s) as directed by provider Daily. Use in each nostril as directed 30 mL 2   • famotidine (PEPCID) 20 MG tablet Take 1 tablet by mouth 2 (Two) Times a Day. 180 tablet 0   • furosemide (LASIX) 20 MG tablet Take 1 tablet by mouth Every Other Day. 45 tablet 3   • lisinopril (PRINIVIL,ZESTRIL) 5 MG tablet Take 1 tablet by mouth Daily. 90 tablet 1   • nystatin susp + lidocaine viscous (MAGIC MOUTHWASH) oral suspension Swish and swallow 5 mL 4 (Four) Times a Day As Needed (Sore throat). 60 mL 0   • PARoxetine (Paxil) 10 MG tablet Take 1 tablet by mouth Every Night. 90 tablet 3   • potassium chloride (K-DUR,KLOR-CON) 10 MEQ CR tablet TAKE ONE TABLET BY MOUTH EVERY DAY FOR POTASSIUM DEFICIENCY 90 tablet 3   • rivaroxaban (Xarelto) 20 MG tablet Take 1 tablet by mouth Daily With Dinner. 90 tablet 3   • rosuvastatin (CRESTOR) 10 MG tablet Take 1 tablet by mouth Daily. for cholesterol 90 tablet 3   • Sotalol HCl, AF, 120 MG tablet Take 1 tablet by mouth 2 (Two) Times a Day. 180 each 1   • traMADol (ULTRAM) 50 MG tablet Take 1 tablet by mouth 2 (Two) Times a Day As Needed for Moderate Pain  for up to 90 days. 30 tablet 0   • amoxicillin (AMOXIL) 875 MG tablet Take 1 tablet by mouth 2 (Two) Times a Day for 7 days. 14 tablet 0     No current facility-administered medications for this visit.       Past Medical History:  Past Medical History:   Diagnosis Date   • Acute bronchitis    • Allergic rhinitis    • Anxiety    • Atrial fibrillation (HCC)    • Back pain    • Burping    • Chills (without fever) 3/25/2019   • Chronic pain    • Congestive heart failure (HCC)    • Depression    • Encounter for screening fecal occult blood testing 03/10/2016    NEGATIVE   • Fibromyalgia    • Flatulence    • GERD (gastroesophageal reflux disease)    • Hematuria    • Hyperlipidemia    • Hypertension    • Osteoarthritis    • Pacemaker    • PUD (peptic ulcer disease)    • Right shoulder pain        Past Surgical History:  Past Surgical  History:   Procedure Laterality Date   • CARDIOVERSION  05/2017    [facility]    • CARDIOVERSION  09/01/2017    [facility]   • CHOLECYSTECTOMY  2004   • COLONOSCOPY  05/2013   • DILATATION AND CURETTAGE     • EPIDIDYMAL CYST EXCISION     • ESOPHAGOSCOPY / EGD  01/2019   • EYE SURGERY     • HEMORRHOIDECTOMY  01/2019   • MAMMO BILATERAL  10/2014   • NECK SURGERY     • PACEMAKER IMPLANTATION     • PAP SMEAR  03/2016   • SKIN LESION EXCISION         Social History:  Social History     Socioeconomic History   • Marital status:    Tobacco Use   • Smoking status: Never Smoker   • Smokeless tobacco: Never Used   Vaping Use   • Vaping Use: Never used   Substance and Sexual Activity   • Alcohol use: No   • Drug use: No   • Sexual activity: Defer       Family History:  Family History   Problem Relation Age of Onset   • Thyroid disease Mother    • Mental illness Father         nervous breakdown   • Breast cancer Maternal Aunt        Review of Systems:  Review of Systems   Constitutional: Negative for chills and fever.   HENT: Positive for congestion and rhinorrhea.    Respiratory: Negative for chest tightness, shortness of breath and wheezing.    Cardiovascular: Negative for chest pain and palpitations.   Gastrointestinal: Negative for abdominal pain, diarrhea, nausea and vomiting.   Musculoskeletal: Negative for arthralgias and back pain.       Physical Exam:  Physical Exam  Vitals and nursing note reviewed.   Constitutional:       General: She is not in acute distress.     Appearance: Normal appearance. She is not ill-appearing or toxic-appearing.   HENT:      Head: Normocephalic.      Right Ear: Ear canal and external ear normal. A middle ear effusion is present.      Left Ear: Ear canal and external ear normal. A middle ear effusion is present.      Nose: Nose normal.      Right Sinus: No maxillary sinus tenderness or frontal sinus tenderness.      Left Sinus: No maxillary sinus tenderness or frontal sinus  "tenderness.      Mouth/Throat:      Mouth: Mucous membranes are moist.      Comments: + PND, cobblestoning.   Eyes:      Conjunctiva/sclera: Conjunctivae normal.   Cardiovascular:      Rate and Rhythm: Normal rate and regular rhythm.      Heart sounds: Normal heart sounds.   Pulmonary:      Effort: Pulmonary effort is normal. No respiratory distress.      Breath sounds: Normal breath sounds.   Abdominal:      General: Bowel sounds are normal.      Palpations: Abdomen is soft.   Skin:     General: Skin is warm and dry.      Coloration: Skin is not pale.   Neurological:      Mental Status: She is alert and oriented to person, place, and time.   Psychiatric:         Mood and Affect: Mood normal.         Behavior: Behavior normal.         Thought Content: Thought content normal.         Judgment: Judgment normal.         Vital Signs:   /60 (BP Location: Left arm, Patient Position: Sitting, Cuff Size: Adult)   Pulse 79   Temp 97.1 °F (36.2 °C) (Temporal)   Ht 165.1 cm (65\")   Wt 74 kg (163 lb 3.2 oz)   SpO2 98%   BMI 27.16 kg/m²  RR: 17           Assessment and Plan   Diagnoses and all orders for this visit:    1. Viral URI with cough (Primary)  -     COVID-19,APTIMA PANTHER(ZOE),[facility], NP/OP SWAB IN UTM/VTM/[facility] [employer]SP[employer] MEDIA,24 HR TAT - Swab, Nasopharynx; Future  -     amoxicillin (AMOXIL) 875 MG tablet; Take 1 tablet by mouth 2 (Two) Times a Day for 7 days.  Dispense: 14 tablet; Refill: 0    As discussed, I do not feel you have a bacterial infection at this time.  Patient adamant that she needs to \"get on top of this before it gets worse\" and needs an antibiotic.  Discussed viral versus bacterial causes and appropriate treatments of each.  Patient verbalizes understanding and is still adamant about having an antibiotic.  Antibiotic sent for patient to have on hand should she develop signs of a bacterial sinus infection. If you develop signs of pneumonia, as discussed, follow-up in clinic for recheck.  " Recommended Covid testing but patient refuses any testing.  Recommended home Claritin and azelastine as needed for symptoms.  Can do plain Mucinex as directed for chest congestion.  Cough and deep breathe.    Discussed possible differential diagnoses, testing, treatment, recommended non-pharmacological interventions, risks, warning signs to monitor for that would indicate need for follow-up in clinic or ER. If no improvement with these regimens or you have new or worsening symptoms follow-up. Patient verbalizes understanding and agreement with plan of care. Denies further needs or concerns.     I spent 20 minutes caring for patient on this date of service. This time includes time spent by me in the following activities: preparing for the visit, reviewing tests, obtaining and/or reviewing a separately obtained history, performing a medically appropriate examination and/or evaluation, counseling and educating the patient/family/caregiver, ordering medications, tests, or procedures and documenting information in the medical record    Meds ordered during this visit:  New Medications Ordered This Visit   Medications   • amoxicillin (AMOXIL) 875 MG tablet     Sig: Take 1 tablet by mouth 2 (Two) Times a Day for 7 days.     Dispense:  14 tablet     Refill:  0       Patient Instructions:  Patient instructions given for the following visit diagnosis:    ICD-10-CM ICD-9-CM   1. Viral URI with cough  J06.9 465.9       Follow Up   Return if symptoms worsen or fail to improve.        This document has been electronically signed by TEODORA House  November 16, 2021 16:19 EST    Patient was given instructions and counseling regarding her condition or for health maintenance advice. Please see specific information pulled into the AVS if appropriate.     Part of this note may be an electronic transcription/translation of spoken language to printed text using the Dragon Dictation System.

## 2021-11-29 ENCOUNTER — TELEPHONE (OUTPATIENT)
Dept: FAMILY MEDICINE CLINIC | Facility: CLINIC | Age: 86
End: 2021-11-29

## 2021-11-29 DIAGNOSIS — J02.9 SORE THROAT: ICD-10-CM

## 2021-11-29 RX ORDER — DIPHENHYDRAMINE, LIDOCAINE, NYSTATIN
5 KIT ORAL 4 TIMES DAILY PRN
Qty: 60 ML | Refills: 0 | Status: SHIPPED | OUTPATIENT
Start: 2021-11-29 | End: 2022-02-16

## 2021-11-29 NOTE — TELEPHONE ENCOUNTER
Caller: Tonia Ritter    Relationship: Self    Best call back number: [phone] (H)    What medication are you requesting: SOMETHING FOR HER MOUTH     What are your current symptoms: RAW PLACES IN HER MOUTH, WHITE SPOTS ON HER MOUTH     How long have you been experiencing symptoms:  LAST WEEK     Have you had these symptoms before:    [x] Yes  [] No    Have you been treated for these symptoms before:   [x] Yes  [] No    If a prescription is needed, what is your preferred pharmacy and phone number: [address] [phone] [facility] [phone]      Additional notes: PATIENT HAS BEEN TAKING A LOT OF ANTIBIOTICS

## 2021-11-30 ENCOUNTER — HOSPITAL ENCOUNTER (EMERGENCY)
Dept: HOSPITAL 79 - ER1 | Age: 86
Discharge: HOME | End: 2021-11-30
Payer: MEDICARE

## 2021-11-30 VITALS — BODY MASS INDEX: 27.49 KG/M2 | WEIGHT: 165 LBS | HEIGHT: 65 IN

## 2021-11-30 DIAGNOSIS — Z23: ICD-10-CM

## 2021-11-30 DIAGNOSIS — U07.1: Primary | ICD-10-CM

## 2021-11-30 DIAGNOSIS — I10: ICD-10-CM

## 2021-11-30 LAB
BUN/CREATININE RATIO: 9 (ref 0–10)
HGB BLD-MCNC: 11.9 GM/DL (ref 12.3–15.3)
RED BLOOD COUNT: 3.88 M/UL (ref 4–5.1)
WHITE BLOOD COUNT: 6.9 K/UL (ref 4.5–11)

## 2021-11-30 PROCEDURE — M0245: HCPCS

## 2021-12-01 ENCOUNTER — PATIENT OUTREACH (OUTPATIENT)
Dept: CASE MANAGEMENT | Facility: OTHER | Age: 86
End: 2021-12-01

## 2021-12-01 NOTE — OUTREACH NOTE
Ambulatory Case Management Note    Patient Outreach    RN-ACM outreach with patient.  Patient had an ED visit at [facility] on 11/30/21.  Per patient, she tested positive for COVID-19.  Patient c/o flu-like symptoms and cough. She denied unusual shortness of breath.  Patient indicated she received MA-Infusion during the ED visit and was discharged with an antibiotic and Tessalon Pearls.   RN-SANDRAM provided education on quarantine/isolation, OTC treatments, monitoring for SOB and elevated temperature.  Patient v/u and reported being cared for by her daughter.  Per patient, multiple extended family members were together during the Thanksgiving holiday and several have since tested positive for COVID-19.      Education provided on importance of monitoring for and reporting new/worsening symptoms.  Patient v/u and ability to contact PCP or seek ED evaluation as needed.        Zoey Carrillo RN  Ambulatory Case Management    12/1/2021, 12:13 EST

## 2021-12-02 ENCOUNTER — HOSPITAL ENCOUNTER (EMERGENCY)
Dept: HOSPITAL 79 - ER1 | Age: 86
Discharge: HOME | End: 2021-12-02
Payer: MEDICARE

## 2021-12-02 DIAGNOSIS — U07.1: Primary | ICD-10-CM

## 2021-12-02 DIAGNOSIS — Z90.49: ICD-10-CM

## 2021-12-02 DIAGNOSIS — Z95.0: ICD-10-CM

## 2021-12-02 DIAGNOSIS — I48.91: ICD-10-CM

## 2021-12-02 DIAGNOSIS — I10: ICD-10-CM

## 2021-12-02 LAB
BUN/CREATININE RATIO: 15 (ref 0–10)
HGB BLD-MCNC: 12.4 GM/DL (ref 12.3–15.3)
RED BLOOD COUNT: 3.96 M/UL (ref 4–5.1)
WHITE BLOOD COUNT: 8.7 K/UL (ref 4.5–11)

## 2021-12-06 ENCOUNTER — TELEPHONE (OUTPATIENT)
Dept: FAMILY MEDICINE CLINIC | Facility: CLINIC | Age: 86
End: 2021-12-06

## 2021-12-06 DIAGNOSIS — F41.9 ANXIETY: ICD-10-CM

## 2021-12-06 RX ORDER — DIAZEPAM 2 MG/1
2 TABLET ORAL DAILY PRN
Qty: 12 TABLET | Refills: 0 | Status: SHIPPED | OUTPATIENT
Start: 2021-12-06 | End: 2022-02-16

## 2021-12-11 DIAGNOSIS — I48.0 PAROXYSMAL ATRIAL FIBRILLATION (HCC): ICD-10-CM

## 2021-12-13 RX ORDER — POTASSIUM CHLORIDE 750 MG/1
TABLET, EXTENDED RELEASE ORAL
Qty: 90 TABLET | Refills: 3 | OUTPATIENT
Start: 2021-12-13

## 2021-12-15 ENCOUNTER — OFFICE VISIT (OUTPATIENT)
Dept: FAMILY MEDICINE CLINIC | Facility: CLINIC | Age: 86
End: 2021-12-15

## 2021-12-15 VITALS
WEIGHT: 159.8 LBS | SYSTOLIC BLOOD PRESSURE: 122 MMHG | DIASTOLIC BLOOD PRESSURE: 68 MMHG | BODY MASS INDEX: 26.62 KG/M2 | HEART RATE: 80 BPM | OXYGEN SATURATION: 97 % | HEIGHT: 65 IN | TEMPERATURE: 97.1 F

## 2021-12-15 DIAGNOSIS — E78.2 MIXED HYPERLIPIDEMIA: ICD-10-CM

## 2021-12-15 DIAGNOSIS — I10 ESSENTIAL HYPERTENSION: ICD-10-CM

## 2021-12-15 DIAGNOSIS — R05.9 COUGH: ICD-10-CM

## 2021-12-15 DIAGNOSIS — H61.21 IMPACTED CERUMEN OF RIGHT EAR: ICD-10-CM

## 2021-12-15 DIAGNOSIS — Z71.89 ADVANCED CARE PLANNING/COUNSELING DISCUSSION: ICD-10-CM

## 2021-12-15 DIAGNOSIS — K21.9 GASTROESOPHAGEAL REFLUX DISEASE WITHOUT ESOPHAGITIS: ICD-10-CM

## 2021-12-15 DIAGNOSIS — I48.0 PAROXYSMAL ATRIAL FIBRILLATION (HCC): ICD-10-CM

## 2021-12-15 DIAGNOSIS — F41.9 ANXIETY: ICD-10-CM

## 2021-12-15 DIAGNOSIS — Z00.00 MEDICARE ANNUAL WELLNESS VISIT, SUBSEQUENT: Primary | ICD-10-CM

## 2021-12-15 PROCEDURE — 69210 REMOVE IMPACTED EAR WAX UNI: CPT | Performed by: FAMILY MEDICINE

## 2021-12-15 PROCEDURE — 1170F FXNL STATUS ASSESSED: CPT | Performed by: FAMILY MEDICINE

## 2021-12-15 PROCEDURE — 99213 OFFICE O/P EST LOW 20 MIN: CPT | Performed by: FAMILY MEDICINE

## 2021-12-15 PROCEDURE — 69209 REMOVE IMPACTED EAR WAX UNI: CPT | Performed by: FAMILY MEDICINE

## 2021-12-15 PROCEDURE — G0439 PPPS, SUBSEQ VISIT: HCPCS | Performed by: FAMILY MEDICINE

## 2021-12-15 PROCEDURE — 1126F AMNT PAIN NOTED NONE PRSNT: CPT | Performed by: FAMILY MEDICINE

## 2021-12-15 PROCEDURE — 1160F RVW MEDS BY RX/DR IN RCRD: CPT | Performed by: FAMILY MEDICINE

## 2021-12-15 RX ORDER — ROSUVASTATIN CALCIUM 10 MG/1
10 TABLET, COATED ORAL DAILY
Qty: 90 TABLET | Refills: 3 | Status: SHIPPED | OUTPATIENT
Start: 2021-12-15 | End: 2022-02-16 | Stop reason: SDUPTHER

## 2021-12-15 RX ORDER — FAMOTIDINE 20 MG/1
20 TABLET, FILM COATED ORAL 2 TIMES DAILY
Qty: 180 TABLET | Refills: 0 | Status: SHIPPED | OUTPATIENT
Start: 2021-12-15 | End: 2022-02-16 | Stop reason: SDUPTHER

## 2021-12-15 RX ORDER — FUROSEMIDE 20 MG/1
20 TABLET ORAL EVERY OTHER DAY
Qty: 45 TABLET | Refills: 3 | Status: SHIPPED | OUTPATIENT
Start: 2021-12-15 | End: 2022-02-16 | Stop reason: SDUPTHER

## 2021-12-15 RX ORDER — PAROXETINE 10 MG/1
5 TABLET, FILM COATED ORAL NIGHTLY
Qty: 90 TABLET | Refills: 2 | Status: SHIPPED | OUTPATIENT
Start: 2021-12-15 | End: 2022-02-16 | Stop reason: SDUPTHER

## 2021-12-15 RX ORDER — LISINOPRIL 5 MG/1
5 TABLET ORAL DAILY
Qty: 90 TABLET | Refills: 1 | Status: SHIPPED | OUTPATIENT
Start: 2021-12-15 | End: 2022-02-16 | Stop reason: SDUPTHER

## 2021-12-15 NOTE — PROGRESS NOTES
QUICK REFERENCE INFORMATION:  The ABCs of the Annual Wellness Visit    Subsequent Medicare Wellness Visit    HEALTH RISK ASSESSMENT    1935    Recent Hospitalizations:  No hospitalization(s) within the last year..        Current Medical Providers:  Patient Care Team:  Joey Orozco DO as PCP - General (Family Medicine)        Smoking Status:  Social History     Tobacco Use   Smoking Status Never Smoker   Smokeless Tobacco Never Used       Alcohol Consumption:  Social History     Substance and Sexual Activity   Alcohol Use No       Depression Screen:   PHQ-2/PHQ-9 Depression Screening 12/15/2021   Little interest or pleasure in doing things 0   Feeling down, depressed, or hopeless 0   Total Score 0       Health Habits and Functional and Cognitive Screening:  Functional & Cognitive Status 12/15/2021   Do you have difficulty preparing food and eating? No   Do you have difficulty bathing yourself, getting dressed or grooming yourself? No   Do you have difficulty using the toilet? No   Do you have difficulty moving around from place to place? No   Do you have trouble with steps or getting out of a bed or a chair? No   Current Diet Well Balanced Diet   Dental Exam Up to date   Eye Exam Up to date   Exercise (times per week) 0 times per week   Current Exercises Include No Regular Exercise   Current Exercise Activities Include -   Do you need help using the phone?  No   Are you deaf or do you have serious difficulty hearing?  No   Do you need help with transportation? No   Do you need help shopping? No   Do you need help preparing meals?  No   Do you need help with housework?  No   Do you need help with laundry? No   Do you need help taking your medications? No   Do you need help managing money? No   Do you ever drive or ride in a car without wearing a seat belt? No   Have you felt unusual stress, anger or loneliness in the last month? -   Who do you live with? -   If you need help, do you have trouble finding  someone available to you? -   Have you been bothered in the last four weeks by sexual problems? -   Do you have difficulty concentrating, remembering or making decisions? -           Does the patient have evidence of cognitive impairment? No    Aspirin use counseling: Does not need ASA (and currently is not on it)      Recent Lab Results:  CMP:  Lab Results   Component Value Date    BUN 13 10/28/2021    CREATININE 0.91 10/28/2021    EGFRIFNONA 59 (L) 10/28/2021    EGFRIFAFRI 63 08/11/2016    BCR 14.3 10/28/2021     10/28/2021    K 4.0 10/28/2021    CO2 28.8 10/28/2021    CALCIUM 9.6 10/28/2021    PROTENTOTREF 7.1 08/11/2016    ALBUMIN 4.39 10/28/2021    LABGLOBREF 2.8 08/11/2016    LABIL2 1.5 08/11/2016    BILITOT 0.4 10/28/2021    ALKPHOS 73 10/28/2021    AST 20 10/28/2021    ALT 10 10/28/2021     Lipid Panel:  Lab Results   Component Value Date    CHOL 184 04/21/2021    TRIG 90 04/21/2021    HDL 76 (H) 04/21/2021    VLDL 16 04/21/2021    LDLHDL 1.18 04/21/2021     HbA1c:       Visual Acuity:   Visual Acuity Screening    Right eye Left eye Both eyes   Without correction:      With correction: 20/30 20/30 20/25       Age-appropriate Screening Schedule:  Refer to the list below for future screening recommendations based on patient's age, sex and/or medical conditions. Orders for these recommended tests are listed in the plan section. The patient has been provided with a written plan.    Health Maintenance   Topic Date Due   • ZOSTER VACCINE (1 of 2) Never done   • PAP SMEAR  Never done   • DXA SCAN  01/04/2020   • MAMMOGRAM  08/02/2020   • INFLUENZA VACCINE  08/01/2021   • LIPID PANEL  04/21/2022   • TDAP/TD VACCINES (4 - Td or Tdap) 10/04/2026        Subjective   History of Present Illness    Tonia Ritter is a 86 y.o. female who presents for an Subsequent Wellness Visit.    The following portions of the patient's history were reviewed and updated as appropriate: allergies, current medications, past family  history, past medical history, past social history, past surgical history and problem list.    Outpatient Medications Prior to Visit   Medication Sig Dispense Refill   • acetaminophen (TYLENOL) 500 MG tablet Take 500 mg by mouth every 6 (six) hours as needed for mild pain (1-3).     • azelastine (ASTELIN) 0.1 % nasal spray 2 sprays into the nostril(s) as directed by provider Daily. Use in each nostril as directed 30 mL 2   • diazePAM (VALIUM) 2 MG tablet Take 1 tablet by mouth Daily As Needed for Anxiety for up to 90 days. 12 tablet 0   • famotidine (PEPCID) 20 MG tablet Take 1 tablet by mouth 2 (Two) Times a Day. 180 tablet 0   • furosemide (LASIX) 20 MG tablet Take 1 tablet by mouth Every Other Day. 45 tablet 3   • lisinopril (PRINIVIL,ZESTRIL) 5 MG tablet Take 1 tablet by mouth Daily. 90 tablet 1   • nystatin susp + lidocaine viscous (MAGIC MOUTHWASH) oral suspension Swish and spit 5 mL 4 (Four) Times a Day As Needed (Sore throat). 60 mL 0   • PARoxetine (Paxil) 10 MG tablet Take 1 tablet by mouth Every Night. (Patient taking differently: Take 10 mg by mouth Every Night. Takes 0.5 tablet) 90 tablet 3   • potassium chloride (K-DUR,KLOR-CON) 10 MEQ CR tablet TAKE ONE TABLET BY MOUTH EVERY DAY FOR POTASSIUM DEFICIENCY 90 tablet 3   • rivaroxaban (Xarelto) 20 MG tablet Take 1 tablet by mouth Daily With Dinner. 90 tablet 3   • rosuvastatin (CRESTOR) 10 MG tablet Take 1 tablet by mouth Daily. for cholesterol 90 tablet 3   • Sotalol HCl, AF, 120 MG tablet Take 1 tablet by mouth 2 (Two) Times a Day. 180 each 1   • traMADol (ULTRAM) 50 MG tablet Take 1 tablet by mouth 2 (Two) Times a Day As Needed for Moderate Pain  for up to 90 days. 30 tablet 0     No facility-administered medications prior to visit.       Patient Active Problem List   Diagnosis   • Right shoulder pain   • PUD (peptic ulcer disease)   • Osteoarthritis   • Hypertension   • Hyperlipidemia   • Acute bronchitis   • Allergic rhinitis   • Anxiety   • Atrial  fibrillation (HCC)   • Back pain   • Burping   • Chronic pain   • Depression   • Encounter for screening fecal occult blood testing   • Flatulence   • GERD (gastroesophageal reflux disease)   • Hematuria   • Constipation   • Pain of upper abdomen   • Pacemaker   • S/P ablation of atrial fibrillation   • Acute URI   • Acute bacterial rhinosinusitis   • Hot flashes   • Impacted cerumen of right ear       Advance Care Planning:  ACP discussion was held with the patient during this visit. Patient does not have an advance directive, information provided.    Identification of Risk Factors:  Risk factors include: Advance Directive Discussion.    Review of Systems   Constitutional: Negative for chills, fever and unexpected weight loss.   HENT: Positive for hearing loss. Negative for congestion and sore throat.    Eyes: Negative for blurred vision and visual disturbance.   Respiratory: Negative for cough and wheezing.    Cardiovascular: Negative for chest pain and palpitations.   Gastrointestinal: Negative for abdominal pain and diarrhea.   Endocrine: Negative for cold intolerance and heat intolerance.   Genitourinary: Negative for dysuria.   Musculoskeletal: Negative for arthralgias and neck stiffness.   Neurological: Negative for dizziness, seizures and syncope.   Psychiatric/Behavioral: Negative for self-injury, suicidal ideas and depressed mood.       Compared to one year ago, the patient feels her physical health is the same.  Compared to one year ago, the patient feels her mental health is the same.    Objective     Physical Exam  Vitals and nursing note reviewed.   Constitutional:       Appearance: She is well-developed.   HENT:      Head: Normocephalic and atraumatic.      Right Ear: External ear normal.      Left Ear: External ear normal.      Ears:      Comments: Initially, the right ear was impacted with cerumen, this was removed successfully.  The TM was normal.     Nose: Nose normal.   Eyes:       "Conjunctiva/sclera: Conjunctivae normal.      Pupils: Pupils are equal, round, and reactive to light.   Cardiovascular:      Rate and Rhythm: Normal rate and regular rhythm.      Heart sounds: Normal heart sounds.   Pulmonary:      Effort: Pulmonary effort is normal.      Breath sounds: Normal breath sounds.   Abdominal:      General: Bowel sounds are normal.      Palpations: Abdomen is soft.   Musculoskeletal:      Cervical back: Normal range of motion and neck supple.   Skin:     General: Skin is warm and dry.   Neurological:      Mental Status: She is alert and oriented to person, place, and time.   Psychiatric:         Behavior: Behavior normal.         Vitals:    12/15/21 1359   BP: 122/68   BP Location: Right arm   Patient Position: Sitting   Pulse: 80   Temp: 97.1 °F (36.2 °C)   SpO2: 97%   Weight: 72.5 kg (159 lb 12.8 oz)   Height: 165.1 cm (65\")   PainSc: 0-No pain       Patient's Body mass index is 26.59 kg/m². indicating that she is overweight (BMI 25-29.9). Obesity-related health conditions include the following: hypertension and dyslipidemias. Obesity is unchanged. BMI is is above average; BMI management plan is completed. We discussed portion control and increasing exercise..      Assessment/Plan   Patient Self-Management and Personalized Health Advice  The patient has been provided with information about: diet and exercise and preventive services including:   · Annual Wellness Visit (AWV).    Visit Diagnoses:    ICD-10-CM ICD-9-CM   1. Medicare annual wellness visit, subsequent  Z00.00 V70.0   2. Paroxysmal atrial fibrillation (HCC)  I48.0 427.31   3. Mixed hyperlipidemia  E78.2 272.2   4. Anxiety  F41.9 300.00   5. Gastroesophageal reflux disease without esophagitis  K21.9 530.81   6. Essential hypertension  I10 401.9   7. Cough  R05.9 786.2   8. Impacted cerumen of right ear  H61.21 380.4   9. Advanced care planning/counseling discussion  Z71.89 V65.49   #1 no major concerns on her Medicare " wellness.  She recently recovered from COVID-19.  She did not need to be hospitalized for this.  #4 she still suffers from anxiety.  We will continue paroxetine 5 mg nightly, she is down to taking only a couple Valium a month.  Will hopefully discontinue this medication soon.  #7 she continues to have cough secondary to Covid 19.  It is getting better.  Her lungs were clear today.  #8 the cerumen impaction is resolved.  Procedure as below.  #9 advanced care discussion: She was unable to make any final decisions on any of the questions.  She was given information to review at home.  Ear Cerumen Removal    Date/Time: 12/15/2021 3:24 PM  Performed by: Joey Orozco DO  Authorized by: Joey Orozco DO   Consent: Verbal consent obtained.  Risks and benefits: risks, benefits and alternatives were discussed  Consent given by: patient  Patient understanding: patient states understanding of the procedure being performed    Anesthesia:  Local Anesthetic: none  Location details: right ear  Patient tolerance: patient tolerated the procedure well with no immediate complications  Comments: Her TM was normal.  The skin of the ear canal was not damaged.  Procedure type: irrigation         No orders of the defined types were placed in this encounter.      Outpatient Encounter Medications as of 12/15/2021   Medication Sig Dispense Refill   • acetaminophen (TYLENOL) 500 MG tablet Take 500 mg by mouth every 6 (six) hours as needed for mild pain (1-3).     • azelastine (ASTELIN) 0.1 % nasal spray 2 sprays into the nostril(s) as directed by provider Daily. Use in each nostril as directed 30 mL 2   • diazePAM (VALIUM) 2 MG tablet Take 1 tablet by mouth Daily As Needed for Anxiety for up to 90 days. 12 tablet 0   • famotidine (PEPCID) 20 MG tablet Take 1 tablet by mouth 2 (Two) Times a Day. 180 tablet 0   • furosemide (LASIX) 20 MG tablet Take 1 tablet by mouth Every Other Day. 45 tablet 3   • lisinopril (PRINIVIL,ZESTRIL) 5  MG tablet Take 1 tablet by mouth Daily. 90 tablet 1   • nystatin susp + lidocaine viscous (MAGIC MOUTHWASH) oral suspension Swish and spit 5 mL 4 (Four) Times a Day As Needed (Sore throat). 60 mL 0   • PARoxetine (Paxil) 10 MG tablet Take 1 tablet by mouth Every Night. (Patient taking differently: Take 10 mg by mouth Every Night. Takes 0.5 tablet) 90 tablet 3   • potassium chloride (K-DUR,KLOR-CON) 10 MEQ CR tablet TAKE ONE TABLET BY MOUTH EVERY DAY FOR POTASSIUM DEFICIENCY 90 tablet 3   • rivaroxaban (Xarelto) 20 MG tablet Take 1 tablet by mouth Daily With Dinner. 90 tablet 3   • rosuvastatin (CRESTOR) 10 MG tablet Take 1 tablet by mouth Daily. for cholesterol 90 tablet 3   • Sotalol HCl, AF, 120 MG tablet Take 1 tablet by mouth 2 (Two) Times a Day. 180 each 1   • traMADol (ULTRAM) 50 MG tablet Take 1 tablet by mouth 2 (Two) Times a Day As Needed for Moderate Pain  for up to 90 days. 30 tablet 0     No facility-administered encounter medications on file as of 12/15/2021.       Reviewed use of high risk medication in the elderly: yes  Reviewed for potential of harmful drug interactions in the elderly: yes    Follow Up:  Return in about 3 months (around 3/15/2022).     An After Visit Summary and PPPS with all of these plans were given to the patient.

## 2022-01-11 DIAGNOSIS — I48.0 PAROXYSMAL ATRIAL FIBRILLATION: ICD-10-CM

## 2022-01-11 DIAGNOSIS — F41.9 ANXIETY: ICD-10-CM

## 2022-01-11 RX ORDER — DIAZEPAM 2 MG/1
2 TABLET ORAL DAILY PRN
Qty: 12 TABLET | Refills: 0 | OUTPATIENT
Start: 2022-01-11 | End: 2022-04-11

## 2022-01-11 NOTE — TELEPHONE ENCOUNTER
Caller: Ritter, Tonia G    Relationship: Self    Best call back number: [phone]    Requested Prescriptions:   Requested Prescriptions     Pending Prescriptions Disp Refills   • diazePAM (VALIUM) 2 MG tablet 12 tablet 0     Sig: Take 1 tablet by mouth Daily As Needed for Anxiety for up to 90 days.   • rivaroxaban (Xarelto) 20 MG tablet 90 tablet 3     Sig: Take 1 tablet by mouth Daily With Dinner.    TRAMADOL    Pharmacy where request should be sent: [address] [phone] [facility] [phone] [facility]     Additional details provided by patient: PATIENT STATES SHE ALSO NEEDS TRAMADOL.     Does the patient have less than a 3 day supply:  [x] Yes  [] No    Chao Winter Rep   01/11/22 13:29 EST

## 2022-01-20 DIAGNOSIS — F41.9 ANXIETY: ICD-10-CM

## 2022-01-20 NOTE — TELEPHONE ENCOUNTER
PHARMACY IS REQUESTING REFILLS ON VALIUM AND TRAMADOL I DO NOT SEE TRAMADOL ON ACTIVE MEDICATION LIST.

## 2022-01-21 RX ORDER — DIAZEPAM 2 MG/1
2 TABLET ORAL DAILY PRN
Qty: 12 TABLET | Refills: 0 | OUTPATIENT
Start: 2022-01-21 | End: 2022-04-21

## 2022-01-31 ENCOUNTER — TELEPHONE (OUTPATIENT)
Dept: FAMILY MEDICINE CLINIC | Facility: CLINIC | Age: 87
End: 2022-01-31

## 2022-02-14 ENCOUNTER — LAB (OUTPATIENT)
Dept: LAB | Facility: HOSPITAL | Age: 87
End: 2022-02-14

## 2022-02-14 ENCOUNTER — TRANSCRIBE ORDERS (OUTPATIENT)
Dept: ADMINISTRATIVE | Facility: HOSPITAL | Age: 87
End: 2022-02-14

## 2022-02-14 DIAGNOSIS — N18.9 CHRONIC KIDNEY DISEASE, UNSPECIFIED CKD STAGE: ICD-10-CM

## 2022-02-14 DIAGNOSIS — N18.9 CHRONIC KIDNEY DISEASE, UNSPECIFIED CKD STAGE: Primary | ICD-10-CM

## 2022-02-14 PROCEDURE — 81001 URINALYSIS AUTO W/SCOPE: CPT

## 2022-02-14 PROCEDURE — 80053 COMPREHEN METABOLIC PANEL: CPT

## 2022-02-14 PROCEDURE — 36415 COLL VENOUS BLD VENIPUNCTURE: CPT

## 2022-02-14 PROCEDURE — 84156 ASSAY OF PROTEIN URINE: CPT

## 2022-02-14 PROCEDURE — 82570 ASSAY OF URINE CREATININE: CPT

## 2022-02-15 LAB
ALBUMIN SERPL-MCNC: 4.5 G/DL (ref 3.5–5.2)
ALBUMIN/GLOB SERPL: 1.6 G/DL
ALP SERPL-CCNC: 77 U/L (ref 39–117)
ALT SERPL W P-5'-P-CCNC: 9 U/L (ref 1–33)
ANION GAP SERPL CALCULATED.3IONS-SCNC: 9.4 MMOL/L (ref 5–15)
AST SERPL-CCNC: 22 U/L (ref 1–32)
BACTERIA UR QL AUTO: NORMAL /HPF
BILIRUB SERPL-MCNC: 0.4 MG/DL (ref 0–1.2)
BILIRUB UR QL STRIP: NEGATIVE
BUN SERPL-MCNC: 10 MG/DL (ref 8–23)
BUN/CREAT SERPL: 9.5 (ref 7–25)
CALCIUM SPEC-SCNC: 9.6 MG/DL (ref 8.6–10.5)
CHLORIDE SERPL-SCNC: 102 MMOL/L (ref 98–107)
CLARITY UR: CLEAR
CO2 SERPL-SCNC: 28.6 MMOL/L (ref 22–29)
COLOR UR: YELLOW
CREAT SERPL-MCNC: 1.05 MG/DL (ref 0.57–1)
CREAT UR-MCNC: 42.8 MG/DL
GFR SERPL CREATININE-BSD FRML MDRD: 50 ML/MIN/1.73
GLOBULIN UR ELPH-MCNC: 2.8 GM/DL
GLUCOSE SERPL-MCNC: 82 MG/DL (ref 65–99)
GLUCOSE UR STRIP-MCNC: NEGATIVE MG/DL
HGB UR QL STRIP.AUTO: NEGATIVE
HYALINE CASTS UR QL AUTO: NORMAL /LPF
KETONES UR QL STRIP: NEGATIVE
LEUKOCYTE ESTERASE UR QL STRIP.AUTO: ABNORMAL
NITRITE UR QL STRIP: NEGATIVE
PH UR STRIP.AUTO: 6.5 [PH] (ref 5–8)
POTASSIUM SERPL-SCNC: 3.9 MMOL/L (ref 3.5–5.2)
PROT ?TM UR-MCNC: 6.3 MG/DL
PROT SERPL-MCNC: 7.3 G/DL (ref 6–8.5)
PROT UR QL STRIP: NEGATIVE
PROT/CREAT UR: 147.2 MG/G CREA (ref 0–200)
RBC # UR STRIP: NORMAL /HPF
REF LAB TEST METHOD: NORMAL
SODIUM SERPL-SCNC: 140 MMOL/L (ref 136–145)
SP GR UR STRIP: 1.01 (ref 1–1.03)
SQUAMOUS #/AREA URNS HPF: NORMAL /HPF
UROBILINOGEN UR QL STRIP: ABNORMAL
WBC # UR STRIP: NORMAL /HPF

## 2022-02-16 ENCOUNTER — OFFICE VISIT (OUTPATIENT)
Dept: FAMILY MEDICINE CLINIC | Facility: CLINIC | Age: 87
End: 2022-02-16

## 2022-02-16 VITALS
SYSTOLIC BLOOD PRESSURE: 130 MMHG | DIASTOLIC BLOOD PRESSURE: 68 MMHG | BODY MASS INDEX: 26.13 KG/M2 | HEIGHT: 65 IN | OXYGEN SATURATION: 98 % | WEIGHT: 156.85 LBS | TEMPERATURE: 98.2 F | HEART RATE: 83 BPM

## 2022-02-16 DIAGNOSIS — E78.2 MIXED HYPERLIPIDEMIA: ICD-10-CM

## 2022-02-16 DIAGNOSIS — I48.0 PAROXYSMAL ATRIAL FIBRILLATION: ICD-10-CM

## 2022-02-16 DIAGNOSIS — K21.9 GASTROESOPHAGEAL REFLUX DISEASE WITHOUT ESOPHAGITIS: ICD-10-CM

## 2022-02-16 DIAGNOSIS — R14.0 ABDOMINAL BLOATING: Primary | ICD-10-CM

## 2022-02-16 DIAGNOSIS — F41.9 ANXIETY: ICD-10-CM

## 2022-02-16 DIAGNOSIS — I10 ESSENTIAL HYPERTENSION: ICD-10-CM

## 2022-02-16 PROCEDURE — 99214 OFFICE O/P EST MOD 30 MIN: CPT | Performed by: FAMILY MEDICINE

## 2022-02-16 RX ORDER — FAMOTIDINE 20 MG/1
20 TABLET, FILM COATED ORAL 2 TIMES DAILY
Qty: 180 TABLET | Refills: 0 | Status: SHIPPED | OUTPATIENT
Start: 2022-02-16 | End: 2022-08-01 | Stop reason: SDUPTHER

## 2022-02-16 RX ORDER — PAROXETINE 10 MG/1
5 TABLET, FILM COATED ORAL NIGHTLY
Qty: 90 TABLET | Refills: 2 | Status: SHIPPED | OUTPATIENT
Start: 2022-02-16 | End: 2022-08-01 | Stop reason: SDUPTHER

## 2022-02-16 RX ORDER — LISINOPRIL 5 MG/1
5 TABLET ORAL DAILY
Qty: 90 TABLET | Refills: 1 | Status: SHIPPED | OUTPATIENT
Start: 2022-02-16 | End: 2022-08-01 | Stop reason: SDUPTHER

## 2022-02-16 RX ORDER — SOTALOL HYDROCHLORIDE 120 MG/1
1 TABLET ORAL 2 TIMES DAILY
Qty: 180 EACH | Refills: 1 | Status: SHIPPED | OUTPATIENT
Start: 2022-02-16 | End: 2022-08-01 | Stop reason: SDUPTHER

## 2022-02-16 RX ORDER — ROSUVASTATIN CALCIUM 10 MG/1
10 TABLET, COATED ORAL DAILY
Qty: 90 TABLET | Refills: 3 | Status: SHIPPED | OUTPATIENT
Start: 2022-02-16 | End: 2022-10-27 | Stop reason: SDUPTHER

## 2022-02-16 RX ORDER — FUROSEMIDE 20 MG/1
20 TABLET ORAL EVERY OTHER DAY
Qty: 45 TABLET | Refills: 3 | Status: SHIPPED | OUTPATIENT
Start: 2022-02-16 | End: 2022-10-27 | Stop reason: SDUPTHER

## 2022-02-16 NOTE — PROGRESS NOTES
Subjective   Tonia Ritter is a 86 y.o. female.   Pt presents today with CC of Anxiety      History of Present Illness   1.  Patient is an 86-year-old female complaining of long-term abdominal bloating with diagnosis of gastroparesis by gastroenterology in the past.  She reports an allergy to Reglan.  She requests a referral to gastroenterology.  #2 she has anxiety for which she takes paroxetine 5 mg nightly.  This also helps her sleep.  #3 she has hypertension for which she takes lisinopril 5 mg daily and furosemide 20 mg every day.  #4 she has atrial fibrillation for which she takes Xarelto and sotalol.  She needs refills.  #5 she has GERD for which she takes famotidine 20 mg twice a day.  She is adamant that her current terms of bloating are not from acid reflux or gastritis.  #6 she has hyperlipidemia for which she takes rosuvastatin 10 mg daily.         The following portions of the patient's history were reviewed and updated as appropriate: allergies, current medications, past family history, past medical history, past social history, past surgical history and problem list.    Review of Systems   Constitutional: Negative for chills, fever and unexpected weight loss.   HENT: Negative for congestion and sore throat.    Eyes: Negative for blurred vision and visual disturbance.   Respiratory: Negative for cough and wheezing.    Cardiovascular: Negative for chest pain and palpitations.   Gastrointestinal: Negative for abdominal pain and diarrhea.   Endocrine: Negative for cold intolerance and heat intolerance.   Genitourinary: Negative for dysuria.   Musculoskeletal: Negative for arthralgias and neck stiffness.   Neurological: Negative for dizziness, seizures and syncope.   Psychiatric/Behavioral: Negative for self-injury, suicidal ideas and depressed mood.       Objective   Physical Exam  Vitals and nursing note reviewed.   Constitutional:       Appearance: She is well-developed.   HENT:      Head:  Normocephalic and atraumatic.      Right Ear: External ear normal.      Left Ear: External ear normal.      Nose: Nose normal.   Eyes:      Conjunctiva/sclera: Conjunctivae normal.      Pupils: Pupils are equal, round, and reactive to light.   Cardiovascular:      Rate and Rhythm: Normal rate and regular rhythm.      Heart sounds: Normal heart sounds.   Pulmonary:      Effort: Pulmonary effort is normal.      Breath sounds: Normal breath sounds.   Abdominal:      General: Bowel sounds are normal.      Palpations: Abdomen is soft.   Musculoskeletal:      Cervical back: Normal range of motion and neck supple.   Skin:     General: Skin is warm and dry.   Neurological:      Mental Status: She is alert and oriented to person, place, and time.   Psychiatric:         Behavior: Behavior normal.           Assessment/Plan   Diagnoses and all orders for this visit:    1. Abdominal bloating (Primary)  -     Ambulatory Referral to Gastroenterology  Will refer to Dr. Howe per her request.  Gastroparesis is her previous diagnosis.  Allergy to Reglan.  Because of her age and history of taking multiple medications for this without benefit, recommend she discuss with specialist.  2. Anxiety  -     PARoxetine (Paxil) 10 MG tablet; Take 0.5 tablets by mouth Every Night. Takes 0.5 tablet  Dispense: 90 tablet; Refill: 2    3. Essential hypertension  -     lisinopril (PRINIVIL,ZESTRIL) 5 MG tablet; Take 1 tablet by mouth Daily.  Dispense: 90 tablet; Refill: 1  -     furosemide (LASIX) 20 MG tablet; Take 1 tablet by mouth Every Other Day.  Dispense: 45 tablet; Refill: 3    4. Paroxysmal atrial fibrillation (HCC)  -     rivaroxaban (Xarelto) 20 MG tablet; Take 1 tablet by mouth Daily With Dinner.  Dispense: 90 tablet; Refill: 3  -     Sotalol HCl, AF, 120 MG tablet; Take 1 tablet by mouth 2 (Two) Times a Day.  Dispense: 180 each; Refill: 1    5. Gastroesophageal reflux disease without esophagitis  -     famotidine (PEPCID) 20 MG tablet;  Take 1 tablet by mouth 2 (Two) Times a Day.  Dispense: 180 tablet; Refill: 0    6. Mixed hyperlipidemia  -     rosuvastatin (CRESTOR) 10 MG tablet; Take 1 tablet by mouth Daily. for cholesterol  Dispense: 90 tablet; Refill: 3                 Patient's Body mass index is 26.1 kg/m². indicating that she is overweight (BMI 25-29.9). Patient's (Body mass index is 26.1 kg/m².) indicates that they are overweight with health conditions that include hypertension . Weight is unchanged. BMI is is above average; BMI management plan is completed. We discussed portion control and increasing exercise. .

## 2022-04-05 ENCOUNTER — TELEPHONE (OUTPATIENT)
Dept: FAMILY MEDICINE CLINIC | Facility: CLINIC | Age: 87
End: 2022-04-05

## 2022-04-05 NOTE — TELEPHONE ENCOUNTER
Caller: Tonia Ritter    Relationship: Self    Best call back number: [phone]    What is the best time to reach you: ANYTIME    Who are you requesting to speak with (clinical staff, provider,  specific staff member): CLINICAL STAFF    Do you know the name of the person who called: SELF    What was the call regarding: PATIENT STATES THAT SHE WOULD LIKE A CALL FROM THE NURSE. PATIENT STATES THAT IT IS URGENT.     Do you require a callback: YES

## 2022-04-05 NOTE — TELEPHONE ENCOUNTER
Caller: Tonia Ritter    Relationship: Self    Best call back number: [phone]    What is the best time to reach you: ANYTIME    Who are you requesting to speak with (clinical staff, provider,  specific staff member): CLINICAL STAFF    Do you know the name of the person who called: SELF    What was the call regarding: PATIENT STATES THAT SHE WOULD LIKE A CALL FROM THE NURSE. PATIENT STATES THAT IT IS URGENT.     Do you require a callback: YES      Spoke with patient she is requesting a referral to Dr. Morris in [place] for her Gastroparesis reports her symptoms seem to be getting worse & she found him in the yellow pages & feels like God led her to find his name.

## 2022-04-06 DIAGNOSIS — K31.84 GASTROPARESIS: Primary | ICD-10-CM

## 2022-04-22 ENCOUNTER — HOSPITAL ENCOUNTER (OUTPATIENT)
Dept: HOSPITAL 79 - CT | Age: 87
End: 2022-04-22
Attending: INTERNAL MEDICINE
Payer: MEDICARE

## 2022-04-22 DIAGNOSIS — R10.12: Primary | ICD-10-CM

## 2022-04-27 ENCOUNTER — OFFICE VISIT (OUTPATIENT)
Dept: FAMILY MEDICINE CLINIC | Facility: CLINIC | Age: 87
End: 2022-04-27

## 2022-04-27 VITALS
BODY MASS INDEX: 25.79 KG/M2 | HEART RATE: 81 BPM | DIASTOLIC BLOOD PRESSURE: 78 MMHG | OXYGEN SATURATION: 98 % | TEMPERATURE: 98.6 F | HEIGHT: 65 IN | SYSTOLIC BLOOD PRESSURE: 126 MMHG | WEIGHT: 154.8 LBS

## 2022-04-27 DIAGNOSIS — R93.89 ABNORMAL CT SCAN: Primary | ICD-10-CM

## 2022-04-27 DIAGNOSIS — R91.8 LUNG INFILTRATE ON CT: ICD-10-CM

## 2022-04-27 PROCEDURE — 99213 OFFICE O/P EST LOW 20 MIN: CPT | Performed by: FAMILY MEDICINE

## 2022-04-27 NOTE — PROGRESS NOTES
Subjective   Tonia Ritter is a 86 y.o. female.   Pt presents today with CC of Follow-up (Ct scan patient states they saw something on her CT)      History of Present Illness   Patient is a pleasant 86-year-old female who recently had a CT scan of her abdomen and pelvis.  Imaging included part of the lungs.  There was an abnormality in the right middle lobe that was suspicious for pneumonia.  She has had no symptoms of pneumonia, no shortness of breath, no concerns at all.  She is agreeable to repeat imaging.       The following portions of the patient's history were reviewed and updated as appropriate: allergies, current medications, past family history, past medical history, past social history, past surgical history and problem list.    Review of Systems   Constitutional: Negative for chills, fever and unexpected weight loss.   HENT: Negative for congestion and sore throat.    Eyes: Negative for blurred vision and visual disturbance.   Respiratory: Negative for cough, choking, chest tightness, shortness of breath, wheezing and stridor.    Cardiovascular: Negative for chest pain and palpitations.   Gastrointestinal: Negative for abdominal pain and diarrhea.   Endocrine: Negative for cold intolerance and heat intolerance.   Genitourinary: Negative for dysuria.   Musculoskeletal: Negative for arthralgias and neck stiffness.   Neurological: Negative for dizziness, seizures and syncope.   Psychiatric/Behavioral: Negative for self-injury, suicidal ideas and depressed mood.       Objective   Physical Exam  Vitals and nursing note reviewed.   Constitutional:       Appearance: She is well-developed.   HENT:      Head: Normocephalic and atraumatic.      Right Ear: External ear normal.      Left Ear: External ear normal.      Nose: Nose normal.   Eyes:      Conjunctiva/sclera: Conjunctivae normal.      Pupils: Pupils are equal, round, and reactive to light.   Cardiovascular:      Rate and Rhythm: Normal rate and regular  rhythm.      Heart sounds: Normal heart sounds.   Pulmonary:      Effort: Pulmonary effort is normal. No respiratory distress.      Breath sounds: Normal breath sounds. No wheezing, rhonchi or rales.   Abdominal:      General: Bowel sounds are normal.      Palpations: Abdomen is soft.   Musculoskeletal:      Cervical back: Normal range of motion and neck supple.   Skin:     General: Skin is warm and dry.   Neurological:      Mental Status: She is alert and oriented to person, place, and time.   Psychiatric:         Behavior: Behavior normal.           Assessment/Plan   Diagnoses and all orders for this visit:    1. Abnormal CT scan (Primary)  -     CT Chest With Contrast; Future    2. Lung infiltrate on CT  -     CT Chest With Contrast; Future    We discussed options, including a trial of antibiotic prior to imaging.  We will hold off on antibiotics as she has no symptoms.  Will order CT chest to reevaluate the abnormality on her CT scan.  I suspect scarring rather than acute pneumonia.  Aspiration pneumonitis is possible given her age, though not suspected.                BMI is >= 25 and < 30. (Overweight) The following options were offered after discussion: exercise counseling/recommendations and nutrition counseling/recommendations

## 2022-05-03 ENCOUNTER — TELEPHONE (OUTPATIENT)
Dept: FAMILY MEDICINE CLINIC | Facility: CLINIC | Age: 87
End: 2022-05-03

## 2022-05-03 NOTE — TELEPHONE ENCOUNTER
Caller: Tonia Ritter    Relationship: Self    Best call back number: [phone]  What is the best time to reach you:ANY TIME    Who are you requesting to speak with (clinical staff, provider,  specific staff member):     MEDICAL STAFF    Do you know the name of the person who called: ANY ONE    What was the call regarding:     PATIENT STATES SHE HAS A DEEP SORE THROAT AND CONCERNED SINCE SHE IS SCHEDULED FOR  UPPER G ON 05.05.22I.      Do you require a callback:  PLEASE CALL BACK

## 2022-05-03 NOTE — TELEPHONE ENCOUNTER
Caller: Tonia Ritter    Relationship: Self    Best call back number: [phone]  What is the best time to reach you:ANY TIME    Who are you requesting to speak with (clinical staff, provider,  specific staff member):     MEDICAL STAFF    Do you know the name of the person who called: ANY ONE    What was the call regarding:     PATIENT STATES SHE HAS A DEEP SORE THROAT AND CONCERNED SINCE SHE IS SCHEDULED FOR  UPPER G ON 05.05.22I.      Do you require a callback:  PLEASE CALL BACK    Scheduled an appointment.

## 2022-05-04 ENCOUNTER — OFFICE VISIT (OUTPATIENT)
Dept: FAMILY MEDICINE CLINIC | Facility: CLINIC | Age: 87
End: 2022-05-04

## 2022-05-04 VITALS
BODY MASS INDEX: 25.86 KG/M2 | RESPIRATION RATE: 16 BRPM | HEART RATE: 71 BPM | OXYGEN SATURATION: 98 % | TEMPERATURE: 97.1 F | HEIGHT: 65 IN | DIASTOLIC BLOOD PRESSURE: 74 MMHG | SYSTOLIC BLOOD PRESSURE: 134 MMHG | WEIGHT: 155.2 LBS

## 2022-05-04 DIAGNOSIS — J02.9 SORE THROAT: Primary | ICD-10-CM

## 2022-05-04 DIAGNOSIS — R09.81 NASAL CONGESTION: ICD-10-CM

## 2022-05-04 LAB
EXPIRATION DATE: NORMAL
EXPIRATION DATE: NORMAL
FLUAV AG UPPER RESP QL IA.RAPID: NOT DETECTED
FLUBV AG UPPER RESP QL IA.RAPID: NOT DETECTED
INTERNAL CONTROL: NORMAL
INTERNAL CONTROL: NORMAL
Lab: NORMAL
Lab: NORMAL
S PYO AG THROAT QL: NEGATIVE
SARS-COV-2 AG UPPER RESP QL IA.RAPID: NOT DETECTED

## 2022-05-04 PROCEDURE — 87880 STREP A ASSAY W/OPTIC: CPT | Performed by: NURSE PRACTITIONER

## 2022-05-04 PROCEDURE — 87428 SARSCOV & INF VIR A&B AG IA: CPT | Performed by: NURSE PRACTITIONER

## 2022-05-04 PROCEDURE — 99213 OFFICE O/P EST LOW 20 MIN: CPT | Performed by: NURSE PRACTITIONER

## 2022-05-04 RX ORDER — MOMETASONE FUROATE 50 UG/1
2 SPRAY, METERED NASAL DAILY
Qty: 17 G | Refills: 11 | Status: SHIPPED | OUTPATIENT
Start: 2022-05-04 | End: 2022-08-01

## 2022-05-04 NOTE — PROGRESS NOTES
Chief Complaint  Sore Throat (When swallows. Has scope tomorrow and wants to be checked.), Nasal Congestion, and Allergies    Subjective          Tonia Ritter is a 86 y.o. female who presents today to [facility] for initial evaluation     HPI:   History of Present Illness    Presents to clinic for complaint of feeling rough for the past few days. Associated symptoms: sore throat, congestion. Treatments: none.      The following portions of the patient's history were reviewed and updated as appropriate: allergies, current medications, past family history, past medical history, past social history, past surgical history and problem list.    Objective     Problem List:  Patient Active Problem List   Diagnosis   • Right shoulder pain   • PUD (peptic ulcer disease)   • Osteoarthritis   • Hypertension   • Hyperlipidemia   • Acute bronchitis   • Allergic rhinitis   • Anxiety   • Atrial fibrillation (HCC)   • Back pain   • Burping   • Chronic pain   • Depression   • Encounter for screening fecal occult blood testing   • Flatulence   • GERD (gastroesophageal reflux disease)   • Hematuria   • Constipation   • Pain of upper abdomen   • Pacemaker   • S/P ablation of atrial fibrillation   • Acute URI   • Acute bacterial rhinosinusitis   • Hot flashes   • Impacted cerumen of right ear       Allergy:   Allergies   Allergen Reactions   • Reglan [Metoclopramide]    • Sulfa Antibiotics         Discontinued Medications:  There are no discontinued medications.    Current Medications:   Current Outpatient Medications   Medication Sig Dispense Refill   • acetaminophen (TYLENOL) 500 MG tablet Take 500 mg by mouth every 6 (six) hours as needed for mild pain (1-3).     • azelastine (ASTELIN) 0.1 % nasal spray 2 sprays into the nostril(s) as directed by provider Daily. Use in each nostril as directed 30 mL 2   • famotidine (PEPCID) 20 MG tablet Take 1 tablet by mouth 2 (Two) Times a Day. 180 tablet 0    • furosemide (LASIX) 20 MG tablet Take 1 tablet by mouth Every Other Day. 45 tablet 3   • lisinopril (PRINIVIL,ZESTRIL) 5 MG tablet Take 1 tablet by mouth Daily. 90 tablet 1   • PARoxetine (Paxil) 10 MG tablet Take 0.5 tablets by mouth Every Night. Takes 0.5 tablet 90 tablet 2   • potassium chloride (K-DUR,KLOR-CON) 10 MEQ CR tablet TAKE ONE TABLET BY MOUTH EVERY DAY FOR POTASSIUM DEFICIENCY 90 tablet 3   • rivaroxaban (Xarelto) 20 MG tablet Take 1 tablet by mouth Daily With Dinner. 90 tablet 3   • rosuvastatin (CRESTOR) 10 MG tablet Take 1 tablet by mouth Daily. for cholesterol 90 tablet 3   • Sotalol HCl, AF, 120 MG tablet Take 1 tablet by mouth 2 (Two) Times a Day. 180 each 1   • mometasone (Nasonex) 50 MCG/ACT nasal spray 2 sprays into the nostril(s) as directed by provider Daily. 17 g 11     No current facility-administered medications for this visit.       Past Medical History:  Past Medical History:   Diagnosis Date   • Acute bronchitis    • Allergic rhinitis    • Anxiety    • Atrial fibrillation (HCC)    • Back pain    • Burping    • Chills (without fever) 3/25/2019   • Chronic pain    • Congestive heart failure (HCC)    • Depression    • Encounter for screening fecal occult blood testing 03/10/2016    NEGATIVE   • Fibromyalgia    • Flatulence    • GERD (gastroesophageal reflux disease)    • Hematuria    • Hyperlipidemia    • Hypertension    • Osteoarthritis    • Pacemaker    • PUD (peptic ulcer disease)    • Right shoulder pain        Past Surgical History:  Past Surgical History:   Procedure Laterality Date   • CARDIOVERSION  05/2017    [facility]    • CARDIOVERSION  09/01/2017    [facility]   • CHOLECYSTECTOMY  2004   • COLONOSCOPY  05/2013   • DILATATION AND CURETTAGE     • EPIDIDYMAL CYST EXCISION     • ESOPHAGOSCOPY / EGD  01/2019   • EYE SURGERY     • HEMORRHOIDECTOMY  01/2019   • MAMMO BILATERAL  10/2014   • NECK SURGERY     • PACEMAKER IMPLANTATION     • PAP SMEAR  03/2016   • SKIN LESION  EXCISION         Social History:  Social History     Socioeconomic History   • Marital status:    Tobacco Use   • Smoking status: Never Smoker   • Smokeless tobacco: Never Used   Vaping Use   • Vaping Use: Never used   Substance and Sexual Activity   • Alcohol use: No   • Drug use: No   • Sexual activity: Defer       Family History:  Family History   Problem Relation Age of Onset   • Thyroid disease Mother    • Mental illness Father         nervous breakdown   • Breast cancer Maternal Aunt        Review of Systems:  Review of Systems   Constitutional: Negative for chills and fever.   Gastrointestinal: Negative for nausea and vomiting.       Physical Exam:  Physical Exam  Vitals and nursing note reviewed.   Constitutional:       General: She is not in acute distress.     Appearance: Normal appearance. She is not ill-appearing or toxic-appearing.   HENT:      Head: Normocephalic.      Right Ear: Ear canal and external ear normal. A middle ear effusion is present.      Left Ear: Ear canal and external ear normal. A middle ear effusion is present.      Nose: Nose normal.      Mouth/Throat:      Lips: Pink.      Mouth: Mucous membranes are moist.      Pharynx: Oropharynx is clear. No pharyngeal swelling, oropharyngeal exudate, posterior oropharyngeal erythema or uvula swelling.      Tonsils: No tonsillar exudate.   Eyes:      Conjunctiva/sclera: Conjunctivae normal.   Cardiovascular:      Rate and Rhythm: Normal rate and regular rhythm.      Heart sounds: Normal heart sounds.   Pulmonary:      Effort: Pulmonary effort is normal. No respiratory distress.      Breath sounds: Normal breath sounds.   Abdominal:      General: Bowel sounds are normal.      Palpations: Abdomen is soft.   Musculoskeletal:      Cervical back: Neck supple.   Lymphadenopathy:      Cervical: No cervical adenopathy.   Skin:     General: Skin is warm and dry.      Coloration: Skin is not pale.   Neurological:      Mental Status: She is alert  "and oriented to person, place, and time.   Psychiatric:         Mood and Affect: Mood normal.         Behavior: Behavior normal.         Thought Content: Thought content normal.         Judgment: Judgment normal.         Vital Signs:   /74 (BP Location: Right arm, Patient Position: Sitting, Cuff Size: Adult)   Pulse 71   Temp 97.1 °F (36.2 °C) (Temporal)   Resp 16   Ht 165.1 cm (65\")   Wt 70.4 kg (155 lb 3.2 oz)   SpO2 98%   BMI 25.83 kg/m²              Assessment and Plan   Diagnoses and all orders for this visit:    1. Sore throat (Primary)  -     POCT SARS-CoV-2 Antigen MEHREEN + Flu  -     POCT rapid strep A  Symptom management as discussed.    2. Nasal congestion  -     mometasone (Nasonex) 50 MCG/ACT nasal spray; 2 sprays into the nostril(s) as directed by provider Daily.  Dispense: 17 g; Refill: 11  -     POCT SARS-CoV-2 Antigen MEHREEN + Flu  -     POCT rapid strep A  Regimen as above.    Discussed possible differential diagnoses, testing, treatment, recommended non-pharmacological interventions, risks, warning signs to monitor for that would indicate need for follow-up in clinic or ER. If no improvement with these regimens or you have new or worsening symptoms follow-up. Patient verbalizes understanding and agreement with plan of care. Denies further needs or concerns.     I spent 20 minutes caring for patient on this date of service. This time includes time spent by me in the following activities: preparing for the visit, reviewing tests, obtaining and/or reviewing a separately obtained history, performing a medically appropriate examination and/or evaluation, counseling and educating the patient/family/caregiver, ordering medications, tests, or procedures and documenting information in the medical record    Meds ordered during this visit:  New Medications Ordered This Visit   Medications   • mometasone (Nasonex) 50 MCG/ACT nasal spray     Si sprays into the nostril(s) as directed by provider " Daily.     Dispense:  17 g     Refill:  11       Patient Instructions:  Patient instructions given for the following visit diagnosis:    ICD-10-CM ICD-9-CM   1. Sore throat  J02.9 462   2. Nasal congestion  R09.81 478.19       Follow Up   Return if symptoms worsen or fail to improve.        This document has been electronically signed by TEODORA House  May 4, 2022 14:29 EDT    Patient was given instructions and counseling regarding her condition or for health maintenance advice. Please see specific information pulled into the AVS if appropriate.     Part of this note may be an electronic transcription/translation of spoken language to printed text using the Dragon Dictation System.

## 2022-05-06 ENCOUNTER — TELEPHONE (OUTPATIENT)
Dept: FAMILY MEDICINE CLINIC | Facility: CLINIC | Age: 87
End: 2022-05-06

## 2022-05-06 NOTE — TELEPHONE ENCOUNTER
Caller: Riya Tonia G    Relationship: Self    Best call back number: [phone]    What medications are you currently taking:   Current Outpatient Medications on File Prior to Visit   Medication Sig Dispense Refill   • acetaminophen (TYLENOL) 500 MG tablet Take 500 mg by mouth every 6 (six) hours as needed for mild pain (1-3).     • azelastine (ASTELIN) 0.1 % nasal spray 2 sprays into the nostril(s) as directed by provider Daily. Use in each nostril as directed 30 mL 2   • famotidine (PEPCID) 20 MG tablet Take 1 tablet by mouth 2 (Two) Times a Day. 180 tablet 0   • furosemide (LASIX) 20 MG tablet Take 1 tablet by mouth Every Other Day. 45 tablet 3   • lisinopril (PRINIVIL,ZESTRIL) 5 MG tablet Take 1 tablet by mouth Daily. 90 tablet 1   • mometasone (Nasonex) 50 MCG/ACT nasal spray 2 sprays into the nostril(s) as directed by provider Daily. 17 g 11   • PARoxetine (Paxil) 10 MG tablet Take 0.5 tablets by mouth Every Night. Takes 0.5 tablet 90 tablet 2   • potassium chloride (K-DUR,KLOR-CON) 10 MEQ CR tablet TAKE ONE TABLET BY MOUTH EVERY DAY FOR POTASSIUM DEFICIENCY 90 tablet 3   • rivaroxaban (Xarelto) 20 MG tablet Take 1 tablet by mouth Daily With Dinner. 90 tablet 3   • rosuvastatin (CRESTOR) 10 MG tablet Take 1 tablet by mouth Daily. for cholesterol 90 tablet 3   • Sotalol HCl, AF, 120 MG tablet Take 1 tablet by mouth 2 (Two) Times a Day. 180 each 1     No current facility-administered medications on file prior to visit.          When did you start taking these medications:     Which medication are you concerned about:  furosemide (LASIX) 20 MG tablet  20 mg, Every Other Day          Who prescribed you this medication: DR WALSH    What are your concerns: HAS CONCERNS REGARDING DIRECTIONS ON THE MEDICATION    SHE SAID SHE IS OK WITH THE MEDICATION FOR 3 DAYS, AND HER KIDNEY DR TOLD HER TO TAKE THIS EVERY OTHER DAY, BUT THE DIRECTIONS SAY FOR HER TO TAKE IT 4 TIMES DAILY

## 2022-05-06 NOTE — TELEPHONE ENCOUNTER
Caller: Riya Tonia G    Relationship: Self    Best call back number: [phone]    What medications are you currently taking:   Current Outpatient Medications on File Prior to Visit   Medication Sig Dispense Refill   • acetaminophen (TYLENOL) 500 MG tablet Take 500 mg by mouth every 6 (six) hours as needed for mild pain (1-3).     • azelastine (ASTELIN) 0.1 % nasal spray 2 sprays into the nostril(s) as directed by provider Daily. Use in each nostril as directed 30 mL 2   • famotidine (PEPCID) 20 MG tablet Take 1 tablet by mouth 2 (Two) Times a Day. 180 tablet 0   • furosemide (LASIX) 20 MG tablet Take 1 tablet by mouth Every Other Day. 45 tablet 3   • lisinopril (PRINIVIL,ZESTRIL) 5 MG tablet Take 1 tablet by mouth Daily. 90 tablet 1   • mometasone (Nasonex) 50 MCG/ACT nasal spray 2 sprays into the nostril(s) as directed by provider Daily. 17 g 11   • PARoxetine (Paxil) 10 MG tablet Take 0.5 tablets by mouth Every Night. Takes 0.5 tablet 90 tablet 2   • potassium chloride (K-DUR,KLOR-CON) 10 MEQ CR tablet TAKE ONE TABLET BY MOUTH EVERY DAY FOR POTASSIUM DEFICIENCY 90 tablet 3   • rivaroxaban (Xarelto) 20 MG tablet Take 1 tablet by mouth Daily With Dinner. 90 tablet 3   • rosuvastatin (CRESTOR) 10 MG tablet Take 1 tablet by mouth Daily. for cholesterol 90 tablet 3   • Sotalol HCl, AF, 120 MG tablet Take 1 tablet by mouth 2 (Two) Times a Day. 180 each 1     No current facility-administered medications on file prior to visit.          When did you start taking these medications:     Which medication are you concerned about:  furosemide (LASIX) 20 MG tablet  20 mg, Every Other Day          Who prescribed you this medication: DR AWLSH    What are your concerns: HAS CONCERNS REGARDING DIRECTIONS ON THE MEDICATION    SHE SAID SHE IS OK WITH THE MEDICATION FOR 3 DAYS, AND HER KIDNEY DR TOLD HER TO TAKE THIS EVERY OTHER DAY, BUT THE DIRECTIONS SAY FOR HER TO TAKE IT 4 TIMES DAILY      Patient notified that  directions are QOD,she will notify pharmacy of mistake.

## 2022-05-18 ENCOUNTER — TELEPHONE (OUTPATIENT)
Dept: FAMILY MEDICINE CLINIC | Facility: CLINIC | Age: 87
End: 2022-05-18

## 2022-05-24 ENCOUNTER — TELEPHONE (OUTPATIENT)
Dept: FAMILY MEDICINE CLINIC | Facility: CLINIC | Age: 87
End: 2022-05-24

## 2022-05-24 ENCOUNTER — HOSPITAL ENCOUNTER (OUTPATIENT)
Dept: HOSPITAL 79 - CT | Age: 87
End: 2022-05-24
Attending: FAMILY MEDICINE
Payer: MEDICARE

## 2022-05-24 DIAGNOSIS — R93.89 ABNORMAL CT SCAN: ICD-10-CM

## 2022-05-24 DIAGNOSIS — R91.8: Primary | ICD-10-CM

## 2022-05-24 DIAGNOSIS — I10 ESSENTIAL HYPERTENSION: Primary | ICD-10-CM

## 2022-05-24 DIAGNOSIS — R79.89 ELEVATED SERUM CREATININE: Primary | ICD-10-CM

## 2022-05-24 DIAGNOSIS — R93.5: ICD-10-CM

## 2022-05-24 DIAGNOSIS — I10: ICD-10-CM

## 2022-05-24 NOTE — TELEPHONE ENCOUNTER
[facility] called they need an order faxed for a Creatinine level before IV Dye can be given,order faxed.

## 2022-05-24 NOTE — TELEPHONE ENCOUNTER
Point-of-care creatinine ordered.  Also, BMP ordered as I am not sure which would be needed.  Please cancel the one that is not needed.    It will not let me cancel them you will have to all she needed was a serum Creatinine & I entered & faxed that over to them.

## 2022-05-24 NOTE — TELEPHONE ENCOUNTER
Point-of-care creatinine ordered.  Also, BMP ordered as I am not sure which would be needed.  Please cancel the one that is not needed.

## 2022-06-16 ENCOUNTER — TRANSCRIBE ORDERS (OUTPATIENT)
Dept: ADMINISTRATIVE | Facility: HOSPITAL | Age: 87
End: 2022-06-16

## 2022-06-16 ENCOUNTER — LAB (OUTPATIENT)
Dept: LAB | Facility: HOSPITAL | Age: 87
End: 2022-06-16

## 2022-06-16 DIAGNOSIS — N18.9 CHRONIC KIDNEY DISEASE, UNSPECIFIED CKD STAGE: ICD-10-CM

## 2022-06-16 DIAGNOSIS — N18.9 CHRONIC KIDNEY DISEASE, UNSPECIFIED CKD STAGE: Primary | ICD-10-CM

## 2022-06-16 PROCEDURE — 82043 UR ALBUMIN QUANTITATIVE: CPT

## 2022-06-16 PROCEDURE — 36415 COLL VENOUS BLD VENIPUNCTURE: CPT

## 2022-06-16 PROCEDURE — 80053 COMPREHEN METABOLIC PANEL: CPT

## 2022-06-16 PROCEDURE — 82570 ASSAY OF URINE CREATININE: CPT

## 2022-06-16 PROCEDURE — 81001 URINALYSIS AUTO W/SCOPE: CPT

## 2022-06-16 PROCEDURE — 84156 ASSAY OF PROTEIN URINE: CPT

## 2022-06-17 LAB
ALBUMIN SERPL-MCNC: 4.4 G/DL (ref 3.5–5.2)
ALBUMIN UR-MCNC: <1.2 MG/DL
ALBUMIN/GLOB SERPL: 1.7 G/DL
ALP SERPL-CCNC: 80 U/L (ref 39–117)
ALT SERPL W P-5'-P-CCNC: 12 U/L (ref 1–33)
ANION GAP SERPL CALCULATED.3IONS-SCNC: 11.3 MMOL/L (ref 5–15)
AST SERPL-CCNC: 25 U/L (ref 1–32)
BACTERIA UR QL AUTO: NORMAL /HPF
BILIRUB SERPL-MCNC: 0.4 MG/DL (ref 0–1.2)
BILIRUB UR QL STRIP: NEGATIVE
BUN SERPL-MCNC: 13 MG/DL (ref 8–23)
BUN/CREAT SERPL: 13.5 (ref 7–25)
CALCIUM SPEC-SCNC: 9.1 MG/DL (ref 8.6–10.5)
CHLORIDE SERPL-SCNC: 100 MMOL/L (ref 98–107)
CLARITY UR: CLEAR
CO2 SERPL-SCNC: 26.7 MMOL/L (ref 22–29)
COLOR UR: YELLOW
CREAT SERPL-MCNC: 0.96 MG/DL (ref 0.57–1)
CREAT UR-MCNC: 21.4 MG/DL
CREAT UR-MCNC: 21.4 MG/DL
EGFRCR SERPLBLD CKD-EPI 2021: 58.5 ML/MIN/1.73
GLOBULIN UR ELPH-MCNC: 2.6 GM/DL
GLUCOSE SERPL-MCNC: 91 MG/DL (ref 65–99)
GLUCOSE UR STRIP-MCNC: NEGATIVE MG/DL
HGB UR QL STRIP.AUTO: NEGATIVE
HYALINE CASTS UR QL AUTO: NORMAL /LPF
KETONES UR QL STRIP: NEGATIVE
LEUKOCYTE ESTERASE UR QL STRIP.AUTO: ABNORMAL
MICROALBUMIN/CREAT UR: NORMAL MG/G{CREAT}
NITRITE UR QL STRIP: NEGATIVE
PH UR STRIP.AUTO: 6.5 [PH] (ref 5–8)
POTASSIUM SERPL-SCNC: 4 MMOL/L (ref 3.5–5.2)
PROT ?TM UR-MCNC: <4 MG/DL
PROT SERPL-MCNC: 7 G/DL (ref 6–8.5)
PROT UR QL STRIP: NEGATIVE
PROT/CREAT UR: NORMAL MG/G{CREAT}
RBC # UR STRIP: NORMAL /HPF
REF LAB TEST METHOD: NORMAL
SODIUM SERPL-SCNC: 138 MMOL/L (ref 136–145)
SP GR UR STRIP: 1.01 (ref 1–1.03)
SQUAMOUS #/AREA URNS HPF: NORMAL /HPF
UROBILINOGEN UR QL STRIP: ABNORMAL
WBC # UR STRIP: NORMAL /HPF

## 2022-08-01 ENCOUNTER — OFFICE VISIT (OUTPATIENT)
Dept: FAMILY MEDICINE CLINIC | Facility: CLINIC | Age: 87
End: 2022-08-01

## 2022-08-01 VITALS
HEIGHT: 65 IN | SYSTOLIC BLOOD PRESSURE: 132 MMHG | OXYGEN SATURATION: 98 % | BODY MASS INDEX: 25.09 KG/M2 | WEIGHT: 150.6 LBS | DIASTOLIC BLOOD PRESSURE: 76 MMHG | TEMPERATURE: 98.4 F | HEART RATE: 100 BPM

## 2022-08-01 DIAGNOSIS — K21.9 GASTROESOPHAGEAL REFLUX DISEASE WITHOUT ESOPHAGITIS: ICD-10-CM

## 2022-08-01 DIAGNOSIS — F41.9 ANXIETY: ICD-10-CM

## 2022-08-01 DIAGNOSIS — Z79.01 ANTICOAGULATED: ICD-10-CM

## 2022-08-01 DIAGNOSIS — E78.2 MIXED HYPERLIPIDEMIA: ICD-10-CM

## 2022-08-01 DIAGNOSIS — I10 ESSENTIAL HYPERTENSION: ICD-10-CM

## 2022-08-01 DIAGNOSIS — I48.0 PAROXYSMAL ATRIAL FIBRILLATION: ICD-10-CM

## 2022-08-01 DIAGNOSIS — R93.89 ABNORMAL CT SCAN: Primary | ICD-10-CM

## 2022-08-01 PROCEDURE — 99214 OFFICE O/P EST MOD 30 MIN: CPT | Performed by: FAMILY MEDICINE

## 2022-08-01 RX ORDER — SOTALOL HYDROCHLORIDE 120 MG/1
1 TABLET ORAL 2 TIMES DAILY
Qty: 180 EACH | Refills: 1 | Status: SHIPPED | OUTPATIENT
Start: 2022-08-01 | End: 2022-10-27 | Stop reason: SDUPTHER

## 2022-08-01 RX ORDER — LISINOPRIL 5 MG/1
5 TABLET ORAL DAILY
Qty: 90 TABLET | Refills: 1 | Status: SHIPPED | OUTPATIENT
Start: 2022-08-01 | End: 2022-10-27 | Stop reason: SDUPTHER

## 2022-08-01 RX ORDER — PAROXETINE 10 MG/1
5 TABLET, FILM COATED ORAL NIGHTLY
Qty: 90 TABLET | Refills: 2 | Status: SHIPPED | OUTPATIENT
Start: 2022-08-01 | End: 2022-10-27 | Stop reason: SDUPTHER

## 2022-08-01 RX ORDER — FAMOTIDINE 20 MG/1
20 TABLET, FILM COATED ORAL 2 TIMES DAILY
Qty: 180 TABLET | Refills: 0 | Status: SHIPPED | OUTPATIENT
Start: 2022-08-01 | End: 2022-10-27 | Stop reason: SDUPTHER

## 2022-08-01 RX ORDER — FLUTICASONE PROPIONATE 50 MCG
SPRAY, SUSPENSION (ML) NASAL
COMMUNITY
Start: 2022-06-06 | End: 2023-01-10 | Stop reason: SDUPTHER

## 2022-08-01 NOTE — PROGRESS NOTES
Subjective   Tonia Ritter is a 86 y.o. female.   Pt presents today with CC of Hypertension      History of Present Illness   1.  Patient is a pleasant 86-year-old female here to follow-up on an abnormal CT scan of her chest.  Last CT scan showed a couple lower lobe densities concerning for pneumonia.  She had not have any symptoms.  She would like reevaluation.  #2 she has chronic anxiety for which she takes Paxil 5 mg nightly.  #3 she has hypertension for which she takes lisinopril 5 mg daily.  She has paroxysmal atrial fibrillation for which she takes sotalol 120 mg twice a day.  She follows with cardiology.  She is anticoagulated with Xarelto 20 mg daily.           The following portions of the patient's history were reviewed and updated as appropriate: allergies, current medications, past family history, past medical history, past social history, past surgical history and problem list.    Review of Systems   Constitutional: Negative for chills, fever and unexpected weight loss.   HENT: Negative for congestion and sore throat.    Eyes: Negative for blurred vision and visual disturbance.   Respiratory: Negative for cough and wheezing.    Cardiovascular: Negative for chest pain and palpitations.   Gastrointestinal: Negative for abdominal pain and diarrhea.   Endocrine: Negative for cold intolerance and heat intolerance.   Genitourinary: Negative for dysuria.   Musculoskeletal: Negative for arthralgias and neck stiffness.   Neurological: Negative for dizziness, seizures and syncope.   Psychiatric/Behavioral: Negative for self-injury, suicidal ideas and depressed mood.       Objective   Physical Exam  Vitals and nursing note reviewed.   Constitutional:       Appearance: She is well-developed.   HENT:      Head: Normocephalic and atraumatic.      Right Ear: External ear normal.      Left Ear: External ear normal.      Nose: Nose normal.   Eyes:      Conjunctiva/sclera: Conjunctivae normal.      Pupils: Pupils are  equal, round, and reactive to light.   Cardiovascular:      Rate and Rhythm: Normal rate and regular rhythm.      Heart sounds: Normal heart sounds.   Pulmonary:      Effort: Pulmonary effort is normal.      Breath sounds: Normal breath sounds.   Abdominal:      General: Bowel sounds are normal.      Palpations: Abdomen is soft.   Musculoskeletal:      Cervical back: Normal range of motion and neck supple.   Skin:     General: Skin is warm and dry.   Neurological:      Mental Status: She is alert and oriented to person, place, and time.   Psychiatric:         Behavior: Behavior normal.           Assessment & Plan   Diagnoses and all orders for this visit:    1. Abnormal CT scan (Primary)  -     CT Chest With Contrast; Future  We will get a repeat CT scan to ensure resolution.  Rule out mass.  2. Anxiety  -     PARoxetine (Paxil) 10 MG tablet; Take 0.5 tablets by mouth Every Night. Takes 0.5 tablet  Dispense: 90 tablet; Refill: 2    3. Essential hypertension  -     lisinopril (PRINIVIL,ZESTRIL) 5 MG tablet; Take 1 tablet by mouth Daily.  Dispense: 90 tablet; Refill: 1  No problems on prior blood work.  We will get blood work at follow-up.  4. Paroxysmal atrial fibrillation (HCC)  -     Sotalol HCl, AF, 120 MG tablet; Take 1 tablet by mouth 2 (Two) Times a Day.  Dispense: 180 each; Refill: 1    5. Gastroesophageal reflux disease without esophagitis  -     famotidine (PEPCID) 20 MG tablet; Take 1 tablet by mouth 2 (Two) Times a Day.  Dispense: 180 tablet; Refill: 0    6. Mixed hyperlipidemia    7. Anticoagulated               BMI is >= 25 and <30. (Overweight) The following options were offered after discussion;: exercise counseling/recommendations and nutrition counseling/recommendations

## 2022-08-04 ENCOUNTER — HOSPITAL ENCOUNTER (EMERGENCY)
Dept: HOSPITAL 79 - ER1 | Age: 87
Discharge: HOME | End: 2022-08-04
Payer: MEDICARE

## 2022-08-04 DIAGNOSIS — J18.9: ICD-10-CM

## 2022-08-04 DIAGNOSIS — Z88.8: ICD-10-CM

## 2022-08-04 DIAGNOSIS — R42: Primary | ICD-10-CM

## 2022-08-04 DIAGNOSIS — I10: ICD-10-CM

## 2022-08-04 DIAGNOSIS — Z79.01: ICD-10-CM

## 2022-08-04 DIAGNOSIS — Z95.0: ICD-10-CM

## 2022-08-04 LAB
BUN/CREATININE RATIO: 16 (ref 0–10)
HGB BLD-MCNC: 12.9 GM/DL (ref 12.3–15.3)
RED BLOOD COUNT: 4.13 M/UL (ref 4–5.1)
WHITE BLOOD COUNT: 8.1 K/UL (ref 4.5–11)

## 2022-08-11 ENCOUNTER — TELEPHONE (OUTPATIENT)
Dept: FAMILY MEDICINE CLINIC | Facility: CLINIC | Age: 87
End: 2022-08-11

## 2022-08-11 NOTE — TELEPHONE ENCOUNTER
PATIENT CALLED STATES SHE GOT SICK AND HAD TO GO TO [place] [facility] THEY TOLD HER SHE HAD PNEUMONIA AND GAVE HER A ANTIBIOTIC.PATIENT STATES THEY DONE A CT OF HER CHEST AND HEAD SHE THINKS THEY DONE THE SAME CT SHE HAS SCHEDULED AT [facility] WANTS TO KNOW IF YOU WILL CHECK AND SEE IF SHE STILL NEEDS THE SCAN DONE. I HAVE REQUESTED RECORDS FROM [facility].

## 2022-09-08 ENCOUNTER — HOSPITAL ENCOUNTER (OUTPATIENT)
Dept: CT IMAGING | Facility: HOSPITAL | Age: 87
Discharge: HOME | End: 2022-09-08
Admitting: FAMILY MEDICINE

## 2022-09-08 DIAGNOSIS — R93.89 ABNORMAL CT SCAN: ICD-10-CM

## 2022-09-08 LAB — CREAT BLDA-MCNC: 1 MG/DL (ref 0.6–1.3)

## 2022-09-08 PROCEDURE — 25010000002 IOPAMIDOL 61 % SOLUTION: Performed by: FAMILY MEDICINE

## 2022-09-08 PROCEDURE — 82565 ASSAY OF CREATININE: CPT

## 2022-09-08 PROCEDURE — 71260 CT THORAX DX C+: CPT | Performed by: RADIOLOGY

## 2022-09-08 PROCEDURE — 71260 CT THORAX DX C+: CPT

## 2022-09-08 RX ADMIN — IOPAMIDOL 60 ML: 612 INJECTION, SOLUTION INTRAVENOUS at 11:26

## 2022-09-09 ENCOUNTER — TELEPHONE (OUTPATIENT)
Dept: FAMILY MEDICINE CLINIC | Facility: CLINIC | Age: 87
End: 2022-09-09

## 2022-09-09 NOTE — TELEPHONE ENCOUNTER
----- Message from Joey Orozco DO sent at 9/9/2022  8:03 AM EDT -----  CT scan of your chest looks okay.  There are some nodules along the outside of your right lung.  They appear to be scarring, not cancer.  I recommend rechecking sometime this winter.  Also, we can discuss at follow-up.            Patient notified and verbalized understanding.

## 2022-09-12 ENCOUNTER — TELEPHONE (OUTPATIENT)
Dept: FAMILY MEDICINE CLINIC | Facility: CLINIC | Age: 87
End: 2022-09-12

## 2022-09-12 NOTE — TELEPHONE ENCOUNTER
Caller: Tonia Ritter    Relationship to patient: Self    Best call back number: [phone]    Chief complaint: BAD COUGH    Type of visit: SAME DAY    Requested date: TODAY    If rescheduling, when is the original appointment: N/A    Additional notes: PATIENT ONLY WANTS TO SEE DR. WALSH

## 2022-09-12 NOTE — TELEPHONE ENCOUNTER
Called stating that she has a severe cough that is bad enough to almost take her breath away. Coughing up mucus. Has taken at home COVID test and all have been negative. Been taking antibiotics, cough medicine, and mucinex that she was prescribed from [address] [place] [address] last Monday. Other than the coughing she feels ok but the when she feels terrible. Pt has an appointment at 8:15 in the morning. Would you like anything done before the appointment in the morning?

## 2022-09-13 ENCOUNTER — OFFICE VISIT (OUTPATIENT)
Dept: FAMILY MEDICINE CLINIC | Facility: CLINIC | Age: 87
End: 2022-09-13

## 2022-09-13 VITALS
BODY MASS INDEX: 25.09 KG/M2 | HEIGHT: 65 IN | TEMPERATURE: 97.8 F | WEIGHT: 150.6 LBS | HEART RATE: 84 BPM | DIASTOLIC BLOOD PRESSURE: 76 MMHG | SYSTOLIC BLOOD PRESSURE: 128 MMHG | OXYGEN SATURATION: 99 %

## 2022-09-13 DIAGNOSIS — R05.9 COUGH: Primary | ICD-10-CM

## 2022-09-13 DIAGNOSIS — J40 BRONCHITIS: ICD-10-CM

## 2022-09-13 PROCEDURE — 99213 OFFICE O/P EST LOW 20 MIN: CPT | Performed by: FAMILY MEDICINE

## 2022-09-13 RX ORDER — GUAIFENESIN 600 MG/1
1200 TABLET, EXTENDED RELEASE ORAL 2 TIMES DAILY
COMMUNITY
End: 2022-09-13

## 2022-09-13 RX ORDER — BENZONATATE 100 MG/1
100 CAPSULE ORAL 3 TIMES DAILY PRN
Qty: 30 CAPSULE | Refills: 0 | Status: SHIPPED | OUTPATIENT
Start: 2022-09-13 | End: 2022-10-27

## 2022-09-13 RX ORDER — CEFDINIR 300 MG/1
CAPSULE ORAL
COMMUNITY
Start: 2022-09-05 | End: 2022-10-27

## 2022-09-13 NOTE — PROGRESS NOTES
Subjective   Tonia Ritter is a 87 y.o. female.   Pt presents today with CC of Cough      History of Present Illness   Patient is a pleasant 87-year-old female who was seen last week by urgent care and was diagnosed with bronchitis.  She has had productive cough, was started on Omnicef 300 mg twice daily.  She reports that her cough and general wellness is better, she is still coughing some and request something else for cough.  She has been taking Robitussin/guaifenesin cough syrup with minimal benefit, she also occasionally takes standard guaifenesin pills.  She would like evaluation of this.  Of note, she has scarring in her lungs, as seen on CT scan of her chest.  No malignancy suspected strongly.  Has follow-up CT scan planned for this winter.       The following portions of the patient's history were reviewed and updated as appropriate: allergies, current medications, past family history, past medical history, past social history, past surgical history and problem list.    Review of Systems   Constitutional: Negative for chills, fever and unexpected weight loss.   HENT: Negative for congestion and sore throat.    Eyes: Negative for blurred vision and visual disturbance.   Respiratory: Positive for cough. Negative for wheezing.    Cardiovascular: Negative for chest pain and palpitations.   Gastrointestinal: Negative for abdominal pain and diarrhea.   Endocrine: Negative for cold intolerance and heat intolerance.   Genitourinary: Negative for dysuria.   Musculoskeletal: Negative for arthralgias and neck stiffness.   Neurological: Negative for dizziness, seizures and syncope.   Psychiatric/Behavioral: Negative for self-injury, suicidal ideas and depressed mood.       Objective   Physical Exam  Vitals and nursing note reviewed.   Constitutional:       Appearance: She is well-developed.   HENT:      Head: Normocephalic and atraumatic.      Right Ear: External ear normal.      Left Ear: External ear normal.       Nose: Nose normal.   Eyes:      Conjunctiva/sclera: Conjunctivae normal.      Pupils: Pupils are equal, round, and reactive to light.   Cardiovascular:      Rate and Rhythm: Normal rate and regular rhythm.      Heart sounds: Normal heart sounds.   Pulmonary:      Effort: Pulmonary effort is normal.      Breath sounds: Normal breath sounds.   Abdominal:      General: Bowel sounds are normal.      Palpations: Abdomen is soft.   Musculoskeletal:      Cervical back: Normal range of motion and neck supple.   Skin:     General: Skin is warm and dry.   Neurological:      Mental Status: She is alert and oriented to person, place, and time.   Psychiatric:         Behavior: Behavior normal.           Assessment & Plan   Diagnoses and all orders for this visit:    1. Cough (Primary)  -     benzonatate (Tessalon Perles) 100 MG capsule; Take 1 capsule by mouth 3 (Three) Times a Day As Needed for Cough.  Dispense: 30 capsule; Refill: 0  -     dextromethorphan 15 MG/5ML syrup; Take 10 mL by mouth 4 (Four) Times a Day As Needed for Cough.  Dispense: 118 mL; Refill: 0  Her lungs are clear.  Vital signs are good.  We will try Tessalon Perles, if this is not effective within 2 or 3 doses, she is going to stop this and go back to using a cough syrup.  She states that guaifenesin cough syrup works fair, but would like to try something different.  We will try dextromethorphan cough syrup.  Both were sent to her pharmacy.  I discussed the side effects of both.  2. Bronchitis  -     benzonatate (Tessalon Perles) 100 MG capsule; Take 1 capsule by mouth 3 (Three) Times a Day As Needed for Cough.  Dispense: 30 capsule; Refill: 0  -     dextromethorphan 15 MG/5ML syrup; Take 10 mL by mouth 4 (Four) Times a Day As Needed for Cough.  Dispense: 118 mL; Refill: 0  Continue Omnicef until the end.             BMI is >= 25 and <30. (Overweight) The following options were offered after discussion;: exercise counseling/recommendations and nutrition  counseling/recommendations

## 2022-10-07 DIAGNOSIS — I48.0 PAROXYSMAL ATRIAL FIBRILLATION: ICD-10-CM

## 2022-10-07 RX ORDER — POTASSIUM CHLORIDE 750 MG/1
10 TABLET, EXTENDED RELEASE ORAL DAILY
Qty: 90 TABLET | Refills: 1 | Status: SHIPPED | OUTPATIENT
Start: 2022-10-07 | End: 2022-10-27 | Stop reason: SDUPTHER

## 2022-10-07 NOTE — TELEPHONE ENCOUNTER
Caller: Tonia Ritter    Relationship: Self    Best call back number: [phone]    Requested Prescriptions:   Requested Prescriptions     Pending Prescriptions Disp Refills   • potassium chloride (K-DUR,KLOR-CON) 10 MEQ CR tablet 90 tablet 3        Pharmacy where request should be sent: [address] - [phone] [facility] [phone] [facility]     Additional details provided by patient: PLEASE REFILL   Does the patient have less than a 3 day supply:  [x] Yes  [] No    Chao Frias Rep   10/07/22 12:31 EDT

## 2022-10-27 ENCOUNTER — OFFICE VISIT (OUTPATIENT)
Dept: FAMILY MEDICINE CLINIC | Facility: CLINIC | Age: 87
End: 2022-10-27

## 2022-10-27 VITALS
TEMPERATURE: 98 F | WEIGHT: 151.4 LBS | HEART RATE: 92 BPM | SYSTOLIC BLOOD PRESSURE: 122 MMHG | OXYGEN SATURATION: 100 % | HEIGHT: 65 IN | DIASTOLIC BLOOD PRESSURE: 74 MMHG | BODY MASS INDEX: 25.22 KG/M2

## 2022-10-27 DIAGNOSIS — M25.541 ARTHRALGIA OF BOTH HANDS: Primary | ICD-10-CM

## 2022-10-27 DIAGNOSIS — F41.9 ANXIETY: ICD-10-CM

## 2022-10-27 DIAGNOSIS — L60.8 NAIL DISCOLORATION: ICD-10-CM

## 2022-10-27 DIAGNOSIS — K21.9 GASTROESOPHAGEAL REFLUX DISEASE WITHOUT ESOPHAGITIS: ICD-10-CM

## 2022-10-27 DIAGNOSIS — M25.542 ARTHRALGIA OF BOTH HANDS: Primary | ICD-10-CM

## 2022-10-27 DIAGNOSIS — E78.2 MIXED HYPERLIPIDEMIA: ICD-10-CM

## 2022-10-27 DIAGNOSIS — I10 ESSENTIAL HYPERTENSION: ICD-10-CM

## 2022-10-27 DIAGNOSIS — R53.83 FATIGUE, UNSPECIFIED TYPE: ICD-10-CM

## 2022-10-27 DIAGNOSIS — I48.0 PAROXYSMAL ATRIAL FIBRILLATION: ICD-10-CM

## 2022-10-27 PROCEDURE — 99214 OFFICE O/P EST MOD 30 MIN: CPT | Performed by: FAMILY MEDICINE

## 2022-10-27 RX ORDER — PAROXETINE 10 MG/1
5 TABLET, FILM COATED ORAL NIGHTLY
Qty: 90 TABLET | Refills: 2 | Status: SHIPPED | OUTPATIENT
Start: 2022-10-27 | End: 2022-12-30 | Stop reason: SDUPTHER

## 2022-10-27 RX ORDER — FUROSEMIDE 20 MG/1
20 TABLET ORAL EVERY OTHER DAY
Qty: 45 TABLET | Refills: 1 | Status: SHIPPED | OUTPATIENT
Start: 2022-10-27 | End: 2022-12-30 | Stop reason: SDUPTHER

## 2022-10-27 RX ORDER — POTASSIUM CHLORIDE 750 MG/1
10 TABLET, EXTENDED RELEASE ORAL DAILY
Qty: 90 TABLET | Refills: 1 | Status: SHIPPED | OUTPATIENT
Start: 2022-10-27 | End: 2022-12-30 | Stop reason: SDUPTHER

## 2022-10-27 RX ORDER — ROSUVASTATIN CALCIUM 10 MG/1
10 TABLET, COATED ORAL DAILY
Qty: 90 TABLET | Refills: 1 | Status: SHIPPED | OUTPATIENT
Start: 2022-10-27 | End: 2022-12-30 | Stop reason: SDUPTHER

## 2022-10-27 RX ORDER — FAMOTIDINE 20 MG/1
20 TABLET, FILM COATED ORAL 2 TIMES DAILY
Qty: 180 TABLET | Refills: 0 | Status: SHIPPED | OUTPATIENT
Start: 2022-10-27 | End: 2022-12-30 | Stop reason: SDUPTHER

## 2022-10-27 RX ORDER — SOTALOL HYDROCHLORIDE 120 MG/1
1 TABLET ORAL 2 TIMES DAILY
Qty: 180 EACH | Refills: 0 | Status: SHIPPED | OUTPATIENT
Start: 2022-10-27 | End: 2022-12-30 | Stop reason: SDUPTHER

## 2022-10-27 RX ORDER — LISINOPRIL 5 MG/1
5 TABLET ORAL DAILY
Qty: 90 TABLET | Refills: 1 | Status: SHIPPED | OUTPATIENT
Start: 2022-10-27 | End: 2022-12-30 | Stop reason: SDUPTHER

## 2022-10-27 NOTE — PROGRESS NOTES
Subjective   Tonia iRtter is a 87 y.o. female.   Pt presents today with CC of Finger Injury      History of Present Illness   History of Present Illness  Patient is a pleasant 87-year-old female with pain in both hands.  She has known osteoarthritis.  She complains of discolored fingernail.  She does occasionally use nail polish and remover.  Only 1 finger is involved, slight brown discoloration.  She is concerned about fungal disease.  #2 she has GERD for which she takes Pepcid 20 mg twice a day.  #3 she has hypertension for which he takes lisinopril 5 mg daily and Lasix 20 mg every other day.  #4 she has anxiety for which she takes Paxil just 5 mg nightly.  #5 she has atrial fibrillation, she takes sotalol Xarelto.  #6 she has hyperlipidemia for which she takes Crestor  #7 she complains of chronic fatigue.  She requests laboratory evaluation for it.  Denies chest pain or shortness of breath.       The following portions of the patient's history were reviewed and updated as appropriate: allergies, current medications, past family history, past medical history, past social history, past surgical history and problem list.    Review of Systems   Constitutional: Positive for fatigue. Negative for chills, fever and unexpected weight loss.   HENT: Negative for congestion and sore throat.    Eyes: Negative for blurred vision and visual disturbance.   Respiratory: Negative for cough and wheezing.    Cardiovascular: Negative for chest pain and palpitations.   Gastrointestinal: Negative for abdominal pain and diarrhea.   Endocrine: Negative for cold intolerance and heat intolerance.   Genitourinary: Negative for dysuria.   Musculoskeletal: Negative for arthralgias and neck stiffness.   Neurological: Negative for dizziness, seizures and syncope.   Psychiatric/Behavioral: Negative for self-injury, suicidal ideas and depressed mood.       Objective   Physical Exam  Vitals and nursing note reviewed.   Constitutional:        Appearance: She is well-developed.   HENT:      Head: Normocephalic and atraumatic.      Right Ear: External ear normal.      Left Ear: External ear normal.      Nose: Nose normal.   Eyes:      Conjunctiva/sclera: Conjunctivae normal.      Pupils: Pupils are equal, round, and reactive to light.   Cardiovascular:      Rate and Rhythm: Normal rate and regular rhythm.      Heart sounds: Normal heart sounds.   Pulmonary:      Effort: Pulmonary effort is normal.      Breath sounds: Normal breath sounds.   Abdominal:      General: Bowel sounds are normal.      Palpations: Abdomen is soft.   Musculoskeletal:      Cervical back: Normal range of motion and neck supple.   Skin:     General: Skin is warm and dry.      Comments: Slight dark tan/brown discoloration of one of her fingernails.  Not clearly a fungal infection, I am equally concerned that it may be a product that she used on her finger that has discolored her nail.   Neurological:      Mental Status: She is alert and oriented to person, place, and time.   Psychiatric:         Behavior: Behavior normal.           Assessment & Plan   Diagnoses and all orders for this visit:    1. Arthralgia of both hands (Primary)    2. Gastroesophageal reflux disease without esophagitis  -     famotidine (PEPCID) 20 MG tablet; Take 1 tablet by mouth 2 (Two) Times a Day.  Dispense: 180 tablet; Refill: 0    3. Essential hypertension  -     lisinopril (PRINIVIL,ZESTRIL) 5 MG tablet; Take 1 tablet by mouth Daily.  Dispense: 90 tablet; Refill: 1  -     furosemide (LASIX) 20 MG tablet; Take 1 tablet by mouth Every Other Day.  Dispense: 45 tablet; Refill: 1    4. Anxiety  -     PARoxetine (Paxil) 10 MG tablet; Take 0.5 tablets by mouth Every Night. Takes 0.5 tablet  Dispense: 90 tablet; Refill: 2    5. Paroxysmal atrial fibrillation (HCC)  -     potassium chloride (K-DUR,KLOR-CON) 10 MEQ CR tablet; Take 1 tablet by mouth Daily.  Dispense: 90 tablet; Refill: 1  -     rivaroxaban (Xarelto) 20  MG tablet; Take 1 tablet by mouth Daily With Dinner.  Dispense: 90 tablet; Refill: 1  -     Sotalol HCl, AF, 120 MG tablet; Take 1 tablet by mouth 2 (Two) Times a Day.  Dispense: 180 each; Refill: 0    6. Mixed hyperlipidemia  -     rosuvastatin (CRESTOR) 10 MG tablet; Take 1 tablet by mouth Daily. for cholesterol  Dispense: 90 tablet; Refill: 1    7. Fatigue, unspecified type  -     Comprehensive metabolic panel; Future  -     TSH; Future  -     Folate; Future  -     Vitamin B12; Future  I suspect her fatigue is age-related, no acute findings on exam, no chest pain or shortness of breath.  Vital signs are great.  8. Nail discoloration  -     Efinaconazole 10 % solution; Apply 1 application topically Daily.  Dispense: 4 mL; Refill: 0  I am not sure this is fungal, so I will hold off on oral treatments as I think the risk outweighs the potential benefit of these.  We will try topical treatment, this will likely be effective as if this is fungal, it seems to be very superficial.                  BMI is >= 25 and <30. (Overweight) The following options were offered after discussion;: exercise counseling/recommendations and nutrition counseling/recommendations

## 2022-11-07 ENCOUNTER — TELEPHONE (OUTPATIENT)
Dept: FAMILY MEDICINE CLINIC | Facility: CLINIC | Age: 87
End: 2022-11-07

## 2022-11-07 NOTE — TELEPHONE ENCOUNTER
called patient in regards to overdue labs. No answer.      Pt called in regards to overdue labs. Patient will be in later to get them completed.

## 2022-11-08 ENCOUNTER — TELEPHONE (OUTPATIENT)
Dept: FAMILY MEDICINE CLINIC | Facility: CLINIC | Age: 87
End: 2022-11-08

## 2022-11-08 NOTE — TELEPHONE ENCOUNTER
Provider: DR. WALSH    Caller: [facility]     Relationship to Patient: PHARMACY    Phone Number: [phone]    Reason for Call: PHARMACY CALLED ABOUT THE MEDICATION JUBLIA 10% TOPICAL SOLUTION IS NOT COVERED BUT THE PATIENT'S INSURANCE. PHARMACY WOULD LIKE TO SEE IF THERE IS AN ALTERNATIVE. PLEASE FOLLOW UP.

## 2022-11-09 NOTE — TELEPHONE ENCOUNTER
Does the pharmacy have a suggestion?  Is there going to be a prior authorization request coming?  There are other options, many of them, though sending in multiple prescriptions is unlikely to be beneficial.

## 2022-11-09 NOTE — TELEPHONE ENCOUNTER
Does the pharmacy have a suggestion?  Is there going to be a prior authorization request coming?  There are other options, many of them, though sending in multiple prescriptions is unlikely to be beneficial.    It does not give them any suggestions,can try to pay if you would like.

## 2022-11-10 NOTE — TELEPHONE ENCOUNTER
It is my opinion that this is the best product, the most likely to help.  Another option would be to watch and wait and see if it does not get better.  Other options include taking oral antifungals, these require regular blood testing to monitor liver function.  I recommend against this as this slight discoloration in your fingernail does not work potentially having liver damage.  Your age makes it more likely to have this problem.  If it is too expensive to buy, there are cheaper options, though I do not believe they are as effective.

## 2022-11-10 NOTE — TELEPHONE ENCOUNTER
It is my opinion that this is the best product, the most likely to help.  Another option would be to watch and wait and see if it does not get better.  Other options include taking oral antifungals, these require regular blood testing to monitor liver function.  I recommend against this as this slight discoloration in your fingernail does not work potentially having liver damage.  Your age makes it more likely to have this problem.  If it is too expensive to buy, there are cheaper options, though I do not believe they are as effective.    Spoke with patient & she verbalized understanding,she reports she is using OTC med at present,will see how it does.

## 2022-11-21 ENCOUNTER — CLINICAL SUPPORT (OUTPATIENT)
Dept: FAMILY MEDICINE CLINIC | Facility: CLINIC | Age: 87
End: 2022-11-21

## 2022-11-21 ENCOUNTER — LAB (OUTPATIENT)
Dept: FAMILY MEDICINE CLINIC | Facility: CLINIC | Age: 87
End: 2022-11-21

## 2022-11-21 DIAGNOSIS — R53.83 FATIGUE, UNSPECIFIED TYPE: ICD-10-CM

## 2022-11-21 DIAGNOSIS — E53.8 B12 DEFICIENCY: Primary | ICD-10-CM

## 2022-11-21 PROCEDURE — 84443 ASSAY THYROID STIM HORMONE: CPT | Performed by: FAMILY MEDICINE

## 2022-11-21 PROCEDURE — 80053 COMPREHEN METABOLIC PANEL: CPT | Performed by: FAMILY MEDICINE

## 2022-11-21 PROCEDURE — 82607 VITAMIN B-12: CPT | Performed by: FAMILY MEDICINE

## 2022-11-21 PROCEDURE — 96372 THER/PROPH/DIAG INJ SC/IM: CPT | Performed by: NURSE PRACTITIONER

## 2022-11-21 PROCEDURE — 82746 ASSAY OF FOLIC ACID SERUM: CPT | Performed by: FAMILY MEDICINE

## 2022-11-21 RX ORDER — CYANOCOBALAMIN 1000 UG/ML
1000 INJECTION, SOLUTION INTRAMUSCULAR; SUBCUTANEOUS ONCE
Status: COMPLETED | OUTPATIENT
Start: 2022-11-21 | End: 2022-11-21

## 2022-11-21 RX ADMIN — CYANOCOBALAMIN 1000 MCG: 1000 INJECTION, SOLUTION INTRAMUSCULAR; SUBCUTANEOUS at 11:25

## 2022-11-22 LAB
ALBUMIN SERPL-MCNC: 4.3 G/DL (ref 3.5–5.2)
ALBUMIN/GLOB SERPL: 1.5 G/DL
ALP SERPL-CCNC: 78 U/L (ref 39–117)
ALT SERPL W P-5'-P-CCNC: 6 U/L (ref 1–33)
ANION GAP SERPL CALCULATED.3IONS-SCNC: 11.6 MMOL/L (ref 5–15)
AST SERPL-CCNC: 24 U/L (ref 1–32)
BILIRUB SERPL-MCNC: 0.4 MG/DL (ref 0–1.2)
BUN SERPL-MCNC: 10 MG/DL (ref 8–23)
BUN/CREAT SERPL: 10.5 (ref 7–25)
CALCIUM SPEC-SCNC: 9.2 MG/DL (ref 8.6–10.5)
CHLORIDE SERPL-SCNC: 102 MMOL/L (ref 98–107)
CO2 SERPL-SCNC: 28.4 MMOL/L (ref 22–29)
CREAT SERPL-MCNC: 0.95 MG/DL (ref 0.57–1)
EGFRCR SERPLBLD CKD-EPI 2021: 58.8 ML/MIN/1.73
FOLATE SERPL-MCNC: 13.8 NG/ML (ref 4.78–24.2)
GLOBULIN UR ELPH-MCNC: 2.8 GM/DL
GLUCOSE SERPL-MCNC: 69 MG/DL (ref 65–99)
POTASSIUM SERPL-SCNC: 3.8 MMOL/L (ref 3.5–5.2)
PROT SERPL-MCNC: 7.1 G/DL (ref 6–8.5)
SODIUM SERPL-SCNC: 142 MMOL/L (ref 136–145)
TSH SERPL DL<=0.05 MIU/L-ACNC: 2.66 UIU/ML (ref 0.27–4.2)
VIT B12 BLD-MCNC: >2000 PG/ML (ref 211–946)

## 2022-11-23 ENCOUNTER — TELEPHONE (OUTPATIENT)
Dept: FAMILY MEDICINE CLINIC | Facility: CLINIC | Age: 87
End: 2022-11-23

## 2022-11-23 NOTE — TELEPHONE ENCOUNTER
----- Message from Joey Orozco DO sent at 11/23/2022  1:37 PM EST -----  No problems on your blood work.  No explanation for fatigue.    Patient notified.

## 2022-12-05 ENCOUNTER — TELEPHONE (OUTPATIENT)
Dept: FAMILY MEDICINE CLINIC | Facility: CLINIC | Age: 87
End: 2022-12-05

## 2022-12-05 NOTE — TELEPHONE ENCOUNTER
Caller: Tonia Ritter    Relationship: Self    Best call back number:[phone]    What is the best time to reach you: ANY     Who are you requesting to speak with (clinical staff, provider,  specific staff member): CLINICAL     What was the call regarding: NEEDS TO KNOW IF SHES ALREADY HAD BLOOD WORK THAT WOULD INCLUDE A LIPID PANEL, CMP AND THYROID PANEL. THEY WANTED HER TO GET BLOOD WORK TODAY FOR THE CARDIOLOGIST AND SHE THINKS SHE MAY HAVE HAD THIS ALREADY.     Do you require a callback: YES     Spoke with patient & informed her that she has had everything except the Lipid panel & she verbalized understanding.

## 2022-12-05 NOTE — TELEPHONE ENCOUNTER
Caller: Tonia Ritter    Relationship: Self    Best call back number:[phone]    What is the best time to reach you: ANY     Who are you requesting to speak with (clinical staff, provider,  specific staff member): CLINICAL     What was the call regarding: NEEDS TO KNOW IF SHES ALREADY HAD BLOOD WORK THAT WOULD INCLUDE A LIPID PANEL, CMP AND THYROID PANEL. THEY WANTED HER TO GET BLOOD WORK TODAY FOR THE CARDIOLOGIST AND SHE THINKS SHE MAY HAVE HAD THIS ALREADY.     Do you require a callback: YES

## 2022-12-05 NOTE — TELEPHONE ENCOUNTER
Patient called requesting most recent labs faxed to Dr Ferreira office.  Per request, results faxed.  Confirmation received.     [phone] fax

## 2022-12-30 ENCOUNTER — OFFICE VISIT (OUTPATIENT)
Dept: FAMILY MEDICINE CLINIC | Facility: CLINIC | Age: 87
End: 2022-12-30

## 2022-12-30 VITALS
SYSTOLIC BLOOD PRESSURE: 112 MMHG | HEART RATE: 100 BPM | TEMPERATURE: 97.3 F | OXYGEN SATURATION: 98 % | DIASTOLIC BLOOD PRESSURE: 60 MMHG | WEIGHT: 155.6 LBS | HEIGHT: 65 IN | BODY MASS INDEX: 25.92 KG/M2

## 2022-12-30 DIAGNOSIS — F41.9 ANXIETY: ICD-10-CM

## 2022-12-30 DIAGNOSIS — I48.0 PAROXYSMAL ATRIAL FIBRILLATION: ICD-10-CM

## 2022-12-30 DIAGNOSIS — Z00.00 MEDICARE ANNUAL WELLNESS VISIT, SUBSEQUENT: Primary | ICD-10-CM

## 2022-12-30 DIAGNOSIS — K21.9 GASTROESOPHAGEAL REFLUX DISEASE WITHOUT ESOPHAGITIS: ICD-10-CM

## 2022-12-30 DIAGNOSIS — E78.2 MIXED HYPERLIPIDEMIA: ICD-10-CM

## 2022-12-30 DIAGNOSIS — Z71.89 ADVANCED CARE PLANNING/COUNSELING DISCUSSION: ICD-10-CM

## 2022-12-30 DIAGNOSIS — I10 ESSENTIAL HYPERTENSION: ICD-10-CM

## 2022-12-30 PROCEDURE — 1159F MED LIST DOCD IN RCRD: CPT | Performed by: FAMILY MEDICINE

## 2022-12-30 PROCEDURE — G0439 PPPS, SUBSEQ VISIT: HCPCS | Performed by: FAMILY MEDICINE

## 2022-12-30 PROCEDURE — 99497 ADVNCD CARE PLAN 30 MIN: CPT | Performed by: FAMILY MEDICINE

## 2022-12-30 PROCEDURE — 1170F FXNL STATUS ASSESSED: CPT | Performed by: FAMILY MEDICINE

## 2022-12-30 PROCEDURE — 1125F AMNT PAIN NOTED PAIN PRSNT: CPT | Performed by: FAMILY MEDICINE

## 2022-12-30 RX ORDER — LUBIPROSTONE 8 UG/1
8 CAPSULE ORAL DAILY
COMMUNITY
End: 2023-03-10

## 2022-12-30 RX ORDER — FAMOTIDINE 20 MG/1
20 TABLET, FILM COATED ORAL DAILY
Qty: 90 TABLET | Refills: 0 | Status: SHIPPED | OUTPATIENT
Start: 2022-12-30

## 2022-12-30 RX ORDER — ROSUVASTATIN CALCIUM 10 MG/1
10 TABLET, COATED ORAL DAILY
Qty: 90 TABLET | Refills: 1 | Status: SHIPPED | OUTPATIENT
Start: 2022-12-30

## 2022-12-30 RX ORDER — TRAMADOL HYDROCHLORIDE 50 MG/1
50 TABLET ORAL
COMMUNITY
End: 2022-12-30

## 2022-12-30 RX ORDER — FUROSEMIDE 20 MG/1
20 TABLET ORAL EVERY OTHER DAY
Qty: 45 TABLET | Refills: 1 | Status: SHIPPED | OUTPATIENT
Start: 2022-12-30

## 2022-12-30 RX ORDER — DIAZEPAM 2 MG/1
2 TABLET ORAL
COMMUNITY
End: 2022-12-30

## 2022-12-30 RX ORDER — PAROXETINE 10 MG/1
5 TABLET, FILM COATED ORAL NIGHTLY
Qty: 90 TABLET | Refills: 0 | Status: SHIPPED | OUTPATIENT
Start: 2022-12-30

## 2022-12-30 RX ORDER — SOTALOL HYDROCHLORIDE 120 MG/1
1 TABLET ORAL 2 TIMES DAILY
Qty: 180 EACH | Refills: 0 | Status: SHIPPED | OUTPATIENT
Start: 2022-12-30

## 2022-12-30 RX ORDER — LISINOPRIL 5 MG/1
5 TABLET ORAL DAILY
Qty: 90 TABLET | Refills: 1 | Status: SHIPPED | OUTPATIENT
Start: 2022-12-30 | End: 2023-03-10 | Stop reason: SDUPTHER

## 2022-12-30 RX ORDER — POTASSIUM CHLORIDE 750 MG/1
10 TABLET, EXTENDED RELEASE ORAL DAILY
Qty: 90 TABLET | Refills: 1 | Status: SHIPPED | OUTPATIENT
Start: 2022-12-30

## 2022-12-30 NOTE — PROGRESS NOTES
QUICK REFERENCE INFORMATION:  The ABCs of the Annual Wellness Visit    Subsequent Medicare Wellness Visit    HEALTH RISK ASSESSMENT    1935    Recent Hospitalizations:  No hospitalization(s) within the last year..        Current Medical Providers:  Patient Care Team:  Joey Orozco DO as PCP - General (Family Medicine)        Smoking Status:  Social History     Tobacco Use   Smoking Status Never   Smokeless Tobacco Never       Alcohol Consumption:  Social History     Substance and Sexual Activity   Alcohol Use No       Depression Screen:   PHQ-2/PHQ-9 Depression Screening 12/30/2022   Retired PHQ-9 Total Score -   Retired Total Score -   Little Interest or Pleasure in Doing Things 0-->not at all   Feeling Down, Depressed or Hopeless 0-->not at all   PHQ-9: Brief Depression Severity Measure Score 0       Health Habits and Functional and Cognitive Screening:  Functional & Cognitive Status 12/30/2022   Do you have difficulty preparing food and eating? No   Do you have difficulty bathing yourself, getting dressed or grooming yourself? No   Do you have difficulty using the toilet? No   Do you have difficulty moving around from place to place? No   Do you have trouble with steps or getting out of a bed or a chair? Yes   Current Diet Unhealthy Diet   Dental Exam Up to date   Eye Exam Up to date   Exercise (times per week) 1 times per week   Current Exercises Include Walking   Current Exercise Activities Include -   Do you need help using the phone?  No   Are you deaf or do you have serious difficulty hearing?  No   Do you need help with transportation? No   Do you need help shopping? No   Do you need help preparing meals?  No   Do you need help with housework?  No   Do you need help with laundry? No   Do you need help taking your medications? No   Do you need help managing money? No   Do you ever drive or ride in a car without wearing a seat belt? No   Have you felt unusual stress, anger or loneliness in the  last month? -   Who do you live with? -   If you need help, do you have trouble finding someone available to you? -   Have you been bothered in the last four weeks by sexual problems? -   Do you have difficulty concentrating, remembering or making decisions? -           Does the patient have evidence of cognitive impairment? No    Aspirin use counseling: Taking ASA appropriately as indicated      Recent Lab Results:  CMP:  Lab Results   Component Value Date    BUN 10 11/21/2022    CREATININE 0.95 11/21/2022    EGFRIFNONA 50 (L) 02/14/2022    EGFRIFAFRI 63 08/11/2016    BCR 10.5 11/21/2022     11/21/2022    K 3.8 11/21/2022    CO2 28.4 11/21/2022    CALCIUM 9.2 11/21/2022    PROTENTOTREF 7.1 08/11/2016    ALBUMIN 4.30 11/21/2022    LABGLOBREF 2.8 08/11/2016    LABIL2 1.5 08/11/2016    BILITOT 0.4 11/21/2022    ALKPHOS 78 11/21/2022    AST 24 11/21/2022    ALT 6 11/21/2022     Lipid Panel:  Lab Results   Component Value Date    CHOL 184 04/21/2021    TRIG 90 04/21/2021    HDL 76 (H) 04/21/2021    VLDL 16 04/21/2021    LDLHDL 1.18 04/21/2021     HbA1c:       Visual Acuity:  Vision Screening    Right eye Left eye Both eyes   Without correction      With correction 20/20 20/25 20/25       Age-appropriate Screening Schedule:  Refer to the list below for future screening recommendations based on patient's age, sex and/or medical conditions. Orders for these recommended tests are listed in the plan section. The patient has been provided with a written plan.    Health Maintenance   Topic Date Due   • ZOSTER VACCINE (1 of 2) Never done   • PAP SMEAR  Never done   • DXA SCAN  01/04/2020   • INFLUENZA VACCINE  08/01/2022   • MAMMOGRAM  08/01/2023 (Originally 8/2/2020)   • LIPID PANEL  12/05/2023   • TDAP/TD VACCINES (4 - Td or Tdap) 10/04/2026        Subjective   History of Present Illness    Tonia Ritter is a 87 y.o. female who presents for an Subsequent Wellness Visit.    The following portions of the patient's  history were reviewed and updated as appropriate: allergies, current medications, past family history, past medical history, past social history, past surgical history and problem list.    Outpatient Medications Prior to Visit   Medication Sig Dispense Refill   • acetaminophen (TYLENOL) 500 MG tablet Take 500 mg by mouth every 6 (six) hours as needed for mild pain (1-3).     • famotidine (PEPCID) 20 MG tablet Take 1 tablet by mouth 2 (Two) Times a Day. (Patient taking differently: Take 20 mg by mouth Every Other Day.) 180 tablet 0   • fluticasone (FLONASE) 50 MCG/ACT nasal spray DEPRESS TWO SPRAYS IN EACH NOSTRIL EVERY DAY     • furosemide (LASIX) 20 MG tablet Take 1 tablet by mouth Every Other Day. 45 tablet 1   • lisinopril (PRINIVIL,ZESTRIL) 5 MG tablet Take 1 tablet by mouth Daily. 90 tablet 1   • PARoxetine (Paxil) 10 MG tablet Take 0.5 tablets by mouth Every Night. Takes 0.5 tablet 90 tablet 2   • potassium chloride (K-DUR,KLOR-CON) 10 MEQ CR tablet Take 1 tablet by mouth Daily. 90 tablet 1   • rivaroxaban (Xarelto) 20 MG tablet Take 1 tablet by mouth Daily With Dinner. 90 tablet 1   • rosuvastatin (CRESTOR) 10 MG tablet Take 1 tablet by mouth Daily. for cholesterol 90 tablet 1   • Sotalol HCl, AF, 120 MG tablet Take 1 tablet by mouth 2 (Two) Times a Day. 180 each 0   • Efinaconazole 10 % solution Apply 1 application topically Daily. 4 mL 0   • lubiprostone (AMITIZA) 8 MCG capsule Take 8 mcg by mouth Daily.     • diazePAM (VALIUM) 2 MG tablet Take 2 mg by mouth.     • traMADol (ULTRAM) 50 MG tablet Take 50 mg by mouth.       No facility-administered medications prior to visit.       Patient Active Problem List   Diagnosis   • Right shoulder pain   • PUD (peptic ulcer disease)   • Osteoarthritis   • Hypertension   • Hyperlipidemia   • Acute bronchitis   • Allergic rhinitis   • Anxiety   • Atrial fibrillation (HCC)   • Back pain   • Burping   • Chronic pain   • Depression   • Encounter for screening fecal occult  blood testing   • Flatulence   • GERD (gastroesophageal reflux disease)   • Hematuria   • Constipation   • Pain of upper abdomen   • Pacemaker   • S/P ablation of atrial fibrillation   • Acute URI   • Acute bacterial rhinosinusitis   • Hot flashes   • Impacted cerumen of right ear       Advance Care Planning:  ACP discussion was held with the patient during this visit. Patient does not have an advance directive, information provided.    Identification of Risk Factors:  Risk factors include: Advance Directive Discussion.    Review of Systems   Constitutional: Negative for chills, fever and unexpected weight loss.   HENT: Negative for congestion and sore throat.    Eyes: Negative for blurred vision and visual disturbance.   Respiratory: Negative for cough and wheezing.    Cardiovascular: Negative for chest pain and palpitations.   Gastrointestinal: Negative for abdominal pain and diarrhea.   Endocrine: Negative for cold intolerance and heat intolerance.   Genitourinary: Negative for dysuria.   Musculoskeletal: Negative for arthralgias and neck stiffness.   Neurological: Negative for dizziness, seizures and syncope.   Psychiatric/Behavioral: Negative for self-injury, suicidal ideas and depressed mood.       Compared to one year ago, the patient feels her physical health is the same.  Compared to one year ago, the patient feels her mental health is the same.    Objective     Physical Exam  Vitals and nursing note reviewed.   Constitutional:       Appearance: She is well-developed.   HENT:      Head: Normocephalic and atraumatic.      Right Ear: External ear normal.      Left Ear: External ear normal.      Nose: Nose normal.   Eyes:      Conjunctiva/sclera: Conjunctivae normal.      Pupils: Pupils are equal, round, and reactive to light.   Cardiovascular:      Rate and Rhythm: Normal rate and regular rhythm.      Heart sounds: Normal heart sounds.   Pulmonary:      Effort: Pulmonary effort is normal.      Breath sounds:  "Normal breath sounds.   Abdominal:      General: Bowel sounds are normal.      Palpations: Abdomen is soft.   Musculoskeletal:      Cervical back: Normal range of motion and neck supple.      Comments: Right distal thumb is light blue, no laceration, nail intact(unlikely to lose nail)   Skin:     General: Skin is warm and dry.   Neurological:      Mental Status: She is alert and oriented to person, place, and time.   Psychiatric:         Behavior: Behavior normal.         Vitals:    12/30/22 1103   BP: 112/60   BP Location: Right arm   Patient Position: Sitting   Pulse: 100   Temp: 97.3 °F (36.3 °C)   TempSrc: Temporal   SpO2: 98%   Weight: 70.6 kg (155 lb 9.6 oz)   Height: 165.1 cm (65\")   PainSc: 0-No pain       BMI is >= 25 and <30. (Overweight) The following options were offered after discussion;: exercise counseling/recommendations and nutrition counseling/recommendations      Assessment & Plan   Patient Self-Management and Personalized Health Advice  The patient has been provided with information about: diet and exercise and preventive services including:   · Annual Wellness Visit (AWV).    Visit Diagnoses:    ICD-10-CM ICD-9-CM   1. Medicare annual wellness visit, subsequent  Z00.00 V70.0   2. Gastroesophageal reflux disease without esophagitis  K21.9 530.81   3. Essential hypertension  I10 401.9   4. Anxiety  F41.9 300.00   5. Paroxysmal atrial fibrillation (HCC)  I48.0 427.31   6. Mixed hyperlipidemia  E78.2 272.2   7. Advanced care planning/counseling discussion  Z71.89 V65.49   #1 no major concerns on Medicare wellness.  She gets around well.  She recently smashed her thumb, exam as above.  She was seen in urgent care.  No fracture.  She states that it still hurts, she has some reduced feeling and some tingling in her distal thumb.  I suspect that she did some mild and likely temporary damage to the nerves near the end of her thumb.  She smashed it in a car door.  The bruising is receding.  It seems as " though the nail will be preserved.  #7 I spent 25 minutes discussing advance care.  She currently has a complicated situation with her 2 children as far as making medical decisions.  She is going to work this out.  She was agreeable to MOST form today.  You can find it scanned into her chart.  She is DNR/DNI.  She is confident about these decisions.  She is going to speak with her children about her wishes, she may also speak with her  about writing up an advanced directive.    No orders of the defined types were placed in this encounter.      Outpatient Encounter Medications as of 12/30/2022   Medication Sig Dispense Refill   • acetaminophen (TYLENOL) 500 MG tablet Take 500 mg by mouth every 6 (six) hours as needed for mild pain (1-3).     • famotidine (PEPCID) 20 MG tablet Take 1 tablet by mouth 2 (Two) Times a Day. (Patient taking differently: Take 20 mg by mouth Every Other Day.) 180 tablet 0   • fluticasone (FLONASE) 50 MCG/ACT nasal spray DEPRESS TWO SPRAYS IN EACH NOSTRIL EVERY DAY     • furosemide (LASIX) 20 MG tablet Take 1 tablet by mouth Every Other Day. 45 tablet 1   • lisinopril (PRINIVIL,ZESTRIL) 5 MG tablet Take 1 tablet by mouth Daily. 90 tablet 1   • PARoxetine (Paxil) 10 MG tablet Take 0.5 tablets by mouth Every Night. Takes 0.5 tablet 90 tablet 2   • potassium chloride (K-DUR,KLOR-CON) 10 MEQ CR tablet Take 1 tablet by mouth Daily. 90 tablet 1   • rivaroxaban (Xarelto) 20 MG tablet Take 1 tablet by mouth Daily With Dinner. 90 tablet 1   • rosuvastatin (CRESTOR) 10 MG tablet Take 1 tablet by mouth Daily. for cholesterol 90 tablet 1   • Sotalol HCl, AF, 120 MG tablet Take 1 tablet by mouth 2 (Two) Times a Day. 180 each 0   • Efinaconazole 10 % solution Apply 1 application topically Daily. 4 mL 0   • lubiprostone (AMITIZA) 8 MCG capsule Take 8 mcg by mouth Daily.     • [DISCONTINUED] diazePAM (VALIUM) 2 MG tablet Take 2 mg by mouth.     • [DISCONTINUED] traMADol (ULTRAM) 50 MG tablet Take 50  mg by mouth.       No facility-administered encounter medications on file as of 12/30/2022.       Reviewed use of high risk medication in the elderly: yes  Reviewed for potential of harmful drug interactions in the elderly: yes    Follow Up:  Return in about 3 months (around 3/30/2023) for Recheck.     An After Visit Summary and PPPS with all of these plans were given to the patient.

## 2023-01-10 ENCOUNTER — TRANSCRIBE ORDERS (OUTPATIENT)
Dept: ADMINISTRATIVE | Facility: HOSPITAL | Age: 88
End: 2023-01-10
Payer: MEDICARE

## 2023-01-10 ENCOUNTER — LAB (OUTPATIENT)
Dept: LAB | Facility: HOSPITAL | Age: 88
End: 2023-01-10
Payer: MEDICARE

## 2023-01-10 DIAGNOSIS — N18.9 CHRONIC KIDNEY DISEASE, UNSPECIFIED CKD STAGE: Primary | ICD-10-CM

## 2023-01-10 DIAGNOSIS — N18.9 CHRONIC KIDNEY DISEASE, UNSPECIFIED CKD STAGE: ICD-10-CM

## 2023-01-10 PROCEDURE — 84156 ASSAY OF PROTEIN URINE: CPT

## 2023-01-10 PROCEDURE — 82570 ASSAY OF URINE CREATININE: CPT

## 2023-01-10 PROCEDURE — 82043 UR ALBUMIN QUANTITATIVE: CPT

## 2023-01-10 PROCEDURE — 81001 URINALYSIS AUTO W/SCOPE: CPT

## 2023-01-10 PROCEDURE — 36415 COLL VENOUS BLD VENIPUNCTURE: CPT

## 2023-01-10 PROCEDURE — 80053 COMPREHEN METABOLIC PANEL: CPT

## 2023-01-10 NOTE — TELEPHONE ENCOUNTER
Caller: Tonia Ritter    Relationship: Self    Best call back number: [phone]    Requested Prescriptions:   Requested Prescriptions     Pending Prescriptions Disp Refills   • fluticasone (FLONASE) 50 MCG/ACT nasal spray          Pharmacy where request should be sent: [address] [phone] [facility] [phone] [facility]     Additional details provided by patient: OUT OF MEDICATION, WAS PRESCRIBED BY PREVIOUS DR.    Does the patient have less than a 3 day supply:  [x] Yes  [] No    Would you like a call back once the refill request has been completed: [x] Yes [] No    If the office needs to give you a call back, can they leave a voicemail: [x] Yes [] No    Brit Hardy MA   01/10/23 13:52 EST

## 2023-01-11 LAB
ALBUMIN SERPL-MCNC: 4.5 G/DL (ref 3.5–5.2)
ALBUMIN UR-MCNC: <1.2 MG/DL
ALBUMIN/GLOB SERPL: 1.8 G/DL
ALP SERPL-CCNC: 82 U/L (ref 39–117)
ALT SERPL W P-5'-P-CCNC: 9 U/L (ref 1–33)
ANION GAP SERPL CALCULATED.3IONS-SCNC: 7.1 MMOL/L (ref 5–15)
AST SERPL-CCNC: 22 U/L (ref 1–32)
BACTERIA UR QL AUTO: ABNORMAL /HPF
BILIRUB SERPL-MCNC: 0.4 MG/DL (ref 0–1.2)
BILIRUB UR QL STRIP: NEGATIVE
BUN SERPL-MCNC: 10 MG/DL (ref 8–23)
BUN/CREAT SERPL: 12.2 (ref 7–25)
CALCIUM SPEC-SCNC: 9.3 MG/DL (ref 8.6–10.5)
CHLORIDE SERPL-SCNC: 101 MMOL/L (ref 98–107)
CLARITY UR: CLEAR
CO2 SERPL-SCNC: 30.9 MMOL/L (ref 22–29)
COLOR UR: YELLOW
CREAT SERPL-MCNC: 0.82 MG/DL (ref 0.57–1)
CREAT UR-MCNC: 52.9 MG/DL
CREAT UR-MCNC: 52.9 MG/DL
EGFRCR SERPLBLD CKD-EPI 2021: 70.2 ML/MIN/1.73
GLOBULIN UR ELPH-MCNC: 2.5 GM/DL
GLUCOSE SERPL-MCNC: 76 MG/DL (ref 65–99)
GLUCOSE UR STRIP-MCNC: NEGATIVE MG/DL
HGB UR QL STRIP.AUTO: NEGATIVE
HYALINE CASTS UR QL AUTO: ABNORMAL /LPF
KETONES UR QL STRIP: NEGATIVE
LEUKOCYTE ESTERASE UR QL STRIP.AUTO: ABNORMAL
MICROALBUMIN/CREAT UR: NORMAL MG/G{CREAT}
NITRITE UR QL STRIP: NEGATIVE
PH UR STRIP.AUTO: 6.5 [PH] (ref 5–8)
POTASSIUM SERPL-SCNC: 4.1 MMOL/L (ref 3.5–5.2)
PROT ?TM UR-MCNC: 6.2 MG/DL
PROT SERPL-MCNC: 7 G/DL (ref 6–8.5)
PROT UR QL STRIP: NEGATIVE
PROT/CREAT UR: 117.2 MG/G CREA (ref 0–200)
RBC # UR STRIP: ABNORMAL /HPF
REF LAB TEST METHOD: ABNORMAL
SODIUM SERPL-SCNC: 139 MMOL/L (ref 136–145)
SP GR UR STRIP: 1.01 (ref 1–1.03)
SQUAMOUS #/AREA URNS HPF: ABNORMAL /HPF
UROBILINOGEN UR QL STRIP: ABNORMAL
WBC # UR STRIP: ABNORMAL /HPF

## 2023-01-11 RX ORDER — FLUTICASONE PROPIONATE 50 MCG
2 SPRAY, SUSPENSION (ML) NASAL DAILY
Qty: 16 G | Refills: 2 | Status: SHIPPED | OUTPATIENT
Start: 2023-01-11

## 2023-01-30 ENCOUNTER — TELEPHONE (OUTPATIENT)
Dept: FAMILY MEDICINE CLINIC | Facility: CLINIC | Age: 88
End: 2023-01-30
Payer: MEDICARE

## 2023-01-30 NOTE — TELEPHONE ENCOUNTER
Please have her come and see myself, or one of her providers in the next few days.  If her condition worsens, would recommend going to the emergency room.  Gastroparesis does not typically cause pain in the left flank so diagnosis is unclear.

## 2023-01-30 NOTE — TELEPHONE ENCOUNTER
"Patient called indicating she has pain on her left side that started last night and has continued today.  Denies any nausea, vomiting or diarrhea.  She says she has a history of gastroparesis and thinks this may be related and  thought you \" might order a x-ray, CT or something\".  You have no same day appointments available today.  Please advise.    "

## 2023-01-31 ENCOUNTER — OFFICE VISIT (OUTPATIENT)
Dept: FAMILY MEDICINE CLINIC | Facility: CLINIC | Age: 88
End: 2023-01-31
Payer: MEDICARE

## 2023-01-31 VITALS
HEART RATE: 71 BPM | OXYGEN SATURATION: 99 % | BODY MASS INDEX: 25.16 KG/M2 | WEIGHT: 151 LBS | DIASTOLIC BLOOD PRESSURE: 80 MMHG | SYSTOLIC BLOOD PRESSURE: 138 MMHG | HEIGHT: 65 IN | TEMPERATURE: 97.7 F

## 2023-01-31 DIAGNOSIS — K59.00 CONSTIPATION, UNSPECIFIED CONSTIPATION TYPE: Primary | ICD-10-CM

## 2023-01-31 PROCEDURE — 99213 OFFICE O/P EST LOW 20 MIN: CPT | Performed by: NURSE PRACTITIONER

## 2023-01-31 RX ORDER — POTASSIUM CHLORIDE 750 MG/1
1 TABLET, FILM COATED, EXTENDED RELEASE ORAL DAILY
COMMUNITY
Start: 2023-01-03 | End: 2023-03-07 | Stop reason: SDUPTHER

## 2023-01-31 RX ORDER — POLYETHYLENE GLYCOL 3350 17 G/17G
17 POWDER, FOR SOLUTION ORAL DAILY
Qty: 10 EACH | Refills: 0 | Status: SHIPPED | OUTPATIENT
Start: 2023-01-31

## 2023-01-31 RX ORDER — ACETAZOLAMIDE 250 MG/1
1 TABLET ORAL DAILY
COMMUNITY
Start: 2023-01-19

## 2023-01-31 NOTE — PROGRESS NOTES
Chief Complaint  Pain (L side)    Subjective          Tonia Ritter is a 87 y.o. female who presents today to [facility] for initial evaluation     HPI:   History of Present Illness    Presents for complaint of left side pain x 2 nights. Associated symptoms: constipation. Reports she took a laxative 2 days ago, had BM and abdominal pain got better after BM. LBM was yesterday. Denies any other issues or concerns at this time.       The following portions of the patient's history were reviewed and updated as appropriate: allergies, current medications, past family history, past medical history, past social history, past surgical history and problem list.    Objective     Allergy:   Allergies   Allergen Reactions   • Reglan [Metoclopramide]    • Sulfa Antibiotics Nausea And Vomiting        Current Medications:   Current Outpatient Medications   Medication Sig Dispense Refill   • acetaminophen (TYLENOL) 500 MG tablet Take 500 mg by mouth every 6 (six) hours as needed for mild pain (1-3).     • acetaZOLAMIDE (DIAMOX) 250 MG tablet Take 1 tablet by mouth Daily.     • Efinaconazole 10 % solution Apply 1 application topically Daily. 4 mL 0   • famotidine (PEPCID) 20 MG tablet Take 1 tablet by mouth Daily. 90 tablet 0   • fluticasone (FLONASE) 50 MCG/ACT nasal spray 2 sprays into the nostril(s) as directed by provider Daily. 16 g 2   • furosemide (LASIX) 20 MG tablet Take 1 tablet by mouth Every Other Day. 45 tablet 1   • lisinopril (PRINIVIL,ZESTRIL) 5 MG tablet Take 1 tablet by mouth Daily. 90 tablet 1   • lubiprostone (AMITIZA) 8 MCG capsule Take 8 mcg by mouth Daily.     • PARoxetine (Paxil) 10 MG tablet Take 0.5 tablets by mouth Every Night. Takes 0.5 tablet 90 tablet 0   • potassium chloride (K-DUR,KLOR-CON) 10 MEQ CR tablet Take 1 tablet by mouth Daily. 90 tablet 1   • potassium chloride 10 MEQ CR tablet Take 1 tablet by mouth Daily.     • rivaroxaban (Xarelto) 20 MG tablet Take 1  tablet by mouth Daily With Dinner. 90 tablet 1   • rosuvastatin (CRESTOR) 10 MG tablet Take 1 tablet by mouth Daily. for cholesterol 90 tablet 1   • Sotalol HCl, AF, 120 MG tablet Take 1 tablet by mouth 2 (Two) Times a Day. 180 each 0   • polyethylene glycol (MIRALAX) 17 g packet Take 17 g by mouth Daily. 10 each 0     No current facility-administered medications for this visit.       Past Medical History:  Past Medical History:   Diagnosis Date   • Acute bronchitis    • Allergic rhinitis    • Anxiety    • Atrial fibrillation (HCC)    • Back pain    • Burping    • Chills (without fever) 3/25/2019   • Chronic pain    • Congestive heart failure (HCC)    • Depression    • Encounter for screening fecal occult blood testing 03/10/2016    NEGATIVE   • Fibromyalgia    • Flatulence    • GERD (gastroesophageal reflux disease)    • Hematuria    • Hyperlipidemia    • Hypertension    • Osteoarthritis    • Pacemaker    • PUD (peptic ulcer disease)    • Right shoulder pain        Past Surgical History:  Past Surgical History:   Procedure Laterality Date   • CARDIOVERSION  05/2017    [facility]    • CARDIOVERSION  09/01/2017    [facility]   • CHOLECYSTECTOMY  2004   • COLONOSCOPY  05/2013   • DILATATION AND CURETTAGE     • EPIDIDYMAL CYST EXCISION     • ESOPHAGOSCOPY / EGD  01/2019   • EYE SURGERY     • HEMORRHOIDECTOMY  01/2019   • MAMMO BILATERAL  10/2014   • NECK SURGERY     • PACEMAKER IMPLANTATION     • PAP SMEAR  03/2016   • SKIN LESION EXCISION         Social History:  Social History     Socioeconomic History   • Marital status:    Tobacco Use   • Smoking status: Never   • Smokeless tobacco: Never   Vaping Use   • Vaping Use: Never used   Substance and Sexual Activity   • Alcohol use: No   • Drug use: No   • Sexual activity: Defer       Family History:  Family History   Problem Relation Age of Onset   • Thyroid disease Mother    • Mental illness Father         nervous breakdown   • Breast cancer Maternal Aunt   "      Review of Systems:  Review of Systems   Constitutional: Negative for fever.   Cardiovascular: Negative for chest pain, palpitations and leg swelling.   Gastrointestinal: Positive for constipation. Negative for abdominal distention, blood in stool, diarrhea, nausea and vomiting.       Vital Signs:   /80   Pulse 71   Temp 97.7 °F (36.5 °C) (Temporal)   Ht 165.1 cm (65\")   Wt 68.5 kg (151 lb)   SpO2 99%   BMI 25.13 kg/m²  RR: 17    Physical Exam:  Physical Exam  Vitals and nursing note reviewed.   Constitutional:       General: She is not in acute distress.     Appearance: Normal appearance. She is not ill-appearing or toxic-appearing.   HENT:      Head: Normocephalic.   Cardiovascular:      Rate and Rhythm: Normal rate and regular rhythm.      Heart sounds: Normal heart sounds.   Pulmonary:      Effort: Pulmonary effort is normal. No respiratory distress.      Breath sounds: Normal breath sounds.   Abdominal:      General: Bowel sounds are normal. There is no distension.      Palpations: Abdomen is soft.      Tenderness: There is no abdominal tenderness.   Skin:     General: Skin is warm and dry.      Coloration: Skin is not pale.   Neurological:      Mental Status: She is alert and oriented to person, place, and time.   Psychiatric:         Mood and Affect: Mood normal.         Behavior: Behavior normal.         Thought Content: Thought content normal.         Judgment: Judgment normal.                  Assessment and Plan   Diagnoses and all orders for this visit:    1. Constipation, unspecified constipation type (Primary)  -     polyethylene glycol (MIRALAX) 17 g packet; Take 17 g by mouth Daily.  Dispense: 10 each; Refill: 0    Increase water, fiber, and activity to promote bowel movements.    Discussed possible differential diagnoses, testing, treatment, recommended non-pharmacological interventions, risks, warning signs to monitor for that would indicate need for follow-up in clinic or ER. If " no improvement with these regimens or you have new or worsening symptoms follow-up. Patient verbalizes understanding and agreement with plan of care. Denies further needs or concerns.     Patient was given instructions and counseling regarding her condition and for health maintenance advised.    BMI is >= 25 and <30. (Overweight) The following options were offered after discussion;: weight loss educational material (shared in after visit summary), exercise counseling/recommendations and nutrition counseling/recommendations       I spent 20 minutes caring for patient on this date of service. This time includes time spent by me in the following activities: preparing for the visit, reviewing tests, obtaining and/or reviewing a separately obtained history, performing a medically appropriate examination and/or evaluation, counseling and educating the patient/family/caregiver, ordering medications, tests, or procedures and documenting information in the medical record    Meds ordered during this visit:  New Medications Ordered This Visit   Medications   • polyethylene glycol (MIRALAX) 17 g packet     Sig: Take 17 g by mouth Daily.     Dispense:  10 each     Refill:  0       Patient Instructions:  Patient instructions given for the following visit diagnosis:    ICD-10-CM ICD-9-CM   1. Constipation, unspecified constipation type  K59.00 564.00       Follow Up   Return in about 1 week (around 2/7/2023) for Recheck, Sooner if needed.        This document has been electronically signed by TEODORA House  January 31, 2023 11:58 EST    Patient was given instructions and counseling regarding her condition or for health maintenance advice. Please see specific information pulled into the AVS if appropriate.     Part of this note may be an electronic transcription/translation of spoken language to printed text using the Dragon Dictation System.

## 2023-02-03 ENCOUNTER — PATIENT ROUNDING (BHMG ONLY) (OUTPATIENT)
Dept: FAMILY MEDICINE CLINIC | Facility: CLINIC | Age: 88
End: 2023-02-03
Payer: MEDICARE

## 2023-02-03 NOTE — PROGRESS NOTES
February 3, 2023    Hello, may I speak with Tonia Ritter?    My name is Sravani    I am  with [facility] [place]  [facility]  [address] [place] [address] [phone].    Before we get started may I verify your date of birth? 1935    I am calling to officially welcome you to our practice and ask about your recent visit. Is this a good time to talk? YES    Tell me about your visit with us. What things went well? Saw Monet for bowel trouble, says its getting a little better.        We're always looking for ways to make our patients' experiences even better. Do you have recommendations on ways we may improve?  No, she loves and appreciates everyone at the office.     Overall were you satisfied with your first visit to our practice? Yes       I appreciate you taking the time to speak with me today. Is there anything else I can do for you? YES-made follow up with Dr. Orozco      Thank you, and have a great day.

## 2023-02-07 ENCOUNTER — OFFICE VISIT (OUTPATIENT)
Dept: FAMILY MEDICINE CLINIC | Facility: CLINIC | Age: 88
End: 2023-02-07
Payer: MEDICARE

## 2023-02-07 VITALS
TEMPERATURE: 97.5 F | WEIGHT: 151.6 LBS | HEART RATE: 70 BPM | SYSTOLIC BLOOD PRESSURE: 120 MMHG | DIASTOLIC BLOOD PRESSURE: 68 MMHG | BODY MASS INDEX: 25.26 KG/M2 | HEIGHT: 65 IN | OXYGEN SATURATION: 98 %

## 2023-02-07 DIAGNOSIS — R42 DIZZINESS: Primary | ICD-10-CM

## 2023-02-07 DIAGNOSIS — Z98.890 S/P ABLATION OF ATRIAL FIBRILLATION: ICD-10-CM

## 2023-02-07 DIAGNOSIS — R10.32 LEFT LOWER QUADRANT ABDOMINAL PAIN: ICD-10-CM

## 2023-02-07 DIAGNOSIS — K59.09 CHRONIC CONSTIPATION: ICD-10-CM

## 2023-02-07 DIAGNOSIS — Z86.79 S/P ABLATION OF ATRIAL FIBRILLATION: ICD-10-CM

## 2023-02-07 PROCEDURE — 86140 C-REACTIVE PROTEIN: CPT | Performed by: FAMILY MEDICINE

## 2023-02-07 PROCEDURE — 85027 COMPLETE CBC AUTOMATED: CPT | Performed by: FAMILY MEDICINE

## 2023-02-07 PROCEDURE — 80053 COMPREHEN METABOLIC PANEL: CPT | Performed by: FAMILY MEDICINE

## 2023-02-07 PROCEDURE — 99214 OFFICE O/P EST MOD 30 MIN: CPT | Performed by: FAMILY MEDICINE

## 2023-02-07 PROCEDURE — 36415 COLL VENOUS BLD VENIPUNCTURE: CPT | Performed by: FAMILY MEDICINE

## 2023-02-07 NOTE — PROGRESS NOTES
Subjective   Tonia Ritter is a 87 y.o. female.   Pt presents today with CC of Constipation      History of Present Illness   History of Present Illness  1.  Patient is an 87-year-old female with chronic constipation and gastroparesis.  She has developed left lower quadrant abdominal pain.  She has known constipation.  She has been taking MiraLAX 17 g daily, and lubiprostone.  She takes lubiprostone regularly, MiraLAX was added for as needed use.  She has been taking these daily and is continue to have abdominal pain and larger, harder, and more painful to pass stools associated with occasional gas.  #2 she reports that over the last 48 hours she has felt dizzy upon standing.  She denies any known bleeding.  She has been using laxatives.       The following portions of the patient's history were reviewed and updated as appropriate: allergies, current medications, past family history, past medical history, past social history, past surgical history and problem list.    Review of Systems   Constitutional: Negative for chills, fever and unexpected weight loss.   HENT: Negative for congestion and sore throat.    Eyes: Negative for blurred vision and visual disturbance.   Respiratory: Negative for cough and wheezing.    Cardiovascular: Negative for chest pain and palpitations.   Gastrointestinal: Positive for abdominal pain and constipation. Negative for diarrhea.   Endocrine: Negative for cold intolerance and heat intolerance.   Genitourinary: Negative for dysuria.   Musculoskeletal: Negative for arthralgias and neck stiffness.   Neurological: Negative for dizziness, seizures and syncope.   Psychiatric/Behavioral: Negative for self-injury, suicidal ideas and depressed mood.       Objective   Physical Exam  Vitals and nursing note reviewed.   Constitutional:       Appearance: She is well-developed.   HENT:      Head: Normocephalic and atraumatic.      Right Ear: External ear normal.      Left Ear: External ear  normal.      Nose: Nose normal.   Eyes:      Conjunctiva/sclera: Conjunctivae normal.      Pupils: Pupils are equal, round, and reactive to light.   Cardiovascular:      Rate and Rhythm: Normal rate and regular rhythm.      Heart sounds: Normal heart sounds.   Pulmonary:      Effort: Pulmonary effort is normal.      Breath sounds: Normal breath sounds.   Abdominal:      General: Bowel sounds are normal.      Palpations: Abdomen is soft.      Comments: Fullness in the left upper quadrant, tenderness to palpation in the left lower quadrant, no distention, no guarding.   Musculoskeletal:      Cervical back: Normal range of motion and neck supple.   Skin:     General: Skin is warm and dry.   Neurological:      Mental Status: She is alert and oriented to person, place, and time.   Psychiatric:         Behavior: Behavior normal.           Assessment & Plan   Diagnoses and all orders for this visit:    1. Dizziness (Primary)  -     Comprehensive metabolic panel; Future  -     CBC No Differential; Future  -     Comprehensive metabolic panel  -     CBC No Differential  She was not dizzy walking today, recommend blood work to rule out anemia or electrolyte abnormalities associated with constipation/laxatives.  2. S/P ablation of atrial fibrillation  -     Comprehensive metabolic panel; Future  -     Comprehensive metabolic panel  I do not strongly suspect that this was atrial fibrillation as she denies palpitations associated, and she was able to feel A-fib in the past  3. Chronic constipation  -     XR Abdomen KUB; Future  Getting an x-ray to get a better idea of how severe her constipation is.  If it is mild, may consider CT scan with contrast to evaluate for diverticulitis or other pathologies of the left lower quadrant.  If constipation is severe, would recommend enema.  She has a gastroenterologist.  4. Left lower quadrant abdominal pain  -     Comprehensive metabolic panel; Future  -     CBC No Differential; Future  -      C-reactive protein; Future  -     Comprehensive metabolic panel  -     CBC No Differential  -     C-reactive protein  Getting a CRP to get a better idea if there is an inflammatory process present.  I would not expect a CRP to be elevated significantly with constipation only                BMI is >= 25 and <30. (Overweight) The following options were offered after discussion;: exercise counseling/recommendations and nutrition counseling/recommendations          This document has been electronically signed by Joey Orozco DO  February 7, 2023 12:14 EST    Dictated Utilizing Dragon Dictation: Part of this note may be an electronic transcription/translation of spoken language to printed text using the Dragon Dictation System.

## 2023-02-08 ENCOUNTER — TELEPHONE (OUTPATIENT)
Dept: FAMILY MEDICINE CLINIC | Facility: CLINIC | Age: 88
End: 2023-02-08
Payer: MEDICARE

## 2023-02-08 DIAGNOSIS — K59.09 CHRONIC CONSTIPATION: Primary | ICD-10-CM

## 2023-02-08 DIAGNOSIS — R10.32 LEFT LOWER QUADRANT ABDOMINAL PAIN: ICD-10-CM

## 2023-02-08 DIAGNOSIS — R79.82 ELEVATED C-REACTIVE PROTEIN (CRP): ICD-10-CM

## 2023-02-08 LAB
ALBUMIN SERPL-MCNC: 4.3 G/DL (ref 3.5–5.2)
ALBUMIN/GLOB SERPL: 1.8 G/DL
ALP SERPL-CCNC: 85 U/L (ref 39–117)
ALT SERPL W P-5'-P-CCNC: 8 U/L (ref 1–33)
ANION GAP SERPL CALCULATED.3IONS-SCNC: 7 MMOL/L (ref 5–15)
AST SERPL-CCNC: 22 U/L (ref 1–32)
BILIRUB SERPL-MCNC: 0.5 MG/DL (ref 0–1.2)
BUN SERPL-MCNC: 11 MG/DL (ref 8–23)
BUN/CREAT SERPL: 11.1 (ref 7–25)
CALCIUM SPEC-SCNC: 9.4 MG/DL (ref 8.6–10.5)
CHLORIDE SERPL-SCNC: 101 MMOL/L (ref 98–107)
CO2 SERPL-SCNC: 30 MMOL/L (ref 22–29)
CREAT SERPL-MCNC: 0.99 MG/DL (ref 0.57–1)
CRP SERPL-MCNC: 4.41 MG/DL (ref 0–0.5)
DEPRECATED RDW RBC AUTO: 40.2 FL (ref 37–54)
EGFRCR SERPLBLD CKD-EPI 2021: 56 ML/MIN/1.73
ERYTHROCYTE [DISTWIDTH] IN BLOOD BY AUTOMATED COUNT: 12.3 % (ref 12.3–15.4)
GLOBULIN UR ELPH-MCNC: 2.4 GM/DL
GLUCOSE SERPL-MCNC: 90 MG/DL (ref 65–99)
HCT VFR BLD AUTO: 37.9 % (ref 34–46.6)
HGB BLD-MCNC: 12.7 G/DL (ref 12–15.9)
MCH RBC QN AUTO: 30.5 PG (ref 26.6–33)
MCHC RBC AUTO-ENTMCNC: 33.5 G/DL (ref 31.5–35.7)
MCV RBC AUTO: 91.1 FL (ref 79–97)
PLATELET # BLD AUTO: 151 10*3/MM3 (ref 140–450)
PMV BLD AUTO: 11.3 FL (ref 6–12)
POTASSIUM SERPL-SCNC: 4.1 MMOL/L (ref 3.5–5.2)
PROT SERPL-MCNC: 6.7 G/DL (ref 6–8.5)
RBC # BLD AUTO: 4.16 10*6/MM3 (ref 3.77–5.28)
SODIUM SERPL-SCNC: 138 MMOL/L (ref 136–145)
WBC NRBC COR # BLD: 9.93 10*3/MM3 (ref 3.4–10.8)

## 2023-02-08 NOTE — TELEPHONE ENCOUNTER
----- Message from Joey Orozco DO sent at 2/8/2023  8:20 AM EST -----  I reviewed your x-ray.  It showed a mild/moderate constipation.  Given your worsening symptoms, I am concerned there may be something else going on.  I have ordered a CT scan of your abdomen and pelvis.  Please follow-up after your CT scan, sooner if your condition worsens.  Your labs look okay with the exception of elevated CRP.  This suggests that there is an inflammatory process going on(constipation probably would not do this)        Patient notified & verbalized understanding.

## 2023-02-08 NOTE — TELEPHONE ENCOUNTER
----- Message from Joey Orozco DO sent at 2/8/2023  8:21 AM EST -----  No major concerns on your blood work, your CRP was elevated.  Unclear etiology.  CT scan has been ordered        Patient notified & verbalized understanding.

## 2023-02-13 ENCOUNTER — HOSPITAL ENCOUNTER (OUTPATIENT)
Dept: CT IMAGING | Facility: HOSPITAL | Age: 88
Discharge: HOME | End: 2023-02-13
Admitting: FAMILY MEDICINE
Payer: MEDICARE

## 2023-02-13 DIAGNOSIS — R10.32 LEFT LOWER QUADRANT ABDOMINAL PAIN: ICD-10-CM

## 2023-02-13 DIAGNOSIS — K59.09 CHRONIC CONSTIPATION: ICD-10-CM

## 2023-02-13 DIAGNOSIS — R79.82 ELEVATED C-REACTIVE PROTEIN (CRP): ICD-10-CM

## 2023-02-13 PROCEDURE — 74177 CT ABD & PELVIS W/CONTRAST: CPT | Performed by: RADIOLOGY

## 2023-02-13 PROCEDURE — 25010000002 IOPAMIDOL 61 % SOLUTION: Performed by: FAMILY MEDICINE

## 2023-02-13 PROCEDURE — 74177 CT ABD & PELVIS W/CONTRAST: CPT

## 2023-02-13 RX ADMIN — IOPAMIDOL 75 ML: 612 INJECTION, SOLUTION INTRAVENOUS at 13:26

## 2023-02-14 ENCOUNTER — TELEPHONE (OUTPATIENT)
Dept: FAMILY MEDICINE CLINIC | Facility: CLINIC | Age: 88
End: 2023-02-14
Payer: MEDICARE

## 2023-02-14 NOTE — TELEPHONE ENCOUNTER
----- Message from Joey Orozco, DO sent at 2/14/2023  3:22 PM EST -----  No convincing cause of your left lower quadrant abdominal pain.  Your constipation is much improved from prior, you have a cyst near your ovary that appears to have been there for a long time.  I did not see any definite cause of your abdominal pain.  Are you doing any better?        No answer at this time.    Spoke with patient & she verbalized understanding,reports its better right now but earlier it was really bad,comes & goes.

## 2023-02-20 ENCOUNTER — TELEPHONE (OUTPATIENT)
Dept: FAMILY MEDICINE CLINIC | Facility: CLINIC | Age: 88
End: 2023-02-20
Payer: MEDICARE

## 2023-02-20 NOTE — TELEPHONE ENCOUNTER
Caller: Tonia Ritter    Relationship: Self    Best call back number: [phone]    What orders are you requesting (i.e. lab or imaging): BONE DENSITY TEST    In what timeframe would the patient need to come in: ASAP    Where will you receive your lab/imaging services: WHERE EVER YOU SEND HER, SHE WANTS TO HAVE IT IN [place]. WANTS TO SEE IF YOU THINK SHE NEEDS THIS. IN CASE SHE NEEDS TO TAKE SOME SPECIAL VITAMINS.

## 2023-02-21 ENCOUNTER — TELEPHONE (OUTPATIENT)
Dept: FAMILY MEDICINE CLINIC | Facility: CLINIC | Age: 88
End: 2023-02-21
Payer: MEDICARE

## 2023-02-21 DIAGNOSIS — M85.852 OSTEOPENIA OF NECKS OF BOTH FEMURS: Primary | ICD-10-CM

## 2023-02-21 DIAGNOSIS — M85.851 OSTEOPENIA OF NECKS OF BOTH FEMURS: Primary | ICD-10-CM

## 2023-02-21 NOTE — TELEPHONE ENCOUNTER
Caller: Tonia Ritter    Relationship: Self     Best call back number:  [phone]    Who are you requesting to speak with (clinical staff, provider,  specific staff member):  CLINICAL NURSE       What was the call regarding: WANTS TO GET A BONE DENSITY TEST     Do you require a callback: PLEASE CALL

## 2023-02-22 DIAGNOSIS — M85.851 OSTEOPENIA OF NECKS OF BOTH FEMURS: Primary | ICD-10-CM

## 2023-02-22 DIAGNOSIS — M85.852 OSTEOPENIA OF NECKS OF BOTH FEMURS: Primary | ICD-10-CM

## 2023-02-24 DIAGNOSIS — M85.88 OSTEOPENIA OF SPINE: Primary | ICD-10-CM

## 2023-03-07 ENCOUNTER — OFFICE VISIT (OUTPATIENT)
Dept: FAMILY MEDICINE CLINIC | Facility: CLINIC | Age: 88
End: 2023-03-07
Payer: MEDICARE

## 2023-03-07 VITALS
HEIGHT: 65 IN | DIASTOLIC BLOOD PRESSURE: 80 MMHG | WEIGHT: 150.8 LBS | OXYGEN SATURATION: 98 % | RESPIRATION RATE: 16 BRPM | TEMPERATURE: 97.5 F | BODY MASS INDEX: 25.12 KG/M2 | HEART RATE: 70 BPM | SYSTOLIC BLOOD PRESSURE: 120 MMHG

## 2023-03-07 DIAGNOSIS — R52 BODY ACHES: ICD-10-CM

## 2023-03-07 DIAGNOSIS — R68.83 CHILLS: ICD-10-CM

## 2023-03-07 DIAGNOSIS — I10 ESSENTIAL HYPERTENSION: Primary | ICD-10-CM

## 2023-03-07 PROCEDURE — 99214 OFFICE O/P EST MOD 30 MIN: CPT | Performed by: NURSE PRACTITIONER

## 2023-03-07 PROCEDURE — 87428 SARSCOV & INF VIR A&B AG IA: CPT | Performed by: NURSE PRACTITIONER

## 2023-03-07 NOTE — PROGRESS NOTES
Chief Complaint  Hypertension    Subjective          Tonia Ritter is a 87 y.o. female who presents today to [facility] for follow up    HPI:   History of Present Illness    Presents to clinic for follow-up for hypertension.  Patient reports that yesterday her blood pressure was elevated reports readings of 160/105, 170/100. Associated symptoms: Felt hot.  Patient also reports she has had chills and body aches starting yesterday.  Denies any symptoms at this time.  No other associated symptoms.  Patient verifies she currently takes lisinopril 5 mg daily.  No other issues or concerns at this time.      The following portions of the patient's history were reviewed and updated as appropriate: allergies, current medications, past family history, past medical history, past social history, past surgical history and problem list.    Objective     Allergy:   Allergies   Allergen Reactions   • Reglan [Metoclopramide]    • Sulfa Antibiotics Nausea And Vomiting        Current Medications:   Current Outpatient Medications   Medication Sig Dispense Refill   • acetaminophen (TYLENOL) 500 MG tablet Take 1 tablet by mouth Every 6 (Six) Hours As Needed for Mild Pain.     • acetaZOLAMIDE (DIAMOX) 250 MG tablet Take 1 tablet by mouth Daily.     • Efinaconazole 10 % solution Apply 1 application topically Daily. 4 mL 0   • famotidine (PEPCID) 20 MG tablet Take 1 tablet by mouth Daily. 90 tablet 0   • fluticasone (FLONASE) 50 MCG/ACT nasal spray 2 sprays into the nostril(s) as directed by provider Daily. 16 g 2   • furosemide (LASIX) 20 MG tablet Take 1 tablet by mouth Every Other Day. 45 tablet 1   • lisinopril (PRINIVIL,ZESTRIL) 5 MG tablet Take 1 tablet by mouth Daily. 90 tablet 1   • lubiprostone (AMITIZA) 8 MCG capsule Take 1 capsule by mouth Daily.     • PARoxetine (Paxil) 10 MG tablet Take 0.5 tablets by mouth Every Night. Takes 0.5 tablet 90 tablet 0   • polyethylene glycol (MIRALAX) 17 g  packet Take 17 g by mouth Daily. 10 each 0   • potassium chloride (K-DUR,KLOR-CON) 10 MEQ CR tablet Take 1 tablet by mouth Daily. 90 tablet 1   • rivaroxaban (Xarelto) 20 MG tablet Take 1 tablet by mouth Daily With Dinner. 90 tablet 1   • rosuvastatin (CRESTOR) 10 MG tablet Take 1 tablet by mouth Daily. for cholesterol 90 tablet 1   • Sotalol HCl, AF, 120 MG tablet Take 1 tablet by mouth 2 (Two) Times a Day. 180 each 0   • Wheat Dextrin (BENEFIBER PO) Take 1 tablet by mouth Daily.       No current facility-administered medications for this visit.       Past Medical History:  Past Medical History:   Diagnosis Date   • Acute bronchitis    • Allergic rhinitis    • Anxiety    • Atrial fibrillation (HCC)    • Back pain    • Burping    • Chills (without fever) 3/25/2019   • Chronic pain    • Congestive heart failure (HCC)    • Depression    • Encounter for screening fecal occult blood testing 03/10/2016    NEGATIVE   • Fibromyalgia    • Flatulence    • GERD (gastroesophageal reflux disease)    • Hematuria    • Hyperlipidemia    • Hypertension    • Osteoarthritis    • Pacemaker    • PUD (peptic ulcer disease)    • Right shoulder pain        Past Surgical History:  Past Surgical History:   Procedure Laterality Date   • CARDIOVERSION  05/2017    [facility]    • CARDIOVERSION  09/01/2017    [facility]   • CHOLECYSTECTOMY  2004   • COLONOSCOPY  05/2013   • DILATATION AND CURETTAGE     • EPIDIDYMAL CYST EXCISION     • ESOPHAGOSCOPY / EGD  01/2019   • EYE SURGERY     • HEMORRHOIDECTOMY  01/2019   • MAMMO BILATERAL  10/2014   • NECK SURGERY     • PACEMAKER IMPLANTATION     • PAP SMEAR  03/2016   • SKIN LESION EXCISION         Social History:  Social History     Socioeconomic History   • Marital status:    Tobacco Use   • Smoking status: Never   • Smokeless tobacco: Never   Vaping Use   • Vaping Use: Never used   Substance and Sexual Activity   • Alcohol use: No   • Drug use: No   • Sexual activity: Defer  "      Family History:  Family History   Problem Relation Age of Onset   • Thyroid disease Mother    • Mental illness Father         nervous breakdown   • Breast cancer Maternal Aunt        Review of Systems:  Review of Systems   Constitutional: Negative for fever.   HENT: Negative for congestion, rhinorrhea and sore throat.    Respiratory: Negative for cough, shortness of breath and wheezing.    Gastrointestinal: Negative for abdominal pain.   Genitourinary: Negative for difficulty urinating.       Vital Signs:   /80   Pulse 70   Temp 97.5 °F (36.4 °C) (Temporal)   Resp 16   Ht 165.1 cm (65\")   Wt 68.4 kg (150 lb 12.8 oz)   SpO2 98%   BMI 25.09 kg/m²      Physical Exam:  Physical Exam  Vitals and nursing note reviewed.   Constitutional:       General: She is not in acute distress.     Appearance: Normal appearance. She is not ill-appearing or toxic-appearing.   HENT:      Head: Normocephalic.      Right Ear: Tympanic membrane, ear canal and external ear normal.      Left Ear: Tympanic membrane, ear canal and external ear normal.      Nose: Nose normal.      Mouth/Throat:      Mouth: Mucous membranes are moist.   Eyes:      Conjunctiva/sclera: Conjunctivae normal.   Cardiovascular:      Rate and Rhythm: Normal rate and regular rhythm.      Heart sounds: Normal heart sounds.   Pulmonary:      Effort: Pulmonary effort is normal. No respiratory distress.      Breath sounds: Normal breath sounds.   Abdominal:      General: Bowel sounds are normal.      Palpations: Abdomen is soft.      Tenderness: There is no right CVA tenderness.   Genitourinary:     Comments: - Suprapubic tenderness  Musculoskeletal:         General: No swelling.   Skin:     General: Skin is warm and dry.      Coloration: Skin is not pale.   Neurological:      General: No focal deficit present.      Mental Status: She is alert and oriented to person, place, and time.   Psychiatric:         Mood and Affect: Mood normal.         Behavior: " Behavior normal.         Thought Content: Thought content normal.         Judgment: Judgment normal.                  Assessment and Plan   Diagnoses and all orders for this visit:    1. Essential hypertension (Primary)    2. Chills  -     POCT SARS-CoV-2 Antigen MEHREEN + Flu    3. Body aches  -     POCT SARS-CoV-2 Antigen MEHREEN + Flu      Blood pressure was slightly elevated upon arrival to clinic but repeat was okay.  Patient is asymptomatic.  At this time, recommend wait and watch.  Instructed patient to continue to monitor blood pressure regularly and when symptomatic and follow-up for any readings outside of established parameters or for any return of symptoms.  Patient is agreeable to monitor blood pressure, keep log, bring log to follow-up appointment, follow-up sooner for readings outside of established parameters.  Low-sodium DASH diet and lifestyle modifications to control conditions.   Ensure adequate hydration.  Symptom management as discussed.  Declines further testing at this time.  Follow-up with PCP for further management.    Discussed possible differential diagnoses, testing, treatment, recommended non-pharmacological interventions, risks, warning signs to monitor for that would indicate need for follow-up in clinic or ER. If no improvement with these regimens or you have new or worsening symptoms follow-up. Patient verbalizes understanding and agreement with plan of care. Denies further needs or concerns.     Patient was given instructions and counseling regarding her condition and for health maintenance advised.    BMI is >= 25 and <30. (Overweight) The following options were offered after discussion;: weight loss educational material (shared in after visit summary), exercise counseling/recommendations and nutrition counseling/recommendations       I spent 30 minutes caring for patient on this date of service. This time includes time spent by me in the following activities: preparing for the visit,  reviewing tests, obtaining and/or reviewing a separately obtained history, performing a medically appropriate examination and/or evaluation, counseling and educating the patient/family/caregiver, ordering medications, tests, or procedures and documenting information in the medical record    Meds ordered during this visit:  No orders of the defined types were placed in this encounter.      Patient Instructions:  Patient instructions given for the following visit diagnosis:    ICD-10-CM ICD-9-CM   1. Essential hypertension  I10 401.9   2. Chills  R68.83 780.64   3. Body aches  R52 780.96       Follow Up   Return for Recheck in 2-3 days, sooner if needed.        This document has been electronically signed by TEODORA House  March 8, 2023 09:30 EST    Patient was given instructions and counseling regarding her condition or for health maintenance advice. Please see specific information pulled into the AVS if appropriate.     Part of this note may be an electronic transcription/translation of spoken language to printed text using the Dragon Dictation System.

## 2023-03-08 LAB
EXPIRATION DATE: NORMAL
FLUAV AG UPPER RESP QL IA.RAPID: NOT DETECTED
FLUBV AG UPPER RESP QL IA.RAPID: NOT DETECTED
INTERNAL CONTROL: NORMAL
Lab: NORMAL
SARS-COV-2 AG UPPER RESP QL IA.RAPID: NOT DETECTED

## 2023-03-10 ENCOUNTER — OFFICE VISIT (OUTPATIENT)
Dept: FAMILY MEDICINE CLINIC | Facility: CLINIC | Age: 88
End: 2023-03-10
Payer: MEDICARE

## 2023-03-10 VITALS
DIASTOLIC BLOOD PRESSURE: 80 MMHG | HEART RATE: 87 BPM | SYSTOLIC BLOOD PRESSURE: 158 MMHG | WEIGHT: 153 LBS | TEMPERATURE: 97.8 F | HEIGHT: 65 IN | OXYGEN SATURATION: 100 % | BODY MASS INDEX: 25.49 KG/M2

## 2023-03-10 DIAGNOSIS — I10 ESSENTIAL HYPERTENSION: ICD-10-CM

## 2023-03-10 DIAGNOSIS — R79.82 ELEVATED C-REACTIVE PROTEIN (CRP): ICD-10-CM

## 2023-03-10 DIAGNOSIS — K59.09 CHRONIC CONSTIPATION: ICD-10-CM

## 2023-03-10 DIAGNOSIS — R10.32 LEFT LOWER QUADRANT ABDOMINAL PAIN: ICD-10-CM

## 2023-03-10 DIAGNOSIS — R03.0 ELEVATED BLOOD PRESSURE READING: Primary | ICD-10-CM

## 2023-03-10 PROCEDURE — 36415 COLL VENOUS BLD VENIPUNCTURE: CPT | Performed by: FAMILY MEDICINE

## 2023-03-10 PROCEDURE — 85027 COMPLETE CBC AUTOMATED: CPT | Performed by: FAMILY MEDICINE

## 2023-03-10 PROCEDURE — 99214 OFFICE O/P EST MOD 30 MIN: CPT | Performed by: FAMILY MEDICINE

## 2023-03-10 PROCEDURE — 86140 C-REACTIVE PROTEIN: CPT | Performed by: FAMILY MEDICINE

## 2023-03-10 PROCEDURE — 80053 COMPREHEN METABOLIC PANEL: CPT | Performed by: FAMILY MEDICINE

## 2023-03-10 RX ORDER — LISINOPRIL 5 MG/1
5 TABLET ORAL DAILY
Qty: 90 TABLET | Refills: 1 | Status: SHIPPED | OUTPATIENT
Start: 2023-03-10

## 2023-03-10 NOTE — PROGRESS NOTES
Subjective   Tonia Ritter is a 87 y.o. female.   Pt presents today with CC of Hypertension      History of Present Illness   History of Present Illness  Patient is a 87-year-old female who is still having some left lower quadrant and left upper quadrant abdominal pain.  It comes and goes but has been more frequent.  She had CT scan that did not show a definite cause, the cyst has not changed, her constipation has been more difficult to care for.  She would like further evaluation.  She saw Dr. Howe in the distant past  #2 she recently had blood work that showed elevated CRP, she denies any fevers, chills, though does have abdominal pain as above.  She is agreeable to recheck  #3 she reports that her blood pressure tends to go up and down, its been high the last couple of days.  She showed me a blood pressure log over the last 48 hours, most of the readings were around 160/80.  She currently takes sotalol and lisinopril.  Her lisinopril is a low-dose of just 5 mg daily.  She is interested in an additional medication that she can take as needed when her blood pressure is high.           The following portions of the patient's history were reviewed and updated as appropriate: allergies, current medications, past family history, past medical history, past social history, past surgical history and problem list.    Review of Systems   Constitutional: Negative for chills, fever and unexpected weight loss.   HENT: Negative for congestion and sore throat.    Eyes: Negative for blurred vision and visual disturbance.   Respiratory: Negative for cough and wheezing.    Cardiovascular: Negative for chest pain and palpitations.   Gastrointestinal: Negative for abdominal pain and diarrhea.   Endocrine: Negative for cold intolerance and heat intolerance.   Genitourinary: Negative for dysuria.   Musculoskeletal: Negative for arthralgias and neck stiffness.   Neurological: Negative for dizziness, seizures and syncope.    Psychiatric/Behavioral: Negative for self-injury, suicidal ideas and depressed mood.       Objective   Physical Exam  Vitals and nursing note reviewed.   Constitutional:       Appearance: She is well-developed.   HENT:      Head: Normocephalic and atraumatic.      Right Ear: External ear normal.      Left Ear: External ear normal.      Nose: Nose normal.   Eyes:      Conjunctiva/sclera: Conjunctivae normal.      Pupils: Pupils are equal, round, and reactive to light.   Cardiovascular:      Rate and Rhythm: Normal rate and regular rhythm.      Heart sounds: Normal heart sounds.   Pulmonary:      Effort: Pulmonary effort is normal.      Breath sounds: Normal breath sounds.   Abdominal:      General: Bowel sounds are normal.      Palpations: Abdomen is soft.   Musculoskeletal:      Cervical back: Normal range of motion and neck supple.   Skin:     General: Skin is warm and dry.   Neurological:      Mental Status: She is alert and oriented to person, place, and time.   Psychiatric:         Behavior: Behavior normal.           Assessment & Plan   Diagnoses and all orders for this visit:    1. Elevated blood pressure reading (Primary)  We discussed options.  She would like to have something that she can take as needed when her blood pressure is high.  Many of the medications that we often use for this purpose carry a greater risk than would be worth it.  I think that increasing her lisinopril daily across the board may result in hypotension on some days.  Therefore, as she is sure that she wants to have something around.  I think that it would be reasonable, on days where her blood pressure seems to stay higher, that she take an additional 5 mg of lisinopril.  This is a reasonable option, though it may not work immediately.  It may take a few hours to affect her blood pressure.  Follow-up as needed.  We will getting blood work today to ensure there is no problems metabolically  2. Essential hypertension  -      lisinopril (PRINIVIL,ZESTRIL) 5 MG tablet; Take 1 tablet by mouth Daily.  Dispense: 90 tablet; Refill: 1    3. Left lower quadrant abdominal pain  -     Ambulatory Referral to Gastroenterology  -     Comprehensive metabolic panel; Future  -     C-reactive protein; Future  -     CBC No Differential; Future  -     Comprehensive metabolic panel  -     C-reactive protein  -     CBC No Differential  We will refer back to gastroenterology to discuss further evaluation with potential colonoscopy.  She reports that even when her constipation was resolved, noted by multiple days of diarrhea, her abdominal pain did persist.  4. Chronic constipation  -     Ambulatory Referral to Gastroenterology    5. Elevated C-reactive protein (CRP)  -     Comprehensive metabolic panel; Future  -     C-reactive protein; Future  -     CBC No Differential; Future  -     Comprehensive metabolic panel  -     C-reactive protein  -     CBC No Differential               Tonia Ritter  reports that she has never smoked. She has never used smokeless tobacco.. I have educated her on the risk of diseases from using tobacco products such as cancer, COPD and heart disease.     I advised her to quit and she is not willing to quit.    I spent 3  minutes counseling the patient.         BMI is >= 25 and <30. (Overweight) The following options were offered after discussion;: exercise counseling/recommendations and nutrition counseling/recommendations          This document has been electronically signed by Joey Orozco DO  March 10, 2023 14:54 EST    Dictated Utilizing Dragon Dictation: Part of this note may be an electronic transcription/translation of spoken language to printed text using the Dragon Dictation System.

## 2023-03-11 LAB
ALBUMIN SERPL-MCNC: 4.4 G/DL (ref 3.5–5.2)
ALBUMIN/GLOB SERPL: 1.6 G/DL
ALP SERPL-CCNC: 80 U/L (ref 39–117)
ALT SERPL W P-5'-P-CCNC: 9 U/L (ref 1–33)
ANION GAP SERPL CALCULATED.3IONS-SCNC: 8 MMOL/L (ref 5–15)
AST SERPL-CCNC: 23 U/L (ref 1–32)
BILIRUB SERPL-MCNC: 0.2 MG/DL (ref 0–1.2)
BUN SERPL-MCNC: 10 MG/DL (ref 8–23)
BUN/CREAT SERPL: 11.6 (ref 7–25)
CALCIUM SPEC-SCNC: 9.2 MG/DL (ref 8.6–10.5)
CHLORIDE SERPL-SCNC: 103 MMOL/L (ref 98–107)
CO2 SERPL-SCNC: 29 MMOL/L (ref 22–29)
CREAT SERPL-MCNC: 0.86 MG/DL (ref 0.57–1)
CRP SERPL-MCNC: 0.47 MG/DL (ref 0–0.5)
DEPRECATED RDW RBC AUTO: 42.5 FL (ref 37–54)
EGFRCR SERPLBLD CKD-EPI 2021: 66.3 ML/MIN/1.73
ERYTHROCYTE [DISTWIDTH] IN BLOOD BY AUTOMATED COUNT: 12.8 % (ref 12.3–15.4)
GLOBULIN UR ELPH-MCNC: 2.7 GM/DL
GLUCOSE SERPL-MCNC: 84 MG/DL (ref 65–99)
HCT VFR BLD AUTO: 38.1 % (ref 34–46.6)
HGB BLD-MCNC: 12.2 G/DL (ref 12–15.9)
MCH RBC QN AUTO: 29.3 PG (ref 26.6–33)
MCHC RBC AUTO-ENTMCNC: 32 G/DL (ref 31.5–35.7)
MCV RBC AUTO: 91.4 FL (ref 79–97)
PLATELET # BLD AUTO: 154 10*3/MM3 (ref 140–450)
PMV BLD AUTO: 11.3 FL (ref 6–12)
POTASSIUM SERPL-SCNC: 4.2 MMOL/L (ref 3.5–5.2)
PROT SERPL-MCNC: 7.1 G/DL (ref 6–8.5)
RBC # BLD AUTO: 4.17 10*6/MM3 (ref 3.77–5.28)
SODIUM SERPL-SCNC: 140 MMOL/L (ref 136–145)
WBC NRBC COR # BLD: 5.71 10*3/MM3 (ref 3.4–10.8)

## 2023-03-13 ENCOUNTER — TELEPHONE (OUTPATIENT)
Dept: FAMILY MEDICINE CLINIC | Facility: CLINIC | Age: 88
End: 2023-03-13
Payer: MEDICARE

## 2023-03-13 NOTE — TELEPHONE ENCOUNTER
Caller: Tonia Ritter    Relationship: Self    Best call back number: [phone]    What is the best time to reach you: ANY     Who are you requesting to speak with (clinical staff, provider,  specific staff member): CLINICAL       What was the call regarding: DOES NOT WANT TO DO COLONOSCOPY RIGHT NOW. PLEASE DO NOT SCHEDULE. CALL IF NEED TO DISCUSS     Do you require a callback: NOT UNLESS DISCUSSION IS NEEDED

## 2023-03-13 NOTE — TELEPHONE ENCOUNTER
Caller: Tonia Ritter    Relationship: Self    Best call back number: [phone]    What is the best time to reach you: ANY     Who are you requesting to speak with (clinical staff, provider,  specific staff member): CLINICAL       What was the call regarding: DOES NOT WANT TO DO COLONOSCOPY RIGHT NOW. PLEASE DO NOT SCHEDULE. CALL IF NEED TO DISCUSS     Do you require a callback: NOT UNLESS DISCUSSION IS NEEDED       Patient notified & verbalized understanding.

## 2023-03-13 NOTE — TELEPHONE ENCOUNTER
----- Message from Joey Orozco DO sent at 3/13/2023  4:50 PM EDT -----  Labs were normal.      Patient notified & verbalized understanding.

## 2023-03-20 ENCOUNTER — OFFICE VISIT (OUTPATIENT)
Dept: FAMILY MEDICINE CLINIC | Facility: CLINIC | Age: 88
End: 2023-03-20
Payer: MEDICARE

## 2023-03-20 VITALS
SYSTOLIC BLOOD PRESSURE: 110 MMHG | WEIGHT: 153 LBS | HEIGHT: 65 IN | OXYGEN SATURATION: 99 % | HEART RATE: 79 BPM | BODY MASS INDEX: 25.49 KG/M2 | TEMPERATURE: 97.5 F | DIASTOLIC BLOOD PRESSURE: 70 MMHG

## 2023-03-20 DIAGNOSIS — L03.019 CELLULITIS OF FINGER, UNSPECIFIED LATERALITY: Primary | ICD-10-CM

## 2023-03-20 PROCEDURE — 3074F SYST BP LT 130 MM HG: CPT | Performed by: FAMILY MEDICINE

## 2023-03-20 PROCEDURE — 3078F DIAST BP <80 MM HG: CPT | Performed by: FAMILY MEDICINE

## 2023-03-20 PROCEDURE — 99213 OFFICE O/P EST LOW 20 MIN: CPT | Performed by: FAMILY MEDICINE

## 2023-03-20 RX ORDER — DOXYCYCLINE HYCLATE 100 MG/1
100 CAPSULE ORAL 2 TIMES DAILY
Qty: 14 CAPSULE | Refills: 0 | Status: SHIPPED | OUTPATIENT
Start: 2023-03-20

## 2023-03-20 NOTE — PROGRESS NOTES
"Chief Complaint  Pain (thumb)    Subjective          Tonia Ritter presents to [facility]  Hand Pain   The incident occurred more than 1 week ago (states she smashed her finger a couple weeks ago, around the nail is now red, inflammed and swollen). The incident occurred at home. The injury mechanism was a direct blow. The quality of the pain is described as aching. The pain is mild. She has tried acetaminophen for the symptoms.       Review of Systems      Objective   Vital Signs:   /70 (BP Location: Right arm, Patient Position: Sitting, Cuff Size: Adult)   Pulse 79   Temp 97.5 °F (36.4 °C) (Temporal)   Ht 165.1 cm (65\")   Wt 69.4 kg (153 lb)   SpO2 99%   BMI 25.46 kg/m²     Physical Exam  Constitutional:       General: She is not in acute distress.     Appearance: Normal appearance. She is well-developed and well-groomed. She is not ill-appearing, toxic-appearing or diaphoretic.   HENT:      Head: Normocephalic.      Nose: Nose normal. No congestion or rhinorrhea.      Mouth/Throat:      Mouth: Mucous membranes are moist.      Pharynx: Oropharynx is clear. No oropharyngeal exudate or posterior oropharyngeal erythema.   Eyes:      General: Lids are normal.         Right eye: No discharge.         Left eye: No discharge.      Extraocular Movements: Extraocular movements intact.      Pupils: Pupils are equal, round, and reactive to light.   Neck:      Vascular: No carotid bruit.   Cardiovascular:      Rate and Rhythm: Normal rate and regular rhythm.      Pulses: Normal pulses.      Heart sounds: Normal heart sounds. No murmur heard.    No friction rub. No gallop.   Pulmonary:      Effort: Pulmonary effort is normal. No respiratory distress.      Breath sounds: Normal breath sounds. No stridor. No wheezing, rhonchi or rales.   Chest:      Chest wall: No tenderness.   Abdominal:      General: Bowel sounds are normal. There is no distension.      Palpations: Abdomen is " soft. There is no mass.      Tenderness: There is no abdominal tenderness. There is no right CVA tenderness, left CVA tenderness, guarding or rebound.      Hernia: No hernia is present.   Musculoskeletal:         General: No swelling or tenderness. Normal range of motion.      Cervical back: Normal range of motion and neck supple. No rigidity or tenderness.      Right lower leg: No edema.      Left lower leg: No edema.   Lymphadenopathy:      Cervical: No cervical adenopathy.   Skin:     General: Skin is warm.      Capillary Refill: Capillary refill takes less than 2 seconds.      Coloration: Skin is not jaundiced.      Findings: Erythema present. No bruising or rash.   Neurological:      General: No focal deficit present.      Mental Status: She is alert and oriented to person, place, and time.      Motor: Motor function is intact. No weakness.      Coordination: Coordination is intact.      Gait: Gait is intact. Gait normal.   Psychiatric:         Attention and Perception: Attention normal.         Mood and Affect: Mood normal.         Speech: Speech normal.         Behavior: Behavior normal.         Cognition and Memory: Cognition normal.         Judgment: Judgment normal.        Result Review :                 Assessment and Plan    Diagnoses and all orders for this visit:    1. Cellulitis of finger, unspecified laterality (Primary)  -     doxycycline (VIBRAMYCIN) 100 MG capsule; Take 1 capsule by mouth 2 (Two) Times a Day.  Dispense: 14 capsule; Refill: 0      Patient's Body mass index is 25.46 kg/m². indicating that she is overweight (BMI 25-29.9). Patient's (Body mass index is 25.46 kg/m².) indicates that they are overweight with health conditions that include hypertension and dyslipidemias . Weight is unchanged. BMI is is above average; BMI management plan is completed. We discussed low calorie, low carb based diet program, portion control and increasing exercise. .    Follow Up   No follow-ups on  file.  Patient was given instructions and counseling regarding her condition or for health maintenance advice. Please see specific information pulled into the AVS if appropriate.     This document has been electronically signed by TEODORA Cazares  March 21, 2023 09:34 EDT

## 2023-04-05 ENCOUNTER — TELEPHONE (OUTPATIENT)
Dept: FAMILY MEDICINE CLINIC | Facility: CLINIC | Age: 88
End: 2023-04-05

## 2023-04-05 NOTE — TELEPHONE ENCOUNTER
Caller: Tonia Ritter    Relationship: Self    Best call back number:  [phone]    What is the medical concern/diagnosis:  VOMITING AND DIARRHEA     What specialty or service is being requested:  GASTROENTEROLOGIST      What is the provider, practice or medical service name:    DR JONES     What is the office location:  [place]     What is the office phone number:      Any additional details: PLEASE CALL WITH REFERRAL

## 2023-04-06 ENCOUNTER — NURSE TRIAGE (OUTPATIENT)
Dept: CALL CENTER | Facility: HOSPITAL | Age: 88
End: 2023-04-06
Payer: MEDICARE

## 2023-04-07 ENCOUNTER — OFFICE VISIT (OUTPATIENT)
Dept: FAMILY MEDICINE CLINIC | Facility: CLINIC | Age: 88
End: 2023-04-07
Payer: MEDICARE

## 2023-04-07 DIAGNOSIS — R10.84 GENERALIZED ABDOMINAL PAIN: Primary | ICD-10-CM

## 2023-04-07 PROCEDURE — 3078F DIAST BP <80 MM HG: CPT | Performed by: NURSE PRACTITIONER

## 2023-04-07 PROCEDURE — 3074F SYST BP LT 130 MM HG: CPT | Performed by: NURSE PRACTITIONER

## 2023-04-07 PROCEDURE — 99213 OFFICE O/P EST LOW 20 MIN: CPT | Performed by: NURSE PRACTITIONER

## 2023-04-07 PROCEDURE — 1159F MED LIST DOCD IN RCRD: CPT | Performed by: NURSE PRACTITIONER

## 2023-04-07 PROCEDURE — 1160F RVW MEDS BY RX/DR IN RCRD: CPT | Performed by: NURSE PRACTITIONER

## 2023-04-07 RX ORDER — ONDANSETRON 4 MG/1
4 TABLET, ORALLY DISINTEGRATING ORAL
COMMUNITY
Start: 2023-04-03 | End: 2023-04-14

## 2023-04-07 RX ORDER — SIMETHICONE 80 MG
80 TABLET,CHEWABLE ORAL EVERY 6 HOURS PRN
Qty: 30 TABLET | Refills: 0 | Status: SHIPPED | OUTPATIENT
Start: 2023-04-07 | End: 2023-04-10

## 2023-04-07 NOTE — PROGRESS NOTES
Chief Complaint  Hospital Follow Up Visit and GI Problem    Subjective          Tonia Ritter is a 87 y.o. female who presents today to [facility] for follow up    HPI:   History of Present Illness    Presents to clinic for ER  follow up. Went to [facility] ER on 4/3/2023 for abdominal pain. Today patient reports her stomach feels full and she is not eating much. Has had some small bowel movements. Has follow-up with GI scheduled on 4/11/2023.  Denies any other issues or concerns at this time.        The following portions of the patient's history were reviewed and updated as appropriate: allergies, current medications, past family history, past medical history, past social history, past surgical history and problem list.    Objective     Allergy:   Allergies   Allergen Reactions   • Reglan [Metoclopramide]    • Sulfa Antibiotics Nausea And Vomiting        Current Medications:   Current Outpatient Medications   Medication Sig Dispense Refill   • acetaminophen (TYLENOL) 500 MG tablet Take 1 tablet by mouth Every 6 (Six) Hours As Needed for Mild Pain.     • acetaZOLAMIDE (DIAMOX) 250 MG tablet Take 1 tablet by mouth Daily.     • famotidine (PEPCID) 20 MG tablet Take 1 tablet by mouth Daily. 90 tablet 0   • fluticasone (FLONASE) 50 MCG/ACT nasal spray 2 sprays into the nostril(s) as directed by provider Daily. 16 g 2   • furosemide (LASIX) 20 MG tablet Take 1 tablet by mouth Every Other Day. 45 tablet 1   • lisinopril (PRINIVIL,ZESTRIL) 5 MG tablet Take 1 tablet by mouth Daily. (Patient taking differently: Take 1 tablet by mouth Daily. 2 tabs q d) 90 tablet 1   • ondansetron ODT (ZOFRAN-ODT) 4 MG disintegrating tablet Take 1 tablet by mouth.     • PARoxetine (Paxil) 10 MG tablet Take 0.5 tablets by mouth Every Night. Takes 0.5 tablet 90 tablet 0   • potassium chloride (K-DUR,KLOR-CON) 10 MEQ CR tablet Take 1 tablet by mouth Daily. 90 tablet 1   • rivaroxaban (Xarelto) 20 MG tablet Take  1 tablet by mouth Daily With Dinner. 90 tablet 1   • rosuvastatin (CRESTOR) 10 MG tablet Take 1 tablet by mouth Daily. for cholesterol 90 tablet 1   • Sotalol HCl, AF, 120 MG tablet Take 1 tablet by mouth 2 (Two) Times a Day. 180 each 0   • Wheat Dextrin (BENEFIBER PO) Take 1 tablet by mouth Daily.     • Pancrelipase, Lip-Prot-Amyl, (CREON) 72917-886487 units capsule delayed-release particles capsule Take 1 capsule by mouth Take As Directed. Take 2 capsules with large meals and 1 capsule with snacks 250 capsule 11   • polyethylene glycol (MIRALAX) 17 g packet Take 17 g by mouth Daily. 30 each 3     No current facility-administered medications for this visit.       Past Medical History:  Past Medical History:   Diagnosis Date   • Acute bronchitis    • Allergic rhinitis    • Anxiety    • Atrial fibrillation    • Back pain    • Burping    • Chills (without fever) 3/25/2019   • Chronic pain    • Congestive heart failure    • Depression    • Encounter for screening fecal occult blood testing 03/10/2016    NEGATIVE   • Fibromyalgia    • Flatulence    • GERD (gastroesophageal reflux disease)    • Hematuria    • Hyperlipidemia    • Hypertension    • Osteoarthritis    • Pacemaker    • PUD (peptic ulcer disease)    • Right shoulder pain        Past Surgical History:  Past Surgical History:   Procedure Laterality Date   • CARDIOVERSION  05/2017    [facility]    • CARDIOVERSION  09/01/2017    [facility]   • CHOLECYSTECTOMY  2004   • COLONOSCOPY  05/2013   • DILATATION AND CURETTAGE     • EPIDIDYMAL CYST EXCISION     • ESOPHAGOSCOPY / EGD  01/2019   • EYE SURGERY     • HEMORRHOIDECTOMY  01/2019   • MAMMO BILATERAL  10/2014   • NECK SURGERY     • PACEMAKER IMPLANTATION     • PAP SMEAR  03/2016   • SKIN LESION EXCISION         Social History:  Social History     Socioeconomic History   • Marital status:    Tobacco Use   • Smoking status: Never   • Smokeless tobacco: Never   Vaping Use   • Vaping Use: Never used  "  Substance and Sexual Activity   • Alcohol use: No   • Drug use: No   • Sexual activity: Defer       Family History:  Family History   Problem Relation Age of Onset   • Thyroid disease Mother    • Mental illness Father         nervous breakdown   • Breast cancer Maternal Aunt        Review of Systems:  Review of Systems   Constitutional: Negative for fever.   Gastrointestinal: Negative for diarrhea, nausea and vomiting.   Neurological: Negative for dizziness, weakness and light-headedness.       Vital Signs:   /63   Pulse 81   Temp 97.5 °F (36.4 °C) (Temporal)   Ht 165.1 cm (65\")   Wt 66.6 kg (146 lb 12.8 oz)   SpO2 100%   BMI 24.43 kg/m²  RR: 17    Physical Exam:  Physical Exam  Vitals and nursing note reviewed.   Constitutional:       General: She is not in acute distress.     Appearance: Normal appearance. She is not ill-appearing or toxic-appearing.   HENT:      Head: Normocephalic.   Cardiovascular:      Rate and Rhythm: Normal rate and regular rhythm.      Heart sounds: Normal heart sounds.   Pulmonary:      Effort: Pulmonary effort is normal. No respiratory distress.      Breath sounds: Normal breath sounds.   Abdominal:      General: Bowel sounds are normal. There is no distension.      Palpations: Abdomen is soft.      Tenderness: There is abdominal tenderness (Generalized).   Skin:     General: Skin is warm and dry.      Coloration: Skin is not pale.   Neurological:      Mental Status: She is alert and oriented to person, place, and time.   Psychiatric:         Mood and Affect: Mood normal.         Behavior: Behavior normal.         Thought Content: Thought content normal.         Judgment: Judgment normal.                  Assessment and Plan   Diagnoses and all orders for this visit:    1. Generalized abdominal pain (Primary)  -     XR Abdomen KUB (In Office)    KUB to evaluate status.    Discussed possible differential diagnoses, testing, treatment, recommended non-pharmacological " interventions, risks, warning signs to monitor for that would indicate need for follow-up in clinic or ER. If no improvement with these regimens or you have new or worsening symptoms follow-up. Patient verbalizes understanding and agreement with plan of care. Denies further needs or concerns.     Patient was given instructions and counseling regarding her condition and for health maintenance advised.    BMI is within normal parameters. No other follow-up for BMI required.       I spent 20 minutes caring for patient on this date of service. This time includes time spent by me in the following activities: preparing for the visit, reviewing tests, obtaining and/or reviewing a separately obtained history, performing a medically appropriate examination and/or evaluation, counseling and educating the patient/family/caregiver, ordering medications, tests, or procedures and documenting information in the medical record    Meds ordered during this visit:  No orders of the defined types were placed in this encounter.      Patient Instructions:  Patient instructions given for the following visit diagnosis:    ICD-10-CM ICD-9-CM   1. Generalized abdominal pain  R10.84 789.07       Follow Up   Return for Recheck with PCP in 2-3 days, sooner if needed.        This document has been electronically signed by TEODORA House  April 11, 2023 11:58 EDT    Patient was given instructions and counseling regarding her condition or for health maintenance advice. Please see specific information pulled into the AVS if appropriate.     Part of this note may be an electronic transcription/translation of spoken language to printed text using the Dragon Dictation System.

## 2023-04-07 NOTE — TELEPHONE ENCOUNTER
"Caller states feeling impacted. Caller states was seen earlier in the week at the ER for her stomach and was given zofran. Caller denies abdominal pain. Caller states she is feeling lightheaded holding on to stuff cause she might fall. Caller states she lives alone but won't drive herself in.     Reason for Disposition  • Patient sounds very sick or weak to the triager    Additional Information  • Negative: [1] Abdomen pain is main symptom AND [2] male  • Negative: [1] Abdomen pain is main symptom AND [2] adult female  • Negative: Rectal bleeding or blood in stool is main symptom  • Negative: Rectal pain or itching is main symptom  • Negative: Constipation in a cancer patient who is currently (or recently) receiving chemotherapy or radiation therapy, or cancer patient who has metastatic or end-stage cancer and is receiving palliative care    Answer Assessment - Initial Assessment Questions  1. STOOL PATTERN OR FREQUENCY: \"How often do you pass bowel movements (BMs)?\"  (Normal range: tid to q 3 days)  \"When was the last BM passed?\"        A day or two   2. STRAINING: \"Do you have to strain to have a BM?\"        Yes   3. RECTAL PAIN: \"Does your rectum hurt when the stool comes out?\" If Yes, ask: \"Do you have hemorrhoids? How bad is the pain?\"  (Scale 1-10; or mild, moderate, severe)      Denies rectal pain. Had Hemorrhoid surgery   4. STOOL COMPOSITION: \"Are the stools hard?\"       Yes   5. BLOOD ON STOOLS: \"Has there been any blood on the toilet tissue or on the surface of the BM?\" If Yes, ask: \"When was the last time?\"       Tonight   6. CHRONIC CONSTIPATION: \"Is this a new problem for you?\"  If no, ask: \"How long have you had this problem?\" (days, weeks, months)       Yes   7. CHANGES IN DIET OR HYDRATION: \"Have there been any recent changes in your diet?\" \"How much fluids are you drinking consuming on a daily basis?\"  \"How much have you had to drink today?\"      Have had some water today  8. MEDICATIONS: \"Have you " "been taking any new medications?\" \"Are you taking any narcotic pain medications?\" (e.g., Vicoden, Percocet, morphine, dilaudid)        9. LAXATIVES: \"Have you been using any stool softeners, laxatives, or enemas?\"  If yes, ask \"What, how often, and when was the last time?\"  10.ACTIVITY:  \"How much walking do you do every day? on a daily basis?\"  \"Has your activity level decreased in the past week?\"         Denies laxative use today   11. CAUSE: \"What do you think is causing the constipation?\"         Chronic   12. OTHER SYMPTOMS: \"Do you have any other symptoms?\" (e.g., abdominal pain, bloating, fever, vomiting)        Denies pain. Lightheaded   13. MEDICAL HISTORY: \"Do you have a history of hemorrhoids, rectal fissures, or rectal surgery or rectal abscess?\"            14. PREGNANCY: \"Is there any chance you are pregnant?\" \"When was your last menstrual period?\"    Protocols used: CONSTIPATION-ADULT-      "

## 2023-04-10 ENCOUNTER — OFFICE VISIT (OUTPATIENT)
Dept: FAMILY MEDICINE CLINIC | Facility: CLINIC | Age: 88
End: 2023-04-10
Payer: MEDICARE

## 2023-04-10 VITALS
OXYGEN SATURATION: 97 % | BODY MASS INDEX: 24.43 KG/M2 | HEART RATE: 68 BPM | DIASTOLIC BLOOD PRESSURE: 74 MMHG | TEMPERATURE: 98 F | HEIGHT: 65 IN | WEIGHT: 146.6 LBS | SYSTOLIC BLOOD PRESSURE: 116 MMHG

## 2023-04-10 DIAGNOSIS — A08.0 ROTAVIRUS ENTERITIS: ICD-10-CM

## 2023-04-10 DIAGNOSIS — H61.21 IMPACTED CERUMEN OF RIGHT EAR: ICD-10-CM

## 2023-04-10 DIAGNOSIS — K59.00 CONSTIPATION, UNSPECIFIED CONSTIPATION TYPE: ICD-10-CM

## 2023-04-10 DIAGNOSIS — R79.89 ELEVATED BRAIN NATRIURETIC PEPTIDE (BNP) LEVEL: ICD-10-CM

## 2023-04-10 DIAGNOSIS — K52.9 GASTROENTERITIS: Primary | ICD-10-CM

## 2023-04-10 DIAGNOSIS — H92.01 RIGHT EAR PAIN: ICD-10-CM

## 2023-04-10 RX ORDER — POLYETHYLENE GLYCOL 3350 17 G/17G
17 POWDER, FOR SOLUTION ORAL DAILY
Qty: 30 EACH | Refills: 3 | Status: SHIPPED | OUTPATIENT
Start: 2023-04-10

## 2023-04-10 NOTE — PROGRESS NOTES
Subjective   Tonia Ritter is a 87 y.o. female.   Pt presents today with CC of Constipation      History of Present Illness   History of Present Illness  Patient is an 87-year-old female who has chronic constipation.  She has an appointment tomorrow to establish care with gastroenterology.  Recently, she had rotavirus gastroenteritis that resulted in multiple days of diarrhea.  CT scan confirmed this.  Recent x-ray showed mild stool burden.  Recommend resuming daily MiraLAX.  She is no longer having abdominal pain or diarrhea.  #2 she has right ear pain, she has history of cerumen impaction on the right side.  #3 while in the hospital emergency room she had an elevated BNP.  She has no signs or symptoms of heart failure.  She has no other concerns today.         The following portions of the patient's history were reviewed and updated as appropriate: allergies, current medications, past family history, past medical history, past social history, past surgical history and problem list.    Review of Systems   Constitutional: Negative for chills, fever and unexpected weight loss.   HENT: Positive for ear pain. Negative for congestion and sore throat.    Eyes: Negative for blurred vision and visual disturbance.   Respiratory: Negative for cough and wheezing.    Cardiovascular: Negative for chest pain and palpitations.   Gastrointestinal: Positive for constipation. Negative for abdominal pain and diarrhea.   Endocrine: Negative for cold intolerance and heat intolerance.   Genitourinary: Negative for dysuria.   Musculoskeletal: Negative for arthralgias and neck stiffness.   Neurological: Negative for dizziness, seizures and syncope.   Psychiatric/Behavioral: Negative for self-injury, suicidal ideas and depressed mood.       Objective   Physical Exam  Vitals and nursing note reviewed.   Constitutional:       Appearance: She is well-developed.   HENT:      Head: Normocephalic and atraumatic.      Right Ear: External ear  normal.      Left Ear: External ear normal.      Nose: Nose normal.   Eyes:      Conjunctiva/sclera: Conjunctivae normal.      Pupils: Pupils are equal, round, and reactive to light.   Cardiovascular:      Rate and Rhythm: Normal rate and regular rhythm.      Heart sounds: Normal heart sounds.   Pulmonary:      Effort: Pulmonary effort is normal.      Breath sounds: Normal breath sounds.   Abdominal:      General: Bowel sounds are normal.      Palpations: Abdomen is soft.   Musculoskeletal:      Cervical back: Normal range of motion and neck supple.   Skin:     General: Skin is warm and dry.   Neurological:      Mental Status: She is alert and oriented to person, place, and time.   Psychiatric:         Behavior: Behavior normal.       Ear Cerumen Removal    Date/Time: 4/10/2023 10:53 AM  Performed by: Joey Orozco DO  Authorized by: Joey Orozco DO   Consent: Verbal consent not obtained.  Risks and benefits: risks, benefits and alternatives were discussed  Consent given by: patient  Patient understanding: patient states understanding of the procedure being performed  Patient consent: the patient's understanding of the procedure matches consent given  Procedure type: irrigation         Assessment & Plan   Diagnoses and all orders for this visit:    1. Gastroenteritis (Primary)  Resolved.  She was previously having constipation, her last x-ray showing minimal stool suggests that it at least temporarily resolved her constipation.  I suspect she will continue to have a chronic problem.  She has an appointment tomorrow with gastroenterology for their opinion.  2. Rotavirus enteritis    3. Constipation, unspecified constipation type  -     polyethylene glycol (MIRALAX) 17 g packet; Take 17 g by mouth Daily.  Dispense: 30 each; Refill: 3    4. Right ear pain    5. Impacted cerumen of right ear  Most of the cerumen was removed, there is still some leftover, she did not tolerate further irrigation and was not  willing to allow for instrumentation.  At follow-up, will reevaluate.  She may benefit from long-term use of ceruminolytic.  6. Elevated brain natriuretic peptide (BNP) level  Unclear why it was elevated.  She has no signs of heart failure on exam, and also no symptoms.  We will consider rechecking at follow-up                  BMI is within normal parameters. No other follow-up for BMI required.          This document has been electronically signed by Lisa Delgado  April 10, 2023 09:18 EDT    Dictated Utilizing Dragon Dictation: Part of this note may be an electronic transcription/translation of spoken language to printed text using the Dragon Dictation System.

## 2023-04-11 ENCOUNTER — OFFICE VISIT (OUTPATIENT)
Dept: GASTROENTEROLOGY | Facility: CLINIC | Age: 88
End: 2023-04-11
Payer: MEDICARE

## 2023-04-11 VITALS
BODY MASS INDEX: 24.46 KG/M2 | OXYGEN SATURATION: 100 % | TEMPERATURE: 97.5 F | HEIGHT: 65 IN | HEART RATE: 81 BPM | WEIGHT: 146.8 LBS | DIASTOLIC BLOOD PRESSURE: 63 MMHG | SYSTOLIC BLOOD PRESSURE: 112 MMHG

## 2023-04-11 VITALS — BODY MASS INDEX: 24.32 KG/M2 | WEIGHT: 146 LBS | HEIGHT: 65 IN

## 2023-04-11 DIAGNOSIS — K31.84 GASTROPARESIS: Primary | ICD-10-CM

## 2023-04-11 NOTE — PROGRESS NOTES
DATE:  4/11/2023    DIAGNOSIS:    CHIEF COMPLAINT:  Chief Complaint   Patient presents with   • gastroparesis           Interval History:      The following portions of the patient's history were reviewed and updated as appropriate: allergies, current medications, past family history, past medical history, past social history, past surgical history and problem list.  REVIEW OF SYSTEMS:   Review of Systems   Constitutional: Negative for fever.   HENT: Negative for trouble swallowing.    Eyes: Negative.    Respiratory: Negative.    Cardiovascular: Negative.    Gastrointestinal: Positive for abdominal distention, abdominal pain and constipation. Negative for diarrhea, nausea and vomiting.   Endocrine: Negative.    Genitourinary: Negative.    Skin: Negative.    Allergic/Immunologic: Negative.    Neurological: Negative.    Hematological: Negative.    Psychiatric/Behavioral: Negative.        PAST MEDICAL HISTORY:  Past Medical History:   Diagnosis Date   • Acute bronchitis    • Allergic rhinitis    • Anxiety    • Atrial fibrillation    • Back pain    • Burping    • Chills (without fever) 3/25/2019   • Chronic pain    • Congestive heart failure    • Depression    • Encounter for screening fecal occult blood testing 03/10/2016    NEGATIVE   • Fibromyalgia    • Flatulence    • GERD (gastroesophageal reflux disease)    • Hematuria    • Hyperlipidemia    • Hypertension    • Osteoarthritis    • Pacemaker    • PUD (peptic ulcer disease)    • Right shoulder pain        PAST SURGICAL HISTORY:  Past Surgical History:   Procedure Laterality Date   • CARDIOVERSION  05/2017    [facility]    • CARDIOVERSION  09/01/2017    [facility]   • CHOLECYSTECTOMY  2004   • COLONOSCOPY  05/2013   • DILATATION AND CURETTAGE     • EPIDIDYMAL CYST EXCISION     • ESOPHAGOSCOPY / EGD  01/2019   • EYE SURGERY     • HEMORRHOIDECTOMY  01/2019   • MAMMO BILATERAL  10/2014   • NECK SURGERY     • PACEMAKER IMPLANTATION     • PAP SMEAR  03/2016   •  SKIN LESION EXCISION         SOCIAL HISTORY:  Social History     Socioeconomic History   • Marital status:    Tobacco Use   • Smoking status: Never   • Smokeless tobacco: Never   Vaping Use   • Vaping Use: Never used   Substance and Sexual Activity   • Alcohol use: No   • Drug use: No   • Sexual activity: Defer       FAMILY HISTORY:  Family History   Problem Relation Age of Onset   • Thyroid disease Mother    • Mental illness Father         nervous breakdown   • Breast cancer Maternal Aunt        MEDICATIONS:      Current Outpatient Medications:   •  acetaminophen (TYLENOL) 500 MG tablet, Take 1 tablet by mouth Every 6 (Six) Hours As Needed for Mild Pain., Disp: , Rfl:   •  acetaZOLAMIDE (DIAMOX) 250 MG tablet, Take 1 tablet by mouth Daily., Disp: , Rfl:   •  famotidine (PEPCID) 20 MG tablet, Take 1 tablet by mouth Daily., Disp: 90 tablet, Rfl: 0  •  fluticasone (FLONASE) 50 MCG/ACT nasal spray, 2 sprays into the nostril(s) as directed by provider Daily., Disp: 16 g, Rfl: 2  •  furosemide (LASIX) 20 MG tablet, Take 1 tablet by mouth Every Other Day., Disp: 45 tablet, Rfl: 1  •  lisinopril (PRINIVIL,ZESTRIL) 5 MG tablet, Take 1 tablet by mouth Daily. (Patient taking differently: Take 1 tablet by mouth Daily. 2 tabs q d), Disp: 90 tablet, Rfl: 1  •  ondansetron ODT (ZOFRAN-ODT) 4 MG disintegrating tablet, Take 1 tablet by mouth., Disp: , Rfl:   •  PARoxetine (Paxil) 10 MG tablet, Take 0.5 tablets by mouth Every Night. Takes 0.5 tablet, Disp: 90 tablet, Rfl: 0  •  polyethylene glycol (MIRALAX) 17 g packet, Take 17 g by mouth Daily., Disp: 30 each, Rfl: 3  •  potassium chloride (K-DUR,KLOR-CON) 10 MEQ CR tablet, Take 1 tablet by mouth Daily., Disp: 90 tablet, Rfl: 1  •  rivaroxaban (Xarelto) 20 MG tablet, Take 1 tablet by mouth Daily With Dinner., Disp: 90 tablet, Rfl: 1  •  rosuvastatin (CRESTOR) 10 MG tablet, Take 1 tablet by mouth Daily. for cholesterol, Disp: 90 tablet, Rfl: 1  •  Sotalol HCl, AF, 120 MG  "tablet, Take 1 tablet by mouth 2 (Two) Times a Day., Disp: 180 each, Rfl: 0  •  Wheat Dextrin (BENEFIBER PO), Take 1 tablet by mouth Daily., Disp: , Rfl:     ALLERGIES:    Allergies   Allergen Reactions   • Reglan [Metoclopramide]    • Sulfa Antibiotics Nausea And Vomiting       VIST VITALS/PHYSICAL EXAM:  Ht 165.1 cm (65\")   Wt 66.2 kg (146 lb)   BMI 24.30 kg/m²   Physical Exam      PATHOLOGY:  NONE      ENDOSCOPY:  NONE      IMAGING:  CT Abdomen Pelvis With Contrast    Result Date: 4/3/2023  Increased fluid in colon and small bowel consistent with gastroenteritis. Stable small left ovarian cyst. Images reviewed, interpreted and dictated by Dr. Ernesto Gilliam MD    CT Abdomen Pelvis With Contrast    Result Date: 2/13/2023    Degenerative changes lumbar spine as described.  This report was finalized on 2/13/2023 1:55 PM by Dr. Bob Iyer MD.      XR Abdomen KUB (In Office)    Result Date: 4/10/2023    Mild volume fecal retention in the colon consistent with constipation.  This report was finalized on 4/10/2023 8:34 AM by Dr. Ko Stoll MD.      XR Abdomen KUB    Result Date: 2/7/2023    Mild-moderate volume fecal retention in the colon consistent with constipation.  This report was finalized on 2/7/2023 1:18 PM by Dr. Ko Stoll MD.            RECENT LABS:  Lab Results   Component Value Date    WBC 5.71 03/10/2023    HGB 12.2 03/10/2023    HCT 38.1 03/10/2023    MCV 91.4 03/10/2023    RDW 12.8 03/10/2023     03/10/2023    NEUTRORELPCT 58.7 10/28/2021    LYMPHORELPCT 26.3 10/28/2021    MONORELPCT 12.4 (H) 10/28/2021    EOSRELPCT 2.1 10/28/2021    BASORELPCT 0.4 10/28/2021    NEUTROABS 3.91 10/28/2021    LYMPHSABS 1.76 10/28/2021       Lab Results   Component Value Date     03/10/2023    K 4.2 03/10/2023    CO2 29.0 03/10/2023     03/10/2023    BUN 10 03/10/2023    CREATININE 0.86 03/10/2023    EGFRIFNONA 50 (L) 02/14/2022    EGFRIFAFRI 63 08/11/2016    GLUCOSE 84 03/10/2023    CALCIUM 9.2 " 03/10/2023    ALKPHOS 80 03/10/2023    AST 23 03/10/2023    ALT 9 03/10/2023    BILITOT 0.2 03/10/2023    ALBUMIN 4.4 03/10/2023    PROTEINTOT 7.1 03/10/2023    MG 2.2 04/21/2021             ASSESSMENT & PLAN:    No diagnosis found.    No follow-ups on file.        Electronically Signed by: Regla Pablo MA , April 11, 2023 08:26 EDT       CC:   Joey Orozco DO

## 2023-04-13 ENCOUNTER — TELEPHONE (OUTPATIENT)
Dept: GASTROENTEROLOGY | Facility: CLINIC | Age: 88
End: 2023-04-13
Payer: MEDICARE

## 2023-04-13 ENCOUNTER — TELEPHONE (OUTPATIENT)
Dept: FAMILY MEDICINE CLINIC | Facility: CLINIC | Age: 88
End: 2023-04-13
Payer: MEDICARE

## 2023-04-13 NOTE — TELEPHONE ENCOUNTER
Caller: Riya Tonia G    Relationship: Self    Best call back number: [phone]    What medications are you currently taking:   Current Outpatient Medications on File Prior to Visit   Medication Sig Dispense Refill   • acetaminophen (TYLENOL) 500 MG tablet Take 1 tablet by mouth Every 6 (Six) Hours As Needed for Mild Pain.     • acetaZOLAMIDE (DIAMOX) 250 MG tablet Take 1 tablet by mouth Daily.     • famotidine (PEPCID) 20 MG tablet Take 1 tablet by mouth Daily. 90 tablet 0   • fluticasone (FLONASE) 50 MCG/ACT nasal spray 2 sprays into the nostril(s) as directed by provider Daily. 16 g 2   • furosemide (LASIX) 20 MG tablet Take 1 tablet by mouth Every Other Day. 45 tablet 1   • lisinopril (PRINIVIL,ZESTRIL) 5 MG tablet Take 1 tablet by mouth Daily. (Patient taking differently: Take 1 tablet by mouth Daily. 2 tabs q d) 90 tablet 1   • ondansetron ODT (ZOFRAN-ODT) 4 MG disintegrating tablet Take 1 tablet by mouth.     • Pancrelipase, Lip-Prot-Amyl, (CREON) 13878-556181 units capsule delayed-release particles capsule Take 1 capsule by mouth Take As Directed. Take 2 capsules with large meals and 1 capsule with snacks 250 capsule 11   • PARoxetine (Paxil) 10 MG tablet Take 0.5 tablets by mouth Every Night. Takes 0.5 tablet 90 tablet 0   • polyethylene glycol (MIRALAX) 17 g packet Take 17 g by mouth Daily. 30 each 3   • potassium chloride (K-DUR,KLOR-CON) 10 MEQ CR tablet Take 1 tablet by mouth Daily. 90 tablet 1   • rivaroxaban (Xarelto) 20 MG tablet Take 1 tablet by mouth Daily With Dinner. 90 tablet 1   • rosuvastatin (CRESTOR) 10 MG tablet Take 1 tablet by mouth Daily. for cholesterol 90 tablet 1   • Sotalol HCl, AF, 120 MG tablet Take 1 tablet by mouth 2 (Two) Times a Day. 180 each 0   • Wheat Dextrin (BENEFIBER PO) Take 1 tablet by mouth Daily.       No current facility-administered medications on file prior to visit.        Which medication are you concerned about:     CREON    What are your concerns:      GASTROENTEROLOGIST LARISSA FERNÁNDEZ PRESCRIBED CREON    PATIENT SAID THIS MORNING UNDER HER RIB CAGE WHERE HER PANCREAS IS HURT SO BAD    PLEASE CALL PATIENT TO DISCUSS    ALSO SHE HAS SOME ANTIBIOTICS THAT SHE DIDN'T TAKE TWO OR THREE AND WANTS TO KNOW IF SHE CAN TAKE THEM FOR HER HOARSE THROAT

## 2023-04-13 NOTE — TELEPHONE ENCOUNTER
If she is having all of these problems simultaneously, she really should be evaluated by someone here in the office in the next couple days.  In general, I recommend against taking antibiotics without appropriate indication, furthermore, taking 3 doxycycline pills would only promote drug resistance.  As she was evaluated just a couple days ago, and she had no signs of pneumonia or sinusitis, antibiotic is unlikely to be necessary.  Recommend evaluation.

## 2023-04-13 NOTE — PROGRESS NOTES
DATE OF CONSULTATION:  4/13/2023    REFERRING PHYSICIAN:  No ref. provider found    CHIEF COMPLAINT:  Chief Complaint   Patient presents with   • gastroparesis       HISTORY OF PRESENT ILLNESS:   Tonia Ritter is a very pleasant 87 y.o. female who is being seen today at the request of No ref. provider found for evaluation and treatment of gastroparesis.  Patient states she was diagnosed roughly 4 years ago with gastroparesis by Dr. Bearden post EGD.  Patient has never had gastric emptying scan performed.  Patient states that she previously has tried taking Reglan however did not like side effects of it therefore is very hesitant about restarting such medicine.  She experiences a lot of abdominal bloating and pain that occurs in the epigastric and left upper quadrant.  Patient also notes frequent episodes of nausea.  She denies any weight loss currently but states her appetite is very limited.  She eats several small meals daily that are low in fat.    REVIEW OF SYSTEMS:   Review of Systems   Constitutional: Negative for activity change, appetite change, fatigue and fever.   HENT: Negative for mouth sores, sore throat, trouble swallowing and voice change.    Eyes: Negative for visual disturbance.   Respiratory: Negative for cough and shortness of breath.    Cardiovascular: Negative for chest pain.   Gastrointestinal: Positive for abdominal distention, abdominal pain and nausea. Negative for blood in stool, constipation, diarrhea and vomiting.   Endocrine: Negative for cold intolerance and heat intolerance.   Genitourinary: Negative for flank pain and hematuria.   Musculoskeletal: Negative for arthralgias and back pain.   Skin: Negative for color change.   Allergic/Immunologic: Negative for food allergies.   Neurological: Negative for syncope and weakness.   Psychiatric/Behavioral: Negative for confusion. The patient is not hyperactive.        PAST MEDICAL HISTORY:  Past Medical History:   Diagnosis Date   • Acute  bronchitis    • Allergic rhinitis    • Anxiety    • Atrial fibrillation    • Back pain    • Burping    • Chills (without fever) 3/25/2019   • Chronic pain    • Congestive heart failure    • Depression    • Encounter for screening fecal occult blood testing 03/10/2016    NEGATIVE   • Fibromyalgia    • Flatulence    • GERD (gastroesophageal reflux disease)    • Hematuria    • Hyperlipidemia    • Hypertension    • Osteoarthritis    • Pacemaker    • PUD (peptic ulcer disease)    • Right shoulder pain        PAST SURGICAL HISTORY:  Past Surgical History:   Procedure Laterality Date   • [facility]    • CARDIOVERSION  09/01/2017    [facility]   • CHOLECYSTECTOMY  2004   • COLONOSCOPY  05/2013   • DILATATION AND CURETTAGE     • EPIDIDYMAL CYST EXCISION     • ESOPHAGOSCOPY / EGD  01/2019   • EYE SURGERY     • HEMORRHOIDECTOMY  01/2019   • MAMMO BILATERAL  10/2014   • NECK SURGERY     • PACEMAKER IMPLANTATION     • PAP SMEAR  03/2016   • SKIN LESION EXCISION         FAMILY HISTORY:  Family History   Problem Relation Age of Onset   • Thyroid disease Mother    • Mental illness Father         nervous breakdown   • Breast cancer Maternal Aunt        SOCIAL HISTORY:  Social History     Socioeconomic History   • Marital status:    Tobacco Use   • Smoking status: Never   • Smokeless tobacco: Never   Vaping Use   • Vaping Use: Never used   Substance and Sexual Activity   • Alcohol use: No   • Drug use: No   • Sexual activity: Defer       MEDICATIONS:      Current Outpatient Medications:   •  acetaminophen (TYLENOL) 500 MG tablet, Take 1 tablet by mouth Every 6 (Six) Hours As Needed for Mild Pain., Disp: , Rfl:   •  acetaZOLAMIDE (DIAMOX) 250 MG tablet, Take 1 tablet by mouth Daily., Disp: , Rfl:   •  famotidine (PEPCID) 20 MG tablet, Take 1 tablet by mouth Daily., Disp: 90 tablet, Rfl: 0  •  fluticasone (FLONASE) 50 MCG/ACT nasal spray, 2 sprays into the nostril(s) as directed by provider  "Daily., Disp: 16 g, Rfl: 2  •  furosemide (LASIX) 20 MG tablet, Take 1 tablet by mouth Every Other Day., Disp: 45 tablet, Rfl: 1  •  lisinopril (PRINIVIL,ZESTRIL) 5 MG tablet, Take 1 tablet by mouth Daily. (Patient taking differently: Take 1 tablet by mouth Daily. 2 tabs q d), Disp: 90 tablet, Rfl: 1  •  ondansetron ODT (ZOFRAN-ODT) 4 MG disintegrating tablet, Take 1 tablet by mouth., Disp: , Rfl:   •  PARoxetine (Paxil) 10 MG tablet, Take 0.5 tablets by mouth Every Night. Takes 0.5 tablet, Disp: 90 tablet, Rfl: 0  •  polyethylene glycol (MIRALAX) 17 g packet, Take 17 g by mouth Daily., Disp: 30 each, Rfl: 3  •  potassium chloride (K-DUR,KLOR-CON) 10 MEQ CR tablet, Take 1 tablet by mouth Daily., Disp: 90 tablet, Rfl: 1  •  rivaroxaban (Xarelto) 20 MG tablet, Take 1 tablet by mouth Daily With Dinner., Disp: 90 tablet, Rfl: 1  •  rosuvastatin (CRESTOR) 10 MG tablet, Take 1 tablet by mouth Daily. for cholesterol, Disp: 90 tablet, Rfl: 1  •  Sotalol HCl, AF, 120 MG tablet, Take 1 tablet by mouth 2 (Two) Times a Day., Disp: 180 each, Rfl: 0  •  Wheat Dextrin (BENEFIBER PO), Take 1 tablet by mouth Daily., Disp: , Rfl:   •  Pancrelipase, Lip-Prot-Amyl, (CREON) [id number] units capsule delayed-release particles capsule, Take 1 capsule by mouth Take As Directed. Take 2 capsules with large meals and 1 capsule with snacks, Disp: 250 capsule, Rfl: 11    ALLERGIES:    Allergies   Allergen Reactions   • Reglan [Metoclopramide]    • Sulfa Antibiotics Nausea And Vomiting       VISIT VITALS/PHYSICAL EXAM:  Ht 165.1 cm (65\")   Wt 66.2 kg (146 lb)   BMI 24.30 kg/m²   Physical Exam  Constitutional:       General: She is not in acute distress.     Appearance: Normal appearance. She is well-developed.   HENT:      Head: Normocephalic and atraumatic.   Eyes:      Pupils: Pupils are equal, round, and reactive to light.   Cardiovascular:      Rate and Rhythm: Normal rate and regular rhythm.      Heart sounds: Normal heart sounds. "   Pulmonary:      Effort: Pulmonary effort is normal. No respiratory distress.      Breath sounds: Normal breath sounds. No wheezing, rhonchi or rales.   Abdominal:      General: Abdomen is flat. Bowel sounds are normal. There is no distension.      Palpations: Abdomen is soft. There is no mass.      Tenderness: There is no abdominal tenderness. There is no guarding or rebound.      Hernia: No hernia is present.   Musculoskeletal:         General: No swelling. Normal range of motion.      Cervical back: Normal range of motion and neck supple.      Right lower leg: No edema.      Left lower leg: No edema.   Skin:     General: Skin is warm and dry.   Neurological:      Mental Status: She is alert and oriented to person, place, and time.   Psychiatric:         Attention and Perception: Attention normal.         Mood and Affect: Mood normal.         Speech: Speech normal.         Behavior: Behavior normal. Behavior is cooperative.         Thought Content: Thought content normal.           PATHOLOGY:   NONE      ENDOSCOPY:  NONE      IMAGING:     CT Abdomen Pelvis With Contrast    Result Date: 4/3/2023  Increased fluid in colon and small bowel consistent with gastroenteritis. Stable small left ovarian cyst. Images reviewed, interpreted and dictated by Dr. Ernesto Gilliam MD    CT Abdomen Pelvis With Contrast    Result Date: 2/13/2023    Degenerative changes lumbar spine as described.  This report was finalized on 2/13/2023 1:55 PM by Dr. Bob Iyer MD.      XR Abdomen KUB (In Office)    Result Date: 4/10/2023    Mild volume fecal retention in the colon consistent with constipation.  This report was finalized on 4/10/2023 8:34 AM by Dr. Ko Stoll MD.      XR Abdomen KUB    Result Date: 2/7/2023    Mild-moderate volume fecal retention in the colon consistent with constipation.  This report was finalized on 2/7/2023 1:18 PM by Dr. Ko Stoll MD.         RECENT LABS:  Lab Results   Component Value Date    WBC 5.71  03/10/2023    HGB 12.2 03/10/2023    HCT 38.1 03/10/2023    MCV 91.4 03/10/2023    RDW 12.8 03/10/2023     03/10/2023    NEUTRORELPCT 58.7 10/28/2021    LYMPHORELPCT 26.3 10/28/2021    MONORELPCT 12.4 (H) 10/28/2021    EOSRELPCT 2.1 10/28/2021    BASORELPCT 0.4 10/28/2021    NEUTROABS 3.91 10/28/2021    LYMPHSABS 1.76 10/28/2021       Lab Results   Component Value Date     03/10/2023    K 4.2 03/10/2023    CO2 29.0 03/10/2023     03/10/2023    BUN 10 03/10/2023    CREATININE 0.86 03/10/2023    EGFRIFNONA 50 (L) 02/14/2022    EGFRIFAFRI 63 08/11/2016    GLUCOSE 84 03/10/2023    CALCIUM 9.2 03/10/2023    ALKPHOS 80 03/10/2023    AST 23 03/10/2023    ALT 9 03/10/2023    BILITOT 0.2 03/10/2023    ALBUMIN 4.4 03/10/2023    PROTEINTOT 7.1 03/10/2023    MG 2.2 04/21/2021         ASSESSMENT & PLAN:  Based on patient's age and diagnosis of gastroparesis-she was provided gastroparesis handout to make dietary changes.  She was also prescribed Creon to take 2 with meals and 1 with snacks to improve digestion.   Diagnosis Plan   1. Gastroparesis  Pancrelipase, Lip-Prot-Amyl, (CREON) [id number] units capsule delayed-release particles capsule          Return in about 4 weeks (around 5/9/2023).          Electronically Signed by: Wilman Enamorado PA-C , April 13, 2023 13:20 EDT       CC:   No ref. provider found  Joey Orozco, DO    Thank you so much for allowing us to participate in the care of Tonia Ritter . Please do not hesitate to contact us with any questions or concerns.

## 2023-04-13 NOTE — TELEPHONE ENCOUNTER
Patient called stating she thinks that Creon is causing her abdominal pain. I spoke with maida and she said for her to cut down to only taking it 1-2 times a day and see if that helps. She voiced understanding.

## 2023-04-13 NOTE — TELEPHONE ENCOUNTER
Caller: Riya Tonia G    Relationship: Self    Best call back number: [phone]    What medications are you currently taking:   Current Outpatient Medications on File Prior to Visit   Medication Sig Dispense Refill   • acetaminophen (TYLENOL) 500 MG tablet Take 1 tablet by mouth Every 6 (Six) Hours As Needed for Mild Pain.     • acetaZOLAMIDE (DIAMOX) 250 MG tablet Take 1 tablet by mouth Daily.     • famotidine (PEPCID) 20 MG tablet Take 1 tablet by mouth Daily. 90 tablet 0   • fluticasone (FLONASE) 50 MCG/ACT nasal spray 2 sprays into the nostril(s) as directed by provider Daily. 16 g 2   • furosemide (LASIX) 20 MG tablet Take 1 tablet by mouth Every Other Day. 45 tablet 1   • lisinopril (PRINIVIL,ZESTRIL) 5 MG tablet Take 1 tablet by mouth Daily. (Patient taking differently: Take 1 tablet by mouth Daily. 2 tabs q d) 90 tablet 1   • ondansetron ODT (ZOFRAN-ODT) 4 MG disintegrating tablet Take 1 tablet by mouth.     • Pancrelipase, Lip-Prot-Amyl, (CREON) [id number] units capsule delayed-release particles capsule Take 1 capsule by mouth Take As Directed. Take 2 capsules with large meals and 1 capsule with snacks 250 capsule 11   • PARoxetine (Paxil) 10 MG tablet Take 0.5 tablets by mouth Every Night. Takes 0.5 tablet 90 tablet 0   • polyethylene glycol (MIRALAX) 17 g packet Take 17 g by mouth Daily. 30 each 3   • potassium chloride (K-DUR,KLOR-CON) 10 MEQ CR tablet Take 1 tablet by mouth Daily. 90 tablet 1   • rivaroxaban (Xarelto) 20 MG tablet Take 1 tablet by mouth Daily With Dinner. 90 tablet 1   • rosuvastatin (CRESTOR) 10 MG tablet Take 1 tablet by mouth Daily. for cholesterol 90 tablet 1   • Sotalol HCl, AF, 120 MG tablet Take 1 tablet by mouth 2 (Two) Times a Day. 180 each 0   • Wheat Dextrin (BENEFIBER PO) Take 1 tablet by mouth Daily.       No current facility-administered medications on file prior to visit.        Which medication are you concerned about:     CREON    What are your concerns:      GASTROENTEROLOGIST LARISSA FERNÁNDEZ PRESCRIBED CREON    PATIENT SAID THIS MORNING UNDER HER RIB CAGE WHERE HER PANCREAS IS HURT SO BAD    PLEASE CALL PATIENT TO DISCUSS    ALSO SHE HAS SOME ANTIBIOTICS THAT SHE DIDN'T TAKE TWO OR THREE AND WANTS TO KNOW IF SHE CAN TAKE THEM FOR HER HOARSE THROAT      Spoke with Tonia she is very hoarse,has 3 Doxycycline capsules left from a previous illness & wants to know if she should take them,her throat is not sore but she has been coughing up a small amt. Of yellow sputum,GI recently started her on Creon,she has taken it for 2-3 days & knows that is what is causing her side to hurt,states its right where her Pancreas is,i advised her to call GI concerning this,please advise.

## 2023-04-14 ENCOUNTER — OFFICE VISIT (OUTPATIENT)
Dept: FAMILY MEDICINE CLINIC | Facility: CLINIC | Age: 88
End: 2023-04-14
Payer: MEDICARE

## 2023-04-14 VITALS
HEART RATE: 80 BPM | TEMPERATURE: 97.7 F | DIASTOLIC BLOOD PRESSURE: 70 MMHG | HEIGHT: 65 IN | OXYGEN SATURATION: 95 % | SYSTOLIC BLOOD PRESSURE: 110 MMHG | BODY MASS INDEX: 24.99 KG/M2 | WEIGHT: 150 LBS

## 2023-04-14 DIAGNOSIS — J22 LOWER RESPIRATORY INFECTION (E.G., BRONCHITIS, PNEUMONIA, PNEUMONITIS, PULMONITIS): Primary | ICD-10-CM

## 2023-04-14 DIAGNOSIS — L03.019 CELLULITIS OF FINGER, UNSPECIFIED LATERALITY: ICD-10-CM

## 2023-04-14 DIAGNOSIS — R10.9 ABDOMINAL PAIN, UNSPECIFIED ABDOMINAL LOCATION: ICD-10-CM

## 2023-04-14 PROCEDURE — 3078F DIAST BP <80 MM HG: CPT | Performed by: NURSE PRACTITIONER

## 2023-04-14 PROCEDURE — 87428 SARSCOV & INF VIR A&B AG IA: CPT | Performed by: NURSE PRACTITIONER

## 2023-04-14 PROCEDURE — 99213 OFFICE O/P EST LOW 20 MIN: CPT | Performed by: NURSE PRACTITIONER

## 2023-04-14 PROCEDURE — 3074F SYST BP LT 130 MM HG: CPT | Performed by: NURSE PRACTITIONER

## 2023-04-14 PROCEDURE — 1160F RVW MEDS BY RX/DR IN RCRD: CPT | Performed by: NURSE PRACTITIONER

## 2023-04-14 PROCEDURE — 1159F MED LIST DOCD IN RCRD: CPT | Performed by: NURSE PRACTITIONER

## 2023-04-14 RX ORDER — DOXYCYCLINE HYCLATE 100 MG/1
CAPSULE ORAL
Qty: 12 CAPSULE | Refills: 5 | OUTPATIENT
Start: 2023-04-14

## 2023-04-14 RX ORDER — BENZONATATE 100 MG/1
100 CAPSULE ORAL 3 TIMES DAILY PRN
Qty: 30 CAPSULE | Refills: 0 | Status: SHIPPED | OUTPATIENT
Start: 2023-04-14

## 2023-04-14 RX ORDER — DOXYCYCLINE HYCLATE 100 MG/1
100 CAPSULE ORAL 2 TIMES DAILY
Qty: 14 CAPSULE | Refills: 0 | Status: SHIPPED | OUTPATIENT
Start: 2023-04-14 | End: 2023-04-21

## 2023-04-14 NOTE — TELEPHONE ENCOUNTER
If she is having all of these problems simultaneously, she really should be evaluated by someone here in the office in the next couple days.  In general, I recommend against taking antibiotics without appropriate indication, furthermore, taking 3 doxycycline pills would only promote drug resistance.  As she was evaluated just a couple days ago, and she had no signs of pneumonia or sinusitis, antibiotic is unlikely to be necessary.  Recommend evaluation.      Spoke with patient,GI decreased her Creon,she is still having URI symptoms,same day  Appointment scheduled.

## 2023-04-14 NOTE — PROGRESS NOTES
Chief Complaint  Cough    Subjective          Tonia Ritter is a 87 y.o. female who presents today to [facility] for follow up    HPI:   History of Present Illness    Presents to clinic for complaint of coughing up phlegm. Treatments: mucinex which helped   Requesting cough medicine.      Reports had abdominal pain with taking creon and developed LUQ abdominal pain. Reports she stopped the mediation and reports symptoms have resolved.     Denies any other issues or concerns.       The following portions of the patient's history were reviewed and updated as appropriate: allergies, current medications, past family history, past medical history, past social history, past surgical history and problem list.    Objective     Allergy:   Allergies   Allergen Reactions   • Reglan [Metoclopramide]    • Sulfa Antibiotics Nausea And Vomiting        Current Medications:   Current Outpatient Medications   Medication Sig Dispense Refill   • acetaminophen (TYLENOL) 500 MG tablet Take 1 tablet by mouth Every 6 (Six) Hours As Needed for Mild Pain.     • acetaZOLAMIDE (DIAMOX) 250 MG tablet Take 1 tablet by mouth Daily.     • famotidine (PEPCID) 20 MG tablet Take 1 tablet by mouth Daily. 90 tablet 0   • fluticasone (FLONASE) 50 MCG/ACT nasal spray 2 sprays into the nostril(s) as directed by provider Daily. 16 g 2   • furosemide (LASIX) 20 MG tablet Take 1 tablet by mouth Every Other Day. 45 tablet 1   • lisinopril (PRINIVIL,ZESTRIL) 5 MG tablet Take 1 tablet by mouth Daily. (Patient taking differently: Take 1 tablet by mouth Daily. 2 tabs q d) 90 tablet 1   • PARoxetine (Paxil) 10 MG tablet Take 0.5 tablets by mouth Every Night. Takes 0.5 tablet 90 tablet 0   • polyethylene glycol (MIRALAX) 17 g packet Take 17 g by mouth Daily. 30 each 3   • potassium chloride (K-DUR,KLOR-CON) 10 MEQ CR tablet Take 1 tablet by mouth Daily. 90 tablet 1   • rivaroxaban (Xarelto) 20 MG tablet Take 1 tablet by mouth  Daily With Dinner. 90 tablet 1   • rosuvastatin (CRESTOR) 10 MG tablet Take 1 tablet by mouth Daily. for cholesterol 90 tablet 1   • Sotalol HCl, AF, 120 MG tablet Take 1 tablet by mouth 2 (Two) Times a Day. 180 each 0   • Wheat Dextrin (BENEFIBER PO) Take 1 tablet by mouth Daily.     • benzonatate (Tessalon Perles) 100 MG capsule Take 1 capsule by mouth 3 (Three) Times a Day As Needed for Cough. 30 capsule 0   • doxycycline (VIBRAMYCIN) 100 MG capsule Take 1 capsule by mouth 2 (Two) Times a Day for 7 days. 14 capsule 0     No current facility-administered medications for this visit.       Past Medical History:  Past Medical History:   Diagnosis Date   • Acute bronchitis    • Allergic rhinitis    • Anxiety    • Atrial fibrillation    • Back pain    • Burping    • Chills (without fever) 3/25/2019   • Chronic pain    • Congestive heart failure    • Depression    • Encounter for screening fecal occult blood testing 03/10/2016    NEGATIVE   • Fibromyalgia    • Flatulence    • GERD (gastroesophageal reflux disease)    • Hematuria    • Hyperlipidemia    • Hypertension    • Osteoarthritis    • Pacemaker    • PUD (peptic ulcer disease)    • Right shoulder pain        Past Surgical History:  Past Surgical History:   Procedure Laterality Date   • CARD[facility]OVERSION  05/2017    [facility]    • CARDIOVERSION  09/01/2017    [facility]   • CHOLECYSTECTOMY  2004   • COLONOSCOPY  05/2013   • DILATATION AND CURETTAGE     • EPIDIDYMAL CYST EXCISION     • ESOPHAGOSCOPY / EGD  01/2019   • EYE SURGERY     • HEMORRHOIDECTOMY  01/2019   • MAMMO BILATERAL  10/2014   • NECK SURGERY     • PACEMAKER IMPLANTATION     • PAP SMEAR  03/2016   • SKIN LESION EXCISION         Social History:  Social History     Socioeconomic History   • Marital status:    Tobacco Use   • Smoking status: Never   • Smokeless tobacco: Never   Vaping Use   • Vaping Use: Never used   Substance and Sexual Activity   • Alcohol use: No   • Drug use: No   • Sexual  "activity: Defer       Family History:  Family History   Problem Relation Age of Onset   • Thyroid disease Mother    • Mental illness Father         nervous breakdown   • Breast cancer Maternal Aunt        Review of Systems:  Review of Systems   Constitutional: Negative for fever.   HENT: Negative for sore throat.    Respiratory: Negative for shortness of breath and wheezing.    Gastrointestinal: Negative for abdominal distention, abdominal pain, diarrhea, nausea and vomiting.       Vital Signs:   /70 (BP Location: Right arm, Patient Position: Sitting, Cuff Size: Adult)   Pulse 80   Temp 97.7 °F (36.5 °C) (Temporal)   Ht 165.1 cm (65\")   Wt 68 kg (150 lb)   SpO2 95%   BMI 24.96 kg/m²  RR: 17    Physical Exam:  Physical Exam  Vitals and nursing note reviewed.   Constitutional:       General: She is not in acute distress.     Appearance: Normal appearance. She is not ill-appearing or toxic-appearing.   HENT:      Head: Normocephalic.      Right Ear: Ear canal and external ear normal. A middle ear effusion is present.      Left Ear: Ear canal and external ear normal. A middle ear effusion is present.      Nose: Nose normal.      Mouth/Throat:      Lips: Pink.      Mouth: Mucous membranes are moist.      Pharynx: Oropharynx is clear.   Eyes:      Conjunctiva/sclera: Conjunctivae normal.   Cardiovascular:      Rate and Rhythm: Normal rate and regular rhythm.      Heart sounds: Normal heart sounds.   Pulmonary:      Effort: Pulmonary effort is normal. No respiratory distress.      Breath sounds: Rhonchi present. No wheezing or rales.   Abdominal:      General: Bowel sounds are normal. There is no distension.      Palpations: Abdomen is soft.      Tenderness: There is no abdominal tenderness.   Lymphadenopathy:      Cervical: No cervical adenopathy.   Skin:     General: Skin is warm and dry.      Capillary Refill: Capillary refill takes less than 2 seconds.      Coloration: Skin is not pale.   Neurological:      " Mental Status: She is alert and oriented to person, place, and time.   Psychiatric:         Mood and Affect: Mood normal.         Behavior: Behavior normal.         Thought Content: Thought content normal.         Judgment: Judgment normal.                  Assessment and Plan   Diagnoses and all orders for this visit:    1. Lower respiratory infection (e.g., bronchitis, pneumonia, pneumonitis, pulmonitis) (Primary)  -     benzonatate (Tessalon Perles) 100 MG capsule; Take 1 capsule by mouth 3 (Three) Times a Day As Needed for Cough.  Dispense: 30 capsule; Refill: 0  -     doxycycline (VIBRAMYCIN) 100 MG capsule; Take 1 capsule by mouth 2 (Two) Times a Day for 7 days.  Dispense: 14 capsule; Refill: 0  -     POCT SARS-CoV-2 Antigen MEHREEN + Flu    2. Abdominal pain, unspecified abdominal location      Cough and deep breathe. Increase humidification and hydration.  Symptom management as discussed. Declines further workup at this time.     Follow-up if symptoms return.    Discussed possible differential diagnoses, testing, treatment, recommended non-pharmacological interventions, risks, warning signs to monitor for that would indicate need for follow-up in clinic or ER. If no improvement with these regimens or you have new or worsening symptoms follow-up. Patient verbalizes understanding and agreement with plan of care. Denies further needs or concerns.     Patient was given instructions and counseling regarding her condition and for health maintenance advised.    BMI is within normal parameters. No other follow-up for BMI required.     I spent 20 minutes caring for patient on this date of service. This time includes time spent by me in the following activities: preparing for the visit, reviewing tests, obtaining and/or reviewing a separately obtained history, performing a medically appropriate examination and/or evaluation, counseling and educating the patient/family/caregiver, ordering medications, tests, or procedures  and documenting information in the medical record    Meds ordered during this visit:  New Medications Ordered This Visit   Medications   • benzonatate (Tessalon Perles) 100 MG capsule     Sig: Take 1 capsule by mouth 3 (Three) Times a Day As Needed for Cough.     Dispense:  30 capsule     Refill:  0   • doxycycline (VIBRAMYCIN) 100 MG capsule     Sig: Take 1 capsule by mouth 2 (Two) Times a Day for 7 days.     Dispense:  14 capsule     Refill:  0       Patient Instructions:  Patient instructions given for the following visit diagnosis:    ICD-10-CM ICD-9-CM   1. Lower respiratory infection (e.g., bronchitis, pneumonia, pneumonitis, pulmonitis)  J22 519.8   2. Abdominal pain, unspecified abdominal location  R10.9 789.00       Follow Up   Return for Recheck in 2-3 days, sooner if needed .        This document has been electronically signed by TEODORA House  April 18, 2023 08:06 EDT    Patient was given instructions and counseling regarding her condition or for health maintenance advice. Please see specific information pulled into the AVS if appropriate.     Part of this note may be an electronic transcription/translation of spoken language to printed text using the Dragon Dictation System.

## 2023-04-16 ENCOUNTER — PATIENT OUTREACH (OUTPATIENT)
Dept: CASE MANAGEMENT | Facility: OTHER | Age: 88
End: 2023-04-16
Payer: MEDICARE

## 2023-04-16 NOTE — OUTREACH NOTE
AMBULATORY CASE MANAGEMENT NOTE      Care Coordination          Zoey BOYCE  Ambulatory Case Management    4/16/2023, 19:37 EDT

## 2023-04-26 ENCOUNTER — TELEPHONE (OUTPATIENT)
Dept: FAMILY MEDICINE CLINIC | Facility: CLINIC | Age: 88
End: 2023-04-26
Payer: MEDICARE

## 2023-04-26 DIAGNOSIS — B37.0 THRUSH: ICD-10-CM

## 2023-04-26 DIAGNOSIS — B37.31 YEAST VAGINITIS: Primary | ICD-10-CM

## 2023-04-26 RX ORDER — CLOTRIMAZOLE 1 %
CREAM WITH APPLICATOR VAGINAL DAILY
Qty: 45 G | Refills: 0 | Status: SHIPPED | OUTPATIENT
Start: 2023-04-26 | End: 2023-05-03

## 2023-04-26 NOTE — TELEPHONE ENCOUNTER
It is hard to be sure without examination.  However, if you are unable to come in, it is safe to assume that you have yeast in your mouth and vagina.  Nystatin swish and spit should cover your mouth, typically we might give a pill for a vaginal yeast infection, though that pill interacts with your heart medication.  I recommend vaginal clotrimazole, 5 g/1 applicator full daily for 7 days.    You will be taking nystatin orally, and clotrimazole vaginally.  If you are absolutely not willing to use a vaginal suppository, taking the pill(Diflucan) may be reasonable, especially considering you will be taking only 1 dose, though not recommended. [facility]

## 2023-04-26 NOTE — TELEPHONE ENCOUNTER
"  Caller: Tonia Ritter    Relationship: Self    Best call back number: [phone]    What is the best time to reach you: ANYTIME    Who are you requesting to speak with (clinical staff, provider,  specific staff member): NURSE    What was the call regarding: WOULD LIKE TO GIVE UPDATES OF BEING IN THE HOSPITAL    Do you require a callback: YES      Spoke with patient she wanted to update you on Recent Hospitalization in [place],reports she has  Had Pneumonia & been on a lot of antibiotics,now her mouth is raw & she is requesting a mouthwash for it,also her \"private Areas \"burn & itch & is requesting something for that also.Reports she had an EGD & Colonoscopy while she was hospitalized,records requested.    "

## 2023-04-26 NOTE — TELEPHONE ENCOUNTER
Caller: Tonia Ritter    Relationship: Self    Best call back number: [phone]    What is the best time to reach you: ANYTIME    Who are you requesting to speak with (clinical staff, provider,  specific staff member): NURSE    What was the call regarding: WOULD LIKE TO GIVE UPDATES OF BEING IN THE HOSPITAL    Do you require a callback: YES

## 2023-04-27 NOTE — TELEPHONE ENCOUNTER
It is hard to be sure without examination.  However, if you are unable to come in, it is safe to assume that you have yeast in your mouth and vagina.  Nystatin swish and spit should cover your mouth, typically we might give a pill for a vaginal yeast infection, though that pill interacts with your heart medication.  I recommend vaginal clotrimazole, 5 g/1 applicator full daily for 7 days.    You will be taking nystatin orally, and clotrimazole vaginally.  If you are absolutely not willing to use a vaginal suppository, taking the pill(Diflucan) may be reasonable, especially considering you will be taking only 1 dose, though not recommended.      Patient notified & verbalized understanding.

## 2023-05-10 ENCOUNTER — TRANSCRIBE ORDERS (OUTPATIENT)
Dept: ADMINISTRATIVE | Facility: HOSPITAL | Age: 88
End: 2023-05-10
Payer: MEDICARE

## 2023-05-10 ENCOUNTER — OFFICE VISIT (OUTPATIENT)
Dept: FAMILY MEDICINE CLINIC | Facility: CLINIC | Age: 88
End: 2023-05-10
Payer: MEDICARE

## 2023-05-10 ENCOUNTER — LAB (OUTPATIENT)
Dept: LAB | Facility: HOSPITAL | Age: 88
End: 2023-05-10
Payer: MEDICARE

## 2023-05-10 VITALS
OXYGEN SATURATION: 97 % | HEART RATE: 92 BPM | SYSTOLIC BLOOD PRESSURE: 116 MMHG | WEIGHT: 141 LBS | HEIGHT: 65 IN | DIASTOLIC BLOOD PRESSURE: 68 MMHG | TEMPERATURE: 98.2 F | BODY MASS INDEX: 23.49 KG/M2

## 2023-05-10 DIAGNOSIS — J22 LOWER RESPIRATORY INFECTION (E.G., BRONCHITIS, PNEUMONIA, PNEUMONITIS, PULMONITIS): ICD-10-CM

## 2023-05-10 DIAGNOSIS — R71.0 HEMOGLOBIN DROP: ICD-10-CM

## 2023-05-10 DIAGNOSIS — I10 ESSENTIAL HYPERTENSION: ICD-10-CM

## 2023-05-10 DIAGNOSIS — R60.9 1+ PITTING EDEMA: ICD-10-CM

## 2023-05-10 DIAGNOSIS — K31.84 GASTROPARESIS: ICD-10-CM

## 2023-05-10 DIAGNOSIS — N18.9 CHRONIC KIDNEY DISEASE, UNSPECIFIED CKD STAGE: Primary | ICD-10-CM

## 2023-05-10 DIAGNOSIS — K92.2 GASTROINTESTINAL HEMORRHAGE, UNSPECIFIED GASTROINTESTINAL HEMORRHAGE TYPE: ICD-10-CM

## 2023-05-10 DIAGNOSIS — N18.9 CHRONIC KIDNEY DISEASE, UNSPECIFIED CKD STAGE: ICD-10-CM

## 2023-05-10 DIAGNOSIS — E78.2 MIXED HYPERLIPIDEMIA: ICD-10-CM

## 2023-05-10 DIAGNOSIS — I48.0 PAROXYSMAL ATRIAL FIBRILLATION: ICD-10-CM

## 2023-05-10 DIAGNOSIS — K27.9 PUD (PEPTIC ULCER DISEASE): Primary | ICD-10-CM

## 2023-05-10 DIAGNOSIS — I10 PRIMARY HYPERTENSION: ICD-10-CM

## 2023-05-10 DIAGNOSIS — F41.9 ANXIETY: ICD-10-CM

## 2023-05-10 DIAGNOSIS — K21.9 GASTROESOPHAGEAL REFLUX DISEASE WITHOUT ESOPHAGITIS: ICD-10-CM

## 2023-05-10 DIAGNOSIS — R63.4 WEIGHT LOSS: ICD-10-CM

## 2023-05-10 PROCEDURE — 85027 COMPLETE CBC AUTOMATED: CPT

## 2023-05-10 PROCEDURE — 82043 UR ALBUMIN QUANTITATIVE: CPT

## 2023-05-10 PROCEDURE — 81001 URINALYSIS AUTO W/SCOPE: CPT

## 2023-05-10 PROCEDURE — 80053 COMPREHEN METABOLIC PANEL: CPT

## 2023-05-10 PROCEDURE — 84156 ASSAY OF PROTEIN URINE: CPT

## 2023-05-10 PROCEDURE — 36415 COLL VENOUS BLD VENIPUNCTURE: CPT

## 2023-05-10 PROCEDURE — 82570 ASSAY OF URINE CREATININE: CPT

## 2023-05-10 RX ORDER — LISINOPRIL 5 MG/1
5 TABLET ORAL DAILY
Qty: 90 TABLET | Refills: 1 | Status: SHIPPED | OUTPATIENT
Start: 2023-05-10

## 2023-05-10 RX ORDER — POTASSIUM CHLORIDE 750 MG/1
10 TABLET, EXTENDED RELEASE ORAL DAILY
Qty: 90 TABLET | Refills: 1 | Status: SHIPPED | OUTPATIENT
Start: 2023-05-10

## 2023-05-10 RX ORDER — PAROXETINE 10 MG/1
5 TABLET, FILM COATED ORAL NIGHTLY
Qty: 90 TABLET | Refills: 1 | Status: SHIPPED | OUTPATIENT
Start: 2023-05-10

## 2023-05-10 RX ORDER — ROSUVASTATIN CALCIUM 10 MG/1
10 TABLET, COATED ORAL DAILY
Qty: 90 TABLET | Refills: 1 | Status: SHIPPED | OUTPATIENT
Start: 2023-05-10

## 2023-05-10 RX ORDER — FUROSEMIDE 20 MG/1
20 TABLET ORAL EVERY OTHER DAY
Qty: 45 TABLET | Refills: 1 | Status: SHIPPED | OUTPATIENT
Start: 2023-05-10

## 2023-05-10 RX ORDER — SOTALOL HYDROCHLORIDE 120 MG/1
1 TABLET ORAL 2 TIMES DAILY
Qty: 180 EACH | Refills: 1 | Status: SHIPPED | OUTPATIENT
Start: 2023-05-10

## 2023-05-10 RX ORDER — FAMOTIDINE 20 MG/1
20 TABLET, FILM COATED ORAL DAILY
Qty: 90 TABLET | Refills: 1 | Status: SHIPPED | OUTPATIENT
Start: 2023-05-10

## 2023-05-10 RX ORDER — LISINOPRIL 5 MG/1
5 TABLET ORAL DAILY
Qty: 90 TABLET | Refills: 1 | Status: SHIPPED | OUTPATIENT
Start: 2023-05-10 | End: 2023-05-10 | Stop reason: SDUPTHER

## 2023-05-10 NOTE — PROGRESS NOTES
Subjective   Tonia Ritter is a 87 y.o. female.   Pt presents today with CC of Hypertension      History of Present Illness   History of Present Illness  1.  Patient is an 87-year-old female here complaining of weight loss associated with gastroparesis.  She was previously following with gastroenterology.  Pancreatic insufficiency had been suspected and she was started on Creon.  She stated she took this for couple days and reports that it made her feel worse so she stopped taking it.  She is not willing to go back to her previous gastroenterologist, and would like a secondary opinion.  #2 she recently had a GI bleed from peptic ulcer disease.  She had a scope at [facility].  She denies melena or hematochezia.  #3 she has anxiety for which she takes Paxil 5 mg nightly.  #4 she has hypertension for which she takes Lasix 20 mg every other day, she follows with nephrology and has an appointment coming up in a few days, she also takes lisinopril.  She was previously taking lisinopril 10 mg, he has lost weight and her blood pressure has been well controlled.  She has paroxysmal atrial fibrillation for which she takes sotalol.  #6 she has 1+ pitting edema for which she takes furosemide.         The following portions of the patient's history were reviewed and updated as appropriate: allergies, current medications, past family history, past medical history, past social history, past surgical history and problem list.    Review of Systems   Constitutional: Negative for chills, fever and unexpected weight loss.   HENT: Negative for congestion and sore throat.    Eyes: Negative for blurred vision and visual disturbance.   Respiratory: Negative for cough and wheezing.    Cardiovascular: Negative for chest pain and palpitations.   Gastrointestinal: Negative for abdominal pain and diarrhea.   Endocrine: Negative for cold intolerance and heat intolerance.   Genitourinary: Negative for dysuria.   Musculoskeletal:  Negative for arthralgias and neck stiffness.   Neurological: Negative for dizziness, seizures and syncope.   Psychiatric/Behavioral: Negative for self-injury, suicidal ideas and depressed mood.       Objective   Physical Exam  Vitals and nursing note reviewed.   Constitutional:       Appearance: She is well-developed.   HENT:      Head: Normocephalic and atraumatic.      Right Ear: External ear normal.      Left Ear: External ear normal.      Nose: Nose normal.   Eyes:      Conjunctiva/sclera: Conjunctivae normal.      Pupils: Pupils are equal, round, and reactive to light.   Cardiovascular:      Rate and Rhythm: Normal rate and regular rhythm.      Heart sounds: Normal heart sounds.   Pulmonary:      Effort: Pulmonary effort is normal.      Breath sounds: Normal breath sounds.   Abdominal:      General: Bowel sounds are normal.      Palpations: Abdomen is soft.   Musculoskeletal:      Cervical back: Normal range of motion and neck supple.   Skin:     General: Skin is warm and dry.   Neurological:      Mental Status: She is alert and oriented to person, place, and time.   Psychiatric:         Behavior: Behavior normal.           Assessment & Plan   Diagnoses and all orders for this visit:    1. PUD (peptic ulcer disease) (Primary)    2. Lower respiratory infection (e.g., bronchitis, pneumonia, pneumonitis, pulmonitis)    3. Primary hypertension    4. Gastroesophageal reflux disease without esophagitis  -     famotidine (PEPCID) 20 MG tablet; Take 1 tablet by mouth Daily.  Dispense: 90 tablet; Refill: 1    5. Essential hypertension  -     furosemide (LASIX) 20 MG tablet; Take 1 tablet by mouth Every Other Day.  Dispense: 45 tablet; Refill: 1  -     Discontinue: lisinopril (PRINIVIL,ZESTRIL) 5 MG tablet; Take 1 tablet by mouth Daily. 2 tabs q d  Dispense: 90 tablet; Refill: 1  -     lisinopril (PRINIVIL,ZESTRIL) 5 MG tablet; Take 1 tablet by mouth Daily.  Dispense: 90 tablet; Refill: 1    6. Anxiety  -     PARoxetine  (Paxil) 10 MG tablet; Take 0.5 tablets by mouth Every Night. Takes 0.5 tablet  Dispense: 90 tablet; Refill: 1    7. Paroxysmal atrial fibrillation  -     potassium chloride (K-DUR,KLOR-CON) 10 MEQ CR tablet; Take 1 tablet by mouth Daily.  Dispense: 90 tablet; Refill: 1  -     Sotalol HCl, AF, 120 MG tablet; Take 1 tablet by mouth 2 (Two) Times a Day.  Dispense: 180 each; Refill: 1  -     rivaroxaban (Xarelto) 20 MG tablet; Take 1 tablet by mouth Daily With Dinner.  Dispense: 90 tablet; Refill: 1    8. Mixed hyperlipidemia  -     rosuvastatin (CRESTOR) 10 MG tablet; Take 1 tablet by mouth Daily. for cholesterol  Dispense: 90 tablet; Refill: 1    9. 1+ pitting edema  -     furosemide (LASIX) 20 MG tablet; Take 1 tablet by mouth Every Other Day.  Dispense: 45 tablet; Refill: 1  -     potassium chloride (K-DUR,KLOR-CON) 10 MEQ CR tablet; Take 1 tablet by mouth Daily.  Dispense: 90 tablet; Refill: 1    10. Gastroparesis  -     Ambulatory Referral to Gastroenterology  We will get another opinion from gastroenterology.  Gastroparesis seems to be her primary problem, she also recently was found to have peptic ulcer disease.  We discussed potentially switching back to a PPI, she has a family member who strongly recommended Protonix.  As Pepcid is working well, recommend continuing Pepcid  11. Hemoglobin drop  -     CBC No Differential; Future    12. Gastrointestinal hemorrhage, unspecified gastrointestinal hemorrhage type  -     CBC No Differential; Future    13. Weight loss    She has lost 5 pounds since a month ago.  We will continue to monitor.                BMI is within normal parameters. No other follow-up for BMI required.          This document has been electronically signed by Lisa Delgado  May 10, 2023 14:05 EDT    Dictated Utilizing Dragon Dictation: Part of this note may be an electronic transcription/translation of spoken language to printed text using the Dragon Dictation System.

## 2023-05-11 LAB
ALBUMIN SERPL-MCNC: 4 G/DL (ref 3.5–5.2)
ALBUMIN UR-MCNC: 4.6 MG/DL
ALBUMIN/GLOB SERPL: 1.3 G/DL
ALP SERPL-CCNC: 74 U/L (ref 39–117)
ALT SERPL W P-5'-P-CCNC: 11 U/L (ref 1–33)
ANION GAP SERPL CALCULATED.3IONS-SCNC: 10 MMOL/L (ref 5–15)
AST SERPL-CCNC: 28 U/L (ref 1–32)
BACTERIA UR QL AUTO: ABNORMAL /HPF
BILIRUB SERPL-MCNC: 0.4 MG/DL (ref 0–1.2)
BILIRUB UR QL STRIP: NEGATIVE
BUN SERPL-MCNC: 13 MG/DL (ref 8–23)
BUN/CREAT SERPL: 13.8 (ref 7–25)
CALCIUM SPEC-SCNC: 9.2 MG/DL (ref 8.6–10.5)
CHLORIDE SERPL-SCNC: 102 MMOL/L (ref 98–107)
CLARITY UR: CLEAR
CO2 SERPL-SCNC: 29 MMOL/L (ref 22–29)
COLOR UR: YELLOW
CREAT SERPL-MCNC: 0.94 MG/DL (ref 0.57–1)
CREAT UR-MCNC: 160.6 MG/DL
CREAT UR-MCNC: 160.6 MG/DL
DEPRECATED RDW RBC AUTO: 46.5 FL (ref 37–54)
EGFRCR SERPLBLD CKD-EPI 2021: 59.6 ML/MIN/1.73
ERYTHROCYTE [DISTWIDTH] IN BLOOD BY AUTOMATED COUNT: 13.5 % (ref 12.3–15.4)
GLOBULIN UR ELPH-MCNC: 3.2 GM/DL
GLUCOSE SERPL-MCNC: 85 MG/DL (ref 65–99)
GLUCOSE UR STRIP-MCNC: NEGATIVE MG/DL
HCT VFR BLD AUTO: 36.2 % (ref 34–46.6)
HGB BLD-MCNC: 11.7 G/DL (ref 12–15.9)
HGB UR QL STRIP.AUTO: NEGATIVE
HYALINE CASTS UR QL AUTO: ABNORMAL /LPF
KETONES UR QL STRIP: NEGATIVE
LEUKOCYTE ESTERASE UR QL STRIP.AUTO: ABNORMAL
MCH RBC QN AUTO: 30.4 PG (ref 26.6–33)
MCHC RBC AUTO-ENTMCNC: 32.3 G/DL (ref 31.5–35.7)
MCV RBC AUTO: 94 FL (ref 79–97)
MICROALBUMIN/CREAT UR: 28.6 MG/G
NITRITE UR QL STRIP: NEGATIVE
PH UR STRIP.AUTO: 7 [PH] (ref 5–8)
PLATELET # BLD AUTO: 158 10*3/MM3 (ref 140–450)
PMV BLD AUTO: 10.9 FL (ref 6–12)
POTASSIUM SERPL-SCNC: 4.2 MMOL/L (ref 3.5–5.2)
PROT ?TM UR-MCNC: 24.3 MG/DL
PROT SERPL-MCNC: 7.2 G/DL (ref 6–8.5)
PROT UR QL STRIP: ABNORMAL
PROT/CREAT UR: 151.3 MG/G CREA (ref 0–200)
RBC # BLD AUTO: 3.85 10*6/MM3 (ref 3.77–5.28)
RBC # UR STRIP: ABNORMAL /HPF
REF LAB TEST METHOD: ABNORMAL
SODIUM SERPL-SCNC: 141 MMOL/L (ref 136–145)
SP GR UR STRIP: 1.02 (ref 1–1.03)
SQUAMOUS #/AREA URNS HPF: ABNORMAL /HPF
UROBILINOGEN UR QL STRIP: ABNORMAL
WBC # UR STRIP: ABNORMAL /HPF
WBC NRBC COR # BLD: 6.24 10*3/MM3 (ref 3.4–10.8)

## 2023-05-15 ENCOUNTER — TELEPHONE (OUTPATIENT)
Dept: FAMILY MEDICINE CLINIC | Facility: CLINIC | Age: 88
End: 2023-05-15
Payer: MEDICARE

## 2023-05-15 NOTE — TELEPHONE ENCOUNTER
----- Message from Joey Orozco DO sent at 5/15/2023  8:04 AM EDT -----  I reviewed your blood work.  Your hemoglobin is relatively stable.  It still dropped a little.  Your baseline lately has been around 12.2.  It is down to 11.7.  As you were recently seen for GI bleed, I would like for you to remain vigilant about monitoring for bleeding.  I still recommend you continue Pepcid.  This will help prevent further bleeding.      Patient notified and verbalized understanding.  She states she has an appointment with Dr Howe later this week.

## 2023-05-17 ENCOUNTER — TELEPHONE (OUTPATIENT)
Dept: FAMILY MEDICINE CLINIC | Facility: CLINIC | Age: 88
End: 2023-05-17
Payer: MEDICARE

## 2023-05-17 NOTE — TELEPHONE ENCOUNTER
Caller: Tonia Ritter    Relationship: Self    Best call back number: [phone]    What is the best time to reach you: ANY    Who are you requesting to speak with (clinical staff, provider,  specific staff member): CLINICAL STAFFMARIAH    What was the call regarding: FURTHER DISCUSS THE CONVERSATION REGARDING PATIENTS HEALTH ON 5/16  PATIENT IS NEEDING FURTHER CLARIFICATION ON A FEW THINGS     Do you require a callback: YES

## 2023-05-18 ENCOUNTER — TRANSCRIBE ORDERS (OUTPATIENT)
Dept: ADMINISTRATIVE | Facility: HOSPITAL | Age: 88
End: 2023-05-18
Payer: MEDICARE

## 2023-05-18 DIAGNOSIS — R10.11 ABDOMINAL PAIN, RIGHT UPPER QUADRANT: Primary | ICD-10-CM

## 2023-05-18 DIAGNOSIS — K31.84 GASTROPARESIS: ICD-10-CM

## 2023-05-19 ENCOUNTER — TRANSCRIBE ORDERS (OUTPATIENT)
Dept: ADMINISTRATIVE | Facility: HOSPITAL | Age: 88
End: 2023-05-19
Payer: MEDICARE

## 2023-05-19 ENCOUNTER — TELEPHONE (OUTPATIENT)
Dept: UROLOGY | Facility: CLINIC | Age: 88
End: 2023-05-19
Payer: MEDICARE

## 2023-05-19 DIAGNOSIS — K31.84 GASTROPARESIS: Primary | ICD-10-CM

## 2023-05-19 NOTE — TELEPHONE ENCOUNTER
I spoke to patient, and ordered her gastric emptying scan.  She stated she didn't think she would be able to do the scan. She will keep her F/U Appt on 6/29/2023

## 2023-05-19 NOTE — TELEPHONE ENCOUNTER
Patient was put on creon, she stated this is not agreeing with her. The first available appointment is 06/29/23. Patient asked if something else could be called in for her until her appointment. Please call patient back and let her know if something is called in or not.

## 2023-06-01 ENCOUNTER — OFFICE VISIT (OUTPATIENT)
Dept: FAMILY MEDICINE CLINIC | Facility: CLINIC | Age: 88
End: 2023-06-01

## 2023-06-01 ENCOUNTER — TELEPHONE (OUTPATIENT)
Dept: FAMILY MEDICINE CLINIC | Facility: CLINIC | Age: 88
End: 2023-06-01

## 2023-06-01 VITALS
BODY MASS INDEX: 23.66 KG/M2 | SYSTOLIC BLOOD PRESSURE: 130 MMHG | OXYGEN SATURATION: 99 % | WEIGHT: 142 LBS | TEMPERATURE: 98.4 F | HEART RATE: 81 BPM | DIASTOLIC BLOOD PRESSURE: 74 MMHG | HEIGHT: 65 IN

## 2023-06-01 DIAGNOSIS — D23.9 ANGIOKERATOMA: Primary | ICD-10-CM

## 2023-06-01 PROCEDURE — 1159F MED LIST DOCD IN RCRD: CPT | Performed by: FAMILY MEDICINE

## 2023-06-01 PROCEDURE — 1160F RVW MEDS BY RX/DR IN RCRD: CPT | Performed by: FAMILY MEDICINE

## 2023-06-01 PROCEDURE — 99213 OFFICE O/P EST LOW 20 MIN: CPT | Performed by: FAMILY MEDICINE

## 2023-06-01 PROCEDURE — 3078F DIAST BP <80 MM HG: CPT | Performed by: FAMILY MEDICINE

## 2023-06-01 PROCEDURE — 3075F SYST BP GE 130 - 139MM HG: CPT | Performed by: FAMILY MEDICINE

## 2023-06-01 NOTE — PROGRESS NOTES
"Chief Complaint  Cyst    Subjective          Tonia Ritter presents to [facility]  History of Present Illness  States she discovered a black lesion on the outside of her vagina.  States she is concerned she may have a tick there and would like it checked today.      Review of Systems      Objective   Vital Signs:   /74 (BP Location: Right arm, Patient Position: Sitting, Cuff Size: Adult)   Pulse 81   Temp 98.4 °F (36.9 °C) (Temporal)   Ht 165.1 cm (65\")   Wt 64.4 kg (142 lb)   SpO2 99%   BMI 23.63 kg/m²     Physical Exam  Constitutional:       General: She is not in acute distress.     Appearance: Normal appearance. She is well-developed and well-groomed. She is not ill-appearing, toxic-appearing or diaphoretic.   HENT:      Head: Normocephalic.      Nose: Nose normal. No congestion or rhinorrhea.      Mouth/Throat:      Mouth: Mucous membranes are moist.      Pharynx: Oropharynx is clear. No oropharyngeal exudate or posterior oropharyngeal erythema.   Eyes:      General: Lids are normal.         Right eye: No discharge.         Left eye: No discharge.      Extraocular Movements: Extraocular movements intact.      Pupils: Pupils are equal, round, and reactive to light.   Neck:      Vascular: No carotid bruit.   Cardiovascular:      Rate and Rhythm: Normal rate and regular rhythm.      Pulses: Normal pulses.      Heart sounds: Normal heart sounds. No murmur heard.    No friction rub. No gallop.   Pulmonary:      Effort: Pulmonary effort is normal. No respiratory distress.      Breath sounds: Normal breath sounds. No stridor. No wheezing, rhonchi or rales.   Chest:      Chest wall: No tenderness.   Abdominal:      General: Bowel sounds are normal. There is no distension.      Palpations: Abdomen is soft. There is no mass.      Tenderness: There is no abdominal tenderness. There is no right CVA tenderness, left CVA tenderness, guarding or rebound.      Hernia: No hernia " is present.   Genitourinary:     Labia:         Right: Lesion present.    Musculoskeletal:         General: No swelling or tenderness. Normal range of motion.      Cervical back: Normal range of motion and neck supple. No rigidity or tenderness.      Right lower leg: No edema.      Left lower leg: No edema.   Lymphadenopathy:      Cervical: No cervical adenopathy.   Skin:     General: Skin is warm.      Capillary Refill: Capillary refill takes less than 2 seconds.      Coloration: Skin is not jaundiced.      Findings: No bruising, erythema or rash.   Neurological:      General: No focal deficit present.      Mental Status: She is alert and oriented to person, place, and time.      Motor: Motor function is intact. No weakness.      Coordination: Coordination is intact.      Gait: Gait is intact. Gait normal.   Psychiatric:         Attention and Perception: Attention normal.         Mood and Affect: Mood normal.         Speech: Speech normal.         Behavior: Behavior normal.         Cognition and Memory: Cognition normal.         Judgment: Judgment normal.        Result Review :                 Assessment and Plan    Diagnoses and all orders for this visit:    1. Angiokeratoma (Primary)  Comments:  No treatment necessary at this time      Patient's Body mass index is 23.63 kg/m². indicating that she is within normal range (BMI 18.5-24.9). No BMI management plan needed..    Follow Up   No follow-ups on file.  Patient was given instructions and counseling regarding her condition or for health maintenance advice. Please see specific information pulled into the AVS if appropriate.     This document has been electronically signed by TEODORA Cazares  June 1, 2023 16:51 EDT

## 2023-06-01 NOTE — TELEPHONE ENCOUNTER
Caller: Tonia Ritter    Relationship: Self    Best call back number:      [phone]     What is the best time to reach you:     PATIENT WILL BE AVAILABLE FOR THE NEXT HOUR    Who are you requesting to speak with (clinical staff, provider,  specific staff member):     ONE OF THE NURSES    What was the call regarding:     PATIENT REQUESTED A CALL BACK REGARDING A MEDICAL CONCERN SHE HAS      Spoke with patient she reports an area on her vaginal area that she does not know what it is & is requesting it be checked,same day scheduled.

## 2023-06-01 NOTE — TELEPHONE ENCOUNTER
Caller: Tonia Ritter    Relationship: Self    Best call back number:      [phone]     What is the best time to reach you:     PATIENT WILL BE AVAILABLE FOR THE NEXT HOUR    Who are you requesting to speak with (clinical staff, provider,  specific staff member):     ONE OF THE NURSES    What was the call regarding:     PATIENT REQUESTED A CALL BACK REGARDING A MEDICAL CONCERN SHE HAS

## 2023-06-14 ENCOUNTER — HOSPITAL ENCOUNTER (OUTPATIENT)
Dept: ULTRASOUND IMAGING | Facility: HOSPITAL | Age: 88
Discharge: HOME | End: 2023-06-14
Admitting: SURGERY
Payer: MEDICARE

## 2023-06-14 DIAGNOSIS — K31.84 GASTROPARESIS: ICD-10-CM

## 2023-06-14 PROCEDURE — 76700 US EXAM ABDOM COMPLETE: CPT | Performed by: RADIOLOGY

## 2023-06-14 PROCEDURE — 76700 US EXAM ABDOM COMPLETE: CPT

## 2023-08-01 DIAGNOSIS — R60.9 1+ PITTING EDEMA: ICD-10-CM

## 2023-08-01 DIAGNOSIS — I10 ESSENTIAL HYPERTENSION: ICD-10-CM

## 2023-08-01 RX ORDER — FUROSEMIDE 20 MG/1
20 TABLET ORAL EVERY OTHER DAY
Qty: 45 TABLET | Refills: 1 | OUTPATIENT
Start: 2023-08-01

## 2023-08-01 RX ORDER — LISINOPRIL 5 MG/1
5 TABLET ORAL DAILY
Qty: 90 TABLET | Refills: 1 | OUTPATIENT
Start: 2023-08-01

## 2023-08-08 DIAGNOSIS — R60.9 1+ PITTING EDEMA: ICD-10-CM

## 2023-08-08 DIAGNOSIS — I10 ESSENTIAL HYPERTENSION: ICD-10-CM

## 2023-08-08 RX ORDER — FUROSEMIDE 20 MG/1
20 TABLET ORAL EVERY OTHER DAY
Qty: 45 TABLET | Refills: 1 | OUTPATIENT
Start: 2023-08-08

## 2023-08-08 RX ORDER — LISINOPRIL 5 MG/1
5 TABLET ORAL DAILY
Qty: 90 TABLET | Refills: 1 | OUTPATIENT
Start: 2023-08-08

## 2023-08-08 NOTE — TELEPHONE ENCOUNTER
Caller: Tonia Ritter    Relationship: Self    Best call back number: [phone]     Requested Prescriptions:   Requested Prescriptions     Refused Prescriptions Disp Refills    lisinopril (PRINIVIL,ZESTRIL) 5 MG tablet 90 tablet 1     Sig: Take 1 tablet by mouth Daily.     Refused By: JUAN MANUEL RAMOS     Reason for Refusal: Patient has requested refill too soon    furosemide (LASIX) 20 MG tablet 45 tablet 1     Sig: Take 1 tablet by mouth Every Other Day.     Refused By: JUAN MANUEL RAMOS     Reason for Refusal: Patient has requested refill too soon        Pharmacy where request should be sent: [address] [phone] [facility] [phone]      Last office visit with prescribing clinician: 5/10/2023   Last telemedicine visit with prescribing clinician: Visit date not found   Next office visit with prescribing clinician: 8/10/2023     Additional details provided by patient: PATIENT HAS 2 DAYS LEFT OF THESE MEDICATIONS.    Does the patient have less than a 3 day supply:  [x] Yes  [] No    Would you like a call back once the refill request has been completed: [] Yes [x] No    If the office needs to give you a call back, can they leave a voicemail: [] Yes [x] No    Juan Manuel Ramos LPN   08/08/23 11:40 EDT       Tonia notified that there are refills on file at her pharmacy.

## 2023-08-08 NOTE — TELEPHONE ENCOUNTER
Caller: Tonia Ritter CLARA    Relationship: Self    Best call back number: [phone]     Requested Prescriptions:   Requested Prescriptions     Pending Prescriptions Disp Refills    lisinopril (PRINIVIL,ZESTRIL) 5 MG tablet 90 tablet 1     Sig: Take 1 tablet by mouth Daily.    furosemide (LASIX) 20 MG tablet 45 tablet 1     Sig: Take 1 tablet by mouth Every Other Day.        Pharmacy where request should be sent: [address] [phone] [facility] [phone]      Last office visit with prescribing clinician: 5/10/2023   Last telemedicine visit with prescribing clinician: Visit date not found   Next office visit with prescribing clinician: 8/10/2023     Additional details provided by patient: PATIENT HAS 2 DAYS LEFT OF THESE MEDICATIONS.    Does the patient have less than a 3 day supply:  [x] Yes  [] No    Would you like a call back once the refill request has been completed: [] Yes [x] No    If the office needs to give you a call back, can they leave a voicemail: [] Yes [x] No    Chao Andrea Rep   08/08/23 11:37 EDT

## 2023-08-10 ENCOUNTER — OFFICE VISIT (OUTPATIENT)
Dept: FAMILY MEDICINE CLINIC | Facility: CLINIC | Age: 88
End: 2023-08-10
Payer: MEDICARE

## 2023-08-10 VITALS
DIASTOLIC BLOOD PRESSURE: 74 MMHG | SYSTOLIC BLOOD PRESSURE: 122 MMHG | HEART RATE: 82 BPM | HEIGHT: 65 IN | BODY MASS INDEX: 23.32 KG/M2 | OXYGEN SATURATION: 98 % | WEIGHT: 140 LBS | TEMPERATURE: 97.5 F

## 2023-08-10 DIAGNOSIS — D64.9 NORMOCYTIC ANEMIA: Primary | ICD-10-CM

## 2023-08-10 DIAGNOSIS — I48.0 PAROXYSMAL ATRIAL FIBRILLATION: ICD-10-CM

## 2023-08-10 DIAGNOSIS — R60.9 1+ PITTING EDEMA: ICD-10-CM

## 2023-08-10 DIAGNOSIS — E78.2 MIXED HYPERLIPIDEMIA: ICD-10-CM

## 2023-08-10 DIAGNOSIS — F41.9 ANXIETY: ICD-10-CM

## 2023-08-10 DIAGNOSIS — E53.8 B12 DEFICIENCY: ICD-10-CM

## 2023-08-10 DIAGNOSIS — I10 ESSENTIAL HYPERTENSION: ICD-10-CM

## 2023-08-10 PROCEDURE — 99214 OFFICE O/P EST MOD 30 MIN: CPT | Performed by: FAMILY MEDICINE

## 2023-08-10 PROCEDURE — 85027 COMPLETE CBC AUTOMATED: CPT | Performed by: FAMILY MEDICINE

## 2023-08-10 PROCEDURE — 80053 COMPREHEN METABOLIC PANEL: CPT | Performed by: FAMILY MEDICINE

## 2023-08-10 PROCEDURE — 3078F DIAST BP <80 MM HG: CPT | Performed by: FAMILY MEDICINE

## 2023-08-10 PROCEDURE — 82607 VITAMIN B-12: CPT | Performed by: FAMILY MEDICINE

## 2023-08-10 PROCEDURE — 3074F SYST BP LT 130 MM HG: CPT | Performed by: FAMILY MEDICINE

## 2023-08-10 RX ORDER — SOTALOL HYDROCHLORIDE 120 MG/1
1 TABLET ORAL 2 TIMES DAILY
Qty: 180 EACH | Refills: 1 | Status: SHIPPED | OUTPATIENT
Start: 2023-08-10

## 2023-08-10 RX ORDER — PAROXETINE 10 MG/1
5 TABLET, FILM COATED ORAL NIGHTLY
Qty: 90 TABLET | Refills: 1 | Status: SHIPPED | OUTPATIENT
Start: 2023-08-10

## 2023-08-10 RX ORDER — POTASSIUM CHLORIDE 750 MG/1
10 TABLET, EXTENDED RELEASE ORAL DAILY
Qty: 90 TABLET | Refills: 1 | Status: SHIPPED | OUTPATIENT
Start: 2023-08-10

## 2023-08-10 RX ORDER — ROSUVASTATIN CALCIUM 10 MG/1
10 TABLET, COATED ORAL DAILY
Qty: 90 TABLET | Refills: 1 | Status: SHIPPED | OUTPATIENT
Start: 2023-08-10

## 2023-08-10 RX ORDER — FLUTICASONE PROPIONATE 50 MCG
2 SPRAY, SUSPENSION (ML) NASAL DAILY
Qty: 16 G | Refills: 2 | Status: SHIPPED | OUTPATIENT
Start: 2023-08-10

## 2023-08-10 RX ORDER — LISINOPRIL 5 MG/1
5 TABLET ORAL DAILY
Qty: 90 TABLET | Refills: 1 | Status: SHIPPED | OUTPATIENT
Start: 2023-08-10

## 2023-08-10 RX ORDER — FUROSEMIDE 20 MG/1
20 TABLET ORAL EVERY OTHER DAY
Qty: 45 TABLET | Refills: 1 | Status: SHIPPED | OUTPATIENT
Start: 2023-08-10

## 2023-08-10 NOTE — PROGRESS NOTES
Subjective   Tonia Ritter is a 87 y.o. female.   Pt presents today with CC of Hypertension      History of Present Illness   History of Present Illness  1.  Patient is an 87-year-old female here to follow-up on normocytic anemia.  She denies any known bleeding.  #2 she has hypertension for which she takes Lasix 20 mg every other day, and lisinopril 5 mg daily.  Her blood pressure has been well-controlled at home.  #3 she has pitting edema for which she takes Lasix and replace his potassium with 10 mill equivalent daily.  #4 she has anxiety for which she takes Paxil 5 mg daily.  #5 she has paroxysmal atrial fibrillation for which she takes Xarelto and sotalol.  She denies palpitations.  #6 she has hyperlipidemia for which she takes Crestor 10 mg daily.         The following portions of the patient's history were reviewed and updated as appropriate: allergies, current medications, past family history, past medical history, past social history, past surgical history, and problem list.    Review of Systems   Constitutional:  Negative for chills, fever and unexpected weight loss.   HENT:  Negative for congestion and sore throat.    Eyes:  Negative for blurred vision and visual disturbance.   Respiratory:  Negative for cough and wheezing.    Cardiovascular:  Negative for chest pain and palpitations.   Gastrointestinal:  Negative for abdominal pain and diarrhea.   Endocrine: Negative for cold intolerance and heat intolerance.   Genitourinary:  Negative for dysuria.   Musculoskeletal:  Negative for arthralgias and neck stiffness.   Neurological:  Negative for dizziness, seizures and syncope.   Psychiatric/Behavioral:  Negative for self-injury, suicidal ideas and depressed mood.      Objective   Physical Exam  Vitals and nursing note reviewed.   Constitutional:       Appearance: She is well-developed.   HENT:      Head: Normocephalic and atraumatic.      Right Ear: External ear normal.      Left Ear: External ear  normal.      Nose: Nose normal.   Eyes:      Conjunctiva/sclera: Conjunctivae normal.      Pupils: Pupils are equal, round, and reactive to light.   Cardiovascular:      Rate and Rhythm: Normal rate and regular rhythm.      Heart sounds: Normal heart sounds.   Pulmonary:      Effort: Pulmonary effort is normal.      Breath sounds: Normal breath sounds.   Abdominal:      General: Bowel sounds are normal.      Palpations: Abdomen is soft.   Musculoskeletal:      Cervical back: Normal range of motion and neck supple.   Skin:     General: Skin is warm and dry.   Neurological:      Mental Status: She is alert and oriented to person, place, and time.   Psychiatric:         Behavior: Behavior normal.         Assessment & Plan   Diagnoses and all orders for this visit:    1. Normocytic anemia (Primary)  -     CBC No Differential; Future  -     CBC No Differential  Recheck hemoglobin.  2. Essential hypertension  -     furosemide (LASIX) 20 MG tablet; Take 1 tablet by mouth Every Other Day.  Dispense: 45 tablet; Refill: 1  -     lisinopril (PRINIVIL,ZESTRIL) 5 MG tablet; Take 1 tablet by mouth Daily.  Dispense: 90 tablet; Refill: 1  -     Comprehensive metabolic panel; Future  -     CBC No Differential; Future  -     Comprehensive metabolic panel  -     CBC No Differential    3. 1+ pitting edema  -     furosemide (LASIX) 20 MG tablet; Take 1 tablet by mouth Every Other Day.  Dispense: 45 tablet; Refill: 1  -     potassium chloride (K-DUR,KLOR-CON) 10 MEQ CR tablet; Take 1 tablet by mouth Daily.  Dispense: 90 tablet; Refill: 1  Monitoring potassium and renal function.  Pitting edema was only trace today.  4. Anxiety  -     PARoxetine (Paxil) 10 MG tablet; Take 0.5 tablets by mouth Every Night. Takes 0.5 tablet  Dispense: 90 tablet; Refill: 1    5. Paroxysmal atrial fibrillation  -     potassium chloride (K-DUR,KLOR-CON) 10 MEQ CR tablet; Take 1 tablet by mouth Daily.  Dispense: 90 tablet; Refill: 1  -     rivaroxaban (Xarelto)  20 MG tablet; Take 1 tablet by mouth Daily With Dinner.  Dispense: 90 tablet; Refill: 1  -     Sotalol HCl, AF, 120 MG tablet; Take 1 tablet by mouth 2 (Two) Times a Day.  Dispense: 180 each; Refill: 1    6. Mixed hyperlipidemia  -     rosuvastatin (CRESTOR) 10 MG tablet; Take 1 tablet by mouth Daily. for cholesterol  Dispense: 90 tablet; Refill: 1    7. B12 deficiency  -     Vitamin B12; Future  -     Vitamin B12    Other orders  -     fluticasone (FLONASE) 50 MCG/ACT nasal spray; 2 sprays into the nostril(s) as directed by provider Daily.  Dispense: 16 g; Refill: 2                    BMI is within normal parameters. No other follow-up for BMI required.          This document has been electronically signed by Lisa Delgado  August 10, 2023 13:37 EDT    Dictated Utilizing Dragon Dictation: Part of this note may be an electronic transcription/translation of spoken language to printed text using the Dragon Dictation System.

## 2023-08-11 LAB
ALBUMIN SERPL-MCNC: 4.3 G/DL (ref 3.5–5.2)
ALBUMIN/GLOB SERPL: 1.5 G/DL
ALP SERPL-CCNC: 72 U/L (ref 39–117)
ALT SERPL W P-5'-P-CCNC: 8 U/L (ref 1–33)
ANION GAP SERPL CALCULATED.3IONS-SCNC: 9.6 MMOL/L (ref 5–15)
AST SERPL-CCNC: 24 U/L (ref 1–32)
BILIRUB SERPL-MCNC: 0.3 MG/DL (ref 0–1.2)
BUN SERPL-MCNC: 12 MG/DL (ref 8–23)
BUN/CREAT SERPL: 10.4 (ref 7–25)
CALCIUM SPEC-SCNC: 9.8 MG/DL (ref 8.6–10.5)
CHLORIDE SERPL-SCNC: 102 MMOL/L (ref 98–107)
CO2 SERPL-SCNC: 28.4 MMOL/L (ref 22–29)
CREAT SERPL-MCNC: 1.15 MG/DL (ref 0.57–1)
DEPRECATED RDW RBC AUTO: 44 FL (ref 37–54)
EGFRCR SERPLBLD CKD-EPI 2021: 46.8 ML/MIN/1.73
ERYTHROCYTE [DISTWIDTH] IN BLOOD BY AUTOMATED COUNT: 13 % (ref 12.3–15.4)
GLOBULIN UR ELPH-MCNC: 2.8 GM/DL
GLUCOSE SERPL-MCNC: 72 MG/DL (ref 65–99)
HCT VFR BLD AUTO: 39 % (ref 34–46.6)
HGB BLD-MCNC: 12.4 G/DL (ref 12–15.9)
MCH RBC QN AUTO: 29.5 PG (ref 26.6–33)
MCHC RBC AUTO-ENTMCNC: 31.8 G/DL (ref 31.5–35.7)
MCV RBC AUTO: 92.6 FL (ref 79–97)
PLATELET # BLD AUTO: 154 10*3/MM3 (ref 140–450)
PMV BLD AUTO: 11 FL (ref 6–12)
POTASSIUM SERPL-SCNC: 3.9 MMOL/L (ref 3.5–5.2)
PROT SERPL-MCNC: 7.1 G/DL (ref 6–8.5)
RBC # BLD AUTO: 4.21 10*6/MM3 (ref 3.77–5.28)
SODIUM SERPL-SCNC: 140 MMOL/L (ref 136–145)
VIT B12 BLD-MCNC: 419 PG/ML (ref 211–946)
WBC NRBC COR # BLD: 4.44 10*3/MM3 (ref 3.4–10.8)

## 2023-08-15 ENCOUNTER — TELEPHONE (OUTPATIENT)
Dept: FAMILY MEDICINE CLINIC | Facility: CLINIC | Age: 88
End: 2023-08-15

## 2023-08-15 NOTE — TELEPHONE ENCOUNTER
Caller: Tonia Ritter    Relationship: Self    Best call back number:   [phone]    What test was performed: LABS    When was the test performed: 8/10/23    Additional notes:     PLEASE CALL PATIENT TO GIVE RESULTS

## 2023-08-16 ENCOUNTER — OFFICE VISIT (OUTPATIENT)
Dept: FAMILY MEDICINE CLINIC | Facility: CLINIC | Age: 88
End: 2023-08-16
Payer: MEDICARE

## 2023-08-16 ENCOUNTER — TELEPHONE (OUTPATIENT)
Dept: FAMILY MEDICINE CLINIC | Facility: CLINIC | Age: 88
End: 2023-08-16

## 2023-08-16 VITALS
WEIGHT: 141.6 LBS | BODY MASS INDEX: 23.59 KG/M2 | OXYGEN SATURATION: 93 % | HEIGHT: 65 IN | TEMPERATURE: 97.8 F | DIASTOLIC BLOOD PRESSURE: 68 MMHG | SYSTOLIC BLOOD PRESSURE: 126 MMHG | HEART RATE: 69 BPM

## 2023-08-16 DIAGNOSIS — J01.00 ACUTE NON-RECURRENT MAXILLARY SINUSITIS: ICD-10-CM

## 2023-08-16 DIAGNOSIS — J40 BRONCHITIS: Primary | ICD-10-CM

## 2023-08-16 DIAGNOSIS — R09.82 POSTNASAL DRIP: ICD-10-CM

## 2023-08-16 PROCEDURE — 1159F MED LIST DOCD IN RCRD: CPT | Performed by: FAMILY MEDICINE

## 2023-08-16 PROCEDURE — 3074F SYST BP LT 130 MM HG: CPT | Performed by: FAMILY MEDICINE

## 2023-08-16 PROCEDURE — 1160F RVW MEDS BY RX/DR IN RCRD: CPT | Performed by: FAMILY MEDICINE

## 2023-08-16 PROCEDURE — 3078F DIAST BP <80 MM HG: CPT | Performed by: FAMILY MEDICINE

## 2023-08-16 PROCEDURE — 99213 OFFICE O/P EST LOW 20 MIN: CPT | Performed by: FAMILY MEDICINE

## 2023-08-16 RX ORDER — AMOXICILLIN 875 MG/1
875 TABLET, COATED ORAL 2 TIMES DAILY
Qty: 10 TABLET | Refills: 0 | Status: SHIPPED | OUTPATIENT
Start: 2023-08-16 | End: 2023-08-18

## 2023-08-17 ENCOUNTER — TELEPHONE (OUTPATIENT)
Dept: FAMILY MEDICINE CLINIC | Facility: CLINIC | Age: 88
End: 2023-08-17
Payer: MEDICARE

## 2023-08-17 NOTE — TELEPHONE ENCOUNTER
Caller: Tonia Ritter     Relationship: SELF    Best call back number: [phone]     What is your medical concern? FEVER, FACE FLUSHED, COUGH, CHEST GETS TIGHT AND HURTS WHEN COUGHING, GETTING WORSE, FEELS REALLY ROUGH, RIGHT EAR PAIN, FEELS LIKE SHE MAY HAVE COVID    How long has this issue been going on? WAS SEEN IN THE OFFICE 8/16/23    Is your provider already aware of this issue? NOT THAT SHE IS GETTING WORSE    Have you been treated for this issue? WAS GIVEN ANTI-BIOTICS 8/16/23    PHARMACY:[address] - [phone] [facility] [phone] FX     ASKING FOR A COVID TEST. PLEASE CALL TO ADVISE

## 2023-08-17 NOTE — PROGRESS NOTES
Subjective   Tonia Ritter is a 87 y.o. female.   Pt presents today with CC of Cough (Pt had been painting and feels it has caused her cough /Has been taking mucus ER that had )      History of Present Illness   History of Present Illness  Patient is a pleasant 87-year-old female who reports wheezing after inhaling paint fumes yesterday.  It was an indoor paint, and she was painting outside with it.  She denies any shortness of breath, but she does feel congested and has been wheezing.  She reports worsening postnasal drip for the past 2 weeks and request antibiotic.     The following portions of the patient's history were reviewed and updated as appropriate: allergies, current medications, past family history, past medical history, past social history, past surgical history, and problem list.    Review of Systems   Constitutional:  Negative for chills, fever and unexpected weight loss.   HENT:  Negative for congestion and sore throat.    Eyes:  Negative for blurred vision and visual disturbance.   Respiratory:  Positive for cough and wheezing.    Cardiovascular:  Negative for chest pain and palpitations.   Gastrointestinal:  Negative for abdominal pain and diarrhea.   Endocrine: Negative for cold intolerance and heat intolerance.   Genitourinary:  Negative for dysuria.   Musculoskeletal:  Negative for arthralgias and neck stiffness.   Neurological:  Negative for dizziness, seizures and syncope.   Psychiatric/Behavioral:  Negative for self-injury, suicidal ideas and depressed mood.      Objective   Physical Exam  Vitals and nursing note reviewed.   Constitutional:       Appearance: She is well-developed.   HENT:      Head: Normocephalic and atraumatic.      Right Ear: External ear normal.      Left Ear: External ear normal.      Nose: Nose normal.   Eyes:      Conjunctiva/sclera: Conjunctivae normal.      Pupils: Pupils are equal, round, and reactive to light.   Cardiovascular:      Rate and Rhythm:  Normal rate and regular rhythm.      Heart sounds: Normal heart sounds.   Pulmonary:      Effort: Pulmonary effort is normal. No respiratory distress.      Breath sounds: Normal breath sounds. No wheezing or rhonchi.   Abdominal:      General: Bowel sounds are normal.      Palpations: Abdomen is soft.   Musculoskeletal:      Cervical back: Normal range of motion and neck supple.   Skin:     General: Skin is warm and dry.   Neurological:      Mental Status: She is alert and oriented to person, place, and time.   Psychiatric:         Behavior: Behavior normal.         Assessment & Plan   Diagnoses and all orders for this visit:    1. Bronchitis (Primary)  -     amoxicillin (AMOXIL) 875 MG tablet; Take 1 tablet by mouth 2 (Two) Times a Day.  Dispense: 10 tablet; Refill: 0    2. Postnasal drip  -     amoxicillin (AMOXIL) 875 MG tablet; Take 1 tablet by mouth 2 (Two) Times a Day.  Dispense: 10 tablet; Refill: 0    3. Acute non-recurrent maxillary sinusitis  -     amoxicillin (AMOXIL) 875 MG tablet; Take 1 tablet by mouth 2 (Two) Times a Day.  Dispense: 10 tablet; Refill: 0    He reports postnasal drip that is worse than baseline for the past 2 weeks.  I do not believe that her increase in wheezing after inhaling paint fumes is going to continue to be a problem.  She is not wheezing today.  Likely a short-term reaction, however, she request an antibiotic be sent in in the case that she needs it over the weekend.  At this time, I do not believe that antibiotic is indicated, however, her oxygen was a little low despite a normal pulmonary exam otherwise.  If she worsens, start antibiotic.                BMI is within normal parameters. No other follow-up for BMI required.          This document has been electronically signed by Joey Orozco DO  August 17, 2023 11:28 EDT    Dictated Utilizing Dragon Dictation: Part of this note may be an electronic transcription/translation of spoken language to printed text using the Dragon  Dictation System.

## 2023-08-18 ENCOUNTER — OFFICE VISIT (OUTPATIENT)
Dept: FAMILY MEDICINE CLINIC | Facility: CLINIC | Age: 88
End: 2023-08-18
Payer: MEDICARE

## 2023-08-18 VITALS
HEART RATE: 79 BPM | HEIGHT: 65 IN | BODY MASS INDEX: 23.53 KG/M2 | DIASTOLIC BLOOD PRESSURE: 80 MMHG | SYSTOLIC BLOOD PRESSURE: 130 MMHG | TEMPERATURE: 97.5 F | OXYGEN SATURATION: 95 % | WEIGHT: 141.2 LBS

## 2023-08-18 DIAGNOSIS — I48.0 PAROXYSMAL ATRIAL FIBRILLATION: ICD-10-CM

## 2023-08-18 DIAGNOSIS — U07.1 COVID-19 VIRUS DETECTED: Primary | ICD-10-CM

## 2023-08-18 LAB
EXPIRATION DATE: ABNORMAL
FLUAV AG UPPER RESP QL IA.RAPID: NOT DETECTED
FLUBV AG UPPER RESP QL IA.RAPID: NOT DETECTED
INTERNAL CONTROL: ABNORMAL
Lab: ABNORMAL
SARS-COV-2 AG UPPER RESP QL IA.RAPID: DETECTED

## 2023-08-18 PROCEDURE — 3079F DIAST BP 80-89 MM HG: CPT | Performed by: FAMILY MEDICINE

## 2023-08-18 PROCEDURE — 99213 OFFICE O/P EST LOW 20 MIN: CPT | Performed by: FAMILY MEDICINE

## 2023-08-18 PROCEDURE — 3075F SYST BP GE 130 - 139MM HG: CPT | Performed by: FAMILY MEDICINE

## 2023-08-18 PROCEDURE — 87428 SARSCOV & INF VIR A&B AG IA: CPT | Performed by: FAMILY MEDICINE

## 2023-08-18 NOTE — TELEPHONE ENCOUNTER
Spoke with patient.  Someone has already scheduled a 1pm appointment today.  She is having transportation issues but hopes to make it in.

## 2023-08-18 NOTE — PROGRESS NOTES
Subjective   Tonia Ritter is a 87 y.o. female.   Pt presents today with CC of Fever      History of Present Illness   History of Present Illness  Patient is 3 days into recent illness.  She reports fevers yesterday, but none today.  Of note, she has atrial fibrillation and takes Xarelto daily.  She started amoxicillin yesterday as her symptoms had worsened.  She complains of nasal congestion and malaise.  No chest pain or shortness of breath.     The following portions of the patient's history were reviewed and updated as appropriate: allergies, current medications, past family history, past medical history, past social history, past surgical history, and problem list.    Review of Systems   Constitutional:  Negative for chills, fever and unexpected weight loss.   HENT:  Negative for congestion and sore throat.    Eyes:  Negative for blurred vision and visual disturbance.   Respiratory:  Negative for cough and wheezing.    Cardiovascular:  Negative for chest pain and palpitations.   Gastrointestinal:  Negative for abdominal pain and diarrhea.   Endocrine: Negative for cold intolerance and heat intolerance.   Genitourinary:  Negative for dysuria.   Musculoskeletal:  Negative for arthralgias and neck stiffness.   Neurological:  Negative for dizziness, seizures and syncope.   Psychiatric/Behavioral:  Negative for self-injury, suicidal ideas and depressed mood.      Objective   Physical Exam  Vitals and nursing note reviewed.   Constitutional:       Appearance: She is well-developed.   HENT:      Head: Normocephalic and atraumatic.      Right Ear: External ear normal.      Left Ear: External ear normal.      Nose: Nose normal.   Eyes:      Conjunctiva/sclera: Conjunctivae normal.      Pupils: Pupils are equal, round, and reactive to light.   Cardiovascular:      Rate and Rhythm: Normal rate and regular rhythm.      Heart sounds: Normal heart sounds.   Pulmonary:      Effort: Pulmonary effort is normal.      Breath  sounds: Normal breath sounds.   Abdominal:      General: Bowel sounds are normal.      Palpations: Abdomen is soft.   Musculoskeletal:      Cervical back: Normal range of motion and neck supple.   Skin:     General: Skin is warm and dry.   Neurological:      Mental Status: She is alert and oriented to person, place, and time.   Psychiatric:         Behavior: Behavior normal.         Assessment & Plan   Diagnoses and all orders for this visit:    1. COVID-19 virus detected (Primary)  -     Nirmatrelvir&Ritonavir 300/100 (PAXLOVID) 20 x 150 MG & 10 x 100MG tablet therapy pack tablet; Take 2 tablets by mouth 2 (Two) Times a Day. Indications: COVID-19 Confirmed Infection  Dispense: 20 tablet; Refill: 0  -     POCT SARS-CoV-2 Antigen MEHREEN + Flu  She was recently started on amoxicillin.  Tested positive for COVID today.  Adjustment to dose due to chronic kidney disease.  I discussed the side effects with her including potential interaction with Xarelto.  Decreased Xarelto to 15 mg for the next 5 days while she is taking Paxlovid.  It is my opinion that benefit outweighs risk.     2. Paroxysmal atrial fibrillation  Small prescription of just 5 15 mg Xarelto sent to her pharmacy.                  BMI is within normal parameters. No other follow-up for BMI required.          This document has been electronically signed by Joey Orozco DO  August 18, 2023 13:29 EDT    Dictated Utilizing Dragon Dictation: Part of this note may be an electronic transcription/translation of spoken language to printed text using the Dragon Dictation System.

## 2023-08-22 ENCOUNTER — TELEPHONE (OUTPATIENT)
Dept: FAMILY MEDICINE CLINIC | Facility: CLINIC | Age: 88
End: 2023-08-22
Payer: MEDICARE

## 2023-08-22 DIAGNOSIS — I48.0 PAROXYSMAL ATRIAL FIBRILLATION: ICD-10-CM

## 2023-08-22 NOTE — TELEPHONE ENCOUNTER
Caller: Tonia Ritter    Relationship: Self    Best call back number: [phone]    What is the best time to reach you: ANYTIME    Who are you requesting to speak with (clinical staff, provider,  specific staff member): DOUGLAS    What was the call regarding: PATIENT STATES THAT PAXLOVID HAS HER BOTTOM RAW AMD HER MOUTH SORE,SHE HAS STOPPED TAKEN IT. ALSO PATIENT WOULD LIKE TO DISCUSS HER TRIP TO URGENT CARE.    Would you like a callback: YES

## 2023-08-22 NOTE — TELEPHONE ENCOUNTER
Caller: Tonia Ritter    Relationship: Self    Best call back number: [phone]    What is the best time to reach you: ANYTIME    Who are you requesting to speak with (clinical staff, provider,  specific staff member): DOUGLAS    What was the call regarding: PATIENT STATES THAT PAXLOVID HAS HER BOTTOM RAW AMD HER MOUTH SORE,SHE HAS STOPPED TAKEN IT. ALSO PATIENT WOULD LIKE TO DISCUSS HER TRIP TO URGENT CARE.    Would you like a callback: YES        Spoke with patient she reports she had to stop the Paxlovid due to it making her buttocks & her Mouth sore & raw stopped it day before yesterday,had some blood streaks in her sputum Saturday,[facility] was ok no Pneumonia is doing better still up moving around thinks she's on the mend.

## 2023-08-22 NOTE — TELEPHONE ENCOUNTER
Paxlovid discontinued from your medication list.  Xarelto was decreased from 20 mg down to 15 mg while you are taking Paxlovid.  I recommend starting back on 20 mg tomorrow of Xarelto.

## 2023-08-22 NOTE — TELEPHONE ENCOUNTER
Paxlovid discontinued from your medication list.  Xarelto was decreased from 20 mg down to 15 mg while you are taking Paxlovid.  I recommend starting back on 20 mg tomorrow of Xarelto.      Spoke with patient she never did decrease it she did not remember that so she has been on the 20 mg the entire time.

## 2023-09-12 ENCOUNTER — OFFICE VISIT (OUTPATIENT)
Dept: FAMILY MEDICINE CLINIC | Facility: CLINIC | Age: 88
End: 2023-09-12
Payer: MEDICARE

## 2023-09-12 VITALS
DIASTOLIC BLOOD PRESSURE: 68 MMHG | WEIGHT: 140.2 LBS | TEMPERATURE: 98.4 F | HEIGHT: 65 IN | SYSTOLIC BLOOD PRESSURE: 120 MMHG | HEART RATE: 90 BPM | BODY MASS INDEX: 23.36 KG/M2 | OXYGEN SATURATION: 99 %

## 2023-09-12 DIAGNOSIS — T50.1X5A: ICD-10-CM

## 2023-09-12 DIAGNOSIS — N18.9 CHRONIC KIDNEY DISEASE, UNSPECIFIED CKD STAGE: ICD-10-CM

## 2023-09-12 DIAGNOSIS — R79.89 ELEVATED SERUM CREATININE: ICD-10-CM

## 2023-09-12 DIAGNOSIS — I10 ESSENTIAL HYPERTENSION: Primary | ICD-10-CM

## 2023-09-12 PROCEDURE — 81001 URINALYSIS AUTO W/SCOPE: CPT | Performed by: INTERNAL MEDICINE

## 2023-09-12 PROCEDURE — 80053 COMPREHEN METABOLIC PANEL: CPT | Performed by: INTERNAL MEDICINE

## 2023-09-12 PROCEDURE — 82043 UR ALBUMIN QUANTITATIVE: CPT | Performed by: INTERNAL MEDICINE

## 2023-09-12 PROCEDURE — 84156 ASSAY OF PROTEIN URINE: CPT | Performed by: INTERNAL MEDICINE

## 2023-09-12 PROCEDURE — 82570 ASSAY OF URINE CREATININE: CPT | Performed by: INTERNAL MEDICINE

## 2023-09-12 NOTE — PROGRESS NOTES
Subjective   Tonia Ritter is a 88 y.o. female.   Pt presents today with CC of Hypertension      History of Present Illness   History of Present Illness  1.  Patient is an 88-year-old female here to follow-up on hypertension.  She has associated chronic kidney disease.  She had an increase in her serum creatinine recently and is agreeable to recheck.  She has an appointment with nephrology, whom she sees for chronic kidney disease, next week.     The following portions of the patient's history were reviewed and updated as appropriate: allergies, current medications, past family history, past medical history, past social history, past surgical history, and problem list.    Review of Systems   Constitutional:  Negative for chills, fever and unexpected weight loss.   HENT:  Negative for congestion and sore throat.    Eyes:  Negative for blurred vision and visual disturbance.   Respiratory:  Negative for cough and wheezing.    Cardiovascular:  Negative for chest pain and palpitations.   Gastrointestinal:  Negative for abdominal pain and diarrhea.   Endocrine: Negative for cold intolerance and heat intolerance.   Genitourinary:  Negative for dysuria.   Musculoskeletal:  Negative for arthralgias and neck stiffness.   Neurological:  Negative for dizziness, seizures and syncope.   Psychiatric/Behavioral:  Negative for self-injury, suicidal ideas and depressed mood.      Objective   Physical Exam  Vitals and nursing note reviewed.   Constitutional:       Appearance: She is well-developed.   HENT:      Head: Normocephalic and atraumatic.      Right Ear: External ear normal.      Left Ear: External ear normal.      Nose: Nose normal.   Eyes:      Conjunctiva/sclera: Conjunctivae normal.      Pupils: Pupils are equal, round, and reactive to light.   Cardiovascular:      Rate and Rhythm: Normal rate and regular rhythm.      Heart sounds: Normal heart sounds.   Pulmonary:      Effort: Pulmonary effort is normal.      Breath  sounds: Normal breath sounds.   Abdominal:      General: Bowel sounds are normal.      Palpations: Abdomen is soft.   Musculoskeletal:      Cervical back: Normal range of motion and neck supple.   Skin:     General: Skin is warm and dry.   Neurological:      Mental Status: She is alert and oriented to person, place, and time.   Psychiatric:         Behavior: Behavior normal.         Assessment & Plan   Diagnoses and all orders for this visit:    1. Essential hypertension (Primary)  Continue current treatment.  She has not lost any further weight.  She has lab work today to be drawn for nephrology.  She has an appointment with nephrology next week in regard to her chronic kidney disease and elevated serum creatinine associated with furosemide.  She has no swelling at this time and is doing well.  2. Elevated serum creatinine    3. Adverse effect of furosemide, initial encounter                     This document has been electronically signed by Lisa Delgado  September 12, 2023 11:32 EDT    Dictated Utilizing Dragon Dictation: Part of this note may be an electronic transcription/translation of spoken language to printed text using the Dragon Dictation System.

## 2023-09-13 LAB
ALBUMIN SERPL-MCNC: 4.2 G/DL (ref 3.5–5.2)
ALBUMIN UR-MCNC: 4.5 MG/DL
ALBUMIN/GLOB SERPL: 1.5 G/DL
ALP SERPL-CCNC: 78 U/L (ref 39–117)
ALT SERPL W P-5'-P-CCNC: 8 U/L (ref 1–33)
ANION GAP SERPL CALCULATED.3IONS-SCNC: 9 MMOL/L (ref 5–15)
AST SERPL-CCNC: 19 U/L (ref 1–32)
BACTERIA UR QL AUTO: ABNORMAL /HPF
BILIRUB SERPL-MCNC: 0.3 MG/DL (ref 0–1.2)
BILIRUB UR QL STRIP: NEGATIVE
BUN SERPL-MCNC: 11 MG/DL (ref 8–23)
BUN/CREAT SERPL: 10.6 (ref 7–25)
CALCIUM SPEC-SCNC: 9.5 MG/DL (ref 8.6–10.5)
CHLORIDE SERPL-SCNC: 102 MMOL/L (ref 98–107)
CLARITY UR: CLEAR
CO2 SERPL-SCNC: 30 MMOL/L (ref 22–29)
COD CRY URNS QL: ABNORMAL /HPF
COLOR UR: YELLOW
CREAT SERPL-MCNC: 1.04 MG/DL (ref 0.57–1)
CREAT UR-MCNC: 118.6 MG/DL
CREAT UR-MCNC: 118.6 MG/DL
EGFRCR SERPLBLD CKD-EPI 2021: 52.5 ML/MIN/1.73
GLOBULIN UR ELPH-MCNC: 2.8 GM/DL
GLUCOSE SERPL-MCNC: 104 MG/DL (ref 65–99)
GLUCOSE UR STRIP-MCNC: NEGATIVE MG/DL
HGB UR QL STRIP.AUTO: NEGATIVE
HYALINE CASTS UR QL AUTO: ABNORMAL /LPF
KETONES UR QL STRIP: NEGATIVE
LEUKOCYTE ESTERASE UR QL STRIP.AUTO: ABNORMAL
MICROALBUMIN/CREAT UR: 37.9 MG/G
NITRITE UR QL STRIP: NEGATIVE
PH UR STRIP.AUTO: 6.5 [PH] (ref 5–8)
POTASSIUM SERPL-SCNC: 4 MMOL/L (ref 3.5–5.2)
PROT ?TM UR-MCNC: 18 MG/DL
PROT SERPL-MCNC: 7 G/DL (ref 6–8.5)
PROT UR QL STRIP: ABNORMAL
PROT/CREAT UR: 151.8 MG/G CREA (ref 0–200)
RBC # UR STRIP: ABNORMAL /HPF
REF LAB TEST METHOD: ABNORMAL
SODIUM SERPL-SCNC: 141 MMOL/L (ref 136–145)
SP GR UR STRIP: 1.02 (ref 1–1.03)
SQUAMOUS #/AREA URNS HPF: ABNORMAL /HPF
UROBILINOGEN UR QL STRIP: ABNORMAL
WBC # UR STRIP: ABNORMAL /HPF

## 2023-10-12 ENCOUNTER — TELEPHONE (OUTPATIENT)
Dept: FAMILY MEDICINE CLINIC | Facility: CLINIC | Age: 88
End: 2023-10-12
Payer: MEDICARE

## 2023-10-13 ENCOUNTER — TELEPHONE (OUTPATIENT)
Dept: FAMILY MEDICINE CLINIC | Facility: CLINIC | Age: 88
End: 2023-10-13

## 2023-10-13 DIAGNOSIS — J40 BRONCHITIS: ICD-10-CM

## 2023-10-13 DIAGNOSIS — J40 BRONCHITIS: Primary | ICD-10-CM

## 2023-10-13 DIAGNOSIS — R09.82 POSTNASAL DRIP: ICD-10-CM

## 2023-10-13 DIAGNOSIS — J01.00 ACUTE NON-RECURRENT MAXILLARY SINUSITIS: ICD-10-CM

## 2023-10-13 RX ORDER — AMOXICILLIN 875 MG/1
TABLET, COATED ORAL
Qty: 10 TABLET | Refills: 5 | OUTPATIENT
Start: 2023-10-13

## 2023-10-13 RX ORDER — AMOXICILLIN 875 MG/1
875 TABLET, COATED ORAL 2 TIMES DAILY
Qty: 14 TABLET | Refills: 0 | Status: SHIPPED | OUTPATIENT
Start: 2023-10-13

## 2023-10-13 NOTE — TELEPHONE ENCOUNTER
There are many things that could be causing your cough.  This includes postviral cough, CHF, or a new bronchitis/sinusitis.  You need to be evaluated.  In the meantime, amoxicillin sent.  You should follow-up in our clinic next week.

## 2023-10-13 NOTE — TELEPHONE ENCOUNTER
Caller: Tonia Ritter    Relationship: Self    Best call back number:[phone]    What medication are you requesting: ANTIBIOTICS REQUEST    What are your current symptoms: LITTLE COUGHING; DRAINAGE; SOME SINUS SYMPTOMS RUNNY NOSE, ETC    How long have you been experiencing symptoms: A COUPLE DAYS    Have you had these symptoms before:    [x] Yes  [] No    Have you been treated for these symptoms before:   [x] Yes  [] No    If a prescription is needed, what is your preferred pharmacy and phone number: [address] [phone] [facility] [phone]      Additional notes: PATIENT WOULD LIKE TO KNOW IF PCP WOULD CALL HER IN ANTIBIOTICS TO HELP CLEAR THESE SYMPTOMS AND WOULD LIKE TO KNOW IF SHE WOULD NEED TO BE SEEN OR JUST BE ABLE TO GET PCP TO CALL THEM INTO PHARMACY BEFORE OFFICE CLOSES FOR WEEKEND    PATIENT STATED THAT SHE WOULD PREFER TO NOT COME INTO OFFICE DUE TO NOT FEELING THAT WELL, BUT WOULD BE WILLING TELEHEALTH IF NEEDED    PLEASE ADVISE

## 2023-10-18 ENCOUNTER — OFFICE VISIT (OUTPATIENT)
Dept: FAMILY MEDICINE CLINIC | Facility: CLINIC | Age: 88
End: 2023-10-18
Payer: MEDICARE

## 2023-10-18 VITALS
TEMPERATURE: 97.3 F | HEART RATE: 81 BPM | HEIGHT: 65 IN | BODY MASS INDEX: 23.79 KG/M2 | WEIGHT: 142.8 LBS | DIASTOLIC BLOOD PRESSURE: 74 MMHG | OXYGEN SATURATION: 97 % | SYSTOLIC BLOOD PRESSURE: 130 MMHG

## 2023-10-18 DIAGNOSIS — J06.9 UPPER RESPIRATORY TRACT INFECTION, UNSPECIFIED TYPE: Primary | ICD-10-CM

## 2023-10-18 DIAGNOSIS — E78.2 MIXED HYPERLIPIDEMIA: ICD-10-CM

## 2023-10-18 DIAGNOSIS — I10 ESSENTIAL HYPERTENSION: ICD-10-CM

## 2023-10-18 DIAGNOSIS — I48.0 PAROXYSMAL ATRIAL FIBRILLATION: ICD-10-CM

## 2023-10-18 DIAGNOSIS — F41.9 ANXIETY: ICD-10-CM

## 2023-10-18 DIAGNOSIS — R60.9 1+ PITTING EDEMA: ICD-10-CM

## 2023-10-18 PROCEDURE — 99213 OFFICE O/P EST LOW 20 MIN: CPT | Performed by: FAMILY MEDICINE

## 2023-10-18 NOTE — PROGRESS NOTES
Subjective   Tonia Ritter is a 88 y.o. female.   Pt presents today with CC of Nasal Congestion      History of Present Illness   History of Present Illness  1.  Patient is an 88-year-old female who complains of nasal congestion, cough, and malaise.  She was started on amoxicillin a few days ago via telephone encounter.  She has been taking the medication and reports that she is doing better.         The following portions of the patient's history were reviewed and updated as appropriate: allergies, current medications, past family history, past medical history, past social history, past surgical history, and problem list.    Review of Systems   Constitutional:  Negative for chills, fever and unexpected weight loss.   HENT:  Positive for congestion. Negative for sore throat.    Eyes:  Negative for blurred vision and visual disturbance.   Respiratory:  Positive for cough. Negative for shortness of breath and wheezing.    Cardiovascular:  Negative for chest pain, palpitations and leg swelling.   Gastrointestinal:  Negative for abdominal pain and diarrhea.   Endocrine: Negative for cold intolerance and heat intolerance.   Genitourinary:  Negative for dysuria.   Musculoskeletal:  Negative for arthralgias and neck stiffness.   Neurological:  Negative for dizziness, seizures and syncope.   Psychiatric/Behavioral:  Negative for self-injury, suicidal ideas and depressed mood.        Objective   Physical Exam  Vitals and nursing note reviewed.   Constitutional:       Appearance: She is well-developed.   HENT:      Head: Normocephalic and atraumatic.      Right Ear: External ear normal.      Left Ear: External ear normal.      Nose: Nose normal.   Eyes:      Conjunctiva/sclera: Conjunctivae normal.      Pupils: Pupils are equal, round, and reactive to light.   Cardiovascular:      Rate and Rhythm: Normal rate and regular rhythm.      Heart sounds: Normal heart sounds.   Pulmonary:      Effort: Pulmonary effort is normal.  No respiratory distress.      Breath sounds: Normal breath sounds. No wheezing or rhonchi.   Abdominal:      General: Bowel sounds are normal.      Palpations: Abdomen is soft.   Musculoskeletal:      Cervical back: Normal range of motion and neck supple.   Skin:     General: Skin is warm and dry.   Neurological:      Mental Status: She is alert and oriented to person, place, and time.   Psychiatric:         Behavior: Behavior normal.           Assessment & Plan   Diagnoses and all orders for this visit:    1. Upper respiratory tract infection, unspecified type (Primary)  She is going to continue amoxicillin until the end of the course.  No addition to her treatment today.  Vital signs are stable, heart has normal rate and rhythm, and her edema is stable with baseline.  Follow-up as needed.  2. Paroxysmal atrial fibrillation    3. Mixed hyperlipidemia    4. 1+ pitting edema                        BMI is within normal parameters. No other follow-up for BMI required.          This document has been electronically signed by Lisa Delgado  October 18, 2023 16:21 EDT    Dictated Utilizing Dragon Dictation: Part of this note may be an electronic transcription/translation of spoken language to printed text using the Dragon Dictation System.

## 2023-10-27 ENCOUNTER — HOSPITAL ENCOUNTER (OUTPATIENT)
Dept: BONE DENSITY | Facility: HOSPITAL | Age: 88
Discharge: HOME | End: 2023-10-27
Admitting: FAMILY MEDICINE
Payer: MEDICARE

## 2023-10-27 PROCEDURE — 77080 DXA BONE DENSITY AXIAL: CPT

## 2023-10-27 PROCEDURE — 77080 DXA BONE DENSITY AXIAL: CPT | Performed by: RADIOLOGY

## 2023-11-06 ENCOUNTER — OFFICE VISIT (OUTPATIENT)
Dept: FAMILY MEDICINE CLINIC | Facility: CLINIC | Age: 88
End: 2023-11-06
Payer: MEDICARE

## 2023-11-06 VITALS
DIASTOLIC BLOOD PRESSURE: 76 MMHG | HEART RATE: 81 BPM | OXYGEN SATURATION: 99 % | HEIGHT: 65 IN | BODY MASS INDEX: 23.72 KG/M2 | SYSTOLIC BLOOD PRESSURE: 130 MMHG | WEIGHT: 142.4 LBS | TEMPERATURE: 98.2 F

## 2023-11-06 DIAGNOSIS — R60.9 1+ PITTING EDEMA: ICD-10-CM

## 2023-11-06 DIAGNOSIS — I10 ESSENTIAL HYPERTENSION: ICD-10-CM

## 2023-11-06 DIAGNOSIS — M85.852 OSTEOPENIA OF NECKS OF BOTH FEMURS: Primary | ICD-10-CM

## 2023-11-06 DIAGNOSIS — M85.851 OSTEOPENIA OF NECKS OF BOTH FEMURS: Primary | ICD-10-CM

## 2023-11-06 DIAGNOSIS — J30.2 SEASONAL ALLERGIES: ICD-10-CM

## 2023-11-06 DIAGNOSIS — J06.9 UPPER RESPIRATORY TRACT INFECTION, UNSPECIFIED TYPE: ICD-10-CM

## 2023-11-06 DIAGNOSIS — I48.0 PAROXYSMAL ATRIAL FIBRILLATION: ICD-10-CM

## 2023-11-06 DIAGNOSIS — E78.2 MIXED HYPERLIPIDEMIA: ICD-10-CM

## 2023-11-06 DIAGNOSIS — F41.9 ANXIETY: ICD-10-CM

## 2023-11-06 DIAGNOSIS — R79.89 ELEVATED SERUM CREATININE: ICD-10-CM

## 2023-11-06 PROCEDURE — 36415 COLL VENOUS BLD VENIPUNCTURE: CPT | Performed by: FAMILY MEDICINE

## 2023-11-06 PROCEDURE — 83735 ASSAY OF MAGNESIUM: CPT | Performed by: FAMILY MEDICINE

## 2023-11-06 PROCEDURE — 1160F RVW MEDS BY RX/DR IN RCRD: CPT | Performed by: FAMILY MEDICINE

## 2023-11-06 PROCEDURE — 82306 VITAMIN D 25 HYDROXY: CPT | Performed by: FAMILY MEDICINE

## 2023-11-06 PROCEDURE — 80048 BASIC METABOLIC PNL TOTAL CA: CPT | Performed by: FAMILY MEDICINE

## 2023-11-06 PROCEDURE — 1159F MED LIST DOCD IN RCRD: CPT | Performed by: FAMILY MEDICINE

## 2023-11-06 PROCEDURE — 84100 ASSAY OF PHOSPHORUS: CPT | Performed by: FAMILY MEDICINE

## 2023-11-06 PROCEDURE — 99214 OFFICE O/P EST MOD 30 MIN: CPT | Performed by: FAMILY MEDICINE

## 2023-11-06 PROCEDURE — 90677 PCV20 VACCINE IM: CPT | Performed by: FAMILY MEDICINE

## 2023-11-06 PROCEDURE — G0009 ADMIN PNEUMOCOCCAL VACCINE: HCPCS | Performed by: FAMILY MEDICINE

## 2023-11-06 RX ORDER — SOTALOL HYDROCHLORIDE 120 MG/1
1 TABLET ORAL 2 TIMES DAILY
Qty: 180 EACH | Refills: 1 | Status: SHIPPED | OUTPATIENT
Start: 2023-11-06

## 2023-11-06 RX ORDER — PAROXETINE 10 MG/1
5 TABLET, FILM COATED ORAL NIGHTLY
Qty: 90 TABLET | Refills: 1 | Status: SHIPPED | OUTPATIENT
Start: 2023-11-06

## 2023-11-06 RX ORDER — MULTIPLE VITAMINS W/ MINERALS TAB 9MG-400MCG
1 TAB ORAL DAILY
Qty: 90 TABLET | Refills: 1 | Status: SHIPPED | OUTPATIENT
Start: 2023-11-06

## 2023-11-06 RX ORDER — ROSUVASTATIN CALCIUM 10 MG/1
10 TABLET, COATED ORAL DAILY
Qty: 90 TABLET | Refills: 1 | Status: SHIPPED | OUTPATIENT
Start: 2023-11-06

## 2023-11-06 RX ORDER — LISINOPRIL 5 MG/1
5 TABLET ORAL DAILY
Qty: 90 TABLET | Refills: 1 | Status: SHIPPED | OUTPATIENT
Start: 2023-11-06

## 2023-11-06 RX ORDER — FUROSEMIDE 20 MG/1
20 TABLET ORAL EVERY OTHER DAY
Qty: 45 TABLET | Refills: 1 | Status: SHIPPED | OUTPATIENT
Start: 2023-11-06

## 2023-11-06 RX ORDER — POTASSIUM CHLORIDE 750 MG/1
10 TABLET, EXTENDED RELEASE ORAL DAILY
Qty: 90 TABLET | Refills: 1 | Status: SHIPPED | OUTPATIENT
Start: 2023-11-06

## 2023-11-06 RX ORDER — FLUTICASONE PROPIONATE 50 MCG
2 SPRAY, SUSPENSION (ML) NASAL DAILY
Qty: 16 G | Refills: 2 | Status: SHIPPED | OUTPATIENT
Start: 2023-11-06

## 2023-11-06 NOTE — PROGRESS NOTES
Subjective   Tonia Ritter is a 88 y.o. female.   Pt presents today with CC of URI (Follow up)      History of Present Illness   History of Present Illness  1.  Patient is a pleasant 88-year-old female here to follow-up on upper respiratory illness.  She completed antibiotic and reports that she is doing much better.  #2 she has osteopenia as seen on recent DEXA scan.  Currently, she does not take a multivitamin and is agreeable to do so.  She is agreeable to blood work to check her levels.  She has hypertension for which she takes Lasix and lisinopril.  She reports that her blood pressure has been elevated without the use of lisinopril.  She attempted a trial without it.  She currently takes Lasix just every other day.  #3 she has anxiety for which she takes paroxetine just 5 mg daily.  4 she has paroxysmal atrial fibrillation for which she takes Xarelto and sotalol.  She has not had palpitations recently.  #5 she is agreeable to pneumococcal vaccine, but does not want flu shot.       The following portions of the patient's history were reviewed and updated as appropriate: allergies, current medications, past family history, past medical history, past social history, past surgical history, and problem list.    Review of Systems   Constitutional:  Negative for chills, fever and unexpected weight loss.   HENT:  Negative for congestion and sore throat.    Eyes:  Negative for blurred vision and visual disturbance.   Respiratory:  Negative for cough and wheezing.    Cardiovascular:  Negative for chest pain and palpitations.   Gastrointestinal:  Negative for abdominal pain and diarrhea.   Endocrine: Negative for cold intolerance and heat intolerance.   Genitourinary:  Negative for dysuria.   Musculoskeletal:  Negative for arthralgias and neck stiffness.   Neurological:  Negative for dizziness, seizures and syncope.   Psychiatric/Behavioral:  Negative for self-injury, suicidal ideas and depressed mood.         Objective   Physical Exam  Vitals and nursing note reviewed.   Constitutional:       Appearance: She is well-developed.   HENT:      Head: Normocephalic and atraumatic.      Right Ear: External ear normal.      Left Ear: External ear normal.      Nose: Nose normal.   Eyes:      Conjunctiva/sclera: Conjunctivae normal.      Pupils: Pupils are equal, round, and reactive to light.   Cardiovascular:      Rate and Rhythm: Normal rate and regular rhythm.      Heart sounds: Normal heart sounds.   Pulmonary:      Effort: Pulmonary effort is normal.      Breath sounds: Normal breath sounds.   Abdominal:      General: Bowel sounds are normal.      Palpations: Abdomen is soft.   Musculoskeletal:      Cervical back: Normal range of motion and neck supple.   Skin:     General: Skin is warm and dry.   Neurological:      Mental Status: She is alert and oriented to person, place, and time.   Psychiatric:         Behavior: Behavior normal.           Assessment & Plan   Diagnoses and all orders for this visit:    1. 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  Dispense: 90 tablet; Refill: 1  -     Magnesium; Future  -     Phosphorus; Future  -     Vitamin D 25 hydroxy; Future    2. Essential hypertension  -     furosemide (LASIX) 20 MG tablet; Take 1 tablet by mouth Every Other Day.  Dispense: 45 tablet; Refill: 1  -     lisinopril (PRINIVIL,ZESTRIL) 5 MG tablet; Take 1 tablet by mouth Daily.  Dispense: 90 tablet; Refill: 1  -     Basic metabolic panel; Future  -     Magnesium; Future  -     Phosphorus; Future  We will continue current treatment.  Blood pressure has remained stable.  She had elevated serum creatinine on recent blood work.  We will recheck to ensure no problems.  3. 1+ pitting edema  -     furosemide (LASIX) 20 MG tablet; Take 1 tablet by mouth Every Other Day.  Dispense: 45 tablet; Refill: 1  -      potassium chloride (K-DUR,KLOR-CON) 10 MEQ CR tablet; Take 1 tablet by mouth Daily.  Dispense: 90 tablet; Refill: 1    4. Anxiety  -     PARoxetine (Paxil) 10 MG tablet; Take 0.5 tablets by mouth Every Night. Takes 0.5 tablet  Dispense: 90 tablet; Refill: 1    5. Paroxysmal atrial fibrillation  -     potassium chloride (K-DUR,KLOR-CON) 10 MEQ CR tablet; Take 1 tablet by mouth Daily.  Dispense: 90 tablet; Refill: 1  -     rivaroxaban (Xarelto) 20 MG tablet; Take 1 tablet by mouth Daily With Dinner.  Dispense: 90 tablet; Refill: 0  -     Sotalol HCl, AF, 120 MG tablet; Take 1 tablet by mouth 2 (Two) Times a Day.  Dispense: 180 each; Refill: 1  She is not in A-fib today.  6. Mixed hyperlipidemia  -     rosuvastatin (CRESTOR) 10 MG tablet; Take 1 tablet by mouth Daily. for cholesterol  Dispense: 90 tablet; Refill: 1    7. Seasonal allergies  -     fluticasone (FLONASE) 50 MCG/ACT nasal spray; 2 sprays into the nostril(s) as directed by provider Daily.  Dispense: 16 g; Refill: 2    8. Elevated serum creatinine  -     Basic metabolic panel; Future    9. Upper respiratory tract infection, unspecified type    Other orders  -     Pneumococcal Conjugate Vaccine 20-Valent (PCV20)               BMI is within normal parameters. No other follow-up for BMI required.          This document has been electronically signed by Lisa Delgado  November 6, 2023 13:36 EST    Dictated Utilizing Dragon Dictation: Part of this note may be an electronic transcription/translation of spoken language to printed text using the Dragon Dictation System.

## 2023-11-07 LAB
25(OH)D3 SERPL-MCNC: 26.5 NG/ML (ref 30–100)
ANION GAP SERPL CALCULATED.3IONS-SCNC: 7.7 MMOL/L (ref 5–15)
BUN SERPL-MCNC: 12 MG/DL (ref 8–23)
BUN/CREAT SERPL: 12.8 (ref 7–25)
CALCIUM SPEC-SCNC: 9.7 MG/DL (ref 8.6–10.5)
CHLORIDE SERPL-SCNC: 103 MMOL/L (ref 98–107)
CO2 SERPL-SCNC: 29.3 MMOL/L (ref 22–29)
CREAT SERPL-MCNC: 0.94 MG/DL (ref 0.57–1)
EGFRCR SERPLBLD CKD-EPI 2021: 59.2 ML/MIN/1.73
GLUCOSE SERPL-MCNC: 77 MG/DL (ref 65–99)
MAGNESIUM SERPL-MCNC: 2.1 MG/DL (ref 1.6–2.4)
PHOSPHATE SERPL-MCNC: 4.1 MG/DL (ref 2.5–4.5)
POTASSIUM SERPL-SCNC: 4.2 MMOL/L (ref 3.5–5.2)
SODIUM SERPL-SCNC: 140 MMOL/L (ref 136–145)

## 2023-11-13 ENCOUNTER — TELEPHONE (OUTPATIENT)
Dept: FAMILY MEDICINE CLINIC | Facility: CLINIC | Age: 88
End: 2023-11-13
Payer: MEDICARE

## 2023-11-13 NOTE — TELEPHONE ENCOUNTER
----- Message from Joey Orozco DO sent at 11/13/2023  1:12 PM EST -----  Blood work is stable.  No concerns.      Patient notified & verbalized understanding.

## 2023-11-13 NOTE — TELEPHONE ENCOUNTER
----- Message from Joey Orozco DO sent at 11/13/2023  1:12 PM EST -----  Blood work is stable.  No concerns.

## 2023-11-29 ENCOUNTER — TELEPHONE (OUTPATIENT)
Dept: FAMILY MEDICINE CLINIC | Facility: CLINIC | Age: 88
End: 2023-11-29
Payer: MEDICARE

## 2023-11-29 NOTE — TELEPHONE ENCOUNTER
Tonia called reports she started having a clear vaginal discharge to the point it wet her underclothes so she went to [facility] on Monday & had a Pap,UA,US,reports she goes back tomorrow for FU they called her & told her they found something? She just wants you to be aware & she will sign & have all records sent to you.

## 2023-11-29 NOTE — TELEPHONE ENCOUNTER
Tonia called reports she started having a clear vaginal discharge to the point it wet her underclothes so she went to [facility] on Monday & had a Pap,UA,US,reports she goes back tomorrow for FU they called her & told her they found something?

## 2023-12-01 ENCOUNTER — TELEPHONE (OUTPATIENT)
Dept: FAMILY MEDICINE CLINIC | Facility: CLINIC | Age: 88
End: 2023-12-01
Payer: MEDICARE

## 2023-12-01 NOTE — TELEPHONE ENCOUNTER
Caller: Tonia Ritter    Relationship to patient: Self    Best call back number: [phone]     PATIENT IS CALLING TO STATE THAT SHE SAW DR LONGO AT [facility].  HE FOUND A MASS AND WANTED DR WALSH TO HAVE THE NOTES.  HE NEEDS TO CALL [facility] [phone] AND OBTAIN THESE.  THEIR FAX NUMBER [phone].

## 2023-12-01 NOTE — TELEPHONE ENCOUNTER
Caller: Tonia Ritter    Relationship to patient: Self    Best call back number: [phone]     PATIENT IS CALLING TO STATE THAT SHE SAW DR LONGO AT [facility].  HE FOUND A MASS AND WANTED DR WALSH TO HAVE THE NOTES.  HE NEEDS TO CALL [facility] [phone] AND OBTAIN THESE.  THEIR FAX NUMBER [phone].      Request faxed for records.

## 2023-12-05 ENCOUNTER — TELEPHONE (OUTPATIENT)
Dept: FAMILY MEDICINE CLINIC | Facility: CLINIC | Age: 88
End: 2023-12-05

## 2023-12-05 NOTE — TELEPHONE ENCOUNTER
Spoke to patient, she wants to discuss results from [facility] with Dr Orozco. Scheduled follow up appt. Records are scanned in.

## 2023-12-05 NOTE — TELEPHONE ENCOUNTER
Caller: Tonia Ritter    Relationship: Self    Best call back number: [phone]    What is the best time to reach you: ANY TIME    Who are you requesting to speak with (clinical staff, provider,  specific staff member): NURSE    Do you know the name of the person who called: SELF    What was the call regarding: PATIENT WOULD LIKE TO TALK TO NURSE ABOUT HER APPOINTMENT WITH THE [facility] SHE HAD. PLEASE CALL PATIENT BACK TODAY,.

## 2023-12-06 ENCOUNTER — OFFICE VISIT (OUTPATIENT)
Dept: FAMILY MEDICINE CLINIC | Facility: CLINIC | Age: 88
End: 2023-12-06
Payer: MEDICARE

## 2023-12-06 VITALS
SYSTOLIC BLOOD PRESSURE: 130 MMHG | BODY MASS INDEX: 23.69 KG/M2 | TEMPERATURE: 97.3 F | HEIGHT: 65 IN | DIASTOLIC BLOOD PRESSURE: 74 MMHG | OXYGEN SATURATION: 100 % | WEIGHT: 142.2 LBS | HEART RATE: 83 BPM

## 2023-12-06 DIAGNOSIS — N88.8 MASS OF CERVIX: Primary | ICD-10-CM

## 2023-12-06 PROCEDURE — 99213 OFFICE O/P EST LOW 20 MIN: CPT | Performed by: FAMILY MEDICINE

## 2023-12-06 NOTE — PROGRESS NOTES
Subjective   Tonia Ritter is a 88 y.o. female.   Pt presents today with CC of Annual Exam (Wants 2nd opinion on some test she had done)      History of Present Illness   History of Present Illness  Patient is an 88-year-old female who recently was seen by her gynecologist.  She was found to have a cervix mass.  Ultrasound confirmed.  She does not have a biopsy of it yet.  This will require anesthesia, per gynecologic note.  Tonia is concerned about having procedure done to have the biopsy.       The following portions of the patient's history were reviewed and updated as appropriate: allergies, current medications, past family history, past medical history, past social history, past surgical history, and problem list.    Review of Systems   Constitutional:  Negative for chills, fever and unexpected weight loss.   HENT:  Negative for congestion and sore throat.    Eyes:  Negative for blurred vision and visual disturbance.   Respiratory:  Negative for cough and wheezing.    Cardiovascular:  Negative for chest pain and palpitations.   Gastrointestinal:  Negative for abdominal pain and diarrhea.   Endocrine: Negative for cold intolerance and heat intolerance.   Genitourinary:  Negative for dysuria.   Musculoskeletal:  Negative for arthralgias and neck stiffness.   Neurological:  Negative for dizziness, seizures and syncope.   Psychiatric/Behavioral:  Negative for self-injury, suicidal ideas and depressed mood.        Objective   Physical Exam  Vitals and nursing note reviewed.   Constitutional:       Appearance: She is well-developed.   HENT:      Head: Normocephalic and atraumatic.      Right Ear: External ear normal.      Left Ear: External ear normal.      Nose: Nose normal.   Eyes:      Conjunctiva/sclera: Conjunctivae normal.      Pupils: Pupils are equal, round, and reactive to light.   Cardiovascular:      Rate and Rhythm: Normal rate and regular rhythm.      Heart sounds: Normal heart sounds.   Pulmonary:       Effort: Pulmonary effort is normal.      Breath sounds: Normal breath sounds.   Abdominal:      General: Bowel sounds are normal.      Palpations: Abdomen is soft.   Musculoskeletal:      Cervical back: Normal range of motion and neck supple.   Skin:     General: Skin is warm and dry.   Neurological:      Mental Status: She is alert and oriented to person, place, and time.   Psychiatric:         Behavior: Behavior normal.           Assessment & Plan   Diagnoses and all orders for this visit:    1. Mass of cervix (Primary)    She is concerned about what they might find.  However, we know there is a mass, it was confirmed on ultrasound and it was recommended that biopsy performed.  She is unable to tolerate gynecologic examination adequate for getting a sample.  So, it was recommended that she do this under anesthesia.  At this time, she is agreeable to have the procedure done for diagnosis.  She does still have reservations about what treatments she might be agreeable to.  My recommendation is to get a diagnosis, find out what treatment options are available, and make a decision at that time.     I spent 30 minutes face-to-face discussing her gynecologic problem, as well as potential treatment options in the future.  She is agreeable to contact her gynecologist and get scheduled.            BMI is within normal parameters. No other follow-up for BMI required.          This document has been electronically signed by Lisa Delgado  December 6, 2023 16:11 EST    Dictated Utilizing Dragon Dictation: Part of this note may be an electronic transcription/translation of spoken language to printed text using the Dragon Dictation System.

## 2024-01-24 ENCOUNTER — OFFICE VISIT (OUTPATIENT)
Dept: FAMILY MEDICINE CLINIC | Facility: CLINIC | Age: 89
End: 2024-01-24
Payer: MEDICARE

## 2024-01-24 VITALS
SYSTOLIC BLOOD PRESSURE: 122 MMHG | HEIGHT: 65 IN | TEMPERATURE: 97.5 F | OXYGEN SATURATION: 98 % | HEART RATE: 88 BPM | WEIGHT: 137.8 LBS | DIASTOLIC BLOOD PRESSURE: 74 MMHG | BODY MASS INDEX: 22.96 KG/M2
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
## 2024-01-24 DIAGNOSIS — S60.419A ABRASION OF FINGER OF RIGHT HAND, INITIAL ENCOUNTER: Primary | ICD-10-CM

## 2024-01-24 DIAGNOSIS — N18.32 STAGE 3B CHRONIC KIDNEY DISEASE: ICD-10-CM

## 2024-01-24 PROCEDURE — 81001 URINALYSIS AUTO W/SCOPE: CPT | Performed by: INTERNAL MEDICINE

## 2024-01-24 PROCEDURE — 80053 COMPREHEN METABOLIC PANEL: CPT | Performed by: INTERNAL MEDICINE

## 2024-01-24 PROCEDURE — 36415 COLL VENOUS BLD VENIPUNCTURE: CPT | Performed by: NURSE PRACTITIONER

## 2024-01-24 PROCEDURE — 82043 UR ALBUMIN QUANTITATIVE: CPT | Performed by: INTERNAL MEDICINE

## 2024-01-24 PROCEDURE — 84156 ASSAY OF PROTEIN URINE: CPT | Performed by: INTERNAL MEDICINE

## 2024-01-24 PROCEDURE — 82570 ASSAY OF URINE CREATININE: CPT | Performed by: INTERNAL MEDICINE

## 2024-01-24 RX ORDER — DOXYCYCLINE HYCLATE 100 MG/1
100 CAPSULE ORAL 2 TIMES DAILY
Qty: 14 CAPSULE | Refills: 0 | Status: SHIPPED | OUTPATIENT
Start: 2024-01-24 | End: 2024-01-31

## 2024-01-24 RX ORDER — MUPIROCIN CALCIUM 20 MG/G
CREAM TOPICAL
Qty: 15 G | Refills: 0 | Status: SHIPPED | OUTPATIENT
Start: 2024-01-24

## 2024-01-24 NOTE — PROGRESS NOTES
[facility]     Chief Complaint  Abrasion (Scratched by a cat on right middle finger)    Tonia Ritter is a 88 y.o. female who presents today to [facility] for Abrasion (Scratched by a cat on right middle finger).    HPI:   Abrasion     patient presents today after being scratched by cat on her finger approximately 3 to 4 days ago.  States the cat belongs to her neighbor and she helps care for it.  Her right hand middle finger is the finger that is affected.  Reports that she would like to have treatment today because she is worried about cat scratch fever.  Reports she feels like that she may have had a slight fever last night as she woke up with her nightgown slightly wet as if she had sweated out a fever through the night but did not take her temp. States she cleaned the area very good when it happened and used soap and water, and an antiseptic and flushed the area thoroughly. Also has labwork she would like drawn for her Nephrologist review at her upcoming appt with him.     Previous History:   Past Medical History:   Diagnosis Date    Acute bronchitis     Allergic rhinitis     Anxiety     Atrial fibrillation     Back pain     Burping     Chills (without fever) 3/25/2019    Chronic pain     Congestive heart failure     Depression     Encounter for screening fecal occult blood testing 03/10/2016    NEGATIVE    Fibromyalgia     Flatulence     GERD (gastroesophageal reflux disease)     Hematuria     Hyperlipidemia     Hypertension     Osteoarthritis     Pacemaker     PUD (peptic ulcer disease)     Right shoulder pain       Past Surgical History:   Procedure Laterality Date    CARDIOVERSION  05/2017    [facility]     CARDIOVERSION  09/01/2017    [facility]    CHOLECYSTECTOMY  2004    COLONOSCOPY  05/2013    DILATATION AND CURETTAGE      EPIDIDYMAL CYST EXCISION      ESOPHAGOSCOPY / EGD  01/2019    EYE SURGERY      HEMORRHOIDECTOMY  01/2019    MAMMO BILATERAL   10/2014    NECK SURGERY      PACEMAKER IMPLANTATION      PAP SMEAR  03/2016    SKIN LESION EXCISION        Social History     Socioeconomic History    Marital status:    Tobacco Use    Smoking status: Never    Smokeless tobacco: Never   Vaping Use    Vaping Use: Never used   Substance and Sexual Activity    Alcohol use: No    Drug use: No    Sexual activity: Defer      Health Maintenance Due   Topic Date Due    ZOSTER VACCINE (1 of 2) Never done    PAP SMEAR  Never done    COVID-19 Vaccine (3 - 2023-24 season) 09/01/2023    LIPID PANEL  12/05/2023    ANNUAL WELLNESS VISIT  12/30/2023        Current Medications:  Current Outpatient Medications   Medication Sig Dispense Refill    acetaminophen (TYLENOL) 500 MG tablet Take 1 tablet by mouth Every 6 (Six) Hours As Needed for Mild Pain.      fluticasone (FLONASE) 50 MCG/ACT nasal spray 2 sprays into the nostril(s) as directed by provider Daily. 16 g 2    furosemide (LASIX) 20 MG tablet Take 1 tablet by mouth Every Other Day. 45 tablet 1    lisinopril (PRINIVIL,ZESTRIL) 5 MG tablet Take 1 tablet by mouth Daily. 90 tablet 1    multivitamin with minerals (Centrum Silver 50+Women) tablet tablet Take 1 tablet by mouth Daily. 90 tablet 1    PARoxetine (Paxil) 10 MG tablet Take 0.5 tablets by mouth Every Night. Takes 0.5 tablet 90 tablet 1    potassium chloride (K-DUR,KLOR-CON) 10 MEQ CR tablet Take 1 tablet by mouth Daily. 90 tablet 1    rivaroxaban (Xarelto) 20 MG tablet Take 1 tablet by mouth Daily With Dinner. 90 tablet 0    rosuvastatin (CRESTOR) 10 MG tablet Take 1 tablet by mouth Daily. for cholesterol 90 tablet 1    doxycycline (VIBRAMYCIN) 100 MG capsule Take 1 capsule by mouth 2 (Two) Times a Day for 7 days. 14 capsule 0    mupirocin (BACTROBAN) 2 % cream Wash area with antibacterial soap and water and apply ointment 2-3 times per day x 3 days 15 g 0    Sotalol HCl, AF, 120 MG tablet Take 1 tablet by mouth 2 (Two) Times a Day. 180 each 1     No current  "facility-administered medications for this visit.       Allergies:   Allergies   Allergen Reactions    Reglan [Metoclopramide]     Sulfa Antibiotics Nausea And Vomiting       Vitals:   /74 (BP Location: Right arm, Patient Position: Sitting)   Pulse 88   Temp 97.5 °F (36.4 °C) (Temporal)   Ht 165.1 cm (65\")   Wt 62.5 kg (137 lb 12.8 oz)   SpO2 98%   BMI 22.93 kg/m²   Estimated body mass index is 22.93 kg/m² as calculated from the following:    Height as of this encounter: 165.1 cm (65\").    Weight as of this encounter: 62.5 kg (137 lb 12.8 oz).          Physical Exam:   Physical Exam  Vitals reviewed.   Constitutional:       Appearance: Normal appearance.   HENT:      Head: Normocephalic.   Eyes:      Extraocular Movements: Extraocular movements intact.      Conjunctiva/sclera: Conjunctivae normal.   Cardiovascular:      Rate and Rhythm: Normal rate.      Heart sounds: Normal heart sounds.   Pulmonary:      Effort: Pulmonary effort is normal.      Breath sounds: Normal breath sounds.   Lymphadenopathy:      Cervical: No cervical adenopathy.      Upper Body:      Right upper body: No supraclavicular, axillary or epitrochlear adenopathy.      Left upper body: No supraclavicular, axillary or epitrochlear adenopathy.      Comments: No lymphadenopathy noted    Skin:     General: Skin is warm and dry.      Comments: RIGHT HAND: MIDDLE FINGER: less than 0.5 inch abrasion present beside of her finger nail, midly tender and minor erythema. No drainage or crusting, Cap refill wnl    Neurological:      Mental Status: She is alert. Mental status is at baseline.      Comments: Normal gait    Psychiatric:         Mood and Affect: Mood normal.          Lab Results:   Office Visit on 11/06/2023   Component Date Value Ref Range Status    Glucose 11/06/2023 77  65 - 99 mg/dL Final    BUN 11/06/2023 12  8 - 23 mg/dL Final    Creatinine 11/06/2023 0.94  0.57 - 1.00 mg/dL Final    Sodium 11/06/2023 140  136 - 145 mmol/L Final "    Potassium 11/06/2023 4.2  3.5 - 5.2 mmol/L Final    Chloride 11/06/2023 103  98 - 107 mmol/L Final    CO2 11/06/2023 29.3 (H)  22.0 - 29.0 mmol/L Final    Calcium 11/06/2023 9.7  8.6 - 10.5 mg/dL Final    BUN/Creatinine Ratio 11/06/2023 12.8  7.0 - 25.0 Final    Anion Gap 11/06/2023 7.7  5.0 - 15.0 mmol/L Final    eGFR 11/06/2023 59.2 (L)  >60.0 mL/min/1.73 Final    Magnesium 11/06/2023 2.1  1.6 - 2.4 mg/dL Final    Phosphorus 11/06/2023 4.1  2.5 - 4.5 mg/dL Final    25 Hydroxy, Vitamin D 11/06/2023 26.5 (L)  30.0 - 100.0 ng/ml Final       Results review: During today's encounter, all relevant clinical data has been reviewed.      Assessment and Plan  Diagnoses and all orders for this visit:    1. Abrasion of finger of right hand, initial encounter (Primary)    2. Cat scratch    3. Stage 3b chronic kidney disease  -     Urinalysis With Microscopic - Urine, Clean Catch  -     Protein / Creatinine Ratio, Urine - Urine, Clean Catch  -     Microalbumin / Creatinine Urine Ratio - Urine, Clean Catch  -     Comprehensive Metabolic Panel    Other orders  -     doxycycline (VIBRAMYCIN) 100 MG capsule; Take 1 capsule by mouth 2 (Two) Times a Day for 7 days.  Dispense: 14 capsule; Refill: 0  -     mupirocin (BACTROBAN) 2 % cream; Wash area with antibacterial soap and water and apply ointment 2-3 times per day x 3 days  Dispense: 15 g; Refill: 0      #1- 2 No Lymphadenopathy seen during assessment, patient does not appear ill today. Denies muscle aches, loss of appetite or rash. Will go ahead and treat with Antibiotic today however do not suspect CSD at this time. She verbalized understanding to return and notify clinic of any worsening of symptoms or no improvement. Patient taking Sotalol, so will avoid zithromax at todays encounter.   #3- Labs performed today per Nephrology's order     BMI is within normal parameters. No other follow-up for BMI required.       New Medications:   New Medications Ordered This Visit    Medications    doxycycline (VIBRAMYCIN) 100 MG capsule     Sig: Take 1 capsule by mouth 2 (Two) Times a Day for 7 days.     Dispense:  14 capsule     Refill:  0    mupirocin (BACTROBAN) 2 % cream     Sig: Wash area with antibacterial soap and water and apply ointment 2-3 times per day x 3 days     Dispense:  15 g     Refill:  0       Discontinued Medications:   There are no discontinued medications.              Follow Up:   No follow-ups on file.    Patient was given instructions and counseling regarding her condition or for health maintenance advice. Please see specific information pulled into the AVS if appropriate.       This document has been electronically signed by TEODORA Grayson   January 24, 2024 22:42 EST    Dictated Utilizing Dragon Dictation: Part of this note may be an electronic transcription/translation of spoken language to printed text using the Dragon Dictation System.

## 2024-01-25 LAB
ALBUMIN SERPL-MCNC: 4.3 G/DL (ref 3.5–5.2)
ALBUMIN UR-MCNC: 3.2 MG/DL
ALBUMIN/GLOB SERPL: 1.7 G/DL
ALP SERPL-CCNC: 86 U/L (ref 39–117)
ALT SERPL W P-5'-P-CCNC: 10 U/L (ref 1–33)
ANION GAP SERPL CALCULATED.3IONS-SCNC: 10 MMOL/L (ref 5–15)
AST SERPL-CCNC: 21 U/L (ref 1–32)
BACTERIA UR QL AUTO: ABNORMAL /HPF
BILIRUB SERPL-MCNC: 0.5 MG/DL (ref 0–1.2)
BILIRUB UR QL STRIP: NEGATIVE
BUN SERPL-MCNC: 10 MG/DL (ref 8–23)
BUN/CREAT SERPL: 10.3 (ref 7–25)
CALCIUM SPEC-SCNC: 9.3 MG/DL (ref 8.6–10.5)
CHLORIDE SERPL-SCNC: 102 MMOL/L (ref 98–107)
CLARITY UR: CLEAR
CO2 SERPL-SCNC: 28 MMOL/L (ref 22–29)
COLOR UR: YELLOW
CREAT SERPL-MCNC: 0.97 MG/DL (ref 0.57–1)
CREAT UR-MCNC: 54.3 MG/DL
CREAT UR-MCNC: 54.3 MG/DL
EGFRCR SERPLBLD CKD-EPI 2021: 57 ML/MIN/1.73
GLOBULIN UR ELPH-MCNC: 2.5 GM/DL
GLUCOSE SERPL-MCNC: 84 MG/DL (ref 65–99)
GLUCOSE UR STRIP-MCNC: NEGATIVE MG/DL
HGB UR QL STRIP.AUTO: NEGATIVE
HYALINE CASTS UR QL AUTO: ABNORMAL /LPF
KETONES UR QL STRIP: NEGATIVE
LEUKOCYTE ESTERASE UR QL STRIP.AUTO: ABNORMAL
MICROALBUMIN/CREAT UR: 58.9 MG/G (ref 0–29)
NITRITE UR QL STRIP: NEGATIVE
PH UR STRIP.AUTO: 7 [PH] (ref 5–8)
POTASSIUM SERPL-SCNC: 4 MMOL/L (ref 3.5–5.2)
PROT ?TM UR-MCNC: 11.1 MG/DL
PROT SERPL-MCNC: 6.8 G/DL (ref 6–8.5)
PROT UR QL STRIP: NEGATIVE
PROT/CREAT UR: 204.4 MG/G CREA (ref 0–200)
RBC # UR STRIP: ABNORMAL /HPF
REF LAB TEST METHOD: ABNORMAL
SODIUM SERPL-SCNC: 140 MMOL/L (ref 136–145)
SP GR UR STRIP: 1.01 (ref 1–1.03)
SQUAMOUS #/AREA URNS HPF: ABNORMAL /HPF
UROBILINOGEN UR QL STRIP: ABNORMAL
WBC # UR STRIP: ABNORMAL /HPF

## 2024-02-06 ENCOUNTER — TELEPHONE (OUTPATIENT)
Dept: FAMILY MEDICINE CLINIC | Facility: CLINIC | Age: 89
End: 2024-02-06

## 2024-02-06 ENCOUNTER — OFFICE VISIT (OUTPATIENT)
Dept: FAMILY MEDICINE CLINIC | Facility: CLINIC | Age: 89
End: 2024-02-06
Payer: MEDICARE

## 2024-02-06 VITALS
HEART RATE: 75 BPM | TEMPERATURE: 97.7 F | BODY MASS INDEX: 22.63 KG/M2 | WEIGHT: 135.8 LBS | HEIGHT: 65 IN | OXYGEN SATURATION: 96 % | DIASTOLIC BLOOD PRESSURE: 70 MMHG | SYSTOLIC BLOOD PRESSURE: 118 MMHG

## 2024-02-06 DIAGNOSIS — E78.2 MIXED HYPERLIPIDEMIA: ICD-10-CM

## 2024-02-06 DIAGNOSIS — R60.9 1+ PITTING EDEMA: ICD-10-CM

## 2024-02-06 DIAGNOSIS — I10 ESSENTIAL HYPERTENSION: ICD-10-CM

## 2024-02-06 DIAGNOSIS — H61.21 IMPACTED CERUMEN OF RIGHT EAR: ICD-10-CM

## 2024-02-06 DIAGNOSIS — I48.0 PAROXYSMAL ATRIAL FIBRILLATION: ICD-10-CM

## 2024-02-06 DIAGNOSIS — Z00.00 MEDICARE ANNUAL WELLNESS VISIT, SUBSEQUENT: Primary | ICD-10-CM

## 2024-02-06 DIAGNOSIS — F41.9 ANXIETY: ICD-10-CM

## 2024-02-06 PROCEDURE — 1170F FXNL STATUS ASSESSED: CPT | Performed by: FAMILY MEDICINE

## 2024-02-06 PROCEDURE — 69209 REMOVE IMPACTED EAR WAX UNI: CPT | Performed by: FAMILY MEDICINE

## 2024-02-06 PROCEDURE — G0439 PPPS, SUBSEQ VISIT: HCPCS | Performed by: FAMILY MEDICINE

## 2024-02-06 RX ORDER — PAROXETINE 10 MG/1
5 TABLET, FILM COATED ORAL NIGHTLY
Qty: 90 TABLET | Refills: 1 | Status: SHIPPED | OUTPATIENT
Start: 2024-02-06

## 2024-02-06 RX ORDER — LISINOPRIL 5 MG/1
5 TABLET ORAL DAILY
Qty: 90 TABLET | Refills: 1 | Status: SHIPPED | OUTPATIENT
Start: 2024-02-06

## 2024-02-06 RX ORDER — ROSUVASTATIN CALCIUM 10 MG/1
10 TABLET, COATED ORAL DAILY
Qty: 90 TABLET | Refills: 1 | Status: SHIPPED | OUTPATIENT
Start: 2024-02-06

## 2024-02-06 RX ORDER — POTASSIUM CHLORIDE 750 MG/1
10 TABLET, EXTENDED RELEASE ORAL DAILY
Qty: 90 TABLET | Refills: 1 | Status: SHIPPED | OUTPATIENT
Start: 2024-02-06

## 2024-02-06 RX ORDER — SOTALOL HYDROCHLORIDE 120 MG/1
1 TABLET ORAL 2 TIMES DAILY
Qty: 180 EACH | Refills: 1 | Status: SHIPPED | OUTPATIENT
Start: 2024-02-06

## 2024-02-06 NOTE — PROGRESS NOTES
The ABCs of the Annual Wellness Visit  Subsequent Medicare Wellness Visit    Subjective      Tonia Ritter is a 88 y.o. female who presents for a Subsequent Medicare Wellness Visit.    The following portions of the patient's history were reviewed and   updated as appropriate: allergies, current medications, past family history, past medical history, past social history, past surgical history, and problem list.    Compared to one year ago, the patient feels her physical   health is the same.    Compared to one year ago, the patient feels her mental   health is the same.    Recent Hospitalizations:  She was not admitted to the hospital during the last year.       Current Medical Providers:  Patient Care Team:  Joey Orozco DO as PCP - General (Family Medicine)    Outpatient Medications Prior to Visit   Medication Sig Dispense Refill   • acetaminophen (TYLENOL) 500 MG tablet Take 1 tablet by mouth Every 6 (Six) Hours As Needed for Mild Pain.     • fluticasone (FLONASE) 50 MCG/ACT nasal spray 2 sprays into the nostril(s) as directed by provider Daily. 16 g 2   • furosemide (LASIX) 20 MG tablet Take 1 tablet by mouth Every Other Day. (Patient taking differently: Take 1 tablet by mouth Every Other Day. Takes 2 x weekly) 45 tablet 1   • multivitamin with minerals (Centrum Silver 50+Women) tablet tablet Take 1 tablet by mouth Daily. 90 tablet 1   • lisinopril (PRINIVIL,ZESTRIL) 5 MG tablet Take 1 tablet by mouth Daily. 90 tablet 1   • mupirocin (BACTROBAN) 2 % cream Wash area with antibacterial soap and water and apply ointment 2-3 times per day x 3 days 15 g 0   • PARoxetine (Paxil) 10 MG tablet Take 0.5 tablets by mouth Every Night. Takes 0.5 tablet 90 tablet 1   • potassium chloride (K-DUR,KLOR-CON) 10 MEQ CR tablet Take 1 tablet by mouth Daily. 90 tablet 1   • rivaroxaban (Xarelto) 20 MG tablet Take 1 tablet by mouth Daily With Dinner. 90 tablet 0   • rosuvastatin (CRESTOR) 10 MG tablet Take 1 tablet by  "mouth Daily. for cholesterol 90 tablet 1   • Sotalol HCl, AF, 120 MG tablet Take 1 tablet by mouth 2 (Two) Times a Day. 180 each 1     No facility-administered medications prior to visit.       No opioid medication identified on active medication list. I have reviewed chart for other potential  high risk medication/s and harmful drug interactions in the elderly.        Aspirin is not on active medication list.  Aspirin use is not indicated based on review of current medical condition/s. Risk of harm outweighs potential benefits.  .    Patient Active Problem List   Diagnosis   • Right shoulder pain   • PUD (peptic ulcer disease)   • Osteoarthritis   • Hypertension   • Hyperlipidemia   • Acute bronchitis   • Allergic rhinitis   • Anxiety   • Atrial fibrillation   • Back pain   • Burping   • Chronic pain   • Depression   • Encounter for screening fecal occult blood testing   • Flatulence   • GERD (gastroesophageal reflux disease)   • Hematuria   • Constipation   • Pain of upper abdomen   • Pacemaker   • S/P ablation of atrial fibrillation   • Acute URI   • Acute bacterial rhinosinusitis   • Hot flashes   • Impacted cerumen of right ear     Advance Care Planning   Advance Care Planning     Advance Directive is not on file.  ACP discussion was held with the patient during this visit. Patient does not have an advance directive, information provided.     Objective    Vitals:    02/06/24 1121   BP: 118/70   BP Location: Right arm   Patient Position: Sitting   Cuff Size: Adult   Pulse: 75   Temp: 97.7 °F (36.5 °C)   TempSrc: Temporal   SpO2: 96%   Weight: 61.6 kg (135 lb 12.8 oz)   Height: 165.1 cm (65\")     Estimated body mass index is 22.6 kg/m² as calculated from the following:    Height as of this encounter: 165.1 cm (65\").    Weight as of this encounter: 61.6 kg (135 lb 12.8 oz).    BMI is within normal parameters. No other follow-up for BMI required.      Does the patient have evidence of cognitive impairment? "   No  Physical Exam  Constitutional:       Appearance: She is well-developed.   HENT:      Head: Normocephalic and atraumatic.      Right Ear: Tympanic membrane and external ear normal.      Left Ear: Tympanic membrane and external ear normal.      Ears:      Comments: Initially, the right ear was impacted with cerumen.  After cleanout, the TM was visualized and found to be normal.     Nose: Nose normal.   Eyes:      Conjunctiva/sclera: Conjunctivae normal.      Pupils: Pupils are equal, round, and reactive to light.   Cardiovascular:      Rate and Rhythm: Normal rate and regular rhythm.      Heart sounds: Normal heart sounds.   Pulmonary:      Effort: Pulmonary effort is normal.      Breath sounds: Normal breath sounds.   Abdominal:      General: Bowel sounds are normal.      Palpations: Abdomen is soft.   Musculoskeletal:      Cervical back: Normal range of motion and neck supple.   Skin:     General: Skin is warm and dry.   Neurological:      Mental Status: She is alert and oriented to person, place, and time.   Psychiatric:         Behavior: Behavior normal.              HEALTH RISK ASSESSMENT    Smoking Status:  Social History     Tobacco Use   Smoking Status Never   Smokeless Tobacco Never     Alcohol Consumption:  Social History     Substance and Sexual Activity   Alcohol Use No     Fall Risk Screen:    STEADI Fall Risk Assessment was completed, and patient is at MODERATE risk for falls. Assessment completed on:2024    Depression Screenin/6/2024    11:28 AM   PHQ-2/PHQ-9 Depression Screening   Little Interest or Pleasure in Doing Things 0-->not at all   Feeling Down, Depressed or Hopeless 0-->not at all   PHQ-9: Brief Depression Severity Measure Score 0       Health Habits and Functional and Cognitive Screenin/6/2024    11:25 AM   Functional & Cognitive Status   Do you have difficulty preparing food and eating? No   Do you have difficulty bathing yourself, getting dressed or grooming  yourself? No   Do you have difficulty using the toilet? No   Do you have difficulty moving around from place to place? No   Do you have trouble with steps or getting out of a bed or a chair? No   Current Diet Other   Dental Exam Other   Eye Exam Up to date   Exercise (times per week) 0 times per week   Current Exercises Include No Regular Exercise   Do you need help using the phone?  No   Are you deaf or do you have serious difficulty hearing?  No   Do you need help to go to places out of walking distance? Yes   Do you need help shopping? No   Do you need help preparing meals?  No   Do you need help with housework?  No   Do you need help with laundry? No   Do you need help taking your medications? No   Do you need help managing money? No   Do you ever drive or ride in a car without wearing a seat belt? No       Age-appropriate Screening Schedule:  Refer to the list below for future screening recommendations based on patient's age, sex and/or medical conditions. Orders for these recommended tests are listed in the plan section. The patient has been provided with a written plan.    Health Maintenance   Topic Date Due   • ZOSTER VACCINE (1 of 2) Never done   • RSV Vaccine - Adults >60 Years or Pregnant 32-36 Weeks (1 - 1-dose 60+ series) Never done   • PAP SMEAR  Never done   • COVID-19 Vaccine (3 - 2023-24 season) 09/01/2023   • LIPID PANEL  12/05/2023   • ANNUAL WELLNESS VISIT  12/30/2023   • INFLUENZA VACCINE  03/31/2024 (Originally 8/1/2023)   • DXA SCAN  10/27/2025   • TDAP/TD VACCINES (4 - Td or Tdap) 10/04/2026   • Pneumococcal Vaccine 65+  Completed                  CMS Preventative Services Quick Reference  Risk Factors Identified During Encounter:    Immunizations Discussed/Encouraged: Influenza    The above risks/problems have been discussed with the patient.  Pertinent information has been shared with the patient in the After Visit Summary.    Ear Cerumen Removal    Date/Time: 2/6/2024 2:04 PM    Performed  by: Joey Orozco DO  Authorized by: Joey Orozco DO  Consent: Verbal consent obtained.  Risks and benefits: risks, benefits and alternatives were discussed  Consent given by: patient  Patient understanding: patient states understanding of the procedure being performed    Anesthesia:  Local Anesthetic: none  Location details: right ear  Patient tolerance: patient tolerated the procedure well with no immediate complications  Procedure type: irrigation          Diagnoses and all orders for this visit:    1. Medicare annual wellness visit, subsequent (Primary)  No major concerns on Medicare wellness today.  2. Essential hypertension  -     lisinopril (PRINIVIL,ZESTRIL) 5 MG tablet; Take 1 tablet by mouth Daily.  Dispense: 90 tablet; Refill: 1    3. Anxiety  -     PARoxetine (Paxil) 10 MG tablet; Take 0.5 tablets by mouth Every Night. Takes 0.5 tablet  Dispense: 90 tablet; Refill: 1    4. 1+ pitting edema  -     potassium chloride (K-DUR,KLOR-CON) 10 MEQ CR tablet; Take 1 tablet by mouth Daily.  Dispense: 90 tablet; Refill: 1    5. Paroxysmal atrial fibrillation  -     potassium chloride (K-DUR,KLOR-CON) 10 MEQ CR tablet; Take 1 tablet by mouth Daily.  Dispense: 90 tablet; Refill: 1  -     rivaroxaban (Xarelto) 20 MG tablet; Take 1 tablet by mouth Daily With Dinner.  Dispense: 90 tablet; Refill: 0  -     Sotalol HCl, AF, 120 MG tablet; Take 1 tablet by mouth 2 (Two) Times a Day.  Dispense: 180 each; Refill: 1    6. Mixed hyperlipidemia  -     rosuvastatin (CRESTOR) 10 MG tablet; Take 1 tablet by mouth Daily. for cholesterol  Dispense: 90 tablet; Refill: 1  #7 cerumen impaction of right ear  Left ear was normal on exam, right ear was examined and found to be occluded by cerumen and she reports hearing loss.  The ear canal was cleaned out on the right side resulting in improved hearing.    Follow Up:   Next Medicare Wellness visit to be scheduled in 1 year.      An After Visit Summary and PPPS were made  available to the patient.

## 2024-02-09 ENCOUNTER — TELEPHONE (OUTPATIENT)
Dept: FAMILY MEDICINE CLINIC | Facility: CLINIC | Age: 89
End: 2024-02-09

## 2024-02-09 NOTE — TELEPHONE ENCOUNTER
Spoke to Tonia, she specifically wants to talk to Trent only as she says trent will know what is going on. She says it isn't urgent and she will call back on Monday.

## 2024-02-09 NOTE — TELEPHONE ENCOUNTER
Caller: Tonia Ritter    Relationship: Self    Best call back number: [phone]     What is the best time to reach you: ANY    Who are you requesting to speak with (clinical staff, provider,  specific staff member): DOUGLAS    Do you know the name of the person who called: TONIA    What was the call regarding: PATIENT DECLINED TO DISCUSS WITH HUB.     PLEASE ADVISE PATIENT

## 2024-02-12 ENCOUNTER — NURSE TRIAGE (OUTPATIENT)
Dept: CALL CENTER | Facility: HOSPITAL | Age: 89
End: 2024-02-12
Payer: MEDICARE

## 2024-02-20 ENCOUNTER — OFFICE VISIT (OUTPATIENT)
Dept: FAMILY MEDICINE CLINIC | Facility: CLINIC | Age: 89
End: 2024-02-20
Payer: MEDICARE

## 2024-02-20 VITALS
HEIGHT: 65 IN | RESPIRATION RATE: 16 BRPM | OXYGEN SATURATION: 95 % | HEART RATE: 80 BPM | WEIGHT: 137.2 LBS | SYSTOLIC BLOOD PRESSURE: 112 MMHG | TEMPERATURE: 97.7 F | DIASTOLIC BLOOD PRESSURE: 58 MMHG | BODY MASS INDEX: 22.86 KG/M2

## 2024-02-20 DIAGNOSIS — R35.0 URINARY FREQUENCY: ICD-10-CM

## 2024-02-20 DIAGNOSIS — R68.83 CHILLS (WITHOUT FEVER): Primary | ICD-10-CM

## 2024-02-20 PROCEDURE — 80053 COMPREHEN METABOLIC PANEL: CPT | Performed by: STUDENT IN AN ORGANIZED HEALTH CARE EDUCATION/TRAINING PROGRAM

## 2024-02-20 PROCEDURE — 99214 OFFICE O/P EST MOD 30 MIN: CPT | Performed by: STUDENT IN AN ORGANIZED HEALTH CARE EDUCATION/TRAINING PROGRAM

## 2024-02-20 PROCEDURE — 87086 URINE CULTURE/COLONY COUNT: CPT | Performed by: STUDENT IN AN ORGANIZED HEALTH CARE EDUCATION/TRAINING PROGRAM

## 2024-02-20 PROCEDURE — 36415 COLL VENOUS BLD VENIPUNCTURE: CPT | Performed by: STUDENT IN AN ORGANIZED HEALTH CARE EDUCATION/TRAINING PROGRAM

## 2024-02-20 PROCEDURE — 81001 URINALYSIS AUTO W/SCOPE: CPT | Performed by: STUDENT IN AN ORGANIZED HEALTH CARE EDUCATION/TRAINING PROGRAM

## 2024-02-20 PROCEDURE — 85025 COMPLETE CBC W/AUTO DIFF WBC: CPT | Performed by: STUDENT IN AN ORGANIZED HEALTH CARE EDUCATION/TRAINING PROGRAM

## 2024-02-20 RX ORDER — POTASSIUM CHLORIDE 750 MG/1
1 TABLET, FILM COATED, EXTENDED RELEASE ORAL DAILY
COMMUNITY
Start: 2024-01-30 | End: 2024-02-26 | Stop reason: ALTCHOICE

## 2024-02-20 NOTE — PROGRESS NOTES
Chief Complaint  Hypertension    HPI:   HPI   Tonia Ritter is a 88 y.o. female who presents today to [facility] for Hypertension. Patient reports that her blood pressure went up to 150/90 on Saturday. Then she reports that she developed chills, and body aches the next day. She reports that she has some mild headaches, urinary urgency and frequency. She denies dysuria. She denies significant weight loss. She is planning to have a pacemaker battery change in one month. She does state she has sneezed more the last few days, but denies cough, and congestion.     Previous History:   Past Medical History:   Diagnosis Date    Acute bronchitis     Allergic rhinitis     Anxiety     Atrial fibrillation     Back pain     Burping     Chills (without fever) 3/25/2019    Chronic pain     Congestive heart failure     Depression     Encounter for screening fecal occult blood testing 03/10/2016    NEGATIVE    Fibromyalgia     Flatulence     GERD (gastroesophageal reflux disease)     Hematuria     Hyperlipidemia     Hypertension     Osteoarthritis     Pacemaker     PUD (peptic ulcer disease)     Right shoulder pain       Past Surgical History:   Procedure Laterality Date    CARDIOVERSION  05/2017    [facility]     CARDIOVERSION  09/01/2017    [facility]    CHOLECYSTECTOMY  2004    COLONOSCOPY  05/2013    DILATATION AND CURETTAGE      EPIDIDYMAL CYST EXCISION      ESOPHAGOSCOPY / EGD  01/2019    EYE SURGERY      HEMORRHOIDECTOMY  01/2019    MAMMO BILATERAL  10/2014    NECK SURGERY      PACEMAKER IMPLANTATION      PAP SMEAR  03/2016    SKIN LESION EXCISION        Social History     Socioeconomic History    Marital status:    Tobacco Use    Smoking status: Never    Smokeless tobacco: Never   Vaping Use    Vaping Use: Never used   Substance and Sexual Activity    Alcohol use: No    Drug use: No    Sexual activity: Defer      Health Maintenance Due   Topic Date Due    ZOSTER VACCINE (1  "of 2) Never done    RSV Vaccine - Adults (1 - 1-dose 60+ series) Never done    PAP SMEAR  Never done    COVID-19 Vaccine (3 - 2023-24 season) 09/01/2023    LIPID PANEL  12/05/2023        Current Medications:  Current Outpatient Medications   Medication Sig Dispense Refill    acetaminophen (TYLENOL) 500 MG tablet Take 1 tablet by mouth Every 6 (Six) Hours As Needed for Mild Pain.      fluticasone (FLONASE) 50 MCG/ACT nasal spray 2 sprays into the nostril(s) as directed by provider Daily. 16 g 2    furosemide (LASIX) 20 MG tablet Take 1 tablet by mouth Every Other Day. (Patient taking differently: Take 1 tablet by mouth See Admin Instructions. Takes 2 x weekly (Monday and Thursday)) 45 tablet 1    lisinopril (PRINIVIL,ZESTRIL) 5 MG tablet Take 1 tablet by mouth Daily. 90 tablet 1    multivitamin with minerals (Centrum Silver 50+Women) tablet tablet Take 1 tablet by mouth Daily. 90 tablet 1    PARoxetine (Paxil) 10 MG tablet Take 0.5 tablets by mouth Every Night. Takes 0.5 tablet 90 tablet 1    potassium chloride (K-DUR,KLOR-CON) 10 MEQ CR tablet Take 1 tablet by mouth Daily. 90 tablet 1    potassium chloride 10 MEQ CR tablet Take 1 tablet by mouth Daily.      rivaroxaban (Xarelto) 20 MG tablet Take 1 tablet by mouth Daily With Dinner. 90 tablet 0    rosuvastatin (CRESTOR) 10 MG tablet Take 1 tablet by mouth Daily. for cholesterol 90 tablet 1    Sotalol HCl, AF, 120 MG tablet Take 1 tablet by mouth 2 (Two) Times a Day. 180 each 1     No current facility-administered medications for this visit.       Allergies:   Allergies   Allergen Reactions    Reglan [Metoclopramide]     Sulfa Antibiotics Nausea And Vomiting       Vitals:   /58 (BP Location: Right arm, Patient Position: Sitting, Cuff Size: Adult)   Pulse 80   Temp 97.7 °F (36.5 °C) (Temporal)   Resp 16   Ht 165.1 cm (65\")   Wt 62.2 kg (137 lb 3.2 oz)   SpO2 95%   BMI 22.83 kg/m²     Estimated body mass index is 22.83 kg/m² as calculated from the " "following:    Height as of this encounter: 165.1 cm (65\").    Weight as of this encounter: 62.2 kg (137 lb 3.2 oz).    Tonia Ritter  reports that she has never smoked. She has never used smokeless tobacco.           Physical Exam:   Physical Exam  Constitutional:       Appearance: Normal appearance.   HENT:      Mouth/Throat:      Mouth: Mucous membranes are moist.   Cardiovascular:      Rate and Rhythm: Normal rate and regular rhythm.   Pulmonary:      Effort: Pulmonary effort is normal.      Breath sounds: Normal breath sounds. No wheezing or rhonchi.   Abdominal:      General: There is no distension.      Tenderness: There is no abdominal tenderness. There is no right CVA tenderness or left CVA tenderness.   Musculoskeletal:         General: Normal range of motion.   Skin:     General: Skin is warm and dry.   Neurological:      Mental Status: She is alert.   Psychiatric:         Mood and Affect: Mood normal.          Lab Results:   Office Visit on 01/24/2024   Component Date Value Ref Range Status    Protein/Creatinine Ratio, Urine 01/24/2024 204.4 (H)  0.0 - 200.0 mg/G Crea Final    Creatinine, Urine 01/24/2024 54.3  mg/dL Final    Total Protein, Urine 01/24/2024 11.1  mg/dL Final    Microalbumin/Creatinine Ratio 01/24/2024 58.9 (H)  0.0 - 29.0 mg/g Final    Creatinine, Urine 01/24/2024 54.3  mg/dL Final    Microalbumin, Urine 01/24/2024 3.2  mg/dL Final    Glucose 01/24/2024 84  65 - 99 mg/dL Final    BUN 01/24/2024 10  8 - 23 mg/dL Final    Creatinine 01/24/2024 0.97  0.57 - 1.00 mg/dL Final    Sodium 01/24/2024 140  136 - 145 mmol/L Final    Potassium 01/24/2024 4.0  3.5 - 5.2 mmol/L Final    Chloride 01/24/2024 102  98 - 107 mmol/L Final    CO2 01/24/2024 28.0  22.0 - 29.0 mmol/L Final    Calcium 01/24/2024 9.3  8.6 - 10.5 mg/dL Final    Total Protein 01/24/2024 6.8  6.0 - 8.5 g/dL Final    Albumin 01/24/2024 4.3  3.5 - 5.2 g/dL Final    ALT (SGPT) 01/24/2024 10  1 - 33 U/L Final    AST (SGOT) " 01/24/2024 21  1 - 32 U/L Final    Alkaline Phosphatase 01/24/2024 86  39 - 117 U/L Final    Total Bilirubin 01/24/2024 0.5  0.0 - 1.2 mg/dL Final    Globulin 01/24/2024 2.5  gm/dL Final    A/G Ratio 01/24/2024 1.7  g/dL Final    BUN/Creatinine Ratio 01/24/2024 10.3  7.0 - 25.0 Final    Anion Gap 01/24/2024 10.0  5.0 - 15.0 mmol/L Final    eGFR 01/24/2024 57.0 (L)  >60.0 mL/min/1.73 Final    Color, UA 01/24/2024 Yellow  Yellow, Straw Final    Appearance, UA 01/24/2024 Clear  Clear Final    pH, UA 01/24/2024 7.0  5.0 - 8.0 Final    Specific Gravity, UA 01/24/2024 1.011  1.005 - 1.030 Final    Glucose, UA 01/24/2024 Negative  Negative Final    Ketones, UA 01/24/2024 Negative  Negative Final    Bilirubin, UA 01/24/2024 Negative  Negative Final    Blood, UA 01/24/2024 Negative  Negative Final    Protein, UA 01/24/2024 Negative  Negative Final    Leuk Esterase, UA 01/24/2024 Small (1+) (A)  Negative Final    Nitrite, UA 01/24/2024 Negative  Negative Final    Urobilinogen, UA 01/24/2024 0.2 E.U./dL  0.2 - 1.0 E.U./dL Final    RBC, UA 01/24/2024 0-2  None Seen, 0-2 /HPF Final    WBC, UA 01/24/2024 3-5 (A)  None Seen, 0-2 /HPF Final    Bacteria, UA 01/24/2024 None Seen  None Seen /HPF Final    Squamous Epithelial Cells, UA 01/24/2024 0-2  None Seen, 0-2 /HPF Final    Hyaline Casts, UA 01/24/2024 None Seen  None Seen /LPF Final    Methodology 01/24/2024 Automated Microscopy   Final   Office Visit on 11/06/2023   Component Date Value Ref Range Status    Glucose 11/06/2023 77  65 - 99 mg/dL Final    BUN 11/06/2023 12  8 - 23 mg/dL Final    Creatinine 11/06/2023 0.94  0.57 - 1.00 mg/dL Final    Sodium 11/06/2023 140  136 - 145 mmol/L Final    Potassium 11/06/2023 4.2  3.5 - 5.2 mmol/L Final    Chloride 11/06/2023 103  98 - 107 mmol/L Final    CO2 11/06/2023 29.3 (H)  22.0 - 29.0 mmol/L Final    Calcium 11/06/2023 9.7  8.6 - 10.5 mg/dL Final    BUN/Creatinine Ratio 11/06/2023 12.8  7.0 - 25.0 Final    Anion Gap 11/06/2023 7.7   5.0 - 15.0 mmol/L Final    eGFR 11/06/2023 59.2 (L)  >60.0 mL/min/1.73 Final    Magnesium 11/06/2023 2.1  1.6 - 2.4 mg/dL Final    Phosphorus 11/06/2023 4.1  2.5 - 4.5 mg/dL Final    25 Hydroxy, Vitamin D 11/06/2023 26.5 (L)  30.0 - 100.0 ng/ml Final   Office Visit on 09/12/2023   Component Date Value Ref Range Status    Glucose 09/12/2023 104 (H)  65 - 99 mg/dL Final    BUN 09/12/2023 11  8 - 23 mg/dL Final    Creatinine 09/12/2023 1.04 (H)  0.57 - 1.00 mg/dL Final    Sodium 09/12/2023 141  136 - 145 mmol/L Final    Potassium 09/12/2023 4.0  3.5 - 5.2 mmol/L Final    Chloride 09/12/2023 102  98 - 107 mmol/L Final    CO2 09/12/2023 30.0 (H)  22.0 - 29.0 mmol/L Final    Calcium 09/12/2023 9.5  8.6 - 10.5 mg/dL Final    Total Protein 09/12/2023 7.0  6.0 - 8.5 g/dL Final    Albumin 09/12/2023 4.2  3.5 - 5.2 g/dL Final    ALT (SGPT) 09/12/2023 8  1 - 33 U/L Final    AST (SGOT) 09/12/2023 19  1 - 32 U/L Final    Alkaline Phosphatase 09/12/2023 78  39 - 117 U/L Final    Total Bilirubin 09/12/2023 0.3  0.0 - 1.2 mg/dL Final    Globulin 09/12/2023 2.8  gm/dL Final    A/G Ratio 09/12/2023 1.5  g/dL Final    BUN/Creatinine Ratio 09/12/2023 10.6  7.0 - 25.0 Final    Anion Gap 09/12/2023 9.0  5.0 - 15.0 mmol/L Final    eGFR 09/12/2023 52.5 (L)  >60.0 mL/min/1.73 Final    Protein/Creatinine Ratio, Urine 09/12/2023 151.8  0.0 - 200.0 mg/G Crea Final    Creatinine, Urine 09/12/2023 118.6  mg/dL Final    Total Protein, Urine 09/12/2023 18.0  mg/dL Final    Microalbumin/Creatinine Ratio 09/12/2023 37.9  mg/g Final    Creatinine, Urine 09/12/2023 118.6  mg/dL Final    Microalbumin, Urine 09/12/2023 4.5  mg/dL Final    Color, UA 09/12/2023 Yellow  Yellow, Straw Final    Appearance, UA 09/12/2023 Clear  Clear Final    pH, UA 09/12/2023 6.5  5.0 - 8.0 Final    Specific Gravity, UA 09/12/2023 1.017  1.005 - 1.030 Final    Glucose, UA 09/12/2023 Negative  Negative Final    Ketones, UA 09/12/2023 Negative  Negative Final    Bilirubin, UA  09/12/2023 Negative  Negative Final    Blood, UA 09/12/2023 Negative  Negative Final    Protein, UA 09/12/2023 Trace (A)  Negative Final    Leuk Esterase, UA 09/12/2023 Small (1+) (A)  Negative Final    Nitrite, UA 09/12/2023 Negative  Negative Final    Urobilinogen, UA 09/12/2023 0.2 E.U./dL  0.2 - 1.0 E.U./dL Final    RBC, UA 09/12/2023 None Seen  None Seen, 0-2 /HPF Final    WBC, UA 09/12/2023 3-5 (A)  None Seen, 0-2 /HPF Final    Bacteria, UA 09/12/2023 None Seen  None Seen /HPF Final    Squamous Epithelial Cells, UA 09/12/2023 3-6 (A)  None Seen, 0-2 /HPF Final    Hyaline Casts, UA 09/12/2023 None Seen  None Seen /LPF Final    Calcium Oxalate Crystals, UA 09/12/2023 Small/1+  None Seen /HPF Final    Methodology 09/12/2023 Manual Light Microscopy   Final       Assessment and Plan  Diagnoses and all orders for this visit:    1. Chills (without fever) (Primary)    2. Urinary frequency  -     Urine Culture - Urine, Urine, Clean Catch; Future  -     Urinalysis With Microscopic - Urine, Clean Catch; Future  -     CBC w AUTO Differential; Future  -     Comprehensive metabolic panel; Future    Patient is having chills, night sweats, and body aches. On history and physical exam there Is no clear etiology. She denies dysuria, but has had new onset urgency and frequency. No CVA tenderness. No signs of URI at this time. Occult malignancy is always on the differential but infection more likely at this time due to acuity.   As above, will do urine culture and urinalysis, as well as CBC/CMP. Low threshold for antibiotics.     BMI is within normal parameters. No other follow-up for BMI required.       New Medications:   No orders of the defined types were placed in this encounter.      Discontinued Medications:   There are no discontinued medications.              Follow Up:   No follow-ups on file.    Patient was given instructions and counseling regarding her condition or for health maintenance advice. Please see specific  information pulled into the AVS if appropriate.       This document has been electronically signed by Cheko Philip DO   February 20, 2024 15:54 EST    Dictated Utilizing Dragon Dictation: Part of this note may be an electronic transcription/translation of spoken language to printed text using the Dragon Dictation System.

## 2024-02-21 LAB
ALBUMIN SERPL-MCNC: 4 G/DL (ref 3.5–5.2)
ALBUMIN/GLOB SERPL: 1.5 G/DL
ALP SERPL-CCNC: 79 U/L (ref 39–117)
ALT SERPL W P-5'-P-CCNC: 9 U/L (ref 1–33)
ANION GAP SERPL CALCULATED.3IONS-SCNC: 8.2 MMOL/L (ref 5–15)
AST SERPL-CCNC: 18 U/L (ref 1–32)
BACTERIA SPEC AEROBE CULT: NO GROWTH
BACTERIA UR QL AUTO: NORMAL /HPF
BASOPHILS # BLD AUTO: 0.02 10*3/MM3 (ref 0–0.2)
BASOPHILS NFR BLD AUTO: 0.3 % (ref 0–1.5)
BILIRUB SERPL-MCNC: 0.4 MG/DL (ref 0–1.2)
BILIRUB UR QL STRIP: NEGATIVE
BUN SERPL-MCNC: 9 MG/DL (ref 8–23)
BUN/CREAT SERPL: 9.3 (ref 7–25)
CALCIUM SPEC-SCNC: 9 MG/DL (ref 8.6–10.5)
CHLORIDE SERPL-SCNC: 104 MMOL/L (ref 98–107)
CLARITY UR: CLEAR
CO2 SERPL-SCNC: 27.8 MMOL/L (ref 22–29)
COLOR UR: YELLOW
CREAT SERPL-MCNC: 0.97 MG/DL (ref 0.57–1)
DEPRECATED RDW RBC AUTO: 43.6 FL (ref 37–54)
EGFRCR SERPLBLD CKD-EPI 2021: 57 ML/MIN/1.73
EOSINOPHIL # BLD AUTO: 0.04 10*3/MM3 (ref 0–0.4)
EOSINOPHIL NFR BLD AUTO: 0.6 % (ref 0.3–6.2)
ERYTHROCYTE [DISTWIDTH] IN BLOOD BY AUTOMATED COUNT: 12.8 % (ref 12.3–15.4)
GLOBULIN UR ELPH-MCNC: 2.7 GM/DL
GLUCOSE SERPL-MCNC: 76 MG/DL (ref 65–99)
GLUCOSE UR STRIP-MCNC: NEGATIVE MG/DL
HCT VFR BLD AUTO: 38.6 % (ref 34–46.6)
HGB BLD-MCNC: 12.5 G/DL (ref 12–15.9)
HGB UR QL STRIP.AUTO: NEGATIVE
HYALINE CASTS UR QL AUTO: NORMAL /LPF
IMM GRANULOCYTES # BLD AUTO: 0.01 10*3/MM3 (ref 0–0.05)
IMM GRANULOCYTES NFR BLD AUTO: 0.2 % (ref 0–0.5)
KETONES UR QL STRIP: NEGATIVE
LEUKOCYTE ESTERASE UR QL STRIP.AUTO: ABNORMAL
LYMPHOCYTES # BLD AUTO: 1.24 10*3/MM3 (ref 0.7–3.1)
LYMPHOCYTES NFR BLD AUTO: 19.8 % (ref 19.6–45.3)
MCH RBC QN AUTO: 30.2 PG (ref 26.6–33)
MCHC RBC AUTO-ENTMCNC: 32.4 G/DL (ref 31.5–35.7)
MCV RBC AUTO: 93.2 FL (ref 79–97)
MONOCYTES # BLD AUTO: 0.94 10*3/MM3 (ref 0.1–0.9)
MONOCYTES NFR BLD AUTO: 15 % (ref 5–12)
NEUTROPHILS NFR BLD AUTO: 4 10*3/MM3 (ref 1.7–7)
NEUTROPHILS NFR BLD AUTO: 64.1 % (ref 42.7–76)
NITRITE UR QL STRIP: NEGATIVE
NRBC BLD AUTO-RTO: 0 /100 WBC (ref 0–0.2)
PH UR STRIP.AUTO: 6.5 [PH] (ref 5–8)
PLATELET # BLD AUTO: 159 10*3/MM3 (ref 140–450)
PMV BLD AUTO: 10.8 FL (ref 6–12)
POTASSIUM SERPL-SCNC: 4 MMOL/L (ref 3.5–5.2)
PROT SERPL-MCNC: 6.7 G/DL (ref 6–8.5)
PROT UR QL STRIP: ABNORMAL
RBC # BLD AUTO: 4.14 10*6/MM3 (ref 3.77–5.28)
RBC # UR STRIP: NORMAL /HPF
REF LAB TEST METHOD: NORMAL
SODIUM SERPL-SCNC: 140 MMOL/L (ref 136–145)
SP GR UR STRIP: 1.02 (ref 1–1.03)
SQUAMOUS #/AREA URNS HPF: NORMAL /HPF
UROBILINOGEN UR QL STRIP: ABNORMAL
WBC # UR STRIP: NORMAL /HPF
WBC NRBC COR # BLD AUTO: 6.25 10*3/MM3 (ref 3.4–10.8)

## 2024-02-22 ENCOUNTER — TELEPHONE (OUTPATIENT)
Dept: FAMILY MEDICINE CLINIC | Facility: CLINIC | Age: 89
End: 2024-02-22
Payer: MEDICARE

## 2024-02-22 NOTE — TELEPHONE ENCOUNTER
----- Message from Cheko Philip, DO sent at 2/21/2024  3:52 PM EST -----  Please let the patient know that her lab work was normal.  Her urine culture was negative.  There is no signs of infection on her labs.  I recommend that she monitor her symptoms for a few days if she continues to have chills and sweats we need to further evaluate her.

## 2024-02-26 ENCOUNTER — OFFICE VISIT (OUTPATIENT)
Dept: FAMILY MEDICINE CLINIC | Facility: CLINIC | Age: 89
End: 2024-02-26
Payer: MEDICARE

## 2024-02-26 VITALS
HEART RATE: 80 BPM | DIASTOLIC BLOOD PRESSURE: 78 MMHG | BODY MASS INDEX: 23.26 KG/M2 | WEIGHT: 139.6 LBS | TEMPERATURE: 97.7 F | OXYGEN SATURATION: 97 % | SYSTOLIC BLOOD PRESSURE: 124 MMHG | HEIGHT: 65 IN

## 2024-02-26 DIAGNOSIS — J01.00 ACUTE NON-RECURRENT MAXILLARY SINUSITIS: ICD-10-CM

## 2024-02-26 DIAGNOSIS — J00 CORYZA: ICD-10-CM

## 2024-02-26 DIAGNOSIS — T14.8XXA EXCORIATION: Primary | ICD-10-CM

## 2024-02-26 RX ORDER — AMOXICILLIN AND CLAVULANATE POTASSIUM 875; 125 MG/1; MG/1
1 TABLET, FILM COATED ORAL 2 TIMES DAILY
Qty: 14 TABLET | Refills: 0 | Status: SHIPPED | OUTPATIENT
Start: 2024-02-26 | End: 2024-02-29

## 2024-02-29 ENCOUNTER — TELEPHONE (OUTPATIENT)
Dept: FAMILY MEDICINE CLINIC | Facility: CLINIC | Age: 89
End: 2024-02-29

## 2024-02-29 ENCOUNTER — OFFICE VISIT (OUTPATIENT)
Dept: FAMILY MEDICINE CLINIC | Facility: CLINIC | Age: 89
End: 2024-02-29
Payer: MEDICARE

## 2024-02-29 VITALS
OXYGEN SATURATION: 95 % | DIASTOLIC BLOOD PRESSURE: 70 MMHG | TEMPERATURE: 96.9 F | HEART RATE: 80 BPM | WEIGHT: 138 LBS | SYSTOLIC BLOOD PRESSURE: 118 MMHG | BODY MASS INDEX: 22.99 KG/M2 | HEIGHT: 65 IN

## 2024-02-29 DIAGNOSIS — J34.89 RHINORRHEA: ICD-10-CM

## 2024-02-29 DIAGNOSIS — T14.8XXA EXCORIATION: Primary | ICD-10-CM

## 2024-02-29 DIAGNOSIS — J01.00 ACUTE NON-RECURRENT MAXILLARY SINUSITIS: ICD-10-CM

## 2024-02-29 RX ORDER — CEFDINIR 300 MG/1
300 CAPSULE ORAL 2 TIMES DAILY
Qty: 10 CAPSULE | Refills: 0 | Status: SHIPPED | OUTPATIENT
Start: 2024-02-29

## 2024-02-29 NOTE — TELEPHONE ENCOUNTER
"Tonia called stated she thinks she is allergic to the Augmentin,reports after she takes it her face feels \"Itchy & Hot\" no rash states its hard to explain but she feels it in her face after she took it so now she is afraid to take it anymore,reports her lip looks awful looks like it has been burned with blisters & wants to know if she can have silvadene.  "

## 2024-02-29 NOTE — TELEPHONE ENCOUNTER
She had a sore area between her nose and upper lip.  Is this the area in question?  I recommend that she is evaluated today or tomorrow.  For now, stop the Augmentin.

## 2024-02-29 NOTE — PROGRESS NOTES
Subjective   Tonia Ritter is a 88 y.o. female.   Pt presents today with CC of Abrasion      History of Present Illness   History of Present Illness  Patient is a pleasant 88-year-old female here to follow-up on an abrasion below her nose.  This is started from rhinorrhea and requirement to wipe her nose frequently.  She has been using Vaseline with minimal benefit.  She started Augmentin to help with sinusitis, though reports that the redness around her nose worsened.  She wanted to be evaluated.  She denies any rash, swelling, shortness of breath, or any other effect from Augmentin.       The following portions of the patient's history were reviewed and updated as appropriate: allergies, current medications, past family history, past medical history, past social history, past surgical history, and problem list.    Review of Systems   Constitutional:  Negative for chills, fever and unexpected weight loss.   HENT:  Positive for congestion, rhinorrhea and sinus pressure. Negative for postnasal drip and sore throat.    Eyes:  Negative for blurred vision and visual disturbance.   Respiratory:  Negative for cough and wheezing.    Cardiovascular:  Negative for chest pain and palpitations.   Gastrointestinal:  Negative for abdominal pain, diarrhea and nausea.   Genitourinary:  Negative for dysuria.   Musculoskeletal:  Negative for arthralgias and neck stiffness.   Skin:  Negative for rash.   Neurological:  Negative for seizures and syncope.     Vitals:    02/29/24 1545   BP: 118/70   Pulse: 80   Temp: 96.9 °F (36.1 °C)   SpO2: 95%        Objective   Physical Exam  Vitals and nursing note reviewed.   Constitutional:       Appearance: She is well-developed.   HENT:      Head: Normocephalic and atraumatic.      Right Ear: External ear normal.      Left Ear: External ear normal.      Nose: Nose normal.      Comments: Below her nose and above her upper lip is some excoriation, it is erythematous, well-demarcated, no  drainage or bleeding.  Very isolated region just below her nostrils.  No rash elsewhere.  Eyes:      Conjunctiva/sclera: Conjunctivae normal.      Pupils: Pupils are equal, round, and reactive to light.   Cardiovascular:      Rate and Rhythm: Normal rate and regular rhythm.      Heart sounds: Normal heart sounds.   Pulmonary:      Effort: Pulmonary effort is normal.      Breath sounds: Normal breath sounds.   Abdominal:      General: Bowel sounds are normal.      Palpations: Abdomen is soft.   Musculoskeletal:      Cervical back: Normal range of motion and neck supple.   Skin:     General: Skin is warm and dry.   Neurological:      Mental Status: She is alert and oriented to person, place, and time.   Psychiatric:         Behavior: Behavior normal.           Assessment & Plan   Diagnoses and all orders for this visit:    1. Excoriation (Primary)  I recommend a light layer of Vaseline to help protect and prevent dryness.  She needs to avoid excessive use of tissues when wiping her nose.  Once the runny nose stops, which I expect in the next couple days, it should heal over a week or so.  2. Rhinorrhea    3. Acute non-recurrent maxillary sinusitis  -     cefdinir (OMNICEF) 300 MG capsule; Take 1 capsule by mouth 2 (Two) Times a Day.  Dispense: 10 capsule; Refill: 0    Discontinue Augmentin, starting cefdinir.  Will treat for another 5 days.              BMI is within normal parameters. No other follow-up for BMI required.          This document has been electronically signed by Joey Orozco DO  February 29, 2024 16:15 EST    Dictated Utilizing Dragon Dictation: Part of this note may be an electronic transcription/translation of spoken language to printed text using the Dragon Dictation System.

## 2024-02-29 NOTE — PROGRESS NOTES
Subjective   Tonia Ritter is a 88 y.o. female.   Pt presents today with CC of Nose Problem      History of Present Illness   History of Present Illness  1.  Patient is an 88-year-old female who reports 2 weeks of persistent runny nose and sinus congestion.  She request antibiotic.  She states that she has a sore underneath her nose from using tissues to blow her nose frequently over the past 2 weeks.  She denies fevers or chills.         The following portions of the patient's history were reviewed and updated as appropriate: allergies, current medications, past family history, past medical history, past social history, past surgical history, and problem list.    Review of Systems   Constitutional:  Negative for chills, fever and unexpected weight loss.   HENT:  Negative for congestion and sore throat.    Eyes:  Negative for blurred vision and visual disturbance.   Respiratory:  Negative for cough and wheezing.    Cardiovascular:  Negative for chest pain and palpitations.   Gastrointestinal:  Negative for abdominal pain and diarrhea.   Endocrine: Negative for cold intolerance and heat intolerance.   Genitourinary:  Negative for dysuria.   Musculoskeletal:  Negative for arthralgias and neck stiffness.   Neurological:  Negative for dizziness, seizures and syncope.   Psychiatric/Behavioral:  Negative for self-injury, suicidal ideas and depressed mood.      Vitals:    02/26/24 1344   BP: 124/78   Pulse: 80   Temp: 97.7 °F (36.5 °C)   SpO2: 97%        Objective   Physical Exam  Vitals and nursing note reviewed.   Constitutional:       Appearance: She is well-developed.   HENT:      Head: Normocephalic and atraumatic.      Right Ear: External ear normal.      Left Ear: External ear normal.      Nose: Nose normal.      Comments: Excoriated region under both nostrils extending down to her upper lip.  No blisters, drainage, or open wounds.  Eyes:      Conjunctiva/sclera: Conjunctivae normal.      Pupils: Pupils are  equal, round, and reactive to light.   Cardiovascular:      Rate and Rhythm: Normal rate and regular rhythm.      Heart sounds: Normal heart sounds.   Pulmonary:      Effort: Pulmonary effort is normal.      Breath sounds: Normal breath sounds.   Abdominal:      General: Bowel sounds are normal.      Palpations: Abdomen is soft.   Musculoskeletal:      Cervical back: Normal range of motion and neck supple.   Skin:     General: Skin is warm and dry.   Neurological:      Mental Status: She is alert and oriented to person, place, and time.   Psychiatric:         Behavior: Behavior normal.           Assessment & Plan   Diagnoses and all orders for this visit:    1. Excoriation (Primary)  I recommend a light layer of Vaseline to prevent further irritation.  To avoid frequent rubbing with tissues over this area.  And, treatment of potential bacterial sinusitis should be beneficial.  2. Coryza    3. Acute non-recurrent maxillary sinusitis  -     amoxicillin-clavulanate (AUGMENTIN) 875-125 MG per tablet; Take 1 tablet by mouth 2 (Two) Times a Day.  Dispense: 14 tablet; Refill: 0  We discussed options.                BMI is within normal parameters. No other follow-up for BMI required.          This document has been electronically signed by Joey Orozco DO  February 29, 2024 12:52 EST    Dictated Utilizing Dragon Dictation: Part of this note may be an electronic transcription/translation of spoken language to printed text using the Dragon Dictation System.

## 2024-03-08 ENCOUNTER — TELEPHONE (OUTPATIENT)
Dept: FAMILY MEDICINE CLINIC | Facility: CLINIC | Age: 89
End: 2024-03-08
Payer: MEDICARE

## 2024-03-08 DIAGNOSIS — B00.1 COLD SORE: Primary | ICD-10-CM

## 2024-03-08 RX ORDER — DOCOSANOL 100 MG/G
1 CREAM TOPICAL
Qty: 2 G | Refills: 0 | Status: SHIPPED | OUTPATIENT
Start: 2024-03-08

## 2024-03-08 NOTE — TELEPHONE ENCOUNTER
[place] [address] [facility] [address]. - [phone] [facility] [phone] FX    Relationship: Pharmacy    Best call back number: [phone]     What medication are you requesting: PATIENT THOUGHT DR WALSH WAS SENDING IN A COLD SORE MEDICATION.      What are your current symptoms:     How long have you been experiencing symptoms:     Have you had these symptoms before:    [] Yes  [] No    Have you been treated for these symptoms before:   [] Yes  [] No    If a prescription is needed, what is your preferred pharmacy and phone number: [place], [place] - [address] [facility]. - [phone]  - [phone] FX     Additional notes:

## 2024-03-08 NOTE — TELEPHONE ENCOUNTER
Caller: Tonia Ritter    Relationship: Self    Best call back number: [phone]     What medication are you requesting: SOMETHING FOR A COLD SORE OR FEVER BLISTER BETWEEN HER NOSE AND LIP    What are your current symptoms: COLD SORE OR FEVER BLISTER BETWEEN HER NOSE AND LIP    How long have you been experiencing symptoms: 3 WEEKS    Have you had these symptoms before:    [] Yes  [x] No    Have you been treated for these symptoms before:   [] Yes  [x] No    If a prescription is needed, what is your preferred pharmacy and phone number: [address] - [phone] [facility] [phone] FX     Additional notes:

## 2024-03-14 ENCOUNTER — TELEPHONE (OUTPATIENT)
Dept: FAMILY MEDICINE CLINIC | Facility: CLINIC | Age: 89
End: 2024-03-14
Payer: MEDICARE

## 2024-03-14 NOTE — TELEPHONE ENCOUNTER
Caller: Tonia Ritter    Relationship to patient: Self    Best call back number: [phone]     PATIENT IS CALLING TO STATE THAT SHE WOULD LIKE TO SEE IF SHE COULD GET A LIFE ALERT NECKLACE.  SHE BALANCE IS UNSTEADY.

## 2024-03-18 PROBLEM — R53.81 DEBILITY: Status: ACTIVE | Noted: 2024-03-18

## 2024-04-18 ENCOUNTER — TELEPHONE (OUTPATIENT)
Dept: FAMILY MEDICINE CLINIC | Facility: CLINIC | Age: 89
End: 2024-04-18
Payer: MEDICARE

## 2024-04-18 DIAGNOSIS — K31.84 GASTROPARESIS: Primary | ICD-10-CM

## 2024-04-18 NOTE — TELEPHONE ENCOUNTER
Pt called in today requesting a referral to dr curry for her gastroperisis she said they could get her scheduled if we sent them a referral

## 2024-04-18 NOTE — TELEPHONE ENCOUNTER
THE PATIENT CALLED AND STATED TO DISREGARD THIS MESSAGE.  SHE HAS AN APPT AT [facility] FOR TOMORROW. 04/19/24.  THEY CALLED TODAY.

## 2024-04-18 NOTE — TELEPHONE ENCOUNTER
Caller: Tonia Ritter    Relationship: Self    Best call back number: [phone]     What is the medical concern/diagnosis: GASTROPARESIS, LOST 30-40 LBS    What specialty or service is being requested: GI    What is the provider, practice or medical service name: DR PEREZ    What is the office location: [place]

## 2024-04-18 NOTE — PROGRESS NOTES
DATE:  4/19/2024    DIAGNOSIS: Epigastric pain    CHIEF COMPLAINT:  Epigastric pain, constipation, weight loss    Interval History:  Ms. Ritter has previously been following with Wilman Enamorado PA-C and was last seen in April 2023.  Please see previous notes for prior management. Patient says she was previously told she had gastroparesis by Dr. Bearden following EGD a few years ago.  However, in review of her records she has not had previous gastric emptying scan done.  She says she was previously given a prescription for Reglan but she discontinued due to concern of potential side effects including TD.  At her last visit with Wilman Enamorado PA-C, she was started on trial of Creon which she discontinued as she did not find this helpful and also caused nausea.  Given continued complaints of epigastric pain, gastric emptying scan was ordered by our clinic in May 2023 but was not completed.  At present, she complains of epigastric pain which occurs daily particularly after eating.  She reports all types of food cause epigastric pain.  She denies having burning in her throat/chest or regurgitation.  Of note, she is s/p cholecystectomy.  However, she does not feel she is struggling with acid reflux.  She reports having early satiety.  Denies dysphagia.  Denies nausea or vomiting.  She reports unintentional weight loss.  She has lost~18 pounds in 1 year.  She also complains of constipation.  She is having a BM ~every other day with stool type 4 or 6 on BSS. She has intermittent abdominal bloating and incomplete evacuation of her colon.  She denies having melena or bright red bleeding per rectum.  She has no other complaints today.    PAST MEDICAL HISTORY:  Past Medical History:   Diagnosis Date    Acute bronchitis     Allergic rhinitis     Anxiety     Atrial fibrillation     Back pain     Burping     Chills (without fever) 03/25/2019    Chronic pain     Congestive heart failure     Debility 03/18/2024    Depression      Encounter for screening fecal occult blood testing 03/10/2016    NEGATIVE    Fibromyalgia     Flatulence     GERD (gastroesophageal reflux disease)     Hematuria     Hyperlipidemia     Hypertension     Osteoarthritis     Pacemaker     PUD (peptic ulcer disease)     Right shoulder pain        PAST SURGICAL HISTORY:  Past Surgical History:   Procedure Laterality Date    CARDIOVERSION  05/2017    [facility]     CARDIOVERSION  09/01/2017    [facility]    CHOLECYSTECTOMY  2004    COLONOSCOPY  05/2013    DILATATION AND CURETTAGE      EPIDIDYMAL CYST EXCISION      ESOPHAGOSCOPY / EGD  01/2019    EYE SURGERY      HEMORRHOIDECTOMY  01/2019    MAMMO BILATERAL  10/2014    NECK SURGERY      PACEMAKER IMPLANTATION      PAP SMEAR  03/2016    SKIN LESION EXCISION         SOCIAL HISTORY:  Social History     Socioeconomic History    Marital status:    Tobacco Use    Smoking status: Never    Smokeless tobacco: Never   Vaping Use    Vaping status: Never Used   Substance and Sexual Activity    Alcohol use: No    Drug use: No    Sexual activity: Defer       FAMILY HISTORY:  Family History   Problem Relation Age of Onset    Thyroid disease Mother     Mental illness Father         nervous breakdown    Breast cancer Maternal Aunt      MEDICATIONS:  The current medication list was reviewed in the EMR    Current Outpatient Medications:     acetaminophen (TYLENOL) 500 MG tablet, Take 1 tablet by mouth Every 6 (Six) Hours As Needed for Mild Pain., Disp: , Rfl:     cefdinir (OMNICEF) 300 MG capsule, Take 1 capsule by mouth 2 (Two) Times a Day., Disp: 10 capsule, Rfl: 0    docosanol (ABREVA) 10 % cream cream, Apply 1 Application topically to the appropriate area as directed 5 (Five) Times a Day., Disp: 2 g, Rfl: 0    fluticasone (FLONASE) 50 MCG/ACT nasal spray, 2 sprays into the nostril(s) as directed by provider Daily. (Patient taking differently: 2 sprays into the nostril(s) as directed by provider Daily As Needed.), Disp: 16  g, Rfl: 2    furosemide (LASIX) 20 MG tablet, Take 1 tablet by mouth Every Other Day. (Patient taking differently: Take 1 tablet by mouth See Admin Instructions. Takes 2 x weekly (Monday and Thursday)), Disp: 45 tablet, Rfl: 1    lisinopril (PRINIVIL,ZESTRIL) 5 MG tablet, Take 1 tablet by mouth Daily., Disp: 90 tablet, Rfl: 1    PARoxetine (Paxil) 10 MG tablet, Take 0.5 tablets by mouth Every Night. Takes 0.5 tablet, Disp: 90 tablet, Rfl: 1    potassium chloride (K-DUR,KLOR-CON) 10 MEQ CR tablet, Take 1 tablet by mouth Daily., Disp: 90 tablet, Rfl: 1    rivaroxaban (Xarelto) 20 MG tablet, Take 1 tablet by mouth Daily With Dinner., Disp: 90 tablet, Rfl: 0    rosuvastatin (CRESTOR) 10 MG tablet, Take 1 tablet by mouth Daily. for cholesterol, Disp: 90 tablet, Rfl: 1    Sotalol HCl, AF, 120 MG tablet, Take 1 tablet by mouth 2 (Two) Times a Day., Disp: 180 each, Rfl: 1    ALLERGIES:    Allergies   Allergen Reactions    Reglan [Metoclopramide]     Sulfa Antibiotics Nausea And Vomiting     REVIEW OF SYSTEMS:    A comprehensive 14 point review of systems was performed.  Significant findings as mentioned above.  All other systems reviewed and are negative.        Physical Exam   Vital Signs:   Vitals:    04/19/24 1045   BP: 103/62   Pulse: 70   General: Well developed, well nourished, alert and oriented x 3, in no acute distress.   Head: ATNC   Eyes: PERRL, No evidence of conjunctivitis.   Nose: No nasal discharge.   Mouth: Oral mucosal membranes moist. No oral ulceration or hemorrhages.   Neck: Neck supple. No thyromegaly. No JVD.   Lungs: Clear in all fields to A&P without rales, rhonchi or wheezing.   Heart: Regular rate and rhythm. No murmurs, rubs, or gallops.   Abdomen: Soft. Bowel sounds are normoactive. Nontender with palpation.   Extremities: No cyanosis or edema.   Neurologic: MS as above. Grossly non focal exam.      RECENT LABS:  Lab Results   Component Value Date    WBC 6.25 02/20/2024    HGB 12.5 02/20/2024     HCT 38.6 02/20/2024    MCV 93.2 02/20/2024    RDW 12.8 02/20/2024     02/20/2024    NEUTRORELPCT 64.1 02/20/2024    LYMPHORELPCT 19.8 02/20/2024    MONORELPCT 15.0 (H) 02/20/2024    EOSRELPCT 0.6 02/20/2024    BASORELPCT 0.3 02/20/2024    NEUTROABS 4.00 02/20/2024    LYMPHSABS 1.24 02/20/2024       Lab Results   Component Value Date     02/20/2024    K 4.0 02/20/2024    CO2 27.8 02/20/2024     02/20/2024    BUN 9 02/20/2024    CREATININE 0.97 02/20/2024    EGFRIFNONA 50 (L) 02/14/2022    EGFRIFAFRI 63 08/11/2016    GLUCOSE 76 02/20/2024    CALCIUM 9.0 02/20/2024    ALKPHOS 79 02/20/2024    AST 18 02/20/2024    ALT 9 02/20/2024    BILITOT 0.4 02/20/2024    ALBUMIN 4.0 02/20/2024    PROTEINTOT 6.7 02/20/2024    MG 2.1 11/06/2023    PHOS 4.1 11/06/2023       ASSESSMENT & PLAN:  Tonia Ritter is a very pleasant 88 y.o. female with    1. Epigastric pain:  2. ? Gastroparesis:  3.  Unintentional weight loss:    -Reportedly patient was previously told she had gastroparesis by Dr. Bearden following EGD a few years ago.  However, in review of her records she has not had previous gastric emptying scan done.  She says she was previously given a prescription for Reglan but she discontinued due to concern of potential side effects including TD.  -Given continued complaints of epigastric pain particularly after eating, again recommended gastric emptying scan to evaluate for possible gastroparesis.  Patient is agreeable, will reorder.  -Although she does not feel this is related to acid reflux, she is s/p cholecystectomy and therefore recommended trial of PPI therapy.  Will start trial of Protonix 40 mg p.o. daily.  -Will have patient follow-up in 3 months for symptom check.          Electronically Signed by: TEODORA Baldwin ,  April 18, 2024 15:28 EDT       CC:   Joey Orozco DO

## 2024-04-19 ENCOUNTER — OFFICE VISIT (OUTPATIENT)
Dept: GASTROENTEROLOGY | Facility: CLINIC | Age: 89
End: 2024-04-19
Payer: MEDICARE

## 2024-04-19 VITALS
WEIGHT: 128 LBS | SYSTOLIC BLOOD PRESSURE: 103 MMHG | DIASTOLIC BLOOD PRESSURE: 62 MMHG | BODY MASS INDEX: 21.33 KG/M2 | HEIGHT: 65 IN | HEART RATE: 70 BPM

## 2024-04-19 DIAGNOSIS — R10.13 EPIGASTRIC PAIN: ICD-10-CM

## 2024-04-19 DIAGNOSIS — K21.9 GASTROESOPHAGEAL REFLUX DISEASE, UNSPECIFIED WHETHER ESOPHAGITIS PRESENT: Primary | ICD-10-CM

## 2024-04-19 DIAGNOSIS — R11.0 NAUSEA: ICD-10-CM

## 2024-04-19 PROCEDURE — 1159F MED LIST DOCD IN RCRD: CPT | Performed by: NURSE PRACTITIONER

## 2024-04-19 PROCEDURE — 1160F RVW MEDS BY RX/DR IN RCRD: CPT | Performed by: NURSE PRACTITIONER

## 2024-04-19 PROCEDURE — 99214 OFFICE O/P EST MOD 30 MIN: CPT | Performed by: NURSE PRACTITIONER

## 2024-04-19 RX ORDER — PANTOPRAZOLE SODIUM 40 MG/1
40 TABLET, DELAYED RELEASE ORAL DAILY
Qty: 30 TABLET | Refills: 5 | Status: SHIPPED | OUTPATIENT
Start: 2024-04-19

## 2024-04-23 ENCOUNTER — OFFICE VISIT (OUTPATIENT)
Dept: FAMILY MEDICINE CLINIC | Facility: CLINIC | Age: 89
End: 2024-04-23
Payer: MEDICARE

## 2024-04-23 VITALS
BODY MASS INDEX: 21.96 KG/M2 | OXYGEN SATURATION: 97 % | SYSTOLIC BLOOD PRESSURE: 110 MMHG | DIASTOLIC BLOOD PRESSURE: 74 MMHG | HEIGHT: 65 IN | WEIGHT: 131.8 LBS | HEART RATE: 71 BPM | TEMPERATURE: 97.8 F

## 2024-04-23 DIAGNOSIS — R33.9 INCOMPLETE BLADDER EMPTYING: Primary | ICD-10-CM

## 2024-04-23 DIAGNOSIS — Z95.0 PACEMAKER: ICD-10-CM

## 2024-04-23 DIAGNOSIS — K31.84 GASTROPARESIS: ICD-10-CM

## 2024-04-23 LAB
BILIRUB BLD-MCNC: NEGATIVE MG/DL
CLARITY, POC: CLEAR
COLOR UR: YELLOW
EXPIRATION DATE: ABNORMAL
GLUCOSE UR STRIP-MCNC: NEGATIVE MG/DL
KETONES UR QL: NEGATIVE
LEUKOCYTE EST, POC: ABNORMAL
Lab: ABNORMAL
NITRITE UR-MCNC: NEGATIVE MG/ML
PH UR: 6 [PH] (ref 5–8)
PROT UR STRIP-MCNC: NEGATIVE MG/DL
RBC # UR STRIP: NEGATIVE /UL
SP GR UR: 1 (ref 1–1.03)
UROBILINOGEN UR QL: NORMAL

## 2024-04-23 PROCEDURE — 99214 OFFICE O/P EST MOD 30 MIN: CPT | Performed by: FAMILY MEDICINE

## 2024-04-23 PROCEDURE — G2211 COMPLEX E/M VISIT ADD ON: HCPCS | Performed by: FAMILY MEDICINE

## 2024-04-23 PROCEDURE — 81003 URINALYSIS AUTO W/O SCOPE: CPT | Performed by: FAMILY MEDICINE

## 2024-04-23 NOTE — PROGRESS NOTES
Subjective   Tonia Ritter is a 88 y.o. female.   Pt presents today with CC of Abdominal Pain      History of Present Illness   History of Present Illness  Patient is an 88-year-old female who complains of abdominal pain with meals.  She has known history of gastroparesis with ongoing workup done by gastro specialist.  She requests for me to initiate further evaluation today, beyond what gastroenterology is doing.  She was recently started on Protonix.  So Far, Protonix is not having a significant effect.  #2 she has a pacemaker that was placed recently.  She complains of a tender mass in her anterior axilla.  She would like this evaluated.  #3 she complains of incomplete bladder emptying this morning, this only happened once, and was not a problem when she gave a urine sample today.       The following portions of the patient's history were reviewed and updated as appropriate: allergies, current medications, past family history, past medical history, past social history, past surgical history, and problem list.    Review of Systems   Constitutional:  Negative for chills, fever and unexpected weight loss.   HENT:  Negative for congestion and sore throat.    Eyes:  Negative for blurred vision and visual disturbance.   Respiratory:  Negative for cough and wheezing.    Cardiovascular:  Negative for chest pain and palpitations.   Gastrointestinal:  Negative for abdominal pain and diarrhea.   Endocrine: Negative for cold intolerance and heat intolerance.   Genitourinary:  Positive for breast lump. Negative for dysuria.   Musculoskeletal:  Negative for arthralgias and neck stiffness.   Neurological:  Negative for dizziness, seizures and syncope.   Psychiatric/Behavioral:  Negative for self-injury, suicidal ideas and depressed mood.      Vitals:    04/23/24 1034   BP: 110/74   Pulse: 71   Temp: 97.8 °F (36.6 °C)   SpO2: 97%        Objective   Physical Exam  Vitals and nursing note reviewed.   Constitutional:        Appearance: She is well-developed.   HENT:      Head: Normocephalic and atraumatic.      Right Ear: External ear normal.      Left Ear: External ear normal.      Nose: Nose normal.   Eyes:      Conjunctiva/sclera: Conjunctivae normal.      Pupils: Pupils are equal, round, and reactive to light.   Cardiovascular:      Rate and Rhythm: Normal rate and regular rhythm.      Heart sounds: Normal heart sounds.   Pulmonary:      Effort: Pulmonary effort is normal.      Breath sounds: Normal breath sounds.   Chest:          Comments: Lisa Hamm MA was present for exam  The axillary palpable mass is the backside of the peacemaker.  It can be palpated on the backside of the pectoralis major muscle.  Abdominal:      General: Bowel sounds are normal.      Palpations: Abdomen is soft.   Musculoskeletal:      Cervical back: Normal range of motion and neck supple.   Skin:     General: Skin is warm and dry.   Neurological:      Mental Status: She is alert and oriented to person, place, and time.   Psychiatric:         Behavior: Behavior normal.         Assessment & Plan   Diagnoses and all orders for this visit:    1. Incomplete bladder emptying (Primary)  -     POCT urinalysis dipstick, automated  Urinalysis looks okay.  Her symptoms have resolved.  Reassurance given  2. Gastroparesis  I would like for her to follow with gastroenterology.  I discussed the findings of her prior testing, and we reviewed the various medications available to treat gastroparesis, and the problems with each for her, most notably that Reglan is on her allergy list.  3. Pacemaker  The mass that she was feeling in her anterior axilla was the backside of the pacemaker.  The pacemaker has not migrated, though as she has low muscle mass, it can be palpated from the axilla.                  BMI is within normal parameters. No other follow-up for BMI required.          This document has been electronically signed by Lisa eDlgado  April 23, 2024 10:39  EDT    Dictated Utilizing Dragon Dictation: Part of this note may be an electronic transcription/translation of spoken language to printed text using the Dragon Dictation System.

## 2024-04-30 ENCOUNTER — HOSPITAL ENCOUNTER (OUTPATIENT)
Dept: NUCLEAR MEDICINE | Facility: HOSPITAL | Age: 89
Discharge: HOME | End: 2024-04-30
Payer: MEDICARE

## 2024-04-30 DIAGNOSIS — R11.0 NAUSEA: ICD-10-CM

## 2024-04-30 DIAGNOSIS — R10.13 EPIGASTRIC PAIN: ICD-10-CM

## 2024-04-30 DIAGNOSIS — K21.9 GASTROESOPHAGEAL REFLUX DISEASE, UNSPECIFIED WHETHER ESOPHAGITIS PRESENT: ICD-10-CM

## 2024-04-30 PROCEDURE — A9541 TC99M SULFUR COLLOID: HCPCS | Performed by: NURSE PRACTITIONER

## 2024-04-30 PROCEDURE — 78264 GASTRIC EMPTYING IMG STUDY: CPT

## 2024-04-30 PROCEDURE — 0 TECHNETIUM SULFUR COLLOID: Performed by: NURSE PRACTITIONER

## 2024-04-30 RX ADMIN — TECHNETIUM TC 99M SULFUR COLLOID 1 DOSE: KIT at 15:32

## 2024-05-01 PROCEDURE — 78264 GASTRIC EMPTYING IMG STUDY: CPT | Performed by: RADIOLOGY

## 2024-05-02 ENCOUNTER — TELEPHONE (OUTPATIENT)
Dept: GASTROENTEROLOGY | Facility: CLINIC | Age: 89
End: 2024-05-02
Payer: MEDICARE

## 2024-05-02 DIAGNOSIS — I10 ESSENTIAL HYPERTENSION: ICD-10-CM

## 2024-05-02 RX ORDER — LISINOPRIL 5 MG/1
5 TABLET ORAL DAILY
Qty: 90 TABLET | Refills: 1 | OUTPATIENT
Start: 2024-05-02

## 2024-05-02 NOTE — TELEPHONE ENCOUNTER
Called and discussed GES results with patient which showed gastroparesis.  At the 121 minutes there was still 93% gastric retention.  Given patient has previously tried Reglan which she could not tolerate, recommended trial of Gimoti.  Patient is agreeable and will stop by clinic to receive sample.  She was advised to notify clinic if she should want a prescription for Gimoti.  Also recommended gastroparesis diet.  Will provide handout.

## 2024-05-03 ENCOUNTER — TELEPHONE (OUTPATIENT)
Dept: GASTROENTEROLOGY | Facility: CLINIC | Age: 89
End: 2024-05-03
Payer: MEDICARE

## 2024-06-03 ENCOUNTER — OFFICE VISIT (OUTPATIENT)
Dept: GASTROENTEROLOGY | Facility: CLINIC | Age: 89
End: 2024-06-03
Payer: MEDICARE

## 2024-06-03 ENCOUNTER — OFFICE VISIT (OUTPATIENT)
Dept: FAMILY MEDICINE CLINIC | Facility: CLINIC | Age: 89
End: 2024-06-03
Payer: MEDICARE

## 2024-06-03 VITALS
BODY MASS INDEX: 20.99 KG/M2 | HEIGHT: 65 IN | SYSTOLIC BLOOD PRESSURE: 121 MMHG | HEART RATE: 71 BPM | DIASTOLIC BLOOD PRESSURE: 74 MMHG | WEIGHT: 126 LBS

## 2024-06-03 VITALS
DIASTOLIC BLOOD PRESSURE: 68 MMHG | WEIGHT: 126 LBS | BODY MASS INDEX: 20.99 KG/M2 | HEIGHT: 65 IN | OXYGEN SATURATION: 97 % | HEART RATE: 79 BPM | TEMPERATURE: 97.7 F | SYSTOLIC BLOOD PRESSURE: 112 MMHG

## 2024-06-03 DIAGNOSIS — R42 DIZZINESS: ICD-10-CM

## 2024-06-03 DIAGNOSIS — K31.84 GASTROPARESIS: ICD-10-CM

## 2024-06-03 DIAGNOSIS — D64.9 NORMOCYTIC ANEMIA: ICD-10-CM

## 2024-06-03 DIAGNOSIS — Z95.0 PACEMAKER: ICD-10-CM

## 2024-06-03 DIAGNOSIS — R10.13 EPIGASTRIC PAIN: ICD-10-CM

## 2024-06-03 DIAGNOSIS — Z79.01 ANTICOAGULATED: ICD-10-CM

## 2024-06-03 DIAGNOSIS — K21.9 GASTROESOPHAGEAL REFLUX DISEASE, UNSPECIFIED WHETHER ESOPHAGITIS PRESENT: Primary | ICD-10-CM

## 2024-06-03 DIAGNOSIS — R71.0 HEMOGLOBIN DROP: Primary | ICD-10-CM

## 2024-06-03 DIAGNOSIS — R63.4 WEIGHT LOSS: ICD-10-CM

## 2024-06-03 DIAGNOSIS — I10 ESSENTIAL HYPERTENSION: ICD-10-CM

## 2024-06-03 DIAGNOSIS — I48.0 PAROXYSMAL ATRIAL FIBRILLATION: ICD-10-CM

## 2024-06-03 PROCEDURE — 1159F MED LIST DOCD IN RCRD: CPT | Performed by: FAMILY MEDICINE

## 2024-06-03 PROCEDURE — 99214 OFFICE O/P EST MOD 30 MIN: CPT | Performed by: NURSE PRACTITIONER

## 2024-06-03 PROCEDURE — 1159F MED LIST DOCD IN RCRD: CPT | Performed by: NURSE PRACTITIONER

## 2024-06-03 PROCEDURE — 1126F AMNT PAIN NOTED NONE PRSNT: CPT | Performed by: FAMILY MEDICINE

## 2024-06-03 PROCEDURE — G2211 COMPLEX E/M VISIT ADD ON: HCPCS | Performed by: FAMILY MEDICINE

## 2024-06-03 PROCEDURE — 99214 OFFICE O/P EST MOD 30 MIN: CPT | Performed by: FAMILY MEDICINE

## 2024-06-03 PROCEDURE — 1160F RVW MEDS BY RX/DR IN RCRD: CPT | Performed by: FAMILY MEDICINE

## 2024-06-03 PROCEDURE — 1160F RVW MEDS BY RX/DR IN RCRD: CPT | Performed by: NURSE PRACTITIONER

## 2024-06-03 RX ORDER — POTASSIUM CHLORIDE 750 MG/1
1 CAPSULE, EXTENDED RELEASE ORAL DAILY
COMMUNITY
Start: 2024-05-02 | End: 2024-06-03

## 2024-06-03 NOTE — PROGRESS NOTES
DATE:  6/3/2024    DIAGNOSIS: Epigastric pain, gastroparesis    CHIEF COMPLAINT:  Follow-up of gastroparesis    HPI:  Ms. Ritter has previously been following with Wilman Enamorado PA-C and was last seen in April 2023.  Please see previous notes for prior management. Patient says she was previously told she had gastroparesis by Dr. Bearden following EGD a few years ago.  However, in review of her records she has not had previous gastric emptying scan done.  She says she was previously given a prescription for Reglan but she discontinued due to concern of potential side effects including TD and says this medication made her nervous.  At her last visit with Wilman Enamorado PA-C, she was started on trial of Creon which she discontinued as she did not find this helpful and also caused nausea.  Given continued complaints of epigastric pain, gastric emptying scan was ordered by our clinic in May 2023 but was not completed.  At present, she complains of epigastric pain which occurs daily particularly after eating.  She reports all types of food cause epigastric pain.  She denies having burning in her throat/chest or regurgitation.  Of note, she is s/p cholecystectomy.  However, she does not feel she is struggling with acid reflux.  She reports having early satiety.  Denies dysphagia.  Denies nausea or vomiting.  She reports unintentional weight loss.  She has lost~18 pounds in 1 year.  She also complains of constipation.  She is having a BM ~every other day with stool type 4 or 6 on BSS. She has intermittent abdominal bloating and incomplete evacuation of her colon.  She denies having melena or bright red bleeding per rectum.  She has no other complaints today.    INTERVAL HISTORY:  Ms. Ritter presents today for follow-up.  Since her last visit, she underwent gastric emptying scan on 4/30/2024 which confirmed significant gastroparesis. At 121 minutes, there was still 93% gastric retention.  Given patient previously tried  Reglan which she could not tolerate, recommended trial of Gimoti.  She was provided with sample but did not try this medication due to concern of potential side effects.  She also reports having previous eye surgery and at that time was given some type of nasal spray which caused her nose to bleed.  She says she does not want this to occur with this new medication. Patient says she is not interested in any medications for treatment of gastroparesis and would like to see a naturopathic doctor. We also provided her with Rx for Protonix 40 mg p.o. daily due to complaints of epigastric pain as she is also s/p cholecystectomy.  However, she refuses to take this medication and does not feel as if she has issues with acid reflux.  At present, she continues to complain of significant epigastric pain particularly after eating.  She has lost 5 pounds since her last visit in April.  She denies nausea or vomiting.  She reports her bowels are moving well.  She has no other complaints today.    PAST MEDICAL HISTORY:  Past Medical History:   Diagnosis Date    Acute bronchitis     Allergic rhinitis     Anxiety     Atrial fibrillation     Back pain     Burping     Chills (without fever) 03/25/2019    Chronic pain     Congestive heart failure     Debility 03/18/2024    Depression     Encounter for screening fecal occult blood testing 03/10/2016    NEGATIVE    Fibromyalgia     Flatulence     GERD (gastroesophageal reflux disease)     Hematuria     Hyperlipidemia     Hypertension     Osteoarthritis     Pacemaker     PUD (peptic ulcer disease)     Right shoulder pain        PAST SURGICAL HISTORY:  Past Surgical History:   Procedure Laterality Date    C[facility]OVERSION  05/2017    [facility]     CARD[facility]OVERSION  09/01/2017    [facility]    CHOLECYSTECTOMY  2004    COLONOSCOPY  05/2013    DILATATION AND CURETTAGE      EPIDIDYMAL CYST EXCISION      ESOPHAGOSCOPY / EGD  01/2019    EYE SURGERY      HEMORRHOIDECTOMY  01/2019    MAMMO  BILATERAL  10/2014    NECK SURGERY      PACEMAKER IMPLANTATION      PAP SMEAR  03/2016    SKIN LESION EXCISION         SOCIAL HISTORY:  Social History     Socioeconomic History    Marital status:    Tobacco Use    Smoking status: Never    Smokeless tobacco: Never   Vaping Use    Vaping status: Never Used   Substance and Sexual Activity    Alcohol use: No    Drug use: No    Sexual activity: Defer       FAMILY HISTORY:  Family History   Problem Relation Age of Onset    Thyroid disease Mother     Mental illness Father         nervous breakdown    Breast cancer Maternal Aunt      MEDICATIONS:  The current medication list was reviewed in the EMR    Current Outpatient Medications:     acetaminophen (TYLENOL) 500 MG tablet, Take 1 tablet by mouth Every 6 (Six) Hours As Needed for Mild Pain., Disp: , Rfl:     fluticasone (FLONASE) 50 MCG/ACT nasal spray, 2 sprays into the nostril(s) as directed by provider Daily. (Patient taking differently: 2 sprays into the nostril(s) as directed by provider Daily As Needed.), Disp: 16 g, Rfl: 2    furosemide (LASIX) 20 MG tablet, Take 1 tablet by mouth Every Other Day. (Patient taking differently: Take 1 tablet by mouth See Admin Instructions. Takes 2 x weekly (Monday and Thursday)), Disp: 45 tablet, Rfl: 1    lisinopril (PRINIVIL,ZESTRIL) 5 MG tablet, Take 1 tablet by mouth Daily., Disp: 90 tablet, Rfl: 1    pantoprazole (PROTONIX) 40 MG EC tablet, Take 1 tablet by mouth Daily., Disp: 30 tablet, Rfl: 5    PARoxetine (Paxil) 10 MG tablet, Take 0.5 tablets by mouth Every Night. Takes 0.5 tablet, Disp: 90 tablet, Rfl: 1    potassium chloride (K-DUR,KLOR-CON) 10 MEQ CR tablet, Take 1 tablet by mouth Daily., Disp: 90 tablet, Rfl: 1    rivaroxaban (Xarelto) 20 MG tablet, Take 1 tablet by mouth Daily With Dinner., Disp: 90 tablet, Rfl: 0    rosuvastatin (CRESTOR) 10 MG tablet, Take 1 tablet by mouth Daily. for cholesterol, Disp: 90 tablet, Rfl: 1    Sotalol HCl, AF, 120 MG tablet, Take  1 tablet by mouth 2 (Two) Times a Day., Disp: 180 each, Rfl: 1    ALLERGIES:    Allergies   Allergen Reactions    Reglan [Metoclopramide]     Sulfa Antibiotics Nausea And Vomiting     REVIEW OF SYSTEMS:    A comprehensive 14 point review of systems was performed.  Significant findings as mentioned above.  All other systems reviewed and are negative.        Physical Exam   Vital Signs:   Vitals:    06/03/24 1438   BP: 121/74   Pulse: 71   General: Well developed, well nourished, alert and oriented x 3, in no acute distress.   Head: ATNC   Eyes: PERRL, No evidence of conjunctivitis.   Nose: No nasal discharge.   Mouth: Oral mucosal membranes moist. No oral ulceration or hemorrhages.   Neck: Neck supple. No thyromegaly. No JVD.   Lungs: Clear in all fields to A&P without rales, rhonchi or wheezing.   Heart: Regular rate and rhythm. No murmurs, rubs, or gallops.   Abdomen: Soft. Bowel sounds are normoactive. Nontender with palpation.   Extremities: No cyanosis or edema.   Neurologic: MS as above. Grossly non focal exam.      RECENT LABS:  Lab Results   Component Value Date    WBC 6.25 02/20/2024    HGB 12.5 02/20/2024    HCT 38.6 02/20/2024    MCV 93.2 02/20/2024    RDW 12.8 02/20/2024     02/20/2024    NEUTRORELPCT 64.1 02/20/2024    LYMPHORELPCT 19.8 02/20/2024    MONORELPCT 15.0 (H) 02/20/2024    EOSRELPCT 0.6 02/20/2024    BASORELPCT 0.3 02/20/2024    NEUTROABS 4.00 02/20/2024    LYMPHSABS 1.24 02/20/2024       Lab Results   Component Value Date     02/20/2024    K 4.0 02/20/2024    CO2 27.8 02/20/2024     02/20/2024    BUN 9 02/20/2024    CREATININE 0.97 02/20/2024    EGFRIFNONA 50 (L) 02/14/2022    EGFRIFAFRI 63 08/11/2016    GLUCOSE 76 02/20/2024    CALCIUM 9.0 02/20/2024    ALKPHOS 79 02/20/2024    AST 18 02/20/2024    ALT 9 02/20/2024    BILITOT 0.4 02/20/2024    ALBUMIN 4.0 02/20/2024    PROTEINTOT 6.7 02/20/2024    MG 2.1 11/06/2023    PHOS 4.1 11/06/2023       ASSESSMENT & PLAN:  Tonia ELIZABETH  Riya is a very pleasant 88 y.o. female with    1. Epigastric pain:  2.  Gastroparesis:  3.  Unintentional weight loss:    -Reportedly patient was previously told she had gastroparesis by Dr. Bearden following EGD a few years ago.  She was previously given a prescription for Reglan which she discontinued due to concern of potential side effects including TD and also says this medication caused her to be nervous.  -At her last visit, obtained gastric emptying scan on 4/30/2024 which did confirm significant gastroparesis.  At 121 minutes, there was still 93% gastric retention.  Given patient previously tried Reglan which she could not tolerate, recommended trial of Gimoti.  She was provided with sample but   did not try this medication due to concern of potential side effects as above.  We also provided her with Rx for Protonix 40 mg p.o. daily due to complaints of epigastric pain as she is also s/p cholecystectomy.  However, she refuses to take this medication as she does not feel as if she has issues with acid reflux.   -Patient has lost 5 pounds since her last visit in April.  We again reviewed gastric emptying scan results and discussed the risks/benefits of gimoti. However, patient adamantly refuses to try this medication.   Patient says she is not interested in any medications for treatment of gastroparesis and would like to see a naturopathic doctor. Therefore, recommended she implement gastroparesis diet.  Handout was provided in clinic today and reviewed.  At this time, no further workup or follow-up is needed from GI standpoint.  If patient continues to lose weight, may have to consider referral to general surgery for feeding tube placement.  Patient is unlikely a candidate for gastric electrical stimulation given age and this is  reserved for patients with refractory symptoms, particularly nausea and vomiting ( with persistence of symptoms despite antiemetic and prokinetic drug therapy for at least one  year).         Electronically Signed by: TEODORA Baldwin ,  Carmen 3, 2024 14:50 EDT       CC:   Joey Orozco DO

## 2024-06-04 NOTE — PROGRESS NOTES
Subjective   Tonia Ritter is a 88 y.o. female.   Pt presents today with CC of Dizziness      History of Present Illness   History of Present Illness  Patient is a pleasant 88-year-old female here complaining of dizziness.  It happens more when she has been standing up for long periods, and also late in the evening when she is walking around bedtime.  Her blood pressure has been normal, but as she has lost weight it has been on average a little lower than previously.  Currently she takes lisinopril and furosemide.  She had a hemoglobin drop on her last blood work.  She denies any bleeding and would like to hold off on blood work.       The following portions of the patient's history were reviewed and updated as appropriate: allergies, current medications, past family history, past medical history, past social history, past surgical history, and problem list.    Review of Systems   Constitutional:  Negative for chills, fever and unexpected weight loss.   HENT:  Negative for congestion and sore throat.    Eyes:  Negative for blurred vision and visual disturbance.   Respiratory:  Negative for cough and wheezing.    Cardiovascular:  Negative for chest pain and palpitations.   Gastrointestinal:  Negative for abdominal pain and diarrhea.   Endocrine: Negative for cold intolerance and heat intolerance.   Genitourinary:  Negative for dysuria.   Musculoskeletal:  Negative for arthralgias and neck stiffness.   Neurological:  Negative for dizziness, seizures and syncope.   Psychiatric/Behavioral:  Negative for self-injury, suicidal ideas and depressed mood.      Vitals:    06/03/24 1613   BP:    Pulse:    Temp:    SpO2: 97%        Objective   Physical Exam  Vitals and nursing note reviewed.   Constitutional:       Appearance: She is well-developed.   HENT:      Head: Normocephalic and atraumatic.      Right Ear: External ear normal.      Left Ear: External ear normal.      Nose: Nose normal.   Eyes:       Conjunctiva/sclera: Conjunctivae normal.      Pupils: Pupils are equal, round, and reactive to light.   Cardiovascular:      Rate and Rhythm: Normal rate and regular rhythm.      Heart sounds: Normal heart sounds.   Pulmonary:      Effort: Pulmonary effort is normal.      Breath sounds: Normal breath sounds.   Abdominal:      General: Bowel sounds are normal.      Palpations: Abdomen is soft.   Musculoskeletal:      Cervical back: Normal range of motion and neck supple.      Comments: Normal gait   Skin:     General: Skin is warm and dry.   Neurological:      Mental Status: She is alert and oriented to person, place, and time.   Psychiatric:         Behavior: Behavior normal.           Assessment & Plan   Diagnoses and all orders for this visit:    1. Hemoglobin drop (Primary)  She has not noticed any bleeding.  She would like to hold off on getting blood work.  Of note, she is anticoagulated with Xarelto.  2. Normocytic anemia    3. Essential hypertension  It is unclear what has been causing her to be dizzy.  She reports that this happens more when she stands up, and in the evenings when she is walking.  I am suspicious that her blood pressure may be dropping a little too low.  I am going to stop lisinopril 5 mg, and she is can to call and let us know how she is doing in 1 to 2 weeks.  4. Pacemaker  This complicates her care, arrhythmia is not suspected, however.  5. Paroxysmal atrial fibrillation    6. Anticoagulated    7. Weight loss  She is concerned about weight loss.  She has lost another 5 pounds since April.  Gastroparesis is her primary concern with this because it is limiting how much she can eat.  Of note, she has not tried the new medication that gastroenterology recommended.  8. Dizziness  I suspect that this is blood pressure related.  Stopped lisinopril.    Orthostatics were normal.              BMI is within normal parameters. No other follow-up for BMI required.          This document has been  electronically signed by Joey Orozco DO  June 4, 2024 12:37 EDT    Dictated Utilizing Dragon Dictation: Part of this note may be an electronic transcription/translation of spoken language to printed text using the Dragon Dictation System.

## 2024-06-24 ENCOUNTER — TELEPHONE (OUTPATIENT)
Dept: FAMILY MEDICINE CLINIC | Facility: CLINIC | Age: 89
End: 2024-06-24
Payer: MEDICARE

## 2024-06-24 NOTE — TELEPHONE ENCOUNTER
Called and spoke with patient.  She indicated she had discontinued Lisinopril per Dr Orozco request, but thinks it might be best to resume as her BP read as normal a little while after taking it.     She is scheduled for a return appointment on Monday 7/1 but can see you sooner if needed.

## 2024-06-24 NOTE — TELEPHONE ENCOUNTER
Caller: Tonia Ritter    Relationship: Self    Best call back number: [phone]    Which medication are you concerned about: BLOOD PRESSURE MEDICATION     What are your concerns: PATIENT BLOOD PRESSURE  /91 LAST NIGHT. PATIENT WAS DIZZY  AND THE ROOM WAS SPINNING AND TOOK A BLOOD PRESSURE MEDICATION. PATIENT HAS BEEN OFF THIS MEDICATION FOR OVER 2 WEEKS.  TODAY HER BLOOD PRESSURE  /73.  PATIENT STILL HAS A DIZZY FEELING THAT COMES AND GOES WITH CERTAIN WAYS SHE MOVES

## 2024-06-25 DIAGNOSIS — I10 ESSENTIAL HYPERTENSION: Primary | ICD-10-CM

## 2024-06-25 RX ORDER — LISINOPRIL 5 MG/1
5 TABLET ORAL DAILY
Qty: 90 TABLET | Refills: 0 | Status: SHIPPED | OUTPATIENT
Start: 2024-06-25 | End: 2024-06-27

## 2024-06-25 NOTE — TELEPHONE ENCOUNTER
Lisinopril sent back to your pharmacy.  I recommend taking it for the next few days until you follow-up with me.  We will make a decision at that time.

## 2024-06-27 ENCOUNTER — OFFICE VISIT (OUTPATIENT)
Dept: FAMILY MEDICINE CLINIC | Facility: CLINIC | Age: 89
End: 2024-06-27
Payer: MEDICARE

## 2024-06-27 ENCOUNTER — LAB (OUTPATIENT)
Dept: FAMILY MEDICINE CLINIC | Facility: CLINIC | Age: 89
End: 2024-06-27
Payer: MEDICARE

## 2024-06-27 VITALS
WEIGHT: 127.2 LBS | OXYGEN SATURATION: 97 % | HEART RATE: 68 BPM | BODY MASS INDEX: 21.19 KG/M2 | SYSTOLIC BLOOD PRESSURE: 136 MMHG | TEMPERATURE: 97.3 F | DIASTOLIC BLOOD PRESSURE: 86 MMHG | HEIGHT: 65 IN

## 2024-06-27 DIAGNOSIS — R71.0 HEMOGLOBIN DROP: ICD-10-CM

## 2024-06-27 DIAGNOSIS — Z79.01 ANTICOAGULATED: ICD-10-CM

## 2024-06-27 DIAGNOSIS — F41.9 ANXIETY: ICD-10-CM

## 2024-06-27 DIAGNOSIS — R60.9 TRACE EDEMA: ICD-10-CM

## 2024-06-27 DIAGNOSIS — R60.9 TRACE EDEMA: Primary | ICD-10-CM

## 2024-06-27 DIAGNOSIS — I10 ESSENTIAL HYPERTENSION: ICD-10-CM

## 2024-06-27 DIAGNOSIS — D64.9 NORMOCYTIC ANEMIA: ICD-10-CM

## 2024-06-27 DIAGNOSIS — I48.0 PAROXYSMAL ATRIAL FIBRILLATION: ICD-10-CM

## 2024-06-27 DIAGNOSIS — R60.9 1+ PITTING EDEMA: ICD-10-CM

## 2024-06-27 DIAGNOSIS — E78.2 MIXED HYPERLIPIDEMIA: ICD-10-CM

## 2024-06-27 PROCEDURE — 99214 OFFICE O/P EST MOD 30 MIN: CPT | Performed by: FAMILY MEDICINE

## 2024-06-27 PROCEDURE — 1159F MED LIST DOCD IN RCRD: CPT | Performed by: FAMILY MEDICINE

## 2024-06-27 PROCEDURE — 85027 COMPLETE CBC AUTOMATED: CPT | Performed by: FAMILY MEDICINE

## 2024-06-27 PROCEDURE — 80053 COMPREHEN METABOLIC PANEL: CPT | Performed by: FAMILY MEDICINE

## 2024-06-27 PROCEDURE — 1160F RVW MEDS BY RX/DR IN RCRD: CPT | Performed by: FAMILY MEDICINE

## 2024-06-27 PROCEDURE — 36415 COLL VENOUS BLD VENIPUNCTURE: CPT

## 2024-06-27 PROCEDURE — 1126F AMNT PAIN NOTED NONE PRSNT: CPT | Performed by: FAMILY MEDICINE

## 2024-06-27 PROCEDURE — G2211 COMPLEX E/M VISIT ADD ON: HCPCS | Performed by: FAMILY MEDICINE

## 2024-06-27 RX ORDER — POTASSIUM CHLORIDE 750 MG/1
10 TABLET, EXTENDED RELEASE ORAL DAILY
Qty: 90 TABLET | Refills: 1 | Status: SHIPPED | OUTPATIENT
Start: 2024-06-27

## 2024-06-27 RX ORDER — PAROXETINE 10 MG/1
5 TABLET, FILM COATED ORAL NIGHTLY
Qty: 90 TABLET | Refills: 0 | Status: SHIPPED | OUTPATIENT
Start: 2024-06-27

## 2024-06-27 RX ORDER — SOTALOL HYDROCHLORIDE 120 MG/1
1 TABLET ORAL 2 TIMES DAILY
Qty: 180 EACH | Refills: 1 | Status: SHIPPED | OUTPATIENT
Start: 2024-06-27

## 2024-06-27 RX ORDER — ROSUVASTATIN CALCIUM 10 MG/1
10 TABLET, COATED ORAL DAILY
Qty: 90 TABLET | Refills: 1 | Status: SHIPPED | OUTPATIENT
Start: 2024-06-27

## 2024-06-27 RX ORDER — FUROSEMIDE 20 MG/1
20 TABLET ORAL EVERY OTHER DAY
Qty: 45 TABLET | Refills: 1 | Status: SHIPPED | OUTPATIENT
Start: 2024-06-27

## 2024-06-27 NOTE — PROGRESS NOTES
Subjective   Tonia Ritter is a 88 y.o. female.   Pt presents today with CC of Hypertension      History of Present Illness   History of Present Illness  1.  Patient is an 88-year-old female here to follow-up on hypertension.  She has associated paroxysmal atrial fibrillation for which she takes sotalol, furosemide every other day, and she is agreeable to blood work.  She is anticoagulated with Xarelto.  #2 she has chronic anxiety for which she takes Paxil 5 mg nightly.  She does well on this regimen.  #3 on her last blood work it was noted that she had a hemoglobin drop with a history of normocytic anemia.  She is agreeable to blood work.         The following portions of the patient's history were reviewed and updated as appropriate: allergies, current medications, past family history, past medical history, past social history, past surgical history, and problem list.    Review of Systems   Constitutional:  Negative for chills, fever and unexpected weight loss.   HENT:  Negative for congestion and sore throat.    Eyes:  Negative for blurred vision and visual disturbance.   Respiratory:  Negative for cough and wheezing.    Cardiovascular:  Negative for chest pain and palpitations.   Gastrointestinal:  Negative for abdominal pain and diarrhea.   Endocrine: Negative for cold intolerance and heat intolerance.   Genitourinary:  Negative for dysuria.   Musculoskeletal:  Negative for arthralgias and neck stiffness.   Neurological:  Negative for dizziness, seizures and syncope.   Psychiatric/Behavioral:  Negative for self-injury, suicidal ideas and depressed mood.      Vitals:    06/27/24 1443   BP: 136/86   Pulse: 68   Temp: 97.3 °F (36.3 °C)   SpO2: 97%        Objective   Physical Exam  Vitals and nursing note reviewed.   Constitutional:       Appearance: She is well-developed.   HENT:      Head: Normocephalic and atraumatic.      Right Ear: External ear normal.      Left Ear: External ear normal.      Nose: Nose  normal.   Eyes:      Conjunctiva/sclera: Conjunctivae normal.      Pupils: Pupils are equal, round, and reactive to light.   Cardiovascular:      Rate and Rhythm: Normal rate and regular rhythm.      Heart sounds: Normal heart sounds.   Pulmonary:      Effort: Pulmonary effort is normal.      Breath sounds: Normal breath sounds.   Abdominal:      General: Bowel sounds are normal.      Palpations: Abdomen is soft.   Musculoskeletal:      Cervical back: Normal range of motion and neck supple.   Skin:     General: Skin is warm and dry.   Neurological:      Mental Status: She is alert and oriented to person, place, and time.   Psychiatric:         Behavior: Behavior normal.           Assessment & Plan   Diagnoses and all orders for this visit:    1. Trace edema (Primary)  -     CBC No Differential; Future  -     Comprehensive metabolic panel; Future    2. Hemoglobin drop  -     CBC No Differential; Future  She denies any known bleeding, though she is anticoagulated.  She is agreeable to blood work.  3. Normocytic anemia  -     CBC No Differential; Future  -     Comprehensive metabolic panel; Future    4. Anticoagulated  -     CBC No Differential; Future    5. Paroxysmal atrial fibrillation  -     Sotalol HCl, AF, 120 MG tablet; Take 1 tablet by mouth 2 (Two) Times a Day.  Dispense: 180 each; Refill: 1  -     rivaroxaban (Xarelto) 20 MG tablet; Take 1 tablet by mouth Daily With Dinner.  Dispense: 90 tablet; Refill: 0  -     potassium chloride (KLOR-CON M10) 10 MEQ CR tablet; Take 1 tablet by mouth Daily.  Dispense: 90 tablet; Refill: 1    6. Mixed hyperlipidemia  -     rosuvastatin (CRESTOR) 10 MG tablet; Take 1 tablet by mouth Daily. for cholesterol  Dispense: 90 tablet; Refill: 1    7. 1+ pitting edema  -     potassium chloride (KLOR-CON M10) 10 MEQ CR tablet; Take 1 tablet by mouth Daily.  Dispense: 90 tablet; Refill: 1  -     furosemide (LASIX) 20 MG tablet; Take 1 tablet by mouth Every Other Day.  Dispense: 45  tablet; Refill: 1    8. Anxiety  -     PARoxetine (Paxil) 10 MG tablet; Take 0.5 tablets by mouth Every Night. Takes 0.5 tablet  Dispense: 90 tablet; Refill: 0    9. Essential hypertension  -     furosemide (LASIX) 20 MG tablet; Take 1 tablet by mouth Every Other Day.  Dispense: 45 tablet; Refill: 1                    BMI is within normal parameters. No other follow-up for BMI required.          This document has been electronically signed by Lisa Delgado  June 27, 2024 14:46 EDT    Dictated Utilizing Dragon Dictation: Part of this note may be an electronic transcription/translation of spoken language to printed text using the Dragon Dictation System.

## 2024-06-28 ENCOUNTER — TELEPHONE (OUTPATIENT)
Dept: FAMILY MEDICINE CLINIC | Facility: CLINIC | Age: 89
End: 2024-06-28
Payer: MEDICARE

## 2024-06-28 LAB
ALBUMIN SERPL-MCNC: 4.2 G/DL (ref 3.5–5.2)
ALBUMIN/GLOB SERPL: 1.4 G/DL
ALP SERPL-CCNC: 75 U/L (ref 39–117)
ALT SERPL W P-5'-P-CCNC: 10 U/L (ref 1–33)
ANION GAP SERPL CALCULATED.3IONS-SCNC: 9.1 MMOL/L (ref 5–15)
AST SERPL-CCNC: 26 U/L (ref 1–32)
BILIRUB SERPL-MCNC: 0.5 MG/DL (ref 0–1.2)
BUN SERPL-MCNC: 12 MG/DL (ref 8–23)
BUN/CREAT SERPL: 12.4 (ref 7–25)
CALCIUM SPEC-SCNC: 9.6 MG/DL (ref 8.6–10.5)
CHLORIDE SERPL-SCNC: 102 MMOL/L (ref 98–107)
CO2 SERPL-SCNC: 27.9 MMOL/L (ref 22–29)
CREAT SERPL-MCNC: 0.97 MG/DL (ref 0.57–1)
DEPRECATED RDW RBC AUTO: 43.6 FL (ref 37–54)
EGFRCR SERPLBLD CKD-EPI 2021: 57 ML/MIN/1.73
ERYTHROCYTE [DISTWIDTH] IN BLOOD BY AUTOMATED COUNT: 13.1 % (ref 12.3–15.4)
GLOBULIN UR ELPH-MCNC: 3.1 GM/DL
GLUCOSE SERPL-MCNC: 73 MG/DL (ref 65–99)
HCT VFR BLD AUTO: 38.9 % (ref 34–46.6)
HGB BLD-MCNC: 12.7 G/DL (ref 12–15.9)
MCH RBC QN AUTO: 30 PG (ref 26.6–33)
MCHC RBC AUTO-ENTMCNC: 32.6 G/DL (ref 31.5–35.7)
MCV RBC AUTO: 91.7 FL (ref 79–97)
PLATELET # BLD AUTO: 161 10*3/MM3 (ref 140–450)
PMV BLD AUTO: 10.5 FL (ref 6–12)
POTASSIUM SERPL-SCNC: 4.6 MMOL/L (ref 3.5–5.2)
PROT SERPL-MCNC: 7.3 G/DL (ref 6–8.5)
RBC # BLD AUTO: 4.24 10*6/MM3 (ref 3.77–5.28)
SODIUM SERPL-SCNC: 139 MMOL/L (ref 136–145)
WBC NRBC COR # BLD AUTO: 5.62 10*3/MM3 (ref 3.4–10.8)

## 2024-06-28 NOTE — TELEPHONE ENCOUNTER
Caller: Tonia Ritter    Relationship: Self    Best call back number: [phone]     Caller requesting test results: PATIENT    What test was performed: LABS    When was the test performed: YESTERDAY    Where was the test performed: [facility]    Additional notes:

## 2024-06-28 NOTE — TELEPHONE ENCOUNTER
----- Message from Joey Orozco sent at 6/28/2024  3:22 PM EDT -----  No problems on your blood work.  Everything looks good.

## 2024-07-08 ENCOUNTER — LAB (OUTPATIENT)
Dept: LAB | Facility: HOSPITAL | Age: 89
End: 2024-07-08
Payer: MEDICARE

## 2024-07-08 ENCOUNTER — TRANSCRIBE ORDERS (OUTPATIENT)
Dept: ADMINISTRATIVE | Facility: HOSPITAL | Age: 89
End: 2024-07-08
Payer: MEDICARE

## 2024-07-08 DIAGNOSIS — N18.32 STAGE 3B CHRONIC KIDNEY DISEASE: Primary | ICD-10-CM

## 2024-07-08 DIAGNOSIS — N18.32 STAGE 3B CHRONIC KIDNEY DISEASE: ICD-10-CM

## 2024-07-08 PROCEDURE — 80053 COMPREHEN METABOLIC PANEL: CPT

## 2024-07-08 PROCEDURE — 82570 ASSAY OF URINE CREATININE: CPT

## 2024-07-08 PROCEDURE — 84156 ASSAY OF PROTEIN URINE: CPT

## 2024-07-08 PROCEDURE — 81001 URINALYSIS AUTO W/SCOPE: CPT

## 2024-07-08 PROCEDURE — 36415 COLL VENOUS BLD VENIPUNCTURE: CPT

## 2024-07-08 PROCEDURE — 82043 UR ALBUMIN QUANTITATIVE: CPT

## 2024-07-09 LAB
ALBUMIN SERPL-MCNC: 4 G/DL (ref 3.5–5.2)
ALBUMIN UR-MCNC: <1.2 MG/DL
ALBUMIN/GLOB SERPL: 1.3 G/DL
ALP SERPL-CCNC: 74 U/L (ref 39–117)
ALT SERPL W P-5'-P-CCNC: 13 U/L (ref 1–33)
ANION GAP SERPL CALCULATED.3IONS-SCNC: 10 MMOL/L (ref 5–15)
AST SERPL-CCNC: 26 U/L (ref 1–32)
BACTERIA UR QL AUTO: NORMAL /HPF
BILIRUB SERPL-MCNC: 0.4 MG/DL (ref 0–1.2)
BILIRUB UR QL STRIP: NEGATIVE
BUN SERPL-MCNC: 11 MG/DL (ref 8–23)
BUN/CREAT SERPL: 10.4 (ref 7–25)
CALCIUM SPEC-SCNC: 9.2 MG/DL (ref 8.6–10.5)
CHLORIDE SERPL-SCNC: 103 MMOL/L (ref 98–107)
CLARITY UR: CLEAR
CO2 SERPL-SCNC: 28 MMOL/L (ref 22–29)
COLOR UR: YELLOW
CREAT SERPL-MCNC: 1.06 MG/DL (ref 0.57–1)
CREAT UR-MCNC: 57.7 MG/DL
CREAT UR-MCNC: 57.7 MG/DL
EGFRCR SERPLBLD CKD-EPI 2021: 51.3 ML/MIN/1.73
GLOBULIN UR ELPH-MCNC: 3 GM/DL
GLUCOSE SERPL-MCNC: 71 MG/DL (ref 65–99)
GLUCOSE UR STRIP-MCNC: NEGATIVE MG/DL
HGB UR QL STRIP.AUTO: NEGATIVE
HYALINE CASTS UR QL AUTO: NORMAL /LPF
KETONES UR QL STRIP: NEGATIVE
LEUKOCYTE ESTERASE UR QL STRIP.AUTO: ABNORMAL
MICROALBUMIN/CREAT UR: NORMAL MG/G{CREAT}
NITRITE UR QL STRIP: NEGATIVE
PH UR STRIP.AUTO: 7 [PH] (ref 5–8)
POTASSIUM SERPL-SCNC: 4.3 MMOL/L (ref 3.5–5.2)
PROT ?TM UR-MCNC: 7.5 MG/DL
PROT SERPL-MCNC: 7 G/DL (ref 6–8.5)
PROT UR QL STRIP: NEGATIVE
PROT/CREAT UR: 130 MG/G CREA (ref 0–200)
RBC # UR STRIP: NORMAL /HPF
REF LAB TEST METHOD: NORMAL
SODIUM SERPL-SCNC: 141 MMOL/L (ref 136–145)
SP GR UR STRIP: 1.01 (ref 1–1.03)
SQUAMOUS #/AREA URNS HPF: NORMAL /HPF
UROBILINOGEN UR QL STRIP: ABNORMAL
WBC # UR STRIP: NORMAL /HPF

## 2024-08-16 ENCOUNTER — NURSE TRIAGE (OUTPATIENT)
Dept: CALL CENTER | Facility: HOSPITAL | Age: 89
End: 2024-08-16
Payer: MEDICARE

## 2024-08-16 ENCOUNTER — OFFICE VISIT (OUTPATIENT)
Dept: FAMILY MEDICINE CLINIC | Facility: CLINIC | Age: 89
End: 2024-08-16
Payer: MEDICARE

## 2024-08-16 VITALS
SYSTOLIC BLOOD PRESSURE: 116 MMHG | BODY MASS INDEX: 21.43 KG/M2 | TEMPERATURE: 97.3 F | WEIGHT: 128.6 LBS | HEIGHT: 65 IN | DIASTOLIC BLOOD PRESSURE: 66 MMHG | OXYGEN SATURATION: 96 % | HEART RATE: 83 BPM

## 2024-08-16 DIAGNOSIS — M25.572 ACUTE LEFT ANKLE PAIN: ICD-10-CM

## 2024-08-16 DIAGNOSIS — M79.672 PAIN OF LEFT HEEL: Primary | ICD-10-CM

## 2024-08-16 PROCEDURE — 1125F AMNT PAIN NOTED PAIN PRSNT: CPT | Performed by: NURSE PRACTITIONER

## 2024-08-16 PROCEDURE — 99213 OFFICE O/P EST LOW 20 MIN: CPT | Performed by: NURSE PRACTITIONER

## 2024-08-16 NOTE — PROGRESS NOTES
"[facility]     Chief Complaint  Foot Pain (Left foot )    Tonia Ritter is a 88 y.o. female who presents today to [facility] for Foot Pain (Left foot ).    HPI:   HPI   Patient presents today for tenderness around her left heel and ankle for approximately 3 days.  States that there is no pain with walking, and she will go several hours without any pain then randomly have a \"sharp shooting pain \"that will last for couple seconds and then kind of goes away.  Denies any trauma excessive walking or potential injury.  States she has never had a blood clot.  She reports she would like to have an x-ray just to be safe.  States that there is not anything that makes it better or worse and she has not found anything that triggers it, it is random.    Previous History:   Past Medical History:   Diagnosis Date    Acute bronchitis     Allergic rhinitis     Anxiety     Atrial fibrillation     Back pain     Burping     Chills (without fever) 03/25/2019    Chronic pain     Congestive heart failure     Debility 03/18/2024    Depression     Encounter for screening fecal occult blood testing 03/10/2016    NEGATIVE    Fibromyalgia     Flatulence     GERD (gastroesophageal reflux disease)     Hematuria     Hyperlipidemia     Hypertension     Osteoarthritis     Pacemaker     PUD (peptic ulcer disease)     Right shoulder pain       Past Surgical History:   Procedure Laterality Date    CARDIOVERSION  05/2017    [facility]     CARDIOVERSION  09/01/2017    [facility]    CHOLECYSTECTOMY  2004    COLONOSCOPY  05/2013    DILATATION AND CURETTAGE      EPIDIDYMAL CYST EXCISION      ESOPHAGOSCOPY / EGD  01/2019    EYE SURGERY      HEMORRHOIDECTOMY  01/2019    MAMMO BILATERAL  10/2014    NECK SURGERY      PACEMAKER IMPLANTATION      PAP SMEAR  03/2016    SKIN LESION EXCISION        Social History     Socioeconomic History    Marital status:    Tobacco Use    Smoking status: Never    " "Smokeless tobacco: Never   Vaping Use    Vaping status: Never Used   Substance and Sexual Activity    Alcohol use: No    Drug use: No    Sexual activity: Defer      Health Maintenance Due   Topic Date Due    ZOSTER VACCINE (1 of 2) Never done    RSV Vaccine - Adults (1 - 1-dose 60+ series) Never done    PAP SMEAR  Never done    COVID-19 Vaccine (3 - 2023-24 season) 09/01/2023    LIPID PANEL  12/05/2023    INFLUENZA VACCINE  08/01/2024        Current Medications:  Current Outpatient Medications   Medication Sig Dispense Refill    acetaminophen (TYLENOL) 500 MG tablet Take 1 tablet by mouth Every 6 (Six) Hours As Needed for Mild Pain.      fluticasone (FLONASE) 50 MCG/ACT nasal spray 2 sprays into the nostril(s) as directed by provider Daily. (Patient taking differently: 2 sprays into the nostril(s) as directed by provider Daily As Needed.) 16 g 2    furosemide (LASIX) 20 MG tablet Take 1 tablet by mouth Every Other Day. 45 tablet 1    PARoxetine (Paxil) 10 MG tablet Take 0.5 tablets by mouth Every Night. Takes 0.5 tablet 90 tablet 0    potassium chloride (KLOR-CON M10) 10 MEQ CR tablet Take 1 tablet by mouth Daily. 90 tablet 1    rivaroxaban (Xarelto) 20 MG tablet Take 1 tablet by mouth Daily With Dinner. 90 tablet 0    rosuvastatin (CRESTOR) 10 MG tablet Take 1 tablet by mouth Daily. for cholesterol 90 tablet 1    Sotalol HCl, AF, 120 MG tablet Take 1 tablet by mouth 2 (Two) Times a Day. 180 each 1     No current facility-administered medications for this visit.       Allergies:   Allergies   Allergen Reactions    Reglan [Metoclopramide]     Sulfa Antibiotics Nausea And Vomiting       Vitals:   /66 (BP Location: Right arm, Patient Position: Sitting)   Pulse 83   Temp 97.3 °F (36.3 °C) (Temporal)   Ht 165.1 cm (65\")   Wt 58.3 kg (128 lb 9.6 oz)   SpO2 96%   BMI 21.40 kg/m²   Estimated body mass index is 21.4 kg/m² as calculated from the following:    Height as of this encounter: 165.1 cm (65\").    Weight " as of this encounter: 58.3 kg (128 lb 9.6 oz).             Physical Exam:   Physical Exam  Vitals reviewed.   Constitutional:       Appearance: Normal appearance.   HENT:      Head: Normocephalic.   Eyes:      Extraocular Movements: Extraocular movements intact.      Conjunctiva/sclera: Conjunctivae normal.      Comments: STYE: LEFT EYE (Taking txt form dr Carrington)    Cardiovascular:      Rate and Rhythm: Normal rate.      Heart sounds: Normal heart sounds.   Pulmonary:      Effort: Pulmonary effort is normal.      Breath sounds: Normal breath sounds.   Abdominal:      General: Bowel sounds are normal.      Palpations: Abdomen is soft.   Musculoskeletal:      Comments: LEFT FOOT/ANKLE: Slight tenderness to palpation above calcaneus, no erythema or swelling    Skin:     General: Skin is warm and dry.   Neurological:      Mental Status: She is alert. Mental status is at baseline.      Comments: Normal gait    Psychiatric:         Mood and Affect: Mood normal.          Lab Results:   Lab on 07/08/2024   Component Date Value Ref Range Status    Glucose 07/08/2024 71  65 - 99 mg/dL Final    BUN 07/08/2024 11  8 - 23 mg/dL Final    Creatinine 07/08/2024 1.06 (H)  0.57 - 1.00 mg/dL Final    Sodium 07/08/2024 141  136 - 145 mmol/L Final    Potassium 07/08/2024 4.3  3.5 - 5.2 mmol/L Final    Chloride 07/08/2024 103  98 - 107 mmol/L Final    CO2 07/08/2024 28.0  22.0 - 29.0 mmol/L Final    Calcium 07/08/2024 9.2  8.6 - 10.5 mg/dL Final    Total Protein 07/08/2024 7.0  6.0 - 8.5 g/dL Final    Albumin 07/08/2024 4.0  3.5 - 5.2 g/dL Final    ALT (SGPT) 07/08/2024 13  1 - 33 U/L Final    AST (SGOT) 07/08/2024 26  1 - 32 U/L Final    Alkaline Phosphatase 07/08/2024 74  39 - 117 U/L Final    Total Bilirubin 07/08/2024 0.4  0.0 - 1.2 mg/dL Final    Globulin 07/08/2024 3.0  gm/dL Final    A/G Ratio 07/08/2024 1.3  g/dL Final    BUN/Creatinine Ratio 07/08/2024 10.4  7.0 - 25.0 Final    Anion Gap 07/08/2024 10.0  5.0 - 15.0 mmol/L Final     eGFR 07/08/2024 51.3 (L)  >60.0 mL/min/1.73 Final    Protein/Creatinine Ratio, Urine 07/08/2024 130.0  0.0 - 200.0 mg/G Crea Final    Creatinine, Urine 07/08/2024 57.7  mg/dL Final    Total Protein, Urine 07/08/2024 7.5  mg/dL Final    Microalbumin/Creatinine Ratio 07/08/2024    Final    Unable to calculate    Creatinine, Urine 07/08/2024 57.7  mg/dL Final    Microalbumin, Urine 07/08/2024 <1.2  mg/dL Final    Color, UA 07/08/2024 Yellow  Yellow, Straw Final    Appearance, UA 07/08/2024 Clear  Clear Final    pH, UA 07/08/2024 7.0  5.0 - 8.0 Final    Specific Gravity, UA 07/08/2024 1.012  1.005 - 1.030 Final    Glucose, UA 07/08/2024 Negative  Negative Final    Ketones, UA 07/08/2024 Negative  Negative Final    Bilirubin, UA 07/08/2024 Negative  Negative Final    Blood, UA 07/08/2024 Negative  Negative Final    Protein, UA 07/08/2024 Negative  Negative Final    Leuk Esterase, UA 07/08/2024 Small (1+) (A)  Negative Final    Nitrite, UA 07/08/2024 Negative  Negative Final    Urobilinogen, UA 07/08/2024 0.2 E.U./dL  0.2 - 1.0 E.U./dL Final    RBC, UA 07/08/2024 0-2  None Seen, 0-2 /HPF Final    WBC, UA 07/08/2024 0-2  None Seen, 0-2 /HPF Final    Bacteria, UA 07/08/2024 None Seen  None Seen /HPF Final    Squamous Epithelial Cells, UA 07/08/2024 0-2  None Seen, 0-2 /HPF Final    Hyaline Casts, UA 07/08/2024 None Seen  None Seen /LPF Final    Methodology 07/08/2024 Automated Microscopy   Final   Lab on 06/27/2024   Component Date Value Ref Range Status    WBC 06/27/2024 5.62  3.40 - 10.80 10*3/mm3 Final    RBC 06/27/2024 4.24  3.77 - 5.28 10*6/mm3 Final    Hemoglobin 06/27/2024 12.7  12.0 - 15.9 g/dL Final    Hematocrit 06/27/2024 38.9  34.0 - 46.6 % Final    MCV 06/27/2024 91.7  79.0 - 97.0 fL Final    MCH 06/27/2024 30.0  26.6 - 33.0 pg Final    MCHC 06/27/2024 32.6  31.5 - 35.7 g/dL Final    RDW 06/27/2024 13.1  12.3 - 15.4 % Final    RDW-SD 06/27/2024 43.6  37.0 - 54.0 fl Final    MPV 06/27/2024 10.5  6.0 - 12.0 fL  Final    Platelets 06/27/2024 161  140 - 450 10*3/mm3 Final    Glucose 06/27/2024 73  65 - 99 mg/dL Final    BUN 06/27/2024 12  8 - 23 mg/dL Final    Creatinine 06/27/2024 0.97  0.57 - 1.00 mg/dL Final    Sodium 06/27/2024 139  136 - 145 mmol/L Final    Potassium 06/27/2024 4.6  3.5 - 5.2 mmol/L Final    Chloride 06/27/2024 102  98 - 107 mmol/L Final    CO2 06/27/2024 27.9  22.0 - 29.0 mmol/L Final    Calcium 06/27/2024 9.6  8.6 - 10.5 mg/dL Final    Total Protein 06/27/2024 7.3  6.0 - 8.5 g/dL Final    Albumin 06/27/2024 4.2  3.5 - 5.2 g/dL Final    ALT (SGPT) 06/27/2024 10  1 - 33 U/L Final    AST (SGOT) 06/27/2024 26  1 - 32 U/L Final    Alkaline Phosphatase 06/27/2024 75  39 - 117 U/L Final    Total Bilirubin 06/27/2024 0.5  0.0 - 1.2 mg/dL Final    Globulin 06/27/2024 3.1  gm/dL Final    A/G Ratio 06/27/2024 1.4  g/dL Final    BUN/Creatinine Ratio 06/27/2024 12.4  7.0 - 25.0 Final    Anion Gap 06/27/2024 9.1  5.0 - 15.0 mmol/L Final    eGFR 06/27/2024 57.0 (L)  >60.0 mL/min/1.73 Final       Results review: During today's encounter, all relevant clinical data has been reviewed.      Assessment and Plan  Diagnoses and all orders for this visit:    1. Pain of left heel (Primary)  -     XR Foot 3+ View Left    2. Acute left ankle pain  -     XR Ankle 3+ View Left      1-2) Xray ordered for today and discussed diff dx. We discussed red flag symptoms that would warrant ER eval and she verbalized understanding and reports if condition worsens will follow up in ER. Currently no pain at this time. Wells score low probability. She states she would like to do xray only today and if she is not better will let us know and would be agreeable then to labwork and further imaging studies.       BMI is within normal parameters. No other follow-up for BMI required.       New Medications:   No orders of the defined types were placed in this encounter.      Discontinued Medications:   There are no discontinued medications.            Visit Diagnoses:    ICD-10-CM ICD-9-CM   1. Pain of left heel  M79.672 729.5   2. Acute left ankle pain  M25.572 719.47            Follow Up:   No follow-ups on file.    Patient was given instructions and counseling regarding her condition or for health maintenance advice. Please see specific information pulled into the AVS if appropriate.       This document has been electronically signed by TEODORA Grayson   August 16, 2024 14:55 EDT    Dictated Utilizing Dragon Dictation: Part of this note may be an electronic transcription/translation of spoken language to printed text using the Dragon Dictation System.   room air

## 2024-08-16 NOTE — TELEPHONE ENCOUNTER
"  Caller advises Heel pain that started yesterday with swelling, denies temperature change or redness along with injury at this time. Advised caller to take OTC pain medication, elevate extremity and apply cold for first 24 hours and then warm compress after. Verbalizes understanding.       Reason for Disposition   [1] MODERATE pain (e.g., interferes with normal activities, limping) AND [2] present > 3 days    Additional Information   Negative: Followed a foot injury   Negative: Diabetes mellitus   Negative: Toe pain is main symptom   Negative: Ankle pain is main symptom   Negative: Thigh or calf pain is main symptom   Negative: Entire foot is cool or blue in comparison to other foot   Negative: Purple or black skin on foot or toe   Negative: [1] Red area or streak AND [2] fever   Negative: [1] Swollen foot AND [2] fever   Negative: Patient sounds very sick or weak to the triager   Negative: [1] SEVERE pain (e.g., excruciating, unable to do any normal activities) AND [2] not improved after 2 hours of pain medicine   Negative: [1] Looks infected (spreading redness, pus) AND [2] large red area (> 2 in. or 5 cm)   Negative: Looks like a boil, infected sore, or deep ulcer   Negative: [1] Redness of the skin AND [2] no fever   Negative: [1] Swollen foot AND [2] no fever  (Exceptions: localized bump from bunions, calluses, insect bite, sting)   Negative: Weakness (i.e., loss of strength) of new-onset in foot or toes  (Exceptions: not truly weak, foot feels weak because of pain; weakness present > 2 weeks)   Negative: Numbness (i.e., loss of sensation) in foot or toes  (Exception: Just tingling; numbness present > 2 weeks.)    Answer Assessment - Initial Assessment Questions  1. ONSET: \"When did the pain start?\"       Yesterday  2. LOCATION: \"Where is the pain located?\"       Lt. heel  3. PAIN: \"How bad is the pain?\"    (Scale 1-10; or mild, moderate, severe)   - MILD (1-3): doesn't interfere with normal activities.    - " "MODERATE (4-7): interferes with normal activities (e.g., work or school) or awakens from sleep, limping.    - SEVERE (8-10): excruciating pain, unable to do any normal activities, unable to walk.       Moderate  4. WORK OR EXERCISE: \"Has there been any recent work or exercise that involved this part of the body?\"       Denies  5. CAUSE: \"What do you think is causing the foot pain?\"      Unsure  6. OTHER SYMPTOMS: \"Do you have any other symptoms?\" (e.g., leg pain, rash, fever, numbness)      Denies  7. PREGNANCY: \"Is there any chance you are pregnant?\" \"When was your last menstrual period?\"      NA    Protocols used: Foot Pain-ADULT-    "

## 2024-08-18 NOTE — PROGRESS NOTES
Attempted to call patient on Friday but was unable to get up with her. Did speak with her today to review results and she reports that the pain has completely resolved, however the swelling has continued and is swelling down close to her toes. Instructed her to go to ER for further eval to rule out any life threatening etiology and she verbalized understanding. Reports she will follow up this week in office as well.

## 2024-08-20 ENCOUNTER — TELEPHONE (OUTPATIENT)
Dept: FAMILY MEDICINE CLINIC | Facility: CLINIC | Age: 89
End: 2024-08-20
Payer: MEDICARE

## 2024-08-21 ENCOUNTER — OFFICE VISIT (OUTPATIENT)
Dept: FAMILY MEDICINE CLINIC | Facility: CLINIC | Age: 89
End: 2024-08-21
Payer: MEDICARE

## 2024-08-21 VITALS
WEIGHT: 128.2 LBS | DIASTOLIC BLOOD PRESSURE: 64 MMHG | SYSTOLIC BLOOD PRESSURE: 118 MMHG | TEMPERATURE: 97.7 F | BODY MASS INDEX: 21.36 KG/M2 | OXYGEN SATURATION: 98 % | HEIGHT: 65 IN | HEART RATE: 85 BPM

## 2024-08-21 DIAGNOSIS — M25.572 ACUTE LEFT ANKLE PAIN: Primary | ICD-10-CM

## 2024-08-21 NOTE — PROGRESS NOTES
Subjective   Tonia Ritter is a 88 y.o. female.   Pt presents today with CC of Edema (Left foot)      History of Present Illness   History of Present Illness  Is an 88-year-old female here to follow-up on left ankle pain.  X-rays were normal.  Swelling and pain have resolved.     The following portions of the patient's history were reviewed and updated as appropriate: allergies, current medications, past family history, past medical history, past social history, past surgical history, and problem list.    Review of Systems   Constitutional:  Negative for chills, fever and unexpected weight loss.   HENT:  Negative for congestion and sore throat.    Eyes:  Negative for blurred vision and visual disturbance.   Respiratory:  Negative for cough and wheezing.    Cardiovascular:  Negative for chest pain and palpitations.   Gastrointestinal:  Negative for abdominal pain and diarrhea.   Endocrine: Negative for cold intolerance and heat intolerance.   Genitourinary:  Negative for dysuria.   Musculoskeletal:  Negative for arthralgias and neck stiffness.   Neurological:  Negative for dizziness, seizures and syncope.   Psychiatric/Behavioral:  Negative for self-injury, suicidal ideas and depressed mood.      Vitals:    08/21/24 1416   BP: 118/64   Pulse: 85   Temp: 97.7 °F (36.5 °C)   SpO2: 98%        Objective   Physical Exam  Vitals and nursing note reviewed.   Constitutional:       Appearance: She is well-developed.   HENT:      Head: Normocephalic and atraumatic.      Right Ear: External ear normal.      Left Ear: External ear normal.      Nose: Nose normal.   Eyes:      Conjunctiva/sclera: Conjunctivae normal.      Pupils: Pupils are equal, round, and reactive to light.   Cardiovascular:      Rate and Rhythm: Normal rate and regular rhythm.      Heart sounds: Normal heart sounds.   Pulmonary:      Effort: Pulmonary effort is normal.      Breath sounds: Normal breath sounds.   Abdominal:      General: Bowel sounds are  normal.      Palpations: Abdomen is soft.   Musculoskeletal:      Cervical back: Normal range of motion and neck supple.      Comments: Left ankle appears normal.   Skin:     General: Skin is warm and dry.   Neurological:      Mental Status: She is alert and oriented to person, place, and time.   Psychiatric:         Behavior: Behavior normal.         Assessment & Plan   Diagnoses and all orders for this visit:    1. Acute left ankle pain (Primary)    Her foot is neurovascularly intact.  No edema.  No tenderness around the Achilles tendon, no bursitis.  She has prominent veins, though this is typical of an 88-year-old female, no significant varicose veins that would suggest a superficial thrombosis.  Reassurance given.  Furthermore, she reports this pain occurred a couple days ago, and has not returned.                BMI is within normal parameters. No other follow-up for BMI required.          This document has been electronically signed by Lisa Delgado  August 21, 2024 14:18 EDT    Dictated Utilizing Dragon Dictation: Part of this note may be an electronic transcription/translation of spoken language to printed text using the Dragon Dictation System.

## 2024-09-26 ENCOUNTER — OFFICE VISIT (OUTPATIENT)
Dept: FAMILY MEDICINE CLINIC | Facility: CLINIC | Age: 89
End: 2024-09-26
Payer: MEDICARE

## 2024-09-26 VITALS
HEIGHT: 65 IN | DIASTOLIC BLOOD PRESSURE: 68 MMHG | WEIGHT: 129.4 LBS | SYSTOLIC BLOOD PRESSURE: 120 MMHG | HEART RATE: 77 BPM | OXYGEN SATURATION: 95 % | BODY MASS INDEX: 21.56 KG/M2 | TEMPERATURE: 97.3 F

## 2024-09-26 DIAGNOSIS — F41.9 ANXIETY: ICD-10-CM

## 2024-09-26 DIAGNOSIS — R60.9 1+ PITTING EDEMA: ICD-10-CM

## 2024-09-26 DIAGNOSIS — R07.81 RIB PAIN ON LEFT SIDE: ICD-10-CM

## 2024-09-26 DIAGNOSIS — I10 ESSENTIAL HYPERTENSION: Primary | ICD-10-CM

## 2024-09-26 DIAGNOSIS — L82.1 SEBORRHEIC KERATOSIS OF SCALP: ICD-10-CM

## 2024-09-26 DIAGNOSIS — I48.0 PAROXYSMAL ATRIAL FIBRILLATION: ICD-10-CM

## 2024-09-26 DIAGNOSIS — K31.84 GASTROPARESIS: ICD-10-CM

## 2024-09-26 DIAGNOSIS — E78.2 MIXED HYPERLIPIDEMIA: ICD-10-CM

## 2024-09-26 DIAGNOSIS — D23.5 DERMOID CYST OF SKIN OF BACK: ICD-10-CM

## 2024-09-26 DIAGNOSIS — L29.9 ITCHING OF EAR: ICD-10-CM

## 2024-09-26 DIAGNOSIS — J30.2 SEASONAL ALLERGIES: ICD-10-CM

## 2024-09-26 PROCEDURE — G2211 COMPLEX E/M VISIT ADD ON: HCPCS | Performed by: FAMILY MEDICINE

## 2024-09-26 PROCEDURE — 1125F AMNT PAIN NOTED PAIN PRSNT: CPT | Performed by: FAMILY MEDICINE

## 2024-09-26 PROCEDURE — 99214 OFFICE O/P EST MOD 30 MIN: CPT | Performed by: FAMILY MEDICINE

## 2024-09-26 RX ORDER — FLUTICASONE PROPIONATE 50 UG/1
2 SPRAY, METERED NASAL DAILY
Qty: 16 G | Refills: 2 | Status: SHIPPED | OUTPATIENT
Start: 2024-09-26

## 2024-09-26 RX ORDER — PAROXETINE 10 MG/1
5 TABLET, FILM COATED ORAL NIGHTLY
Qty: 90 TABLET | Refills: 0 | Status: SHIPPED | OUTPATIENT
Start: 2024-09-26

## 2024-09-26 RX ORDER — SOTALOL HYDROCHLORIDE 120 MG/1
1 TABLET ORAL 2 TIMES DAILY
Qty: 180 EACH | Refills: 1 | Status: SHIPPED | OUTPATIENT
Start: 2024-09-26

## 2024-09-26 RX ORDER — POTASSIUM CHLORIDE 750 MG/1
10 TABLET, EXTENDED RELEASE ORAL DAILY
Qty: 90 TABLET | Refills: 1 | Status: SHIPPED | OUTPATIENT
Start: 2024-09-26

## 2024-09-26 RX ORDER — FUROSEMIDE 20 MG
20 TABLET ORAL EVERY OTHER DAY
Qty: 45 TABLET | Refills: 1 | Status: SHIPPED | OUTPATIENT
Start: 2024-09-26

## 2024-09-26 RX ORDER — ROSUVASTATIN CALCIUM 10 MG/1
10 TABLET, COATED ORAL DAILY
Qty: 90 TABLET | Refills: 1 | Status: SHIPPED | OUTPATIENT
Start: 2024-09-26

## 2024-10-03 NOTE — TELEPHONE ENCOUNTER
BRAULIO reviewed and consistent. Sent Chief complaint:   Chief Complaint   Patient presents with    STD       Vitals:  Visit Vitals  /86 (BP Location: RUE - Right upper extremity, Patient Position: Sitting, Cuff Size: Regular)   Pulse (!) 101   Temp 97.8 °F (36.6 °C) (Temporal)   Resp 18   Ht 5' 2\" (1.575 m)   Wt 98.9 kg (218 lb)   LMP 08/15/2024 (Approximate)   SpO2 97%   BMI 39.87 kg/m²       HISTORY OF PRESENT ILLNESS     Jasmin Negron is a 31 year old female with PMH of hypothyroidism, KEVIN and schizoaffective disorder bipolar type who comes in today to the Urgent Care accompanied by a staff member from her group home for evaluation of possible herpes outbreak. Symptoms started 2 days ago. She reports being transferred to a new group home in the past 2 weeks. Has history of herpes with last outbreak about 3.5 months ago. Was previously on valtrex as preventative but after being admitted to a psychiatric flores recently she ran out of the prescription. She does not have a PCP currently. Denies being sexually active or have concern of other STI infections/pregnancy. She reports burning with open sores to the genital area which is consistent with her previous outbreak. She denies any urinary symptoms outside of burning to the vaginal area with urinating. Treatments prior to visit include: none. She reports feeling well otherwise with no other complaints today.        Other significant problems:  There are no problems to display for this patient.      PAST MEDICAL, FAMILY AND SOCIAL HISTORY     Medications:  Current Outpatient Medications   Medication Sig Dispense Refill    docusate sodium, DSS, 100 MG Cap Take 100 mg by mouth daily.      folic acid (FOLATE) 1 MG tablet Take 1 mg by mouth daily.      hydrOXYzine (ATARAX) 25 MG tablet Take 25 mg by mouth.      levothyroxine 100 MCG tablet Take 100 mcg by mouth daily.      lithium (LITHOBID) 300 MG CR tablet Take 300 mg by mouth in the morning and 300 mg in the evening.      LORazepam (ATIVAN) 0.5 MG  tablet Take 0.5 mg by mouth.      ondansetron (ZOFRAN) 4 MG tablet Take 4 mg by mouth.      oxybutynin (DITROPAN-XL) 5 MG 24 hr tablet Take 5 mg by mouth daily.      bisacodyl (DULCOLAX) 5 MG EC tablet Take 5 mg by mouth.      Cholecalciferol (vitamin D, Cholecalciferol,) 25 mcg (1,000 units) capsule Take 5,000 Units by mouth daily.      valACYclovir (VALTREX) 500 MG tablet Take 1 tablet by mouth in the morning and 1 tablet in the evening. Do all this for 3 days. 6 tablet 0    acetaminophen (TYLENOL) 500 MG tablet Take 2 tablets by mouth 3 times daily as needed for Pain. 30 tablet 0     No current facility-administered medications for this visit.       Allergies:  ALLERGIES:  No Known Allergies    Past Medical  History/Surgeries:  No past medical history on file.    No past surgical history on file.    Family History:  No family history on file.    Social History:  Social History     Tobacco Use    Smoking status: Every Day     Types: Cigarettes    Smokeless tobacco: Never   Substance Use Topics    Alcohol use: Not on file       REVIEW OF SYSTEMS     Review of Systems   Constitutional:  Negative for chills, fatigue and fever.   Genitourinary:  Positive for dysuria and genital sores. Negative for flank pain, frequency, pelvic pain, urgency, vaginal bleeding and vaginal discharge.   All other systems reviewed and are negative.    PHYSICAL EXAM     Physical Exam  Vitals and nursing note reviewed. Exam conducted with a chaperone present.   Constitutional:       General: She is not in acute distress.     Appearance: Normal appearance. She is not ill-appearing or toxic-appearing.   HENT:      Head: Normocephalic and atraumatic.   Eyes:      Conjunctiva/sclera: Conjunctivae normal.   Cardiovascular:      Rate and Rhythm: Normal rate and regular rhythm.   Pulmonary:      Effort: Pulmonary effort is normal.   Genitourinary:     Labia:         Right: Lesion (multiple ulcerative and vesicular lesions present to the bilateral  labia with erythematous base) present.    Skin:     General: Skin is warm and dry.   Neurological:      Mental Status: She is alert.       ASSESSMENT/PLAN     Diagnoses and all orders for this visit:  Herpes simplex vulvovaginitis  Other orders  -     valACYclovir (VALTREX) 500 MG tablet; Take 1 tablet by mouth in the morning and 1 tablet in the evening. Do all this for 3 days.  -     acetaminophen (TYLENOL) 500 MG tablet; Take 2 tablets by mouth 3 times daily as needed for Pain.     Jasmin Negron is a 31 year old female who comes in today to the Urgent Care with a staff member of her group home with concern of herpes outbreak with history of herpes genitalium. On exam she is afebrile, alert, non-toxic and appears to be in no acute distress. Bilateral labia with multiple ulcerative and vesicular lesions with erythematous base visualized on exam. She denies any urinary complaints outside of burning with urination. She reports diagnostic history of herpes with previous valtrex treatment. Will have her start valacyclovir at 500 mg twice daily x 3 days. Tylenol 1,000 mg up to three times daily for discomfort. Provided with PCP providers accepting patients at this time for her to call to establish. Jasmin is advised to return to the Urgent care or ED for any new or worsening symptoms. Jasmin Negron verbalized understanding of and is in agreement with the plan. All questions addressed.    LUCAS Cadet

## 2024-11-05 ENCOUNTER — OFFICE VISIT (OUTPATIENT)
Dept: FAMILY MEDICINE CLINIC | Facility: CLINIC | Age: 89
End: 2024-11-05
Payer: MEDICARE

## 2024-11-05 VITALS
HEART RATE: 76 BPM | SYSTOLIC BLOOD PRESSURE: 152 MMHG | OXYGEN SATURATION: 98 % | TEMPERATURE: 97.1 F | WEIGHT: 132.2 LBS | DIASTOLIC BLOOD PRESSURE: 84 MMHG | BODY MASS INDEX: 22 KG/M2

## 2024-11-05 DIAGNOSIS — I10 ESSENTIAL HYPERTENSION: Primary | ICD-10-CM

## 2024-11-05 PROCEDURE — 99213 OFFICE O/P EST LOW 20 MIN: CPT | Performed by: STUDENT IN AN ORGANIZED HEALTH CARE EDUCATION/TRAINING PROGRAM

## 2024-11-05 PROCEDURE — 1126F AMNT PAIN NOTED NONE PRSNT: CPT | Performed by: STUDENT IN AN ORGANIZED HEALTH CARE EDUCATION/TRAINING PROGRAM

## 2024-11-05 RX ORDER — LISINOPRIL 20 MG/1
20 TABLET ORAL DAILY
Qty: 90 TABLET | Refills: 1 | Status: SHIPPED | OUTPATIENT
Start: 2024-11-05 | End: 2024-11-11 | Stop reason: SDUPTHER

## 2024-11-05 NOTE — PROGRESS NOTES
Chief Complaint  Elevated Blood Pressure    HPI:   HPI   Tonia Ritter is a 89 y.o. female who presents today to [facility] for Elevated Blood Pressure.  Patient presents for hypertension. She reports she has been checking her bp at home and it has been 160/90 consistently for two weeks. She denies chest pain, shortness of breath, neurologic symptoms or blurry vision.     Previous History:   Past Medical History:   Diagnosis Date    Acute bronchitis     Allergic rhinitis     Anxiety     Atrial fibrillation     Back pain     Burping     Chills (without fever) 03/25/2019    Chronic pain     Congestive heart failure     Debility 03/18/2024    Depression     Encounter for screening fecal occult blood testing 03/10/2016    NEGATIVE    Fibromyalgia     Flatulence     GERD (gastroesophageal reflux disease)     Hematuria     Hyperlipidemia     Hypertension     Osteoarthritis     Pacemaker     PUD (peptic ulcer disease)     Right shoulder pain       Past Surgical History:   Procedure Laterality Date    CARDIOVERSION  05/2017    [facility]     CARDIOVERSION  09/01/2017    [facility]    CHOLECYSTECTOMY  2004    COLONOSCOPY  05/2013    DILATATION AND CURETTAGE      EPIDIDYMAL CYST EXCISION      ESOPHAGOSCOPY / EGD  01/2019    EYE SURGERY      HEMORRHOIDECTOMY  01/2019    MAMMO BILATERAL  10/2014    NECK SURGERY      PACEMAKER IMPLANTATION      PAP SMEAR  03/2016    SKIN LESION EXCISION        Social History     Socioeconomic History    Marital status:    Tobacco Use    Smoking status: Never    Smokeless tobacco: Never   Vaping Use    Vaping status: Never Used   Substance and Sexual Activity    Alcohol use: No    Drug use: No    Sexual activity: Defer      Health Maintenance Due   Topic Date Due    ZOSTER VACCINE (1 of 2) Never done    RSV Vaccine - Adults (1 - 1-dose 75+ series) Never done    PAP SMEAR  Never done    LIPID PANEL  12/05/2023    COVID-19 Vaccine (3 - 2024-25  "season) 09/01/2024        Current Medications:  Current Outpatient Medications   Medication Sig Dispense Refill    acetaminophen (TYLENOL) 500 MG tablet Take 1 tablet by mouth Every 6 (Six) Hours As Needed for Mild Pain.      fluticasone (FLONASE) 50 MCG/ACT nasal spray Administer 2 sprays into the nostril(s) as directed by provider Daily. 16 g 2    PARoxetine (Paxil) 10 MG tablet Take 0.5 tablets by mouth Every Night. Takes 0.5 tablet 90 tablet 0    potassium chloride (KLOR-CON M10) 10 MEQ CR tablet Take 1 tablet by mouth Daily. 90 tablet 1    rivaroxaban (Xarelto) 20 MG tablet Take 1 tablet by mouth Daily With Dinner. 90 tablet 1    rosuvastatin (CRESTOR) 10 MG tablet Take 1 tablet by mouth Daily. for cholesterol 90 tablet 1    Sotalol HCl, AF, 120 MG tablet Take 1 tablet by mouth 2 (Two) Times a Day. 180 each 1    furosemide (LASIX) 20 MG tablet Take 1 tablet by mouth Every Other Day. (Patient not taking: Reported on 11/5/2024) 45 tablet 1    lisinopril (PRINIVIL,ZESTRIL) 20 MG tablet Take 1 tablet by mouth Daily. 90 tablet 1     No current facility-administered medications for this visit.       Allergies:   Allergies   Allergen Reactions    Reglan [Metoclopramide]     Sulfa Antibiotics Nausea And Vomiting       Vitals:   /84 (BP Location: Right arm, Patient Position: Sitting, Cuff Size: Adult)   Pulse 76   Temp 97.1 °F (36.2 °C) (Temporal)   Wt 60 kg (132 lb 3.2 oz)   SpO2 98%   BMI 22.00 kg/m²     Estimated body mass index is 22 kg/m² as calculated from the following:    Height as of 9/26/24: 165.1 cm (65\").    Weight as of this encounter: 60 kg (132 lb 3.2 oz).    Tonia ELIZABETH Ritter  reports that she has never smoked. She has never used smokeless tobacco.            Physical Exam:   Physical Exam  Constitutional:       Appearance: Normal appearance.   HENT:      Mouth/Throat:      Mouth: Mucous membranes are moist.   Cardiovascular:      Rate and Rhythm: Normal rate and regular rhythm.   Pulmonary: "      Effort: Pulmonary effort is normal.      Breath sounds: Normal breath sounds. No wheezing or rhonchi.   Musculoskeletal:         General: Normal range of motion.   Skin:     General: Skin is warm and dry.   Neurological:      Mental Status: She is alert.   Psychiatric:         Mood and Affect: Mood normal.          Lab Results:   Lab on 07/08/2024   Component Date Value Ref Range Status    Glucose 07/08/2024 71  65 - 99 mg/dL Final    BUN 07/08/2024 11  8 - 23 mg/dL Final    Creatinine 07/08/2024 1.06 (H)  0.57 - 1.00 mg/dL Final    Sodium 07/08/2024 141  136 - 145 mmol/L Final    Potassium 07/08/2024 4.3  3.5 - 5.2 mmol/L Final    Chloride 07/08/2024 103  98 - 107 mmol/L Final    CO2 07/08/2024 28.0  22.0 - 29.0 mmol/L Final    Calcium 07/08/2024 9.2  8.6 - 10.5 mg/dL Final    Total Protein 07/08/2024 7.0  6.0 - 8.5 g/dL Final    Albumin 07/08/2024 4.0  3.5 - 5.2 g/dL Final    ALT (SGPT) 07/08/2024 13  1 - 33 U/L Final    AST (SGOT) 07/08/2024 26  1 - 32 U/L Final    Alkaline Phosphatase 07/08/2024 74  39 - 117 U/L Final    Total Bilirubin 07/08/2024 0.4  0.0 - 1.2 mg/dL Final    Globulin 07/08/2024 3.0  gm/dL Final    A/G Ratio 07/08/2024 1.3  g/dL Final    BUN/Creatinine Ratio 07/08/2024 10.4  7.0 - 25.0 Final    Anion Gap 07/08/2024 10.0  5.0 - 15.0 mmol/L Final    eGFR 07/08/2024 51.3 (L)  >60.0 mL/min/1.73 Final    Protein/Creatinine Ratio, Urine 07/08/2024 130.0  0.0 - 200.0 mg/G Crea Final    Creatinine, Urine 07/08/2024 57.7  mg/dL Final    Total Protein, Urine 07/08/2024 7.5  mg/dL Final    Microalbumin/Creatinine Ratio 07/08/2024    Final    Unable to calculate    Creatinine, Urine 07/08/2024 57.7  mg/dL Final    Microalbumin, Urine 07/08/2024 <1.2  mg/dL Final    Color, UA 07/08/2024 Yellow  Yellow, Straw Final    Appearance, UA 07/08/2024 Clear  Clear Final    pH, UA 07/08/2024 7.0  5.0 - 8.0 Final    Specific Gravity, UA 07/08/2024 1.012  1.005 - 1.030 Final    Glucose, UA 07/08/2024 Negative   Negative Final    Ketones, UA 07/08/2024 Negative  Negative Final    Bilirubin, UA 07/08/2024 Negative  Negative Final    Blood, UA 07/08/2024 Negative  Negative Final    Protein, UA 07/08/2024 Negative  Negative Final    Leuk Esterase, UA 07/08/2024 Small (1+) (A)  Negative Final    Nitrite, UA 07/08/2024 Negative  Negative Final    Urobilinogen, UA 07/08/2024 0.2 E.U./dL  0.2 - 1.0 E.U./dL Final    RBC, UA 07/08/2024 0-2  None Seen, 0-2 /HPF Final    WBC, UA 07/08/2024 0-2  None Seen, 0-2 /HPF Final    Bacteria, UA 07/08/2024 None Seen  None Seen /HPF Final    Squamous Epithelial Cells, UA 07/08/2024 0-2  None Seen, 0-2 /HPF Final    Hyaline Casts, UA 07/08/2024 None Seen  None Seen /LPF Final    Methodology 07/08/2024 Automated Microscopy   Final   Lab on 06/27/2024   Component Date Value Ref Range Status    WBC 06/27/2024 5.62  3.40 - 10.80 10*3/mm3 Final    RBC 06/27/2024 4.24  3.77 - 5.28 10*6/mm3 Final    Hemoglobin 06/27/2024 12.7  12.0 - 15.9 g/dL Final    Hematocrit 06/27/2024 38.9  34.0 - 46.6 % Final    MCV 06/27/2024 91.7  79.0 - 97.0 fL Final    MCH 06/27/2024 30.0  26.6 - 33.0 pg Final    MCHC 06/27/2024 32.6  31.5 - 35.7 g/dL Final    RDW 06/27/2024 13.1  12.3 - 15.4 % Final    RDW-SD 06/27/2024 43.6  37.0 - 54.0 fl Final    MPV 06/27/2024 10.5  6.0 - 12.0 fL Final    Platelets 06/27/2024 161  140 - 450 10*3/mm3 Final    Glucose 06/27/2024 73  65 - 99 mg/dL Final    BUN 06/27/2024 12  8 - 23 mg/dL Final    Creatinine 06/27/2024 0.97  0.57 - 1.00 mg/dL Final    Sodium 06/27/2024 139  136 - 145 mmol/L Final    Potassium 06/27/2024 4.6  3.5 - 5.2 mmol/L Final    Chloride 06/27/2024 102  98 - 107 mmol/L Final    CO2 06/27/2024 27.9  22.0 - 29.0 mmol/L Final    Calcium 06/27/2024 9.6  8.6 - 10.5 mg/dL Final    Total Protein 06/27/2024 7.3  6.0 - 8.5 g/dL Final    Albumin 06/27/2024 4.2  3.5 - 5.2 g/dL Final    ALT (SGPT) 06/27/2024 10  1 - 33 U/L Final    AST (SGOT) 06/27/2024 26  1 - 32 U/L Final     Alkaline Phosphatase 06/27/2024 75  39 - 117 U/L Final    Total Bilirubin 06/27/2024 0.5  0.0 - 1.2 mg/dL Final    Globulin 06/27/2024 3.1  gm/dL Final    A/G Ratio 06/27/2024 1.4  g/dL Final    BUN/Creatinine Ratio 06/27/2024 12.4  7.0 - 25.0 Final    Anion Gap 06/27/2024 9.1  5.0 - 15.0 mmol/L Final    eGFR 06/27/2024 57.0 (L)  >60.0 mL/min/1.73 Final       Assessment and Plan  Diagnoses and all orders for this visit:    1. Essential hypertension (Primary)  -     lisinopril (PRINIVIL,ZESTRIL) 20 MG tablet; Take 1 tablet by mouth Daily.  Dispense: 90 tablet; Refill: 1    Patient has stage II hypertension. Will start lisinopril 20mg, home BP monitoring. Will follow up in 2 weeks to recheck BP, and BMP.     BMI is within normal parameters. No other follow-up for BMI required.       New Medications:   New Medications Ordered This Visit   Medications    lisinopril (PRINIVIL,ZESTRIL) 20 MG tablet     Sig: Take 1 tablet by mouth Daily.     Dispense:  90 tablet     Refill:  1       Discontinued Medications:   There are no discontinued medications.              Follow Up:   Return in about 2 weeks (around 11/19/2024).    Patient was given instructions and counseling regarding her condition or for health maintenance advice. Please see specific information pulled into the AVS if appropriate.       This document has been electronically signed by Cheko Philip DO   November 5, 2024 15:23 EST    Dictated Utilizing Dragon Dictation: Part of this note may be an electronic transcription/translation of spoken language to printed text using the Dragon Dictation System.

## 2024-11-11 ENCOUNTER — OFFICE VISIT (OUTPATIENT)
Dept: FAMILY MEDICINE CLINIC | Facility: CLINIC | Age: 89
End: 2024-11-11
Payer: MEDICARE

## 2024-11-11 ENCOUNTER — TELEPHONE (OUTPATIENT)
Dept: FAMILY MEDICINE CLINIC | Facility: CLINIC | Age: 89
End: 2024-11-11

## 2024-11-11 ENCOUNTER — LAB (OUTPATIENT)
Dept: FAMILY MEDICINE CLINIC | Facility: CLINIC | Age: 89
End: 2024-11-11
Payer: MEDICARE

## 2024-11-11 VITALS
WEIGHT: 130 LBS | TEMPERATURE: 97.7 F | DIASTOLIC BLOOD PRESSURE: 82 MMHG | HEART RATE: 73 BPM | SYSTOLIC BLOOD PRESSURE: 134 MMHG | BODY MASS INDEX: 21.66 KG/M2 | HEIGHT: 65 IN

## 2024-11-11 DIAGNOSIS — I48.0 PAROXYSMAL ATRIAL FIBRILLATION: ICD-10-CM

## 2024-11-11 DIAGNOSIS — R51.9 RIGHT-SIDED HEADACHE: Primary | ICD-10-CM

## 2024-11-11 DIAGNOSIS — I10 ESSENTIAL HYPERTENSION: ICD-10-CM

## 2024-11-11 DIAGNOSIS — E78.2 MIXED HYPERLIPIDEMIA: ICD-10-CM

## 2024-11-11 PROCEDURE — 85027 COMPLETE CBC AUTOMATED: CPT | Performed by: FAMILY MEDICINE

## 2024-11-11 PROCEDURE — 99214 OFFICE O/P EST MOD 30 MIN: CPT | Performed by: FAMILY MEDICINE

## 2024-11-11 PROCEDURE — G2211 COMPLEX E/M VISIT ADD ON: HCPCS | Performed by: FAMILY MEDICINE

## 2024-11-11 PROCEDURE — 80053 COMPREHEN METABOLIC PANEL: CPT | Performed by: FAMILY MEDICINE

## 2024-11-11 PROCEDURE — 1126F AMNT PAIN NOTED NONE PRSNT: CPT | Performed by: FAMILY MEDICINE

## 2024-11-11 PROCEDURE — 36415 COLL VENOUS BLD VENIPUNCTURE: CPT

## 2024-11-11 RX ORDER — LISINOPRIL 5 MG/1
5 TABLET ORAL NIGHTLY
Qty: 90 TABLET | Refills: 0 | Status: SHIPPED | OUTPATIENT
Start: 2024-11-11

## 2024-11-11 RX ORDER — LISINOPRIL 20 MG/1
20 TABLET ORAL DAILY
Qty: 90 TABLET | Refills: 1 | Status: SHIPPED | OUTPATIENT
Start: 2024-11-11

## 2024-11-11 RX ORDER — SOTALOL HYDROCHLORIDE 120 MG/1
1 TABLET ORAL 2 TIMES DAILY
Qty: 180 EACH | Refills: 1 | Status: SHIPPED | OUTPATIENT
Start: 2024-11-11

## 2024-11-11 NOTE — PROGRESS NOTES
Subjective   Tonia Ritter is a 89 y.o. female.   Pt presents today with CC of Hypertension      History of Present Illness   History of Present Illness     The following portions of the patient's history were reviewed and updated as appropriate: allergies, current medications, past family history, past medical history, past social history, past surgical history, and problem list.    Review of Systems   Constitutional:  Negative for chills, fever and unexpected weight loss.   HENT:  Negative for congestion and sore throat.    Eyes:  Negative for blurred vision and visual disturbance.   Respiratory:  Negative for cough and wheezing.    Cardiovascular:  Negative for chest pain and palpitations.   Gastrointestinal:  Negative for abdominal pain and diarrhea.   Endocrine: Negative for cold intolerance and heat intolerance.   Genitourinary:  Negative for dysuria.   Musculoskeletal:  Negative for arthralgias and neck stiffness.   Neurological:  Negative for dizziness, seizures and syncope.   Psychiatric/Behavioral:  Negative for self-injury, suicidal ideas and depressed mood.      Vitals:    11/11/24 1441   BP: 134/82   Pulse: 73   Temp: 97.7 °F (36.5 °C)        Objective   Physical Exam  Vitals and nursing note reviewed.   Constitutional:       Appearance: She is well-developed.   HENT:      Head: Normocephalic and atraumatic.      Right Ear: External ear normal.      Left Ear: External ear normal.      Nose: Nose normal.   Eyes:      Conjunctiva/sclera: Conjunctivae normal.      Pupils: Pupils are equal, round, and reactive to light.   Cardiovascular:      Rate and Rhythm: Normal rate and regular rhythm.      Heart sounds: Normal heart sounds.   Pulmonary:      Effort: Pulmonary effort is normal.      Breath sounds: Normal breath sounds.   Abdominal:      General: Bowel sounds are normal.      Palpations: Abdomen is soft.   Musculoskeletal:      Cervical back: Normal range of motion and neck supple.   Skin:      General: Skin is warm and dry.   Neurological:      Mental Status: She is alert and oriented to person, place, and time.      Comments: Neurologic exam is normal with the exception of a persistent right frontal headache.  She recently had her right ear cleaned out in hopes that it would help, it did not.  There is no evidence of sinusitis at this time.  I recommend CT scan to look into other causes.  Temporal artery is not tender.   Psychiatric:         Behavior: Behavior normal.           Assessment & Plan   Diagnoses and all orders for this visit:    1. Right-sided headache (Primary)  -     CT Head Without Contrast; Future  Because of atrial fibrillation on Xarelto, I recommend CT scan without contrast to evaluate causes for her headache.  Frontal sinusitis is possible.  2. Paroxysmal atrial fibrillation  -     Sotalol HCl, AF, 120 MG tablet; Take 1 tablet by mouth 2 (Two) Times a Day.  Dispense: 180 each; Refill: 1  -     CT Head Without Contrast; Future    3. Mixed hyperlipidemia    4. Essential hypertension  -     lisinopril (PRINIVIL,ZESTRIL) 20 MG tablet; Take 1 tablet by mouth Daily.  Dispense: 90 tablet; Refill: 1  -     CT Head Without Contrast; Future  -     Comprehensive metabolic panel; Future  -     CBC No Differential; Future  -     lisinopril (PRINIVIL,ZESTRIL) 5 MG tablet; Take 1 tablet by mouth Every Night.  Dispense: 90 tablet; Refill: 0  She has been taking lisinopril 20 mg in the morning with good coverage during the day, though she reports that late in the evening her blood pressure goes back up.  She has 5 mg tablets that she would like to try and take in the evenings for total of 25 mg daily.  This is reasonable.  I recommend follow-up next week to recheck.  Blood work today to ensure no renal dysfunction                   This document has been electronically signed by Joey Orzoco DO  November 11, 2024 15:18 EST    Dictated Utilizing Dragon Dictation: Part of this note may be an electronic  transcription/translation of spoken language to printed text using the Dragon Dictation System.

## 2024-11-12 LAB
ALBUMIN SERPL-MCNC: 4.1 G/DL (ref 3.5–5.2)
ALBUMIN/GLOB SERPL: 1.5 G/DL
ALP SERPL-CCNC: 81 U/L (ref 39–117)
ALT SERPL W P-5'-P-CCNC: 12 U/L (ref 1–33)
ANION GAP SERPL CALCULATED.3IONS-SCNC: 8 MMOL/L (ref 5–15)
AST SERPL-CCNC: 23 U/L (ref 1–32)
BILIRUB SERPL-MCNC: 0.4 MG/DL (ref 0–1.2)
BUN SERPL-MCNC: 14 MG/DL (ref 8–23)
BUN/CREAT SERPL: 15.2 (ref 7–25)
CALCIUM SPEC-SCNC: 9 MG/DL (ref 8.6–10.5)
CHLORIDE SERPL-SCNC: 100 MMOL/L (ref 98–107)
CO2 SERPL-SCNC: 28 MMOL/L (ref 22–29)
CREAT SERPL-MCNC: 0.92 MG/DL (ref 0.57–1)
DEPRECATED RDW RBC AUTO: 42.9 FL (ref 37–54)
EGFRCR SERPLBLD CKD-EPI 2021: 60.4 ML/MIN/1.73
ERYTHROCYTE [DISTWIDTH] IN BLOOD BY AUTOMATED COUNT: 12.6 % (ref 12.3–15.4)
GLOBULIN UR ELPH-MCNC: 2.7 GM/DL
GLUCOSE SERPL-MCNC: 106 MG/DL (ref 65–99)
HCT VFR BLD AUTO: 37 % (ref 34–46.6)
HGB BLD-MCNC: 11.3 G/DL (ref 12–15.9)
MCH RBC QN AUTO: 28.9 PG (ref 26.6–33)
MCHC RBC AUTO-ENTMCNC: 30.5 G/DL (ref 31.5–35.7)
MCV RBC AUTO: 94.6 FL (ref 79–97)
PLATELET # BLD AUTO: 146 10*3/MM3 (ref 140–450)
PMV BLD AUTO: 11 FL (ref 6–12)
POTASSIUM SERPL-SCNC: 4 MMOL/L (ref 3.5–5.2)
PROT SERPL-MCNC: 6.8 G/DL (ref 6–8.5)
RBC # BLD AUTO: 3.91 10*6/MM3 (ref 3.77–5.28)
SODIUM SERPL-SCNC: 136 MMOL/L (ref 136–145)
WBC NRBC COR # BLD AUTO: 5.82 10*3/MM3 (ref 3.4–10.8)

## 2024-11-14 ENCOUNTER — TELEPHONE (OUTPATIENT)
Dept: FAMILY MEDICINE CLINIC | Facility: CLINIC | Age: 89
End: 2024-11-14
Payer: MEDICARE

## 2024-11-14 NOTE — TELEPHONE ENCOUNTER
----- Message from Joey Orozco sent at 11/14/2024 10:11 AM EST -----  No major problems on your blood work.  Your hemoglobin dropped a little again.  Please be on the look out for bleeding.  You had bleeding in the past, are you bleeding again?  Hemoglobin dropped to 11.3.  This is still a safe number, though I recommend we keep an eye on this because of your use of Xarelto.

## 2024-11-20 ENCOUNTER — TELEPHONE (OUTPATIENT)
Dept: FAMILY MEDICINE CLINIC | Facility: CLINIC | Age: 89
End: 2024-11-20
Payer: MEDICARE

## 2024-11-20 ENCOUNTER — HOSPITAL ENCOUNTER (OUTPATIENT)
Facility: HOSPITAL | Age: 89
Discharge: HOME | End: 2024-11-20
Admitting: FAMILY MEDICINE
Payer: MEDICARE

## 2024-11-20 DIAGNOSIS — I48.0 PAROXYSMAL ATRIAL FIBRILLATION: ICD-10-CM

## 2024-11-20 DIAGNOSIS — R51.9 RIGHT-SIDED HEADACHE: ICD-10-CM

## 2024-11-20 DIAGNOSIS — I10 ESSENTIAL HYPERTENSION: ICD-10-CM

## 2024-11-20 PROCEDURE — 70450 CT HEAD/BRAIN W/O DYE: CPT

## 2024-11-20 PROCEDURE — 70450 CT HEAD/BRAIN W/O DYE: CPT | Performed by: RADIOLOGY

## 2024-11-20 NOTE — TELEPHONE ENCOUNTER
----- Message from Joey Orozco sent at 11/20/2024  3:01 PM EST -----  No problems on your CT scan.  No sinusitis noted.

## 2024-12-16 ENCOUNTER — OFFICE VISIT (OUTPATIENT)
Dept: FAMILY MEDICINE CLINIC | Facility: CLINIC | Age: 89
End: 2024-12-16
Payer: MEDICARE

## 2024-12-16 VITALS
HEART RATE: 70 BPM | BODY MASS INDEX: 21.56 KG/M2 | HEIGHT: 65 IN | OXYGEN SATURATION: 96 % | SYSTOLIC BLOOD PRESSURE: 124 MMHG | DIASTOLIC BLOOD PRESSURE: 84 MMHG | WEIGHT: 129.4 LBS | TEMPERATURE: 96.8 F

## 2024-12-16 DIAGNOSIS — I10 ESSENTIAL HYPERTENSION: Primary | ICD-10-CM

## 2024-12-17 NOTE — PROGRESS NOTES
Chief Complaint  Elevated Blood Pressure    HPI:   HPI   Tonia Ritter is a 89 y.o. female who presents today to [facility] for Elevated Blood Pressure.  Patient seen for complaints of elevated blood pressure.  She reports last night she developed some mild facial numbness and a headache.  She checked her blood pressure and it was 190/100.  She reports she took a lisinopril 5 mg and her symptoms went away.  She reports that her home blood pressures have been under 130/80.  She has no lasting symptoms.  She also reports she has only been taking Crestor once every few days.    Previous History:   Past Medical History:   Diagnosis Date    Acute bronchitis     Allergic rhinitis     Anxiety     Atrial fibrillation     Back pain     Burping     Chills (without fever) 03/25/2019    Chronic pain     Congestive heart failure     Debility 03/18/2024    Depression     Encounter for screening fecal occult blood testing 03/10/2016    NEGATIVE    Fibromyalgia     Flatulence     GERD (gastroesophageal reflux disease)     Hematuria     Hyperlipidemia     Hypertension     Osteoarthritis     Pacemaker     PUD (peptic ulcer disease)     Right shoulder pain       Past Surgical History:   Procedure Laterality Date    CARDIOVERSION  05/2017    [facility]     CARDIOVERSION  09/01/2017    [facility]    CHOLECYSTECTOMY  2004    COLONOSCOPY  05/2013    DILATATION AND CURETTAGE      EPIDIDYMAL CYST EXCISION      ESOPHAGOSCOPY / EGD  01/2019    EYE SURGERY      HEMORRHOIDECTOMY  01/2019    MAMMO BILATERAL  10/2014    NECK SURGERY      PACEMAKER IMPLANTATION      PAP SMEAR  03/2016    SKIN LESION EXCISION        Social History     Socioeconomic History    Marital status:    Tobacco Use    Smoking status: Never    Smokeless tobacco: Never   Vaping Use    Vaping status: Never Used   Substance and Sexual Activity    Alcohol use: No    Drug use: No    Sexual activity: Defer      Health Maintenance  "Due   Topic Date Due    ZOSTER VACCINE (1 of 2) Never done    RSV Vaccine - Adults (1 - 1-dose 75+ series) Never done    PAP SMEAR  Never done    LIPID PANEL  12/05/2023    COVID-19 Vaccine (3 - 2024-25 season) 09/01/2024    ANNUAL WELLNESS VISIT  02/06/2025        Current Medications:  Current Outpatient Medications   Medication Sig Dispense Refill    acetaminophen (TYLENOL) 500 MG tablet Take 1 tablet by mouth Every 6 (Six) Hours As Needed for Mild Pain.      fluticasone (FLONASE) 50 MCG/ACT nasal spray Administer 2 sprays into the nostril(s) as directed by provider Daily. 16 g 2    lisinopril (PRINIVIL,ZESTRIL) 20 MG tablet Take 1 tablet by mouth Daily. 90 tablet 1    lisinopril (PRINIVIL,ZESTRIL) 5 MG tablet Take 1 tablet by mouth Every Night. 90 tablet 0    PARoxetine (Paxil) 10 MG tablet Take 0.5 tablets by mouth Every Night. Takes 0.5 tablet 90 tablet 0    potassium chloride (KLOR-CON M10) 10 MEQ CR tablet Take 1 tablet by mouth Daily. 90 tablet 1    rivaroxaban (Xarelto) 20 MG tablet Take 1 tablet by mouth Daily With Dinner. 90 tablet 1    rosuvastatin (CRESTOR) 10 MG tablet Take 1 tablet by mouth Daily. for cholesterol 90 tablet 1    Sotalol HCl, AF, 120 MG tablet Take 1 tablet by mouth 2 (Two) Times a Day. 180 each 1     No current facility-administered medications for this visit.       Allergies:   Allergies   Allergen Reactions    Reglan [Metoclopramide]     Sulfa Antibiotics Nausea And Vomiting       Vitals:   /84 (BP Location: Right arm, Patient Position: Sitting, Cuff Size: Adult)   Pulse 70   Temp 96.8 °F (36 °C) (Temporal)   Ht 165.1 cm (65\")   Wt 58.7 kg (129 lb 6.4 oz)   SpO2 96%   BMI 21.53 kg/m²     Estimated body mass index is 21.53 kg/m² as calculated from the following:    Height as of this encounter: 165.1 cm (65\").    Weight as of this encounter: 58.7 kg (129 lb 6.4 oz).    Tonia Ritter  reports that she has never smoked. She has never used smokeless tobacco.    "       Physical Exam:   Physical Exam  Constitutional:       Appearance: Normal appearance.   HENT:      Mouth/Throat:      Mouth: Mucous membranes are moist.   Cardiovascular:      Rate and Rhythm: Normal rate and regular rhythm.   Pulmonary:      Effort: Pulmonary effort is normal.      Breath sounds: Normal breath sounds. No wheezing or rhonchi.   Musculoskeletal:         General: Normal range of motion.   Skin:     General: Skin is warm and dry.   Neurological:      Mental Status: She is alert.   Psychiatric:         Mood and Affect: Mood normal.          Lab Results:   Lab on 11/11/2024   Component Date Value Ref Range Status    Glucose 11/11/2024 106 (H)  65 - 99 mg/dL Final    BUN 11/11/2024 14  8 - 23 mg/dL Final    Creatinine 11/11/2024 0.92  0.57 - 1.00 mg/dL Final    Sodium 11/11/2024 136  136 - 145 mmol/L Final    Potassium 11/11/2024 4.0  3.5 - 5.2 mmol/L Final    Chloride 11/11/2024 100  98 - 107 mmol/L Final    CO2 11/11/2024 28.0  22.0 - 29.0 mmol/L Final    Calcium 11/11/2024 9.0  8.6 - 10.5 mg/dL Final    Total Protein 11/11/2024 6.8  6.0 - 8.5 g/dL Final    Albumin 11/11/2024 4.1  3.5 - 5.2 g/dL Final    ALT (SGPT) 11/11/2024 12  1 - 33 U/L Final    AST (SGOT) 11/11/2024 23  1 - 32 U/L Final    Alkaline Phosphatase 11/11/2024 81  39 - 117 U/L Final    Total Bilirubin 11/11/2024 0.4  0.0 - 1.2 mg/dL Final    Globulin 11/11/2024 2.7  gm/dL Final    A/G Ratio 11/11/2024 1.5  g/dL Final    BUN/Creatinine Ratio 11/11/2024 15.2  7.0 - 25.0 Final    Anion Gap 11/11/2024 8.0  5.0 - 15.0 mmol/L Final    eGFR 11/11/2024 60.4  >60.0 mL/min/1.73 Final    WBC 11/11/2024 5.82  3.40 - 10.80 10*3/mm3 Final    RBC 11/11/2024 3.91  3.77 - 5.28 10*6/mm3 Final    Hemoglobin 11/11/2024 11.3 (L)  12.0 - 15.9 g/dL Final    Hematocrit 11/11/2024 37.0  34.0 - 46.6 % Final    MCV 11/11/2024 94.6  79.0 - 97.0 fL Final    MCH 11/11/2024 28.9  26.6 - 33.0 pg Final    MCHC 11/11/2024 30.5 (L)  31.5 - 35.7 g/dL Final    RDW  11/11/2024 12.6  12.3 - 15.4 % Final    RDW-SD 11/11/2024 42.9  37.0 - 54.0 fl Final    MPV 11/11/2024 11.0  6.0 - 12.0 fL Final    Platelets 11/11/2024 146  140 - 450 10*3/mm3 Final   Lab on 07/08/2024   Component Date Value Ref Range Status    Glucose 07/08/2024 71  65 - 99 mg/dL Final    BUN 07/08/2024 11  8 - 23 mg/dL Final    Creatinine 07/08/2024 1.06 (H)  0.57 - 1.00 mg/dL Final    Sodium 07/08/2024 141  136 - 145 mmol/L Final    Potassium 07/08/2024 4.3  3.5 - 5.2 mmol/L Final    Chloride 07/08/2024 103  98 - 107 mmol/L Final    CO2 07/08/2024 28.0  22.0 - 29.0 mmol/L Final    Calcium 07/08/2024 9.2  8.6 - 10.5 mg/dL Final    Total Protein 07/08/2024 7.0  6.0 - 8.5 g/dL Final    Albumin 07/08/2024 4.0  3.5 - 5.2 g/dL Final    ALT (SGPT) 07/08/2024 13  1 - 33 U/L Final    AST (SGOT) 07/08/2024 26  1 - 32 U/L Final    Alkaline Phosphatase 07/08/2024 74  39 - 117 U/L Final    Total Bilirubin 07/08/2024 0.4  0.0 - 1.2 mg/dL Final    Globulin 07/08/2024 3.0  gm/dL Final    A/G Ratio 07/08/2024 1.3  g/dL Final    BUN/Creatinine Ratio 07/08/2024 10.4  7.0 - 25.0 Final    Anion Gap 07/08/2024 10.0  5.0 - 15.0 mmol/L Final    eGFR 07/08/2024 51.3 (L)  >60.0 mL/min/1.73 Final    Protein/Creatinine Ratio, Urine 07/08/2024 130.0  0.0 - 200.0 mg/G Crea Final    Creatinine, Urine 07/08/2024 57.7  mg/dL Final    Total Protein, Urine 07/08/2024 7.5  mg/dL Final    Microalbumin/Creatinine Ratio 07/08/2024    Final    Unable to calculate    Creatinine, Urine 07/08/2024 57.7  mg/dL Final    Microalbumin, Urine 07/08/2024 <1.2  mg/dL Final    Color, UA 07/08/2024 Yellow  Yellow, Straw Final    Appearance, UA 07/08/2024 Clear  Clear Final    pH, UA 07/08/2024 7.0  5.0 - 8.0 Final    Specific Gravity, UA 07/08/2024 1.012  1.005 - 1.030 Final    Glucose, UA 07/08/2024 Negative  Negative Final    Ketones, UA 07/08/2024 Negative  Negative Final    Bilirubin, UA 07/08/2024 Negative  Negative Final    Blood, UA 07/08/2024 Negative   Negative Final    Protein, UA 07/08/2024 Negative  Negative Final    Leuk Esterase, UA 07/08/2024 Small (1+) (A)  Negative Final    Nitrite, UA 07/08/2024 Negative  Negative Final    Urobilinogen, UA 07/08/2024 0.2 E.U./dL  0.2 - 1.0 E.U./dL Final    RBC, UA 07/08/2024 0-2  None Seen, 0-2 /HPF Final    WBC, UA 07/08/2024 0-2  None Seen, 0-2 /HPF Final    Bacteria, UA 07/08/2024 None Seen  None Seen /HPF Final    Squamous Epithelial Cells, UA 07/08/2024 0-2  None Seen, 0-2 /HPF Final    Hyaline Casts, UA 07/08/2024 None Seen  None Seen /LPF Final    Methodology 07/08/2024 Automated Microscopy   Final   Lab on 06/27/2024   Component Date Value Ref Range Status    WBC 06/27/2024 5.62  3.40 - 10.80 10*3/mm3 Final    RBC 06/27/2024 4.24  3.77 - 5.28 10*6/mm3 Final    Hemoglobin 06/27/2024 12.7  12.0 - 15.9 g/dL Final    Hematocrit 06/27/2024 38.9  34.0 - 46.6 % Final    MCV 06/27/2024 91.7  79.0 - 97.0 fL Final    MCH 06/27/2024 30.0  26.6 - 33.0 pg Final    MCHC 06/27/2024 32.6  31.5 - 35.7 g/dL Final    RDW 06/27/2024 13.1  12.3 - 15.4 % Final    RDW-SD 06/27/2024 43.6  37.0 - 54.0 fl Final    MPV 06/27/2024 10.5  6.0 - 12.0 fL Final    Platelets 06/27/2024 161  140 - 450 10*3/mm3 Final    Glucose 06/27/2024 73  65 - 99 mg/dL Final    BUN 06/27/2024 12  8 - 23 mg/dL Final    Creatinine 06/27/2024 0.97  0.57 - 1.00 mg/dL Final    Sodium 06/27/2024 139  136 - 145 mmol/L Final    Potassium 06/27/2024 4.6  3.5 - 5.2 mmol/L Final    Chloride 06/27/2024 102  98 - 107 mmol/L Final    CO2 06/27/2024 27.9  22.0 - 29.0 mmol/L Final    Calcium 06/27/2024 9.6  8.6 - 10.5 mg/dL Final    Total Protein 06/27/2024 7.3  6.0 - 8.5 g/dL Final    Albumin 06/27/2024 4.2  3.5 - 5.2 g/dL Final    ALT (SGPT) 06/27/2024 10  1 - 33 U/L Final    AST (SGOT) 06/27/2024 26  1 - 32 U/L Final    Alkaline Phosphatase 06/27/2024 75  39 - 117 U/L Final    Total Bilirubin 06/27/2024 0.5  0.0 - 1.2 mg/dL Final    Globulin 06/27/2024 3.1  gm/dL Final     A/G Ratio 06/27/2024 1.4  g/dL Final    BUN/Creatinine Ratio 06/27/2024 12.4  7.0 - 25.0 Final    Anion Gap 06/27/2024 9.1  5.0 - 15.0 mmol/L Final    eGFR 06/27/2024 57.0 (L)  >60.0 mL/min/1.73 Final       Assessment and Plan  Diagnoses and all orders for this visit:    1. Essential hypertension (Primary)    Patient's blood pressure is normal today, she has no further symptoms.  I discussed that hypertension is generally asymptomatic and developing symptoms that she did the other night that she has got emergency department.  I discussed the importance of Crestor and stroke prevention.  Recent CT of the head was negative, I do not see warranting for further imaging at this time.  Instructed to keep home blood pressure log and follow-up with PCP.    BMI is within normal parameters. No other follow-up for BMI required.       New Medications:   No orders of the defined types were placed in this encounter.      Discontinued Medications:   Medications Discontinued During This Encounter   Medication Reason    furosemide (LASIX) 20 MG tablet *Therapy completed                 Follow Up:   No follow-ups on file.    Patient was given instructions and counseling regarding her condition or for health maintenance advice. Please see specific information pulled into the AVS if appropriate.       This document has been electronically signed by Cheko [facility] DO   December 17, 2024 10:02 EST    Dictated Utilizing Dragon Dictation: Part of this note may be an electronic transcription/translation of spoken language to printed text using the Dragon Dictation System.

## 2025-01-16 ENCOUNTER — LAB (OUTPATIENT)
Dept: LAB | Facility: HOSPITAL | Age: 89
End: 2025-01-16
Payer: MEDICARE

## 2025-01-16 ENCOUNTER — TRANSCRIBE ORDERS (OUTPATIENT)
Dept: ADMINISTRATIVE | Facility: HOSPITAL | Age: 89
End: 2025-01-16
Payer: MEDICARE

## 2025-01-16 DIAGNOSIS — N18.32 STAGE 3B CHRONIC KIDNEY DISEASE: Primary | ICD-10-CM

## 2025-01-16 DIAGNOSIS — N18.32 STAGE 3B CHRONIC KIDNEY DISEASE: ICD-10-CM

## 2025-01-16 LAB
ALBUMIN SERPL-MCNC: 3.7 G/DL (ref 3.5–5.2)
ALBUMIN/GLOB SERPL: 1.2 G/DL
ALP SERPL-CCNC: 80 U/L (ref 39–117)
ALT SERPL W P-5'-P-CCNC: 8 U/L (ref 1–33)
ANION GAP SERPL CALCULATED.3IONS-SCNC: 8.7 MMOL/L (ref 5–15)
AST SERPL-CCNC: 21 U/L (ref 1–32)
BILIRUB SERPL-MCNC: 0.5 MG/DL (ref 0–1.2)
BUN SERPL-MCNC: 11 MG/DL (ref 8–23)
BUN/CREAT SERPL: 11.8 (ref 7–25)
CALCIUM SPEC-SCNC: 9.1 MG/DL (ref 8.6–10.5)
CHLORIDE SERPL-SCNC: 105 MMOL/L (ref 98–107)
CO2 SERPL-SCNC: 28.3 MMOL/L (ref 22–29)
CREAT SERPL-MCNC: 0.93 MG/DL (ref 0.57–1)
EGFRCR SERPLBLD CKD-EPI 2021: 59.6 ML/MIN/1.73
GLOBULIN UR ELPH-MCNC: 3.1 GM/DL
GLUCOSE SERPL-MCNC: 85 MG/DL (ref 65–99)
POTASSIUM SERPL-SCNC: 4.3 MMOL/L (ref 3.5–5.2)
PROT SERPL-MCNC: 6.8 G/DL (ref 6–8.5)
SODIUM SERPL-SCNC: 142 MMOL/L (ref 136–145)

## 2025-01-16 PROCEDURE — 81001 URINALYSIS AUTO W/SCOPE: CPT

## 2025-01-16 PROCEDURE — 84156 ASSAY OF PROTEIN URINE: CPT

## 2025-01-16 PROCEDURE — 82043 UR ALBUMIN QUANTITATIVE: CPT

## 2025-01-16 PROCEDURE — 80053 COMPREHEN METABOLIC PANEL: CPT

## 2025-01-16 PROCEDURE — 36415 COLL VENOUS BLD VENIPUNCTURE: CPT

## 2025-01-16 PROCEDURE — 82570 ASSAY OF URINE CREATININE: CPT

## 2025-01-17 LAB
ALBUMIN UR-MCNC: 5.9 MG/DL
BACTERIA UR QL AUTO: ABNORMAL /HPF
BILIRUB UR QL STRIP: NEGATIVE
CLARITY UR: CLEAR
COLOR UR: YELLOW
CREAT UR-MCNC: 102.7 MG/DL
CREAT UR-MCNC: 102.7 MG/DL
GLUCOSE UR STRIP-MCNC: NEGATIVE MG/DL
HGB UR QL STRIP.AUTO: ABNORMAL
HYALINE CASTS UR QL AUTO: ABNORMAL /LPF
KETONES UR QL STRIP: NEGATIVE
LEUKOCYTE ESTERASE UR QL STRIP.AUTO: ABNORMAL
MICROALBUMIN/CREAT UR: 57.4 MG/G (ref 0–29)
NITRITE UR QL STRIP: NEGATIVE
PH UR STRIP.AUTO: 6.5 [PH] (ref 5–8)
PROT ?TM UR-MCNC: 20.6 MG/DL
PROT UR QL STRIP: ABNORMAL
PROT/CREAT UR: 200.6 MG/G CREA (ref 0–200)
RBC # UR STRIP: ABNORMAL /HPF
REF LAB TEST METHOD: ABNORMAL
SP GR UR STRIP: 1.01 (ref 1–1.03)
SQUAMOUS #/AREA URNS HPF: ABNORMAL /HPF
UROBILINOGEN UR QL STRIP: ABNORMAL
WBC # UR STRIP: ABNORMAL /HPF

## 2025-01-29 ENCOUNTER — TELEPHONE (OUTPATIENT)
Dept: FAMILY MEDICINE CLINIC | Facility: CLINIC | Age: 89
End: 2025-01-29
Payer: MEDICARE

## 2025-01-29 NOTE — TELEPHONE ENCOUNTER
"Patient called indicating she has some internal irritation \"on both sides\" of her nose and it even bleeds at times.  Does she need to see an ENT?  Schedule an in office appointment?    Please advise.     "

## 2025-03-03 RX ORDER — POTASSIUM CHLORIDE 750 MG/1
10 CAPSULE, EXTENDED RELEASE ORAL DAILY
Qty: 90 CAPSULE | Refills: 0 | Status: SHIPPED | OUTPATIENT
Start: 2025-03-03

## 2025-03-05 DIAGNOSIS — I10 ESSENTIAL HYPERTENSION: ICD-10-CM

## 2025-03-05 RX ORDER — LISINOPRIL 5 MG/1
TABLET ORAL
Qty: 90 TABLET | Refills: 0 | Status: SHIPPED | OUTPATIENT
Start: 2025-03-05

## 2025-03-20 ENCOUNTER — OFFICE VISIT (OUTPATIENT)
Dept: FAMILY MEDICINE CLINIC | Facility: CLINIC | Age: 89
End: 2025-03-20
Payer: MEDICARE

## 2025-03-20 VITALS
HEART RATE: 77 BPM | WEIGHT: 130.8 LBS | HEIGHT: 65 IN | TEMPERATURE: 98 F | SYSTOLIC BLOOD PRESSURE: 126 MMHG | DIASTOLIC BLOOD PRESSURE: 80 MMHG | OXYGEN SATURATION: 96 % | BODY MASS INDEX: 21.79 KG/M2

## 2025-03-20 DIAGNOSIS — R05.1 ACUTE COUGH: ICD-10-CM

## 2025-03-20 DIAGNOSIS — J01.00 ACUTE NON-RECURRENT MAXILLARY SINUSITIS: Primary | ICD-10-CM

## 2025-03-20 PROCEDURE — 87428 SARSCOV & INF VIR A&B AG IA: CPT | Performed by: NURSE PRACTITIONER

## 2025-03-20 PROCEDURE — 1159F MED LIST DOCD IN RCRD: CPT | Performed by: NURSE PRACTITIONER

## 2025-03-20 PROCEDURE — 99213 OFFICE O/P EST LOW 20 MIN: CPT | Performed by: NURSE PRACTITIONER

## 2025-03-20 PROCEDURE — 1126F AMNT PAIN NOTED NONE PRSNT: CPT | Performed by: NURSE PRACTITIONER

## 2025-03-20 PROCEDURE — 1160F RVW MEDS BY RX/DR IN RCRD: CPT | Performed by: NURSE PRACTITIONER

## 2025-03-20 RX ORDER — GUAIFENESIN 600 MG/1
600 TABLET, EXTENDED RELEASE ORAL 2 TIMES DAILY
Qty: 10 TABLET | Refills: 0 | Status: SHIPPED | OUTPATIENT
Start: 2025-03-20 | End: 2025-03-25

## 2025-03-20 RX ORDER — METHYLPREDNISOLONE 4 MG/1
TABLET ORAL
Qty: 21 TABLET | Refills: 0 | Status: SHIPPED | OUTPATIENT
Start: 2025-03-20 | End: 2025-03-31

## 2025-03-20 RX ORDER — FEXOFENADINE HCL 180 MG/1
180 TABLET ORAL DAILY
Qty: 14 TABLET | Refills: 0 | Status: SHIPPED | OUTPATIENT
Start: 2025-03-20 | End: 2025-04-03

## 2025-03-25 NOTE — PROGRESS NOTES
[facility]     Chief Complaint  Cough    Tonia Ritter is a 89 y.o. female who presents today to [facility] for Cough.    HPI:   HPI   History of Present Illness  The patient presents for evaluation of a cough.    She reports the onset of a mild cough yesterday, which has since escalated in severity. The cough is described as dry and is accompanied by significant discomfort. She has attempted to alleviate the symptoms with ginger tea and apple juice but has not found relief. She also reports a persistent runny nose and fullness in her sinuses that she thought had improved but got worse a couple days ago. States this all started a little over a week ago. .  She has previously found Mucinex to be effective in managing her symptoms.    Supplemental Information  She is under the care of Dr. Gilmore for kidney-related issues and experiences occasional swelling in her feet and legs, for which she takes a diuretic as needed.    MEDICATIONS  Current: Mucinex, Allegra, Medrol Dosepak, Augmentin    Previous History:   Past Medical History:   Diagnosis Date    Acute bronchitis     Allergic rhinitis     Anxiety     Atrial fibrillation     Back pain     Burping     Chills (without fever) 03/25/2019    Chronic pain     Congestive heart failure     Debility 03/18/2024    Depression     Encounter for screening fecal occult blood testing 03/10/2016    NEGATIVE    Fibromyalgia     Flatulence     GERD (gastroesophageal reflux disease)     Hematuria     Hyperlipidemia     Hypertension     Osteoarthritis     Pacemaker     PUD (peptic ulcer disease)     Right shoulder pain       Past Surgical History:   Procedure Laterality Date    CARDIOVERSION  05/2017    [facility]     CARDIOVERSION  09/01/2017    [facility]    CHOLECYSTECTOMY  2004    COLONOSCOPY  05/2013    DILATATION AND CURETTAGE      EPIDIDYMAL CYST EXCISION      ESOPHAGOSCOPY / EGD  01/2019    EYE SURGERY       HEMORRHOIDECTOMY  01/2019    MAMMO BILATERAL  10/2014    NECK SURGERY      PACEMAKER IMPLANTATION      PAP SMEAR  03/2016    SKIN LESION EXCISION        Social History     Socioeconomic History    Marital status:    Tobacco Use    Smoking status: Never    Smokeless tobacco: Never   Vaping Use    Vaping status: Never Used   Substance and Sexual Activity    Alcohol use: No    Drug use: No    Sexual activity: Defer      Health Maintenance Due   Topic Date Due    ZOSTER VACCINE (1 of 2) Never done    RSV Vaccine - Adults (1 - 1-dose 75+ series) Never done    LIPID PANEL  12/05/2023    COVID-19 Vaccine (3 - 2024-25 season) 09/01/2024    ANNUAL WELLNESS VISIT  02/06/2025        Current Medications:  Current Outpatient Medications   Medication Sig Dispense Refill    acetaminophen (TYLENOL) 500 MG tablet Take 1 tablet by mouth Every 6 (Six) Hours As Needed for Mild Pain.      fluticasone (FLONASE) 50 MCG/ACT nasal spray Administer 2 sprays into the nostril(s) as directed by provider Daily. 16 g 2    lisinopril (PRINIVIL,ZESTRIL) 20 MG tablet Take 1 tablet by mouth Daily. 90 tablet 1    lisinopril (PRINIVIL,ZESTRIL) 5 MG tablet Take 1 tablet BY MOUTH EVERY NIGHT AT BEDTIME FOR BLOOD PRESSURE 90 tablet 0    PARoxetine (Paxil) 10 MG tablet Take 0.5 tablets by mouth Every Night. Takes 0.5 tablet 90 tablet 0    potassium chloride (KLOR-CON M10) 10 MEQ CR tablet Take 1 tablet by mouth Daily. 90 tablet 1    potassium chloride (MICRO-K) 10 MEQ CR capsule Take 1 capsule by mouth Daily. 90 capsule 0    rivaroxaban (Xarelto) 20 MG tablet Take 1 tablet by mouth Daily With Dinner. 90 tablet 1    rosuvastatin (CRESTOR) 10 MG tablet Take 1 tablet by mouth Daily. for cholesterol 90 tablet 1    Sotalol HCl, AF, 120 MG tablet Take 1 tablet by mouth 2 (Two) Times a Day. 180 each 1    amoxicillin-clavulanate (AUGMENTIN) 875-125 MG per tablet Take 1 tablet by mouth 2 (Two) Times a Day for 5 days. 10 tablet 0    fexofenadine (Allegra  "Allergy) 180 MG tablet Take 1 tablet by mouth Daily for 14 days. 14 tablet 0    guaiFENesin (Mucinex) 600 MG 12 hr tablet Take 1 tablet by mouth 2 (Two) Times a Day for 5 days. 10 tablet 0    methylPREDNISolone (MEDROL) 4 MG dose pack Take as directed on package instructions. 21 tablet 0     No current facility-administered medications for this visit.       Allergies:   Allergies   Allergen Reactions    Reglan [Metoclopramide]     Sulfa Antibiotics Nausea And Vomiting       Vitals:   /80 (BP Location: Right arm, Patient Position: Sitting)   Pulse 77   Temp 98 °F (36.7 °C) (Temporal)   Ht 165.1 cm (65\")   Wt 59.3 kg (130 lb 12.8 oz)   SpO2 96%   BMI 21.77 kg/m²   Estimated body mass index is 21.77 kg/m² as calculated from the following:    Height as of this encounter: 165.1 cm (65\").    Weight as of this encounter: 59.3 kg (130 lb 12.8 oz).             Physical Exam:   Physical Exam  Vitals reviewed.   Constitutional:       Appearance: Normal appearance. She is ill-appearing.   HENT:      Head: Normocephalic.      Right Ear: Ear canal normal. Tympanic membrane is retracted.      Left Ear: Ear canal normal. Tympanic membrane is injected.      Nose: Nasal tenderness, congestion and rhinorrhea present. Rhinorrhea is purulent.      Right Turbinates: Enlarged.      Left Turbinates: Enlarged.      Right Sinus: Maxillary sinus tenderness present.      Left Sinus: Maxillary sinus tenderness present.      Mouth/Throat:      Pharynx: Posterior oropharyngeal erythema present.      Comments: PND present   Eyes:      Extraocular Movements: Extraocular movements intact.      Conjunctiva/sclera: Conjunctivae normal.   Cardiovascular:      Rate and Rhythm: Normal rate.      Heart sounds: Normal heart sounds.   Pulmonary:      Effort: Pulmonary effort is normal.      Breath sounds: Wheezing present.   Lymphadenopathy:      Cervical: Cervical adenopathy present.   Skin:     General: Skin is warm and dry.   Neurological:    "   Mental Status: She is alert. Mental status is at baseline.      Comments: Normal gait    Psychiatric:         Mood and Affect: Mood normal.        Physical Exam  There is a little bit of wax in the left ear. The right eye shows a little bit of drainage.  Lungs were auscultated.       Lab Results:   Office Visit on 03/20/2025   Component Date Value Ref Range Status    SARS Antigen 03/20/2025 Not Detected  Not Detected, Presumptive Negative Final    Influenza A Antigen MEHREEN 03/20/2025 Not Detected  Not Detected Final    Influenza B Antigen MEHREEN 03/20/2025 Not Detected  Not Detected Final    Internal Control 03/20/2025 Passed  Passed Final    Lot Number 03/20/2025 [id number]   Final    Expiration Date 03/20/2025 03/05/2026   Final   Lab on 01/16/2025   Component Date Value Ref Range Status    Glucose 01/16/2025 85  65 - 99 mg/dL Final    BUN 01/16/2025 11  8 - 23 mg/dL Final    Creatinine 01/16/2025 0.93  0.57 - 1.00 mg/dL Final    Sodium 01/16/2025 142  136 - 145 mmol/L Final    Potassium 01/16/2025 4.3  3.5 - 5.2 mmol/L Final    Chloride 01/16/2025 105  98 - 107 mmol/L Final    CO2 01/16/2025 28.3  22.0 - 29.0 mmol/L Final    Calcium 01/16/2025 9.1  8.6 - 10.5 mg/dL Final    Total Protein 01/16/2025 6.8  6.0 - 8.5 g/dL Final    Albumin 01/16/2025 3.7  3.5 - 5.2 g/dL Final    ALT (SGPT) 01/16/2025 8  1 - 33 U/L Final    AST (SGOT) 01/16/2025 21  1 - 32 U/L Final    Alkaline Phosphatase 01/16/2025 80  39 - 117 U/L Final    Total Bilirubin 01/16/2025 0.5  0.0 - 1.2 mg/dL Final    Globulin 01/16/2025 3.1  gm/dL Final    A/G Ratio 01/16/2025 1.2  g/dL Final    BUN/Creatinine Ratio 01/16/2025 11.8  7.0 - 25.0 Final    Anion Gap 01/16/2025 8.7  5.0 - 15.0 mmol/L Final    eGFR 01/16/2025 59.6 (L)  >60.0 mL/min/1.73 Final    Protein/Creatinine Ratio, Urine 01/16/2025 200.6 (H)  0.0 - 200.0 mg/G Crea Final    Creatinine, Urine 01/16/2025 102.7  mg/dL Final    Total Protein, Urine 01/16/2025 20.6  mg/dL Final     Microalbumin/Creatinine Ratio 01/16/2025 57.4 (H)  0.0 - 29.0 mg/g Final    Creatinine, Urine 01/16/2025 102.7  mg/dL Final    Microalbumin, Urine 01/16/2025 5.9  mg/dL Final    Color, UA 01/16/2025 Yellow  Yellow, Straw Final    Appearance, UA 01/16/2025 Clear  Clear Final    pH, UA 01/16/2025 6.5  5.0 - 8.0 Final    Specific Gravity, UA 01/16/2025 1.015  1.005 - 1.030 Final    Glucose, UA 01/16/2025 Negative  Negative Final    Ketones, UA 01/16/2025 Negative  Negative Final    Bilirubin, UA 01/16/2025 Negative  Negative Final    Blood, UA 01/16/2025 Small (1+) (A)  Negative Final    Protein, UA 01/16/2025 Trace (A)  Negative Final    Leuk Esterase, UA 01/16/2025 Trace (A)  Negative Final    Nitrite, UA 01/16/2025 Negative  Negative Final    Urobilinogen, UA 01/16/2025 1.0 E.U./dL  0.2 - 1.0 E.U./dL Final    RBC, UA 01/16/2025 3-5 (A)  None Seen, 0-2 /HPF Final    WBC, UA 01/16/2025 3-5 (A)  None Seen, 0-2 /HPF Final    Bacteria, UA 01/16/2025 None Seen  None Seen /HPF Final    Squamous Epithelial Cells, UA 01/16/2025 0-2  None Seen, 0-2 /HPF Final    Hyaline Casts, UA 01/16/2025 0-2  None Seen /LPF Final    Methodology 01/16/2025 Automated Microscopy   Final     Results      Results review: During today's encounter, all relevant clinical data has been reviewed.      Assessment and Plan  Diagnoses and all orders for this visit:    1. Acute non-recurrent maxillary sinusitis (Primary)    2. Acute cough  -     POCT SARS-CoV-2 Antigen MEHREEN + Flu    Other orders  -     fexofenadine (Allegra Allergy) 180 MG tablet; Take 1 tablet by mouth Daily for 14 days.  Dispense: 14 tablet; Refill: 0  -     guaiFENesin (Mucinex) 600 MG 12 hr tablet; Take 1 tablet by mouth 2 (Two) Times a Day for 5 days.  Dispense: 10 tablet; Refill: 0  -     methylPREDNISolone (MEDROL) 4 MG dose pack; Take as directed on package instructions.  Dispense: 21 tablet; Refill: 0  -     amoxicillin-clavulanate (AUGMENTIN) 875-125 MG per tablet; Take 1  tablet by mouth 2 (Two) Times a Day for 5 days.  Dispense: 10 tablet; Refill: 0      Assessment & Plan  1-2. Cough.  She reports a persistent cough that started yesterday, accompanied by pain when coughing and a runny nose and episodes of congestion. She has sinus fullness. There is no history of bronchitis, but she has had pneumonia in the past. She is not experiencing shortness of breath, and her oxygen levels are normal. A chest x-ray was discussed but deemed unnecessary at this time due to adequate air movement. She will start Mucinex and Allegra today to help with congestion and allergy symptoms. If there is no improvement within 2 to 3 days, she will begin the Medrol Dosepak and Augmentin.    PROCEDURE  The patient has a history of tear duct surgery performed by Dr. Moreau.    BMI is within normal parameters. No other follow-up for BMI required.       New Medications:   New Medications Ordered This Visit   Medications    fexofenadine (Allegra Allergy) 180 MG tablet     Sig: Take 1 tablet by mouth Daily for 14 days.     Dispense:  14 tablet     Refill:  0    guaiFENesin (Mucinex) 600 MG 12 hr tablet     Sig: Take 1 tablet by mouth 2 (Two) Times a Day for 5 days.     Dispense:  10 tablet     Refill:  0    methylPREDNISolone (MEDROL) 4 MG dose pack     Sig: Take as directed on package instructions.     Dispense:  21 tablet     Refill:  0    amoxicillin-clavulanate (AUGMENTIN) 875-125 MG per tablet     Sig: Take 1 tablet by mouth 2 (Two) Times a Day for 5 days.     Dispense:  10 tablet     Refill:  0       Discontinued Medications:   There are no discontinued medications.           Visit Diagnoses:    ICD-10-CM ICD-9-CM   1. Acute non-recurrent maxillary sinusitis  J01.00 461.0   2. Acute cough  R05.1 786.2            Follow Up:   Return if symptoms worsen or fail to improve.    Patient was given instructions and counseling regarding her condition or for health maintenance advice. Please see specific information  pulled into the AVS if appropriate.     Patient or patient representative verbalized consent for the use of Ambient Listening during the visit with  TEODORA Grayson for chart documentation. 3/25/2025  12:33 EDT      This document has been electronically signed by TEODORA Gryason   March 25, 2025 12:33 EDT    Dictated Utilizing Dragon Dictation: Part of this note may be an electronic transcription/translation of spoken language to printed text using the Dragon Dictation System.

## 2025-03-31 ENCOUNTER — OFFICE VISIT (OUTPATIENT)
Dept: FAMILY MEDICINE CLINIC | Facility: CLINIC | Age: 89
End: 2025-03-31
Payer: MEDICARE

## 2025-03-31 VITALS
BODY MASS INDEX: 20.73 KG/M2 | WEIGHT: 124.4 LBS | DIASTOLIC BLOOD PRESSURE: 76 MMHG | HEART RATE: 76 BPM | HEIGHT: 65 IN | SYSTOLIC BLOOD PRESSURE: 124 MMHG | OXYGEN SATURATION: 96 % | TEMPERATURE: 98.4 F

## 2025-03-31 DIAGNOSIS — J40 BRONCHITIS: ICD-10-CM

## 2025-03-31 DIAGNOSIS — R05.2 SUBACUTE COUGH: Primary | ICD-10-CM

## 2025-03-31 RX ORDER — DOXYCYCLINE 100 MG/1
100 CAPSULE ORAL 2 TIMES DAILY
Qty: 20 CAPSULE | Refills: 0 | Status: SHIPPED | OUTPATIENT
Start: 2025-03-31

## 2025-03-31 RX ORDER — DEXAMETHASONE SODIUM PHOSPHATE 4 MG/ML
4 INJECTION, SOLUTION INTRA-ARTICULAR; INTRALESIONAL; INTRAMUSCULAR; INTRAVENOUS; SOFT TISSUE ONCE
Status: COMPLETED | OUTPATIENT
Start: 2025-03-31 | End: 2025-03-31

## 2025-03-31 RX ADMIN — DEXAMETHASONE SODIUM PHOSPHATE 4 MG: 4 INJECTION, SOLUTION INTRA-ARTICULAR; INTRALESIONAL; INTRAMUSCULAR; INTRAVENOUS; SOFT TISSUE at 16:49

## 2025-03-31 NOTE — PROGRESS NOTES
Subjective   Tonia Ritter is a 89 y.o. female.   Pt presents today with CC of Cough      History of Present Illness   History of Present Illness  Tonia is a pleasant 89-year-old female with subacute cough.  Several weeks now she is complaining of productive cough.  When she was on Augmentin and steroid she reports that it did get a little better, though it has resumed.  She has been taking her Allegra as directed.  And she is anticoagulated       The following portions of the patient's history were reviewed and updated as appropriate: allergies, current medications, past family history, past medical history, past social history, past surgical history, and problem list.    Review of Systems   Constitutional:  Negative for chills, fever and unexpected weight loss.   HENT:  Negative for congestion and sore throat.    Eyes:  Negative for blurred vision and visual disturbance.   Respiratory:  Positive for cough. Negative for wheezing.    Cardiovascular:  Negative for chest pain and palpitations.   Gastrointestinal:  Negative for abdominal pain and diarrhea.   Endocrine: Negative for cold intolerance and heat intolerance.   Genitourinary:  Negative for dysuria.   Musculoskeletal:  Negative for arthralgias and neck stiffness.   Neurological:  Negative for dizziness, seizures and syncope.   Psychiatric/Behavioral:  Negative for self-injury, suicidal ideas and depressed mood.      Vitals:    03/31/25 1600   BP: 124/76   Pulse: 76   Temp: 98.4 °F (36.9 °C)   SpO2: 96%        Objective   Physical Exam  Vitals and nursing note reviewed.   Constitutional:       Appearance: She is well-developed.   HENT:      Head: Normocephalic and atraumatic.      Right Ear: External ear normal.      Left Ear: External ear normal.      Nose: Nose normal.   Eyes:      Conjunctiva/sclera: Conjunctivae normal.      Pupils: Pupils are equal, round, and reactive to light.   Cardiovascular:      Rate and Rhythm: Normal rate and regular rhythm.       Heart sounds: Normal heart sounds.   Pulmonary:      Effort: Pulmonary effort is normal.      Breath sounds: Normal breath sounds.   Abdominal:      General: Bowel sounds are normal.      Palpations: Abdomen is soft.   Musculoskeletal:      Cervical back: Normal range of motion and neck supple.   Skin:     General: Skin is warm and dry.   Neurological:      Mental Status: She is alert and oriented to person, place, and time.   Psychiatric:         Behavior: Behavior normal.           Assessment & Plan   Diagnoses and all orders for this visit:    1. Subacute cough (Primary)  -     XR Chest 2 View    2. Bronchitis  -     XR Chest 2 View  -     dexAMETHasone (DECADRON) injection 4 mg  -     doxycycline (MONODOX) 100 MG capsule; Take 1 capsule by mouth 2 (Two) Times a Day.  Dispense: 20 capsule; Refill: 0    Already completed a round of steroid and Augmentin.  If her symptoms get significantly better in the next day or so, I will feel like it is more likely the steroid helped.  I do not want to let a pneumonia worsen so I will go ahead and start doxycycline to complete therapy.  Of note, fluoroquinolone interacts with sotalol.                 This document has been electronically signed by Lisa Delgado  March 31, 2025 16:02 EDT    Dictated Utilizing Dragon Dictation: Part of this note may be an electronic transcription/translation of spoken language to printed text using the Dragon Dictation System.

## 2025-04-01 ENCOUNTER — RESULTS FOLLOW-UP (OUTPATIENT)
Dept: FAMILY MEDICINE CLINIC | Facility: CLINIC | Age: 89
End: 2025-04-01
Payer: MEDICARE

## 2025-04-02 ENCOUNTER — TELEPHONE (OUTPATIENT)
Dept: FAMILY MEDICINE CLINIC | Facility: CLINIC | Age: 89
End: 2025-04-02
Payer: MEDICARE

## 2025-04-02 NOTE — TELEPHONE ENCOUNTER
"Tonia called indicating she was \"coughing her head off\" and wants to know if a cough syrup or OTC cough syrup is recommended/could be called in. She was seen by Dr Orozco earlier this week.   "

## 2025-04-03 ENCOUNTER — OFFICE VISIT (OUTPATIENT)
Dept: FAMILY MEDICINE CLINIC | Facility: CLINIC | Age: 89
End: 2025-04-03
Payer: MEDICARE

## 2025-04-03 VITALS
BODY MASS INDEX: 21.19 KG/M2 | SYSTOLIC BLOOD PRESSURE: 138 MMHG | TEMPERATURE: 98.2 F | DIASTOLIC BLOOD PRESSURE: 76 MMHG | HEIGHT: 65 IN | OXYGEN SATURATION: 96 % | HEART RATE: 70 BPM | WEIGHT: 127.2 LBS

## 2025-04-03 DIAGNOSIS — J40 BRONCHITIS: Primary | ICD-10-CM

## 2025-04-03 DIAGNOSIS — R53.83 OTHER FATIGUE: ICD-10-CM

## 2025-04-03 PROCEDURE — 1126F AMNT PAIN NOTED NONE PRSNT: CPT | Performed by: NURSE PRACTITIONER

## 2025-04-03 PROCEDURE — 99213 OFFICE O/P EST LOW 20 MIN: CPT | Performed by: NURSE PRACTITIONER

## 2025-04-03 RX ORDER — ALBUTEROL SULFATE 90 UG/1
2 INHALANT RESPIRATORY (INHALATION)
Qty: 18 G | Refills: 0 | Status: SHIPPED | OUTPATIENT
Start: 2025-04-03

## 2025-04-03 RX ORDER — BENZONATATE 100 MG/1
100 CAPSULE ORAL 3 TIMES DAILY PRN
Qty: 15 CAPSULE | Refills: 0 | Status: SHIPPED | OUTPATIENT
Start: 2025-04-03 | End: 2025-04-08

## 2025-04-04 ENCOUNTER — LAB (OUTPATIENT)
Dept: FAMILY MEDICINE CLINIC | Facility: CLINIC | Age: 89
End: 2025-04-04
Payer: MEDICARE

## 2025-04-04 DIAGNOSIS — R53.83 OTHER FATIGUE: ICD-10-CM

## 2025-04-04 PROCEDURE — 85027 COMPLETE CBC AUTOMATED: CPT | Performed by: NURSE PRACTITIONER

## 2025-04-04 NOTE — PROGRESS NOTES
"[facility]     Chief Complaint  Cough    Tonia Ritter is a 89 y.o. female who presents today to [facility] for Cough.    HPI:   Cough       History of Present Illness  The patient, an 89-year-old female, presents for evaluation of a chronic cough.    She reports a persistent cough that intermittently improves but subsequently recurs. Reports all other symptoms have resolved other than her cough. The cough is described as severe and frequent, causing her body to \"shake\" due to force of cough. It disturbs her ADL's. She experiences sudden coughing fits that disrupt her daily activities.  She has been taking Mucinex DM, which induces somnolence, and Robitussin, borrowed from her cousin. She does not have any known pulmonary conditions.  She does not report wheezing but notes occasional popping and cracking sounds. She does not have gastroesophageal reflux disease (GERD) but has gastroparesis, which complicates digestion. She does not report palpitations. Her cough does not disturb her sleep. She has been consuming hot carmelo tea with apple juice, which she believes provides some relief. She also uses Vicks VapoRub. Despite completing a course of antibiotics and Mucinex, she did not take Allegra.  A chest radiograph was performed, which yielded normal results.    MEDICATIONS  Current: lisinopril, Mucinex, Robitussin    Previous History:   Past Medical History:   Diagnosis Date    Acute bronchitis     Allergic rhinitis     Anxiety     Atrial fibrillation     Back pain     Burping     Chills (without fever) 03/25/2019    Chronic pain     Congestive heart failure     Debility 03/18/2024    Depression     Encounter for screening fecal occult blood testing 03/10/2016    NEGATIVE    Fibromyalgia     Flatulence     GERD (gastroesophageal reflux disease)     Hematuria     Hyperlipidemia     Hypertension     Osteoarthritis     Pacemaker     PUD (peptic ulcer disease)     Right " shoulder pain       Past Surgical History:   Procedure Laterality Date    CARDIOVERSION  05/2017    [facility]     [facility]  09/01/[facility]    CHOLECYSTECTOMY  2004    COLONOSCOPY  05/2013    DILATATION AND CURETTAGE      EPIDIDYMAL CYST EXCISION      ESOPHAGOSCOPY / EGD  01/2019    EYE SURGERY      HEMORRHOIDECTOMY  01/2019    MAMMO BILATERAL  10/2014    NECK SURGERY      PACEMAKER IMPLANTATION      PAP SMEAR  03/2016    SKIN LESION EXCISION        Social History     Socioeconomic History    Marital status:    Tobacco Use    Smoking status: Never    Smokeless tobacco: Never   Vaping Use    Vaping status: Never Used   Substance and Sexual Activity    Alcohol use: No    Drug use: No    Sexual activity: Defer      Health Maintenance Due   Topic Date Due    ZOSTER VACCINE (1 of 2) Never done    RSV Vaccine - Adults (1 - 1-dose 75+ series) Never done    LIPID PANEL  12/05/2023    COVID-19 Vaccine (3 - 2024-25 season) 09/01/2024    ANNUAL WELLNESS VISIT  02/06/2025        Current Medications:  Current Outpatient Medications   Medication Sig Dispense Refill    acetaminophen (TYLENOL) 500 MG tablet Take 1 tablet by mouth Every 6 (Six) Hours As Needed for Mild Pain.      doxycycline (MONODOX) 100 MG capsule Take 1 capsule by mouth 2 (Two) Times a Day. 20 capsule 0    fexofenadine (Allegra Allergy) 180 MG tablet Take 1 tablet by mouth Daily for 14 days. 14 tablet 0    fluticasone (FLONASE) 50 MCG/ACT nasal spray Administer 2 sprays into the nostril(s) as directed by provider Daily. 16 g 2    lisinopril (PRINIVIL,ZESTRIL) 20 MG tablet Take 1 tablet by mouth Daily. 90 tablet 1    lisinopril (PRINIVIL,ZESTRIL) 5 MG tablet Take 1 tablet BY MOUTH EVERY NIGHT AT BEDTIME FOR BLOOD PRESSURE 90 tablet 0    PARoxetine (Paxil) 10 MG tablet Take 0.5 tablets by mouth Every Night. Takes 0.5 tablet 90 tablet 0    potassium chloride (KLOR-CON M10) 10 MEQ CR tablet Take 1 tablet by mouth Daily. 90 tablet 1     "potassium chloride (MICRO-K) 10 MEQ CR capsule Take 1 capsule by mouth Daily. 90 capsule 0    rivaroxaban (Xarelto) 20 MG tablet Take 1 tablet by mouth Daily With Dinner. 90 tablet 1    rosuvastatin (CRESTOR) 10 MG tablet Take 1 tablet by mouth Daily. for cholesterol 90 tablet 1    Sotalol HCl, AF, 120 MG tablet Take 1 tablet by mouth 2 (Two) Times a Day. 180 each 1    albuterol sulfate  (90 Base) MCG/ACT inhaler Inhale 2 puffs Every 4 (Four) to 6 (Six) Hours As Needed for Wheezing or Shortness of Air. 18 g 0    benzonatate (Tessalon Perles) 100 MG capsule Take 1 capsule by mouth 3 (Three) Times a Day As Needed for Cough for up to 5 days. 15 capsule 0     No current facility-administered medications for this visit.       Allergies:   Allergies   Allergen Reactions    Reglan [Metoclopramide]     Sulfa Antibiotics Nausea And Vomiting       Vitals:   /76 (BP Location: Right arm, Patient Position: Sitting)   Pulse 70   Temp 98.2 °F (36.8 °C) (Temporal)   Ht 165.1 cm (65\")   Wt 57.7 kg (127 lb 3.2 oz)   SpO2 96%   BMI 21.17 kg/m²   Estimated body mass index is 21.17 kg/m² as calculated from the following:    Height as of this encounter: 165.1 cm (65\").    Weight as of this encounter: 57.7 kg (127 lb 3.2 oz).               Physical Exam:   Physical Exam  Vitals reviewed.   Constitutional:       Appearance: Normal appearance.   HENT:      Head: Normocephalic.      Right Ear: Tympanic membrane and ear canal normal.      Left Ear: Tympanic membrane and ear canal normal.      Nose: Nose normal.   Eyes:      Extraocular Movements: Extraocular movements intact.      Conjunctiva/sclera: Conjunctivae normal.   Cardiovascular:      Rate and Rhythm: Normal rate.      Heart sounds: Normal heart sounds.   Pulmonary:      Effort: Pulmonary effort is normal.      Breath sounds: Wheezing present.      Comments: Mild and clears with cough  Abdominal:      General: Bowel sounds are normal.      Palpations: Abdomen is " soft.   Skin:     General: Skin is warm and dry.   Neurological:      Mental Status: She is alert. Mental status is at baseline.      Comments: Normal gait    Psychiatric:         Mood and Affect: Mood normal.        Physical Exam  Lungs were auscultated.       Lab Results:   Office Visit on 03/20/2025   Component Date Value Ref Range Status    SARS Antigen 03/20/2025 Not Detected  Not Detected, Presumptive Negative Final    Influenza A Antigen MEHREEN 03/20/2025 Not Detected  Not Detected Final    Influenza B Antigen MEHREEN 03/20/2025 Not Detected  Not Detected Final    Internal Control 03/20/2025 Passed  Passed Final    Lot Number 03/20/2025 [id number]   Final    Expiration Date 03/20/2025 03/05/2026   Final   Lab on 01/16/2025   Component Date Value Ref Range Status    Glucose 01/16/2025 85  65 - 99 mg/dL Final    BUN 01/16/2025 11  8 - 23 mg/dL Final    Creatinine 01/16/2025 0.93  0.57 - 1.00 mg/dL Final    Sodium 01/16/2025 142  136 - 145 mmol/L Final    Potassium 01/16/2025 4.3  3.5 - 5.2 mmol/L Final    Chloride 01/16/2025 105  98 - 107 mmol/L Final    CO2 01/16/2025 28.3  22.0 - 29.0 mmol/L Final    Calcium 01/16/2025 9.1  8.6 - 10.5 mg/dL Final    Total Protein 01/16/2025 6.8  6.0 - 8.5 g/dL Final    Albumin 01/16/2025 3.7  3.5 - 5.2 g/dL Final    ALT (SGPT) 01/16/2025 8  1 - 33 U/L Final    AST (SGOT) 01/16/2025 21  1 - 32 U/L Final    Alkaline Phosphatase 01/16/2025 80  39 - 117 U/L Final    Total Bilirubin 01/16/2025 0.5  0.0 - 1.2 mg/dL Final    Globulin 01/16/2025 3.1  gm/dL Final    A/G Ratio 01/16/2025 1.2  g/dL Final    BUN/Creatinine Ratio 01/16/2025 11.8  7.0 - 25.0 Final    Anion Gap 01/16/2025 8.7  5.0 - 15.0 mmol/L Final    eGFR 01/16/2025 59.6 (L)  >60.0 mL/min/1.73 Final    Protein/Creatinine Ratio, Urine 01/16/2025 200.6 (H)  0.0 - 200.0 mg/G Crea Final    Creatinine, Urine 01/16/2025 102.7  mg/dL Final    Total Protein, Urine 01/16/2025 20.6  mg/dL Final    Microalbumin/Creatinine Ratio 01/16/2025  57.4 (H)  0.0 - 29.0 mg/g Final    Creatinine, Urine 01/16/2025 102.7  mg/dL Final    Microalbumin, Urine 01/16/2025 5.9  mg/dL Final    Color, UA 01/16/2025 Yellow  Yellow, Straw Final    Appearance, UA 01/16/2025 Clear  Clear Final    pH, UA 01/16/2025 6.5  5.0 - 8.0 Final    Specific Gravity, UA 01/16/2025 1.015  1.005 - 1.030 Final    Glucose, UA 01/16/2025 Negative  Negative Final    Ketones, UA 01/16/2025 Negative  Negative Final    Bilirubin, UA 01/16/2025 Negative  Negative Final    Blood, UA 01/16/2025 Small (1+) (A)  Negative Final    Protein, UA 01/16/2025 Trace (A)  Negative Final    Leuk Esterase, UA 01/16/2025 Trace (A)  Negative Final    Nitrite, UA 01/16/2025 Negative  Negative Final    Urobilinogen, UA 01/16/2025 1.0 E.U./dL  0.2 - 1.0 E.U./dL Final    RBC, UA 01/16/2025 3-5 (A)  None Seen, 0-2 /HPF Final    WBC, UA 01/16/2025 3-5 (A)  None Seen, 0-2 /HPF Final    Bacteria, UA 01/16/2025 None Seen  None Seen /HPF Final    Squamous Epithelial Cells, UA 01/16/2025 0-2  None Seen, 0-2 /HPF Final    Hyaline Casts, UA 01/16/2025 0-2  None Seen /LPF Final    Methodology 01/16/2025 Automated Microscopy   Final     Results  Imaging  Chest x-ray showed no abnormalities.    Results review: During today's encounter, all relevant clinical data has been reviewed.      Assessment and Plan  Diagnoses and all orders for this visit:    1. Bronchitis (Primary)    2. Other fatigue  -     CBC (No Diff); Future    Other orders  -     benzonatate (Tessalon Perles) 100 MG capsule; Take 1 capsule by mouth 3 (Three) Times a Day As Needed for Cough for up to 5 days.  Dispense: 15 capsule; Refill: 0  -     albuterol sulfate  (90 Base) MCG/ACT inhaler; Inhale 2 puffs Every 4 (Four) to 6 (Six) Hours As Needed for Wheezing or Shortness of Air.  Dispense: 18 g; Refill: 0      Assessment & Plan  Cough.  The chest radiograph did not reveal any signs of pneumonia 03/31 Monday. The cough is most likely due to bronchitis. An  albuterol inhaler will be prescribed for use at least twice daily to manage the cough. Additionally, Tessalon Perles will be provided for use up to three times daily as needed for severe coughing episodes as it is affecting her daytime activity and is not presumed to be infectious. A teaspoon of honey is recommended to alleviate the cough. CBC will be ordered today. If there is no improvement in her condition, a pulmonary function test may be considered or further testing. If her condition deteriorates, she should seek immediate medical attention at the emergency room. We did discuss diff dx of cough and time frame of these symptoms. However if cough does not resolve within a few weeks she will need to look into further causes of this and she verbalized understanding.     Follow-up  The patient will follow up in 2 weeks to check in on her.    BMI is within normal parameters. No other follow-up for BMI required.       New Medications:   New Medications Ordered This Visit   Medications    benzonatate (Tessalon Perles) 100 MG capsule     Sig: Take 1 capsule by mouth 3 (Three) Times a Day As Needed for Cough for up to 5 days.     Dispense:  15 capsule     Refill:  0    albuterol sulfate  (90 Base) MCG/ACT inhaler     Sig: Inhale 2 puffs Every 4 (Four) to 6 (Six) Hours As Needed for Wheezing or Shortness of Air.     Dispense:  18 g     Refill:  0       Discontinued Medications:   There are no discontinued medications.           Visit Diagnoses:    ICD-10-CM ICD-9-CM   1. Bronchitis  J40 490   2. Other fatigue  R53.83 780.79            Follow Up:   Return in about 2 weeks (around 4/17/2025).    Patient was given instructions and counseling regarding her condition or for health maintenance advice. Please see specific information pulled into the AVS if appropriate.     Patient or patient representative verbalized consent for the use of Ambient Listening during the visit with  TEODORA Grayson for chart  documentation. 4/3/2025  22:47 EDT      This document has been electronically signed by TEODORA Grayson   April 3, 2025 22:47 EDT    Dictated Utilizing Dragon Dictation: Part of this note may be an electronic transcription/translation of spoken language to printed text using the Dragon Dictation System.

## 2025-04-05 LAB
DEPRECATED RDW RBC AUTO: 42 FL (ref 37–54)
ERYTHROCYTE [DISTWIDTH] IN BLOOD BY AUTOMATED COUNT: 12.8 % (ref 12.3–15.4)
HCT VFR BLD AUTO: 40.1 % (ref 34–46.6)
HGB BLD-MCNC: 13.2 G/DL (ref 12–15.9)
MCH RBC QN AUTO: 29.9 PG (ref 26.6–33)
MCHC RBC AUTO-ENTMCNC: 32.9 G/DL (ref 31.5–35.7)
MCV RBC AUTO: 90.7 FL (ref 79–97)
PLATELET # BLD AUTO: 187 10*3/MM3 (ref 140–450)
PMV BLD AUTO: 10.9 FL (ref 6–12)
RBC # BLD AUTO: 4.42 10*6/MM3 (ref 3.77–5.28)
WBC NRBC COR # BLD AUTO: 8.21 10*3/MM3 (ref 3.4–10.8)

## 2025-04-07 ENCOUNTER — RESULTS FOLLOW-UP (OUTPATIENT)
Dept: FAMILY MEDICINE CLINIC | Facility: CLINIC | Age: 89
End: 2025-04-07
Payer: MEDICARE

## 2025-04-07 NOTE — PROGRESS NOTES
Can we let Tonia know we hope she is feeling better, and her labs came back WNL. No signs of infection present. Thank you

## 2025-04-10 ENCOUNTER — TELEPHONE (OUTPATIENT)
Dept: FAMILY MEDICINE CLINIC | Facility: CLINIC | Age: 89
End: 2025-04-10
Payer: MEDICARE

## 2025-04-10 DIAGNOSIS — K13.79 MOUTH PAIN: Primary | ICD-10-CM

## 2025-04-10 RX ORDER — NYSTATIN 100000 [USP'U]/ML
500000 SUSPENSION ORAL 4 TIMES DAILY
Qty: 180 ML | Refills: 0 | Status: SHIPPED | OUTPATIENT
Start: 2025-04-10

## 2025-04-10 NOTE — TELEPHONE ENCOUNTER
Caller: Tonia Ritter    Relationship: Self    Best call back number: [phone]     What medication are you requesting: MAGIC MOUTHWASH    What are your current symptoms: PATIENT HAS BEEN ON A LOT OF ANTIBIOTICS CAUSING HER MOUTH TO BE RAW, RED AND HAVING SOME DISCOMFORT    How long have you been experiencing symptoms: 1 DAY    Have you had these symptoms before:    [x] Yes  [] No    Have you been treated for these symptoms before:   [x] Yes  [] No    If a prescription is needed, what is your preferred pharmacy and phone number: [place], [place] - [address] [place] [address] - [phone] [facility] [phone]      Additional notes: PLEASE CALL PATIENT WHEN MEDICATION HAS BEEN APPROVED OR DENIED.

## 2025-04-17 ENCOUNTER — OFFICE VISIT (OUTPATIENT)
Dept: FAMILY MEDICINE CLINIC | Facility: CLINIC | Age: 89
End: 2025-04-17
Payer: MEDICARE

## 2025-04-17 VITALS
HEART RATE: 78 BPM | OXYGEN SATURATION: 97 % | TEMPERATURE: 97.8 F | BODY MASS INDEX: 20.96 KG/M2 | DIASTOLIC BLOOD PRESSURE: 78 MMHG | HEIGHT: 65 IN | SYSTOLIC BLOOD PRESSURE: 132 MMHG | WEIGHT: 125.8 LBS

## 2025-04-17 DIAGNOSIS — J40 BRONCHITIS: Primary | ICD-10-CM

## 2025-04-17 DIAGNOSIS — R05.2 SUBACUTE COUGH: ICD-10-CM

## 2025-04-17 NOTE — PROGRESS NOTES
Subjective   Tonia Ritter is a 89 y.o. female.   Pt presents today with CC of Cough      History of Present Illness   History of Present Illness  Patient is an 89-year-old female here following up on bronchitis.  She has completed her antibiotic and reports she is doing better.       The following portions of the patient's history were reviewed and updated as appropriate: allergies, current medications, past family history, past medical history, past social history, past surgical history, and problem list.    Review of Systems   Constitutional:  Negative for chills, fever and unexpected weight loss.   HENT:  Negative for congestion and sore throat.    Eyes:  Negative for blurred vision and visual disturbance.   Respiratory:  Positive for cough. Negative for wheezing.    Cardiovascular:  Negative for chest pain and palpitations.   Gastrointestinal:  Negative for abdominal pain and diarrhea.   Endocrine: Negative for cold intolerance and heat intolerance.   Genitourinary:  Negative for dysuria.   Musculoskeletal:  Negative for arthralgias and neck stiffness.   Neurological:  Negative for dizziness, seizures and syncope.   Psychiatric/Behavioral:  Negative for self-injury, suicidal ideas and depressed mood.      Vitals:    04/17/25 1426   BP: 132/78   Pulse: 78   Temp: 97.8 °F (36.6 °C)   SpO2: 97%        Objective   Physical Exam  Vitals and nursing note reviewed.   Constitutional:       Appearance: She is well-developed.   HENT:      Head: Normocephalic and atraumatic.      Right Ear: External ear normal.      Left Ear: External ear normal.      Nose: Nose normal.   Eyes:      Conjunctiva/sclera: Conjunctivae normal.      Pupils: Pupils are equal, round, and reactive to light.   Cardiovascular:      Rate and Rhythm: Normal rate and regular rhythm.      Heart sounds: Normal heart sounds.   Pulmonary:      Effort: Pulmonary effort is normal. No respiratory distress.      Breath sounds: Normal breath sounds. No  wheezing, rhonchi or rales.   Abdominal:      General: Bowel sounds are normal.      Palpations: Abdomen is soft.   Musculoskeletal:      Cervical back: Normal range of motion and neck supple.   Skin:     General: Skin is warm and dry.   Neurological:      Mental Status: She is alert and oriented to person, place, and time.   Psychiatric:         Behavior: Behavior normal.           Assessment & Plan   Diagnoses and all orders for this visit:    1. Bronchitis (Primary)  Resolved  2. Subacute cough  She reports that up until the last couple of days of her antibiotic she was still experiencing a productive cough but it has resolved.  She reports she is back to baseline and has no further concerns.                BMI is within normal parameters. No other follow-up for BMI required.          This document has been electronically signed by Lisa Delgado  April 17, 2025 14:28 EDT    Dictated Utilizing Dragon Dictation: Part of this note may be an electronic transcription/translation of spoken language to printed text using the Dragon Dictation System.

## 2025-05-16 ENCOUNTER — TRANSCRIBE ORDERS (OUTPATIENT)
Dept: ADMINISTRATIVE | Facility: HOSPITAL | Age: 89
End: 2025-05-16
Payer: MEDICARE

## 2025-05-16 ENCOUNTER — LAB (OUTPATIENT)
Dept: LAB | Facility: HOSPITAL | Age: 89
End: 2025-05-16
Payer: MEDICARE

## 2025-05-16 DIAGNOSIS — N18.32 STAGE 3B CHRONIC KIDNEY DISEASE: ICD-10-CM

## 2025-05-16 DIAGNOSIS — N18.32 STAGE 3B CHRONIC KIDNEY DISEASE: Primary | ICD-10-CM

## 2025-05-16 PROCEDURE — 82570 ASSAY OF URINE CREATININE: CPT

## 2025-05-16 PROCEDURE — 82043 UR ALBUMIN QUANTITATIVE: CPT

## 2025-05-16 PROCEDURE — 84156 ASSAY OF PROTEIN URINE: CPT

## 2025-05-16 PROCEDURE — 81001 URINALYSIS AUTO W/SCOPE: CPT

## 2025-05-16 PROCEDURE — 36415 COLL VENOUS BLD VENIPUNCTURE: CPT

## 2025-05-16 PROCEDURE — 80053 COMPREHEN METABOLIC PANEL: CPT

## 2025-05-17 LAB
ALBUMIN SERPL-MCNC: 3.8 G/DL (ref 3.5–5.2)
ALBUMIN UR-MCNC: 5.8 MG/DL
ALBUMIN/GLOB SERPL: 1.1 G/DL
ALP SERPL-CCNC: 80 U/L (ref 39–117)
ALT SERPL W P-5'-P-CCNC: 9 U/L (ref 1–33)
ANION GAP SERPL CALCULATED.3IONS-SCNC: 8.8 MMOL/L (ref 5–15)
AST SERPL-CCNC: 26 U/L (ref 1–32)
BACTERIA UR QL AUTO: NORMAL /HPF
BILIRUB SERPL-MCNC: 0.5 MG/DL (ref 0–1.2)
BILIRUB UR QL STRIP: NEGATIVE
BUN SERPL-MCNC: 8 MG/DL (ref 8–23)
BUN/CREAT SERPL: 7.1 (ref 7–25)
CALCIUM SPEC-SCNC: 9.1 MG/DL (ref 8.6–10.5)
CHLORIDE SERPL-SCNC: 104 MMOL/L (ref 98–107)
CLARITY UR: CLEAR
CO2 SERPL-SCNC: 28.2 MMOL/L (ref 22–29)
COLOR UR: YELLOW
CREAT SERPL-MCNC: 1.13 MG/DL (ref 0.57–1)
CREAT UR-MCNC: 49.5 MG/DL
CREAT UR-MCNC: 49.5 MG/DL
EGFRCR SERPLBLD CKD-EPI 2021: 47.2 ML/MIN/1.73
GLOBULIN UR ELPH-MCNC: 3.4 GM/DL
GLUCOSE SERPL-MCNC: 72 MG/DL (ref 65–99)
GLUCOSE UR STRIP-MCNC: NEGATIVE MG/DL
HGB UR QL STRIP.AUTO: NEGATIVE
HOLD SPECIMEN: NORMAL
HYALINE CASTS UR QL AUTO: NORMAL /LPF
KETONES UR QL STRIP: NEGATIVE
LEUKOCYTE ESTERASE UR QL STRIP.AUTO: ABNORMAL
MICROALBUMIN/CREAT UR: 117.2 MG/G (ref 0–29)
NITRITE UR QL STRIP: NEGATIVE
PH UR STRIP.AUTO: 7 [PH] (ref 5–8)
POTASSIUM SERPL-SCNC: 3.7 MMOL/L (ref 3.5–5.2)
PROT ?TM UR-MCNC: 15.8 MG/DL
PROT SERPL-MCNC: 7.2 G/DL (ref 6–8.5)
PROT UR QL STRIP: ABNORMAL
PROT/CREAT UR: 319.2 MG/G CREA (ref 0–200)
RBC # UR STRIP: NORMAL /HPF
REF LAB TEST METHOD: NORMAL
SODIUM SERPL-SCNC: 141 MMOL/L (ref 136–145)
SP GR UR STRIP: 1.01 (ref 1–1.03)
SQUAMOUS #/AREA URNS HPF: NORMAL /HPF
UROBILINOGEN UR QL STRIP: ABNORMAL
WBC # UR STRIP: NORMAL /HPF

## 2025-05-19 ENCOUNTER — OFFICE VISIT (OUTPATIENT)
Dept: FAMILY MEDICINE CLINIC | Facility: CLINIC | Age: 89
End: 2025-05-19
Payer: MEDICARE

## 2025-05-19 VITALS
BODY MASS INDEX: 21.23 KG/M2 | OXYGEN SATURATION: 98 % | WEIGHT: 127.4 LBS | SYSTOLIC BLOOD PRESSURE: 128 MMHG | DIASTOLIC BLOOD PRESSURE: 76 MMHG | HEART RATE: 70 BPM | TEMPERATURE: 98.4 F | HEIGHT: 65 IN

## 2025-05-19 DIAGNOSIS — R21 RASH OF FACE: ICD-10-CM

## 2025-05-19 DIAGNOSIS — L21.9 SEBORRHEIC DERMATITIS: Primary | ICD-10-CM

## 2025-05-19 DIAGNOSIS — J30.2 SEASONAL ALLERGIES: ICD-10-CM

## 2025-05-19 RX ORDER — LORATADINE 10 MG/1
10 TABLET ORAL DAILY
Qty: 90 TABLET | Refills: 0 | Status: SHIPPED | OUTPATIENT
Start: 2025-05-19

## 2025-05-19 NOTE — PROGRESS NOTES
Subjective   Tonia Ritter is a 89 y.o. female.   Pt presents today with CC of Skin Problem      History of Present Illness   History of Present Illness  Patient is a pleasant 89-year-old female who complains of irritation and rash on her face.  Mostly around her lips, under her nose, and occasionally on her forehead.  This has been going on for few years, it comes and goes and it does not seem to get better.  For the rash on her nose she uses a thin layer of Vaseline that seems to have helped.     The following portions of the patient's history were reviewed and updated as appropriate: allergies, current medications, past family history, past medical history, past social history, past surgical history, and problem list.    Review of Systems   Constitutional:  Negative for chills, fever and unexpected weight loss.   HENT:  Negative for congestion and sore throat.    Eyes:  Negative for blurred vision and visual disturbance.   Respiratory:  Negative for cough and wheezing.    Cardiovascular:  Negative for chest pain and palpitations.   Gastrointestinal:  Negative for abdominal pain and diarrhea.   Endocrine: Negative for cold intolerance and heat intolerance.   Genitourinary:  Negative for dysuria.   Musculoskeletal:  Negative for arthralgias and neck stiffness.   Neurological:  Negative for dizziness, seizures and syncope.   Psychiatric/Behavioral:  Negative for self-injury, suicidal ideas and depressed mood.      Vitals:    05/19/25 1543   BP: 128/76   Pulse: 70   Temp: 98.4 °F (36.9 °C)   SpO2: 98%        Objective   Physical Exam  Vitals and nursing note reviewed.   Constitutional:       Appearance: She is well-developed.   HENT:      Head: Normocephalic and atraumatic.      Right Ear: External ear normal.      Left Ear: External ear normal.      Nose: Nose normal.   Eyes:      Conjunctiva/sclera: Conjunctivae normal.      Pupils: Pupils are equal, round, and reactive to light.   Cardiovascular:      Rate and  Rhythm: Normal rate and regular rhythm.      Heart sounds: Normal heart sounds.   Pulmonary:      Effort: Pulmonary effort is normal.      Breath sounds: Normal breath sounds.   Abdominal:      General: Bowel sounds are normal.      Palpations: Abdomen is soft.   Musculoskeletal:      Cervical back: Normal range of motion and neck supple.   Skin:     General: Skin is warm and dry.      Comments: No noticeable rhinorrhea.  Very faint pink rash around her lips and nose.  Does not involve the cheeks   Neurological:      Mental Status: She is alert and oriented to person, place, and time.   Psychiatric:         Behavior: Behavior normal.         Assessment & Plan   Diagnoses and all orders for this visit:    1. Seborrheic dermatitis (Primary)  As below  2. Rash of face  I suspect that his seborrheic dermatitis.  She does have seasonal allergies and frequently has rhinorrhea.  However, she does not have runny nose today.  The rash would not have been noticeable if she did not bring it up.  I recommend washing her face with dandruff shampoo each night for a couple weeks until her follow-up to see if this helps at all.   3. Seasonal allergies  -     loratadine (Claritin) 10 MG tablet; Take 1 tablet by mouth Daily.  Dispense: 90 tablet; Refill: 0               BMI is within normal parameters. No other follow-up for BMI required.          This document has been electronically signed by Lisa Delgado  May 19, 2025 15:49 EDT    Dictated Utilizing Dragon Dictation: Part of this note may be an electronic transcription/translation of spoken language to printed text using the Dragon Dictation System.

## 2025-05-21 ENCOUNTER — TELEPHONE (OUTPATIENT)
Dept: FAMILY MEDICINE CLINIC | Facility: CLINIC | Age: 89
End: 2025-05-21
Payer: MEDICARE

## 2025-05-21 DIAGNOSIS — J40 BRONCHITIS: Primary | ICD-10-CM

## 2025-05-21 NOTE — TELEPHONE ENCOUNTER
"Tonia called indicating she thinks she may need an antibiotic as she is continuing to have a productive cough and is even \"coughing stuff up\" but thinks an antibiotic may be most advantageous.      "

## 2025-06-02 ENCOUNTER — OFFICE VISIT (OUTPATIENT)
Dept: FAMILY MEDICINE CLINIC | Facility: CLINIC | Age: 89
End: 2025-06-02
Payer: MEDICARE

## 2025-06-02 VITALS
DIASTOLIC BLOOD PRESSURE: 68 MMHG | HEIGHT: 65 IN | HEART RATE: 95 BPM | SYSTOLIC BLOOD PRESSURE: 124 MMHG | BODY MASS INDEX: 20.26 KG/M2 | OXYGEN SATURATION: 97 % | TEMPERATURE: 98.4 F | WEIGHT: 121.6 LBS

## 2025-06-02 DIAGNOSIS — H54.7 WORSENING VISION: ICD-10-CM

## 2025-06-02 DIAGNOSIS — J30.2 SEASONAL ALLERGIES: ICD-10-CM

## 2025-06-02 DIAGNOSIS — I48.0 PAROXYSMAL ATRIAL FIBRILLATION: ICD-10-CM

## 2025-06-02 DIAGNOSIS — E78.2 MIXED HYPERLIPIDEMIA: ICD-10-CM

## 2025-06-02 DIAGNOSIS — Z00.00 MEDICARE ANNUAL WELLNESS VISIT, SUBSEQUENT: Primary | ICD-10-CM

## 2025-06-02 DIAGNOSIS — I10 ESSENTIAL HYPERTENSION: ICD-10-CM

## 2025-06-02 DIAGNOSIS — F41.9 ANXIETY: ICD-10-CM

## 2025-06-02 DIAGNOSIS — E87.6 HYPOKALEMIA: ICD-10-CM

## 2025-06-02 DIAGNOSIS — R10.12 LUQ ABDOMINAL PAIN: ICD-10-CM

## 2025-06-02 DIAGNOSIS — L21.9 SEBORRHEIC DERMATITIS: ICD-10-CM

## 2025-06-02 PROCEDURE — 1126F AMNT PAIN NOTED NONE PRSNT: CPT | Performed by: FAMILY MEDICINE

## 2025-06-02 PROCEDURE — 1159F MED LIST DOCD IN RCRD: CPT | Performed by: FAMILY MEDICINE

## 2025-06-02 PROCEDURE — 1170F FXNL STATUS ASSESSED: CPT | Performed by: FAMILY MEDICINE

## 2025-06-02 PROCEDURE — G0439 PPPS, SUBSEQ VISIT: HCPCS | Performed by: FAMILY MEDICINE

## 2025-06-02 PROCEDURE — 1160F RVW MEDS BY RX/DR IN RCRD: CPT | Performed by: FAMILY MEDICINE

## 2025-06-02 RX ORDER — POTASSIUM CHLORIDE 750 MG/1
10 CAPSULE, EXTENDED RELEASE ORAL DAILY
Qty: 90 CAPSULE | Refills: 0 | Status: SHIPPED | OUTPATIENT
Start: 2025-06-02

## 2025-06-02 RX ORDER — FLUTICASONE PROPIONATE 50 MCG
2 SPRAY, SUSPENSION (ML) NASAL DAILY
Qty: 16 G | Refills: 2 | Status: SHIPPED | OUTPATIENT
Start: 2025-06-02

## 2025-06-02 RX ORDER — LISINOPRIL 5 MG/1
5 TABLET ORAL DAILY
Qty: 90 TABLET | Refills: 0 | Status: SHIPPED | OUTPATIENT
Start: 2025-06-02

## 2025-06-02 RX ORDER — PAROXETINE 10 MG/1
5 TABLET, FILM COATED ORAL NIGHTLY
Qty: 90 TABLET | Refills: 0 | Status: CANCELLED | OUTPATIENT
Start: 2025-06-02

## 2025-06-02 RX ORDER — SOTALOL HYDROCHLORIDE 120 MG/1
1 TABLET ORAL 2 TIMES DAILY
Qty: 180 EACH | Refills: 1 | Status: SHIPPED | OUTPATIENT
Start: 2025-06-02

## 2025-06-02 RX ORDER — ROSUVASTATIN CALCIUM 10 MG/1
10 TABLET, COATED ORAL DAILY
Qty: 90 TABLET | Refills: 1 | Status: SHIPPED | OUTPATIENT
Start: 2025-06-02

## 2025-06-02 RX ORDER — LISINOPRIL 20 MG/1
20 TABLET ORAL DAILY
Qty: 90 TABLET | Refills: 1 | Status: SHIPPED | OUTPATIENT
Start: 2025-06-02

## 2025-06-02 RX ORDER — KETOCONAZOLE 20 MG/G
1 CREAM TOPICAL DAILY
Qty: 60 G | Refills: 0 | Status: CANCELLED | OUTPATIENT
Start: 2025-06-02

## 2025-06-02 NOTE — PROGRESS NOTES
Subjective   The ABCs of the Annual Wellness Visit  Medicare Wellness Visit      Tonia Ritter is a 89 y.o. patient who presents for a Medicare Wellness Visit.    The following portions of the patient's history were reviewed and   updated as appropriate: allergies, current medications, past family history, past medical history, past social history, past surgical history, and problem list.    Compared to one year ago, the patient's physical   health is the same.  Compared to one year ago, the patient's mental   health is the same.    Recent Hospitalizations:  She was not admitted to the hospital during the last year.     Current Medical Providers:  Patient Care Team:  Joey Orozco DO as PCP - General (Family Medicine)  Zoey Carrillo RN as Ambulatory  ([facility])    Outpatient Medications Prior to Visit   Medication Sig Dispense Refill    acetaminophen (TYLENOL) 500 MG tablet Take 1 tablet by mouth Every 6 (Six) Hours As Needed for Mild Pain.      albuterol sulfate  (90 Base) MCG/ACT inhaler Inhale 2 puffs Every 4 (Four) to 6 (Six) Hours As Needed for Wheezing or Shortness of Air. 18 g 0    fluticasone (FLONASE) 50 MCG/ACT nasal spray Administer 2 sprays into the nostril(s) as directed by provider Daily. 16 g 2    lisinopril (PRINIVIL,ZESTRIL) 20 MG tablet Take 1 tablet by mouth Daily. 90 tablet 1    lisinopril (PRINIVIL,ZESTRIL) 5 MG tablet Take 1 tablet BY MOUTH EVERY NIGHT AT BEDTIME FOR BLOOD PRESSURE 90 tablet 0    loratadine (Claritin) 10 MG tablet Take 1 tablet by mouth Daily. 90 tablet 0    nystatin (MYCOSTATIN) 100,000 unit/mL suspension Swish and spit 5 mL 4 (Four) Times a Day. 180 mL 0    PARoxetine (Paxil) 10 MG tablet Take 0.5 tablets by mouth Every Night. Takes 0.5 tablet 90 tablet 0    potassium chloride (MICRO-K) 10 MEQ CR capsule Take 1 capsule by mouth Daily. 90 capsule 0    rivaroxaban (Xarelto) 20 MG tablet Take 1 tablet by mouth Daily With Dinner. 90  "tablet 1    rosuvastatin (CRESTOR) 10 MG tablet Take 1 tablet by mouth Daily. for cholesterol 90 tablet 1    Sotalol HCl, AF, 120 MG tablet Take 1 tablet by mouth 2 (Two) Times a Day. 180 each 1    amoxicillin-clavulanate (AUGMENTIN) 875-125 MG per tablet Take 1 tablet by mouth 2 (Two) Times a Day. 14 tablet 0     No facility-administered medications prior to visit.     No opioid medication identified on active medication list. I have reviewed chart for other potential  high risk medication/s and harmful drug interactions in the elderly.      Aspirin is not on active medication list.  Aspirin use is not indicated based on review of current medical condition/s. Risk of harm outweighs potential benefits.  .    Patient Active Problem List   Diagnosis    Right shoulder pain    PUD (peptic ulcer disease)    Osteoarthritis    Hypertension    Hyperlipidemia    Acute bronchitis    Allergic rhinitis    Anxiety    Atrial fibrillation    Back pain    Burping    Chronic pain    Depression    Encounter for screening fecal occult blood testing    Flatulence    GERD (gastroesophageal reflux disease)    Hematuria    Constipation    Pain of upper abdomen    Pacemaker    S/P ablation of atrial fibrillation    Acute URI    Acute bacterial rhinosinusitis    Hot flashes    Impacted cerumen of right ear    Debility     Advance Care Planning Advance Directive is not on file.  ACP discussion was held with the patient during this visit. Patient does not have an advance directive, information provided.            Objective   Vitals:    06/02/25 1359   BP: 124/68   BP Location: Right arm   Patient Position: Sitting   Pulse: 95   Temp: 98.4 °F (36.9 °C)   SpO2: 97%   Weight: 55.2 kg (121 lb 9.6 oz)   Height: 165.1 cm (65\")   PainSc: 0-No pain       Estimated body mass index is 20.24 kg/m² as calculated from the following:    Height as of this encounter: 165.1 cm (65\").    Weight as of this encounter: 55.2 kg (121 lb 9.6 oz).    BMI is within " normal parameters. No other follow-up for BMI required.    Does the patient have evidence of cognitive impairment? No                                                                                                Health  Risk Assessment    Smoking Status:  Social History     Tobacco Use   Smoking Status Never   Smokeless Tobacco Never     Alcohol Consumption:  Social History     Substance and Sexual Activity   Alcohol Use No       Fall Risk Screen  TENAADI Fall Risk Assessment was completed, and patient is at LOW risk for falls.Assessment completed on:2025    Depression Screening   Little interest or pleasure in doing things? Not at all   Feeling down, depressed, or hopeless? Not at all   PHQ-2 Total Score 0      Health Habits and Functional and Cognitive Screenin/2/2025     2:03 PM   Functional & Cognitive Status   Do you have difficulty preparing food and eating? No   Do you have difficulty bathing yourself, getting dressed or grooming yourself? No   Do you have difficulty using the toilet? No   Do you have difficulty moving around from place to place? No   Do you have trouble with steps or getting out of a bed or a chair? No   Current Diet Unhealthy Diet   Dental Exam Up to date   Eye Exam Not up to date   Exercise (times per week) 0 times per week   Current Exercises Include No Regular Exercise   Do you need help using the phone?  No   Are you deaf or do you have serious difficulty hearing?  No   Do you need help to go to places out of walking distance? No   Do you need help shopping? No   Do you need help preparing meals?  No   Do you need help with housework?  No   Do you need help with laundry? No   Do you need help taking your medications? No   Do you need help managing money? No   Do you ever drive or ride in a car without wearing a seat belt? No   Have you felt unusual stress, anger or loneliness in the last month? No   Who do you live with? Alone   If you need help, do you have trouble finding  someone available to you? Yes   Have you been bothered in the last four weeks by sexual problems? No   Do you have difficulty concentrating, remembering or making decisions? No           Age-appropriate Screening Schedule:  Refer to the list below for future screening recommendations based on patient's age, sex and/or medical conditions. Orders for these recommended tests are listed in the plan section. The patient has been provided with a written plan.    Health Maintenance List  Health Maintenance   Topic Date Due    ZOSTER VACCINE (1 of 2) Never done    RSV Vaccine - Adults (1 - 1-dose 75+ series) Never done    LIPID PANEL  12/05/2023    COVID-19 Vaccine (3 - 2024-25 season) 09/01/2024    ANNUAL WELLNESS VISIT  02/06/2025    INFLUENZA VACCINE  07/01/2025    DXA SCAN  10/27/2025    TDAP/TD VACCINES (4 - Td or Tdap) 10/04/2026    Pneumococcal Vaccine 50+  Completed    MAMMOGRAM  Discontinued                                                                                                                                                CMS Preventative Services Quick Reference  Risk Factors Identified During Encounter  Chronic Pain: Natural history and expected course discussed. Questions answered.    The above risks/problems have been discussed with the patient.  Pertinent information has been shared with the patient in the After Visit Summary.  An After Visit Summary and PPPS were made available to the patient.    Follow Up:   Next Medicare Wellness visit to be scheduled in 1 year.         Additional E&M Note during same encounter follows:  Patient has additional, significant, and separately identifiable condition(s)/problem(s) that require work above and beyond the Medicare Wellness Visit     Chief Complaint  Medicare Wellness-subsequent    Subjective   HPI  Tonia is also being seen today for additional medical problem/s.    Review of Systems   Constitutional:  Negative for appetite change.   HENT:  Negative for  "dental problem, ear discharge and sore throat.    Eyes:  Negative for visual disturbance.   Respiratory:  Negative for cough, chest tightness and shortness of breath.    Cardiovascular:  Negative for chest pain and palpitations.   Gastrointestinal:  Negative for abdominal pain.   Genitourinary:  Negative for dyspareunia, enuresis and hematuria.              Objective   Vital Signs:  /68 (BP Location: Right arm, Patient Position: Sitting)   Pulse 95   Temp 98.4 °F (36.9 °C)   Ht 165.1 cm (65\")   Wt 55.2 kg (121 lb 9.6 oz)   SpO2 97%   BMI 20.24 kg/m²   Physical Exam  Vitals and nursing note reviewed.   Constitutional:       Appearance: She is well-developed.   HENT:      Head: Normocephalic and atraumatic.      Right Ear: External ear normal.      Left Ear: External ear normal.      Nose: Nose normal.   Eyes:      Conjunctiva/sclera: Conjunctivae normal.      Pupils: Pupils are equal, round, and reactive to light.   Cardiovascular:      Rate and Rhythm: Normal rate and regular rhythm.      Heart sounds: Normal heart sounds.   Pulmonary:      Effort: Pulmonary effort is normal.      Breath sounds: Normal breath sounds.   Abdominal:      General: Bowel sounds are normal.      Palpations: Abdomen is soft.   Musculoskeletal:      Cervical back: Normal range of motion and neck supple.   Skin:     General: Skin is warm and dry.   Neurological:      Mental Status: She is alert and oriented to person, place, and time.   Psychiatric:         Behavior: Behavior normal.      Assessment and Plan Additional age appropriate preventative wellness advice topics were discussed during today's preventative wellness exam(some topics already addressed during AWV portion of the note above):    Physical Activity: Advised cardiovascular activity 150 minutes per week as tolerated. (example brisk walk for 30 minutes, 5 days a week).     Nutrition: Discussed nutrition plan with patient. Information shared in after visit summary. " Goal is for a well balanced diet to enhance overall health.     Healthy Weight: Discussed current and goal BMI with patient. Steps to attain this goal discussed. Information shared in after visit summary.     Medicare annual wellness visit, subsequent  No concerns on Medicare wellness today.       Seasonal allergies         Essential hypertension           Anxiety         Paroxysmal atrial fibrillation         Mixed hyperlipidemia            Seborrheic dermatitis  Improved significantly with use of dandruff shampoo.       Worsening vision  She is going to follow-up with Dr. Carrington, her optometrist.  Vision was slightly decreased today from what we would expect from baseline.  She still drives.  If her vision worsens much further, she will need to stop driving.             I spent 30 minutes caring for Tonia on this date of service. This time includes time spent by me in the following activities:preparing for the visit, reviewing tests, obtaining and/or reviewing a separately obtained history, performing a medically appropriate examination and/or evaluation , counseling and educating the patient/family/caregiver, ordering medications, tests, or procedures, documenting information in the medical record, independently interpreting results and communicating that information with the patient/family/caregiver, and care coordination  Follow Up   No follow-ups on file.  Patient was given instructions and counseling regarding her condition or for health maintenance advice. Please see specific information pulled into the AVS if appropriate.

## 2025-06-05 ENCOUNTER — TELEPHONE (OUTPATIENT)
Dept: FAMILY MEDICINE CLINIC | Facility: CLINIC | Age: 89
End: 2025-06-05
Payer: MEDICARE

## 2025-06-05 DIAGNOSIS — F41.9 ANXIETY: Primary | ICD-10-CM

## 2025-06-05 RX ORDER — PAROXETINE 10 MG/1
5 TABLET, FILM COATED ORAL EVERY MORNING
Qty: 90 TABLET | Refills: 0 | Status: SHIPPED | OUTPATIENT
Start: 2025-06-05

## 2025-06-05 NOTE — TELEPHONE ENCOUNTER
Caller: Tonia Ritter    Relationship: Self    Best call back number: [phone]     What was the call regarding: PAXIL     PATIENT STATED THAT SHE KNOWS THAT DR. WALSH TOLD HER TO STOP TAKING THIS MEDICATION BUT PATIENT WOULD LIKE TO CONTINUE TAKING THIS MEDICATION AS SHE STATED THAT WHEN SHE STOPPED TAKING THIS MEDICATION FOR TWO DAYS SHE FELT ODD AND JUST OFF.   PATIENT STATED THAT SHOULD LIKE TO HAVE A CALL BACK AS TO IF SHE CAN CONTINUE TAKING THIS MEDICATION OR NOT.

## 2025-06-05 NOTE — TELEPHONE ENCOUNTER
Yes, you may resume this medication.  It is added back to your chart, and refill was sent to your pharmacy in case it is needed.